# Patient Record
Sex: FEMALE | Race: WHITE | NOT HISPANIC OR LATINO | Employment: FULL TIME | URBAN - METROPOLITAN AREA
[De-identification: names, ages, dates, MRNs, and addresses within clinical notes are randomized per-mention and may not be internally consistent; named-entity substitution may affect disease eponyms.]

---

## 2017-02-13 ENCOUNTER — PATIENT MESSAGE (OUTPATIENT)
Dept: DERMATOLOGY | Facility: CLINIC | Age: 47
End: 2017-02-13

## 2017-02-14 ENCOUNTER — OFFICE VISIT (OUTPATIENT)
Dept: DERMATOLOGY | Facility: CLINIC | Age: 47
End: 2017-02-14
Payer: COMMERCIAL

## 2017-02-14 VITALS — HEIGHT: 67 IN | WEIGHT: 204 LBS | BODY MASS INDEX: 32.02 KG/M2

## 2017-02-14 DIAGNOSIS — L57.0 BENIGN KERATOSIS: ICD-10-CM

## 2017-02-14 DIAGNOSIS — L72.0 MILIA: Primary | ICD-10-CM

## 2017-02-14 PROCEDURE — 99999 PR PBB SHADOW E&M-EST. PATIENT-LVL II: CPT | Mod: PBBFAC,,, | Performed by: DERMATOLOGY

## 2017-02-14 PROCEDURE — 99212 OFFICE O/P EST SF 10 MIN: CPT | Mod: S$GLB,,, | Performed by: DERMATOLOGY

## 2017-02-14 NOTE — PROGRESS NOTES
Subjective:       Patient ID:  Concepcion De LaV ega is a 46 y.o. female who presents for   Chief Complaint   Patient presents with    Lesion     HPI Comments: High risk patient complains of lesion on right cheek for several weeks, asymptomatic, not treated  Lesion on back recently noticed not treated    Recent path  FINAL PATHOLOGIC DIAGNOSIS  1. Skin, right third digit, shave biopsy:  - VERRUCA VULGARIS.  MICROSCOPIC DESCRIPTION: Sections show a verrucous keratosis with acanthosis, papillomatosis,  hyperkeratosis, hypergranulosis and focal koilocytotic changes.  Diagnosed by: Hannah Bill M.D.  (Electronically Signed: 2016-07-18 12:57:47)     Phx BCC L anterior Thigh April 2016 - Gisela ZAZUETA @ [facility] - excised  Phx SCC R hand Sept 2015 - excised    Mother & Father had skin ca- types unknown    Maternal grandparents had skin ca - types unknown                 Review of Systems   Skin: Positive for activity-related sunscreen use. Negative for daily sunscreen use and recent sunburn.        Objective:    Physical Exam   Constitutional: She appears well-developed and well-nourished. No distress.   HENT:   Head:       Neurological: She is alert and oriented to person, place, and time. She is not disoriented.   Psychiatric: She has a normal mood and affect.   Skin:   Areas Examined (abnormalities noted in diagram):   Head / Face Inspection Performed  Neck Inspection Performed  Back Inspection Performed  RUE Inspected  LUE Inspection Performed              Diagram Legend     Erythematous scaling macule/papule c/w actinic keratosis       Vascular papule c/w angioma      Pigmented verrucoid papule/plaque c/w seborrheic keratosis      Yellow umbilicated papule c/w sebaceous hyperplasia      Irregularly shaped tan macule c/w lentigo     1-2 mm smooth white papules consistent with Milia      Movable subcutaneous cyst with punctum c/w epidermal inclusion cyst      Subcutaneous movable cyst c/w pilar cyst      Firm pink to brown  papule c/w dermatofibroma      Pedunculated fleshy papule(s) c/w skin tag(s)      Evenly pigmented macule c/w junctional nevus     Mildly variegated pigmented, slightly irregular-bordered macule c/w mildly atypical nevus      Flesh colored to evenly pigmented papule c/w intradermal nevus       Pink pearly papule/plaque c/w basal cell carcinoma      Erythematous hyperkeratotic cursted plaque c/w SCC      Surgical scar with no sign of skin cancer recurrence      Open and closed comedones      Inflammatory papules and pustules      Verrucoid papule consistent consistent with wart     Erythematous eczematous patches and plaques     Dystrophic onycholytic nail with subungual debris c/w onychomycosis     Umbilicated papule    Erythematous-base heme-crusted tan verrucoid plaque consistent with inflamed seborrheic keratosis     Erythematous Silvery Scaling Plaque c/w Psoriasis     See annotation      Assessment / Plan:        Milia, right cheek  Lesion incised with #11 blade and drained on today's date      Benign keratosis, back  Reassurance given to patient. No treatment is necessary.   Treatment of benign, asymptomatic lesions may be considered cosmetic.               Return in about 6 months (around 8/14/2017).

## 2017-04-20 ENCOUNTER — OFFICE VISIT (OUTPATIENT)
Dept: DERMATOLOGY | Facility: CLINIC | Age: 47
End: 2017-04-20
Payer: COMMERCIAL

## 2017-04-20 VITALS — WEIGHT: 204 LBS | BODY MASS INDEX: 32.02 KG/M2 | HEIGHT: 67 IN

## 2017-04-20 DIAGNOSIS — L23.7 POISON IVY: Primary | ICD-10-CM

## 2017-04-20 DIAGNOSIS — L30.4 INTERTRIGO: ICD-10-CM

## 2017-04-20 PROCEDURE — 99213 OFFICE O/P EST LOW 20 MIN: CPT | Mod: S$GLB,,, | Performed by: DERMATOLOGY

## 2017-04-20 PROCEDURE — 99999 PR PBB SHADOW E&M-EST. PATIENT-LVL II: CPT | Mod: PBBFAC,,, | Performed by: DERMATOLOGY

## 2017-04-20 RX ORDER — PREDNISONE 20 MG/1
TABLET ORAL
Qty: 11 TABLET | Refills: 0 | Status: SHIPPED | OUTPATIENT
Start: 2017-04-20 | End: 2017-07-24 | Stop reason: ALTCHOICE

## 2017-04-20 RX ORDER — MOMETASONE FUROATE 1 MG/G
CREAM TOPICAL
Qty: 50 G | Refills: 1 | Status: SHIPPED | OUTPATIENT
Start: 2017-04-20 | End: 2017-08-17

## 2017-04-20 NOTE — MR AVS SNAPSHOT
[facility]  [address] [place] [address]  Phone: [phone]  Fax: [phone]                  Concepcion De La Vega   2017 9:45 AM   Office Visit    Description:  Female : 1970   Provider:  Taylor Savage MD   Department:  [place] [facility]           Reason for Visit     Rash           Diagnoses this Visit        Comments    Poison ivy    -  Primary     Intertrigo                To Do List           Goals (5 Years of Data)     None      Follow-Up and Disposition     Return if symptoms worsen or fail to improve.    Follow-up and Disposition History       These Medications        Disp Refills Start End    mometasone 0.1% (ELOCON) 0.1 % cream 50 g 1 2017     aaa prn poison ivy, avoid chronic use.    Pharm[place]: [facility] [place] - [address] Ph #: [phone]         [facility]     [facility] Nurse Care Line -  Assistance  Unless otherwise directed by your provider, please contact [facility], our nurse care line that is available for  assistance.     Registered nurses in the [facility] provide: appointment scheduling, clinical advisement, health education, and other advisory services.  Call: [phone] (toll free)               Medications           Message regarding Medications     Verify the changes and/or additions to your medication regime listed below are the same as discussed with your clinician today.  If any of these changes or additions are incorrect, please notify your healthcare provider.        START taking these NEW medications        Refills    mometasone 0.1% (ELOCON) 0.1 % cream 1    Sig: aaa prn poison ivy, avoid chronic use.    Class: Normal           Verify that the below list of medications is an accurate representation of the medications you are currently taking.  If none reported, the list may be blank. If incorrect, please contact your healthcare provider. Carry this  "list with you in case of emergency.           Current Medications     buPROPion (WELLBUTRIN XL) 150 MG TB24 tablet Take 2 tablets (300 mg total) by mouth once daily.    dicyclomine (BENTYL) 10 MG capsule Take 1 capsule (10 mg total) by mouth 4 (four) times daily before meals and nightly.    hyoscyamine (LEVSIN/SL) 0.125 mg Subl Take 1 tablet (0.125 mg total) by mouth every 6 (six) hours as needed.    pantoprazole (PROTONIX) 20 MG tablet Take 1 tablet (20 mg total) by mouth once daily.    riboflavin (VITAMIN B-2) 100 mg Tab tablet Take 1 tablet (100 mg total) by mouth 2 (two) times daily.    mometasone 0.1% (ELOCON) 0.1 % cream aaa prn poison ivy, avoid chronic use.    triamcinolone acetonide 0.5% (KENALOG) 0.5 % Crea Apply topically 3 (three) times daily as needed.           Clinical Reference Information           Your Vitals Were     Height Weight BMI          5' 7" (1.702 m) 92.5 kg (204 lb) 31.95 kg/m2        Allergies as of 4/20/2017     Oxytetracycline      Immunizations Administered on Date of Encounter - 4/20/2017     None      Language Assistance Services     ATTENTION: Language assistance services are available, free of charge. Please call [phone].      ATENCIÓN: Si elio snell, tiene a knapp disposición servicios gratuitos de asistencia lingüística. Llame al [phone].     CHÚ Ý: N?u b?n nói Ti?ng Vi?t, có các d?ch v? h? tr? ngôn ng? mi?n phí dành cho b?n. G?i s? [phone].         [facility] complies with applicable Federal civil rights laws and does not discriminate on the basis of race, color, national origin, age, disability, or sex.        "

## 2017-04-20 NOTE — PROGRESS NOTES
Subjective:       Patient ID:  Concepcion De La Vega is a 47 y.o. female who presents for   Chief Complaint   Patient presents with    Rash     HPI Comments: Pt complains of rash on left hand for 1 days, itchy, using kenalog cream. Linear distribution. History contact with poison ivy recently this weekend. Immediately showered after she realized .     Rash under breast and abdominal fold for 1 day, using TAC cream and otc antifungal cream     Recent path 7-  FINAL PATHOLOGIC DIAGNOSIS  1. Skin, right third digit, shave biopsy:  - VERRUCA VULGARIS.  MICROSCOPIC DESCRIPTION: Sections show a verrucous keratosis with acanthosis, papillomatosis,  hyperkeratosis, hypergranulosis and focal koilocytotic changes.  Diagnosed by: Hannah Bill M.D.  (Electronically Signed: 2016-07-18 12:57:47)      Phx BCC L anterior Thigh April 2016 - Gisela ZAZUETA @ [facility] - excised  Phx SCC R hand Sept 2015 - excised    Mother & Father had skin ca- types unknown    Maternal grandparents had skin ca - types unknown                        Review of Systems   Skin: Positive for itching, rash and activity-related sunscreen use. Negative for daily sunscreen use and recent sunburn.        Objective:    Physical Exam   Constitutional: She appears well-developed and well-nourished. She is obese.  No distress.   HENT:   Mouth/Throat: Lips normal.    Eyes: Lids are normal.  No conjunctival no injection.   Neurological: She is alert and oriented to person, place, and time. She is not disoriented.   Psychiatric: She has a normal mood and affect.   Skin:   Areas Examined (abnormalities noted in diagram):   Head / Face Inspection Performed  Neck Inspection Performed  Chest / Axilla Inspection Performed  Abdomen Inspection Performed  RUE Inspected  LUE Inspection Performed  Nails and Digits Inspection Performed              Diagram Legend     Erythematous scaling macule/papule c/w actinic keratosis       Vascular papule c/w angioma      Pigmented  verrucoid papule/plaque c/w seborrheic keratosis      Yellow umbilicated papule c/w sebaceous hyperplasia      Irregularly shaped tan macule c/w lentigo     1-2 mm smooth white papules consistent with Milia      Movable subcutaneous cyst with punctum c/w epidermal inclusion cyst      Subcutaneous movable cyst c/w pilar cyst      Firm pink to brown papule c/w dermatofibroma      Pedunculated fleshy papule(s) c/w skin tag(s)      Evenly pigmented macule c/w junctional nevus     Mildly variegated pigmented, slightly irregular-bordered macule c/w mildly atypical nevus      Flesh colored to evenly pigmented papule c/w intradermal nevus       Pink pearly papule/plaque c/w basal cell carcinoma      Erythematous hyperkeratotic cursted plaque c/w SCC      Surgical scar with no sign of skin cancer recurrence      Open and closed comedones      Inflammatory papules and pustules      Verrucoid papule consistent consistent with wart     Erythematous eczematous patches and plaques     Dystrophic onycholytic nail with subungual debris c/w onychomycosis     Umbilicated papule    Erythematous-base heme-crusted tan verrucoid plaque consistent with inflamed seborrheic keratosis     Erythematous Silvery Scaling Plaque c/w Psoriasis     See annotation      Assessment / Plan:        Poison ivy  -     mometasone 0.1% (ELOCON) 0.1 % cream; aaa prn poison ivy, avoid chronic use.  Dispense: 50 g; Refill: 1  discussed avoiding chronic use and to use only on affected areas- risk atrophy, striae, ecchymoses, hypopigmentation      Intertrigo, inframammary/infraabdominal/inguinal  Recommend white vinegar: water 1:1 compresses 2x/day followed by cool blow dry and then application of prescription medication- ok to mix triamcinolone/desitin/lotrimin  Handout given  discussed avoiding chronic use and to use only on affected areas- risk atrophy, striae, ecchymoses, hypopigmentation    Cool blow dry after showering. Once clear, use baby powder, corn  starch, or Zeasorb AF powder for maintenance to affected area.             Return if symptoms worsen or fail to improve.

## 2017-05-09 ENCOUNTER — OFFICE VISIT (OUTPATIENT)
Dept: OPTOMETRY | Facility: CLINIC | Age: 47
End: 2017-05-09
Payer: COMMERCIAL

## 2017-05-09 DIAGNOSIS — H16.202 KERATOCONJUNCTIVITIS OF LEFT EYE: Primary | ICD-10-CM

## 2017-05-09 PROCEDURE — 92012 INTRM OPH EXAM EST PATIENT: CPT | Mod: S$GLB,,, | Performed by: OPTOMETRIST

## 2017-05-09 PROCEDURE — 99999 PR PBB SHADOW E&M-EST. PATIENT-LVL II: CPT | Mod: PBBFAC,,, | Performed by: OPTOMETRIST

## 2017-05-09 RX ORDER — NEOMYCIN SULFATE, POLYMYXIN B SULFATE AND DEXAMETHASONE 3.5; 10000; 1 MG/ML; [USP'U]/ML; MG/ML
1 SUSPENSION/ DROPS OPHTHALMIC 3 TIMES DAILY
Qty: 5 ML | Refills: 0 | Status: SHIPPED | OUTPATIENT
Start: 2017-05-09 | End: 2017-05-12

## 2017-05-09 NOTE — PROGRESS NOTES
HPI     Eye Pain    Additional comments: +FBS -- OS when woke up this morning, feels like   something under AYLIN -- relief when pulls lid away from eye // no redness,   tearing or discharge           Dry Eye    Additional comments: +ATS            Comments   Agree above  Transient fbs OS sup lid/ lateral canthus   VA normal  No other new issue  [place] had fbs OD and found lash in eye yesterday       Last edited by ANTONIO Gresham, OD on 5/9/2017 12:16 PM. (History)            Assessment /Plan     For exam results, see Encounter Report.    Keratoconjunctivitis of left eye  -     neomycin-polymyxin-dexamethasone (MAXITROL) 3.5mg/mL-10,000 unit/mL-0.1 % DrpS; Place 1 drop into the left eye 3 (three) times daily.  Dispense: 5 mL; Refill: 0      Allergic / dry eye/ possible fb component to s/s  No fb noted today  No stain/ abrasion    Swabbed sup lid w/ sterile forceps  Rx gtts tid x 3 then prn for comfort  Suggest gel gtts at night for dry eye issues following lasik    RTC prn if not improved or worsening      415 PM  Rechecked at slit lamp w/ fluress  Still no fb noted and no stain  Continue maxitrol as directed w/ ATs tid-qid    RTC prn

## 2017-05-09 NOTE — MR AVS SNAPSHOT
[facility]  [address] [place] [address]  Phone: [phone]  Fax: [phone]                  Concepcion De La Vega   2017 1:00 PM   Office Visit    Description:  Female : 1970   Provider:  ANTONIO Gresham, OD   Department:  [place] [facility]           Reason for Visit     Eye Pain     Dry Eye           Diagnoses this Visit        Comments    Keratoconjunctivitis of left eye    -  Primary            To Do List           Future Appointments        Provider Department Dept Phone    2017 1:00 PM ANTONIO Gresham, OD [phone]      Goals (5 Years of Data)     None      Follow-Up and Disposition     Return if symptoms worsen or fail to improve.       These Medications        Disp Refills Start End    neomycin-polymyxin-dexamethasone (MAXITROL) 3.5mg/mL-10,000 unit/mL-0.1 % DrpS 5 mL 0 2017    Place 1 drop into the left eye 3 (three) times daily. - Left Eye    Pharmacy: [facility] [place] - [address] Ph #: [phone]         [facility]     [facility] Care [facility] -  Assistance  Unless otherwise directed by your provider, please contact [facility], our nurse care line that is available for  assistance.     Registered nurses in the [facility] provide: appointment scheduling, clinical advisement, health education, and other advisory services.  Call: [phone] (toll free)               Medications           Message regarding Medications     Verify the changes and/or additions to your medication regime listed below are the same as discussed with your clinician today.  If any of these changes or additions are incorrect, please notify your healthcare provider.        START taking these NEW medications        Refills    neomycin-polymyxin-dexamethasone (MAXITROL) 3.5mg/mL-10,000 unit/mL-0.1 % DrpS 0    Sig: Place 1 drop into the left eye 3 (three) times  "daily.    Class: Normal    Route: Left Eye           Verify that the below list of medications is an accurate representation of the medications you are currently taking.  If none reported, the list may be blank. If incorrect, please contact your healthcare provider. Carry this list with you in case of emergency.           Current Medications     buPROPion (WELLBUTRIN XL) 150 MG TB24 tablet Take 2 tablets (300 mg total) by mouth once daily.    dicyclomine (BENTYL) 10 MG capsule Take 1 capsule (10 mg total) by mouth 4 (four) times daily before meals and nightly.    hyoscyamine (LEVSIN/SL) 0.125 mg Subl Take 1 tablet (0.125 mg total) by mouth every 6 (six) hours as needed.    mometasone 0.1% (ELOCON) 0.1 % cream aaa prn poison ivy, avoid chronic use.    neomycin-polymyxin-dexamethasone (MAXITROL) 3.5mg/mL-10,000 unit/mL-0.1 % DrpS Place 1 drop into the left eye 3 (three) times daily.    pantoprazole (PROTONIX) 20 MG tablet Take 1 tablet (20 mg total) by mouth once daily.    predniSONE (DELTASONE) 20 MG tablet 2 po qam x 3 days, then 1 po qam x 3 days then 1/2 po qam x 4 days    riboflavin (VITAMIN B-2) 100 mg Tab tablet Take 1 tablet (100 mg total) by mouth 2 (two) times daily.    triamcinolone acetonide 0.5% (KENALOG) 0.5 % Crea Apply topically 3 (three) times daily as needed.           Clinical Reference Information           Allergies as of 5/9/2017     Oxytetracycline      Immunizations Administered on Date of Encounter - 5/9/2017     None      Instructions    DRY EYES:  Use Over The Counter artificial tears as needed for dry eye symptoms.  Some common brands include:  Systane, Optive, and Refresh.  These drops can be used as frequently as desired, but may be most helpful use during long periods of concentrated work.  For example, reading / working at the computer.  Avoid drops that "get redness out", as these contain medication that may further irritate the eyes.    ALLERGY EYES / SYMPTOMS:    Over the counter " medications include--Zaditor and Alaway  Use as directed 1-2 drops daily for symptoms of itching / watering eyes.  These drops will not help for dry eye or exposure symptoms.           Language Assistance Services     ATTENTION: Language assistance services are available, free of charge. Please call [phone].      ATENCIÓN: Si elio snell, tiene a knapp disposición servicios gratuitos de asistencia lingüística. Llame al [phone].     CHÚ Ý: N?u b?n nói Ti?ng Vi?t, có các d?ch v? h? tr? ngôn ng? mi?n phí dành cho b?n. G?i s? [phone].         [place] [facility] complies with applicable Federal civil rights laws and does not discriminate on the basis of race, color, national origin, age, disability, or sex.

## 2017-06-19 ENCOUNTER — PATIENT MESSAGE (OUTPATIENT)
Dept: FAMILY MEDICINE | Facility: CLINIC | Age: 47
End: 2017-06-19

## 2017-06-20 RX ORDER — ALBUTEROL SULFATE 90 UG/1
2 AEROSOL, METERED RESPIRATORY (INHALATION) EVERY 6 HOURS PRN
Qty: 18 G | Refills: 3 | Status: SHIPPED | OUTPATIENT
Start: 2017-06-20 | End: 2018-10-16 | Stop reason: SDUPTHER

## 2017-06-26 ENCOUNTER — CLINICAL SUPPORT (OUTPATIENT)
Dept: UROLOGY | Facility: CLINIC | Age: 47
End: 2017-06-26
Payer: COMMERCIAL

## 2017-06-26 DIAGNOSIS — N30.00 ACUTE CYSTITIS WITHOUT HEMATURIA: Primary | ICD-10-CM

## 2017-06-26 PROCEDURE — 87086 URINE CULTURE/COLONY COUNT: CPT

## 2017-06-28 LAB — BACTERIA UR CULT: NO GROWTH

## 2017-07-06 DIAGNOSIS — K22.4 ESOPHAGEAL SPASM: ICD-10-CM

## 2017-07-06 RX ORDER — DICYCLOMINE HYDROCHLORIDE 10 MG/1
CAPSULE ORAL
Qty: 30 CAPSULE | Refills: 2 | Status: SHIPPED | OUTPATIENT
Start: 2017-07-06 | End: 2018-01-08

## 2017-07-24 ENCOUNTER — OFFICE VISIT (OUTPATIENT)
Dept: ALLERGY | Facility: CLINIC | Age: 47
End: 2017-07-24
Payer: COMMERCIAL

## 2017-07-24 VITALS
WEIGHT: 210.13 LBS | BODY MASS INDEX: 31.85 KG/M2 | HEIGHT: 68 IN | DIASTOLIC BLOOD PRESSURE: 82 MMHG | SYSTOLIC BLOOD PRESSURE: 130 MMHG

## 2017-07-24 DIAGNOSIS — J30.89 CHRONIC NONSEASONAL ALLERGIC RHINITIS DUE TO POLLEN: Primary | ICD-10-CM

## 2017-07-24 DIAGNOSIS — J45.21 MILD INTERMITTENT ASTHMA WITH ACUTE EXACERBATION: ICD-10-CM

## 2017-07-24 DIAGNOSIS — J30.1 CHRONIC SEASONAL ALLERGIC RHINITIS DUE TO POLLEN: ICD-10-CM

## 2017-07-24 DIAGNOSIS — H10.13 ALLERGIC CONJUNCTIVITIS, BILATERAL: ICD-10-CM

## 2017-07-24 PROCEDURE — 99999 PR PBB SHADOW E&M-EST. PATIENT-LVL II: CPT | Mod: PBBFAC,,, | Performed by: ALLERGY & IMMUNOLOGY

## 2017-07-24 PROCEDURE — 99204 OFFICE O/P NEW MOD 45 MIN: CPT | Mod: S$GLB,,, | Performed by: ALLERGY & IMMUNOLOGY

## 2017-07-24 RX ORDER — FLUTICASONE PROPIONATE 50 MCG
1 SPRAY, SUSPENSION (ML) NASAL DAILY
COMMUNITY
End: 2019-05-08

## 2017-07-24 RX ORDER — CETIRIZINE HYDROCHLORIDE 10 MG/1
10 TABLET ORAL DAILY
COMMUNITY
End: 2019-07-03

## 2017-07-24 RX ORDER — PREDNISONE 20 MG/1
TABLET ORAL
Qty: 7 TABLET | Refills: 0 | Status: SHIPPED | OUTPATIENT
Start: 2017-07-24 | End: 2017-08-17

## 2017-07-24 RX ORDER — MONTELUKAST SODIUM 10 MG/1
10 TABLET ORAL NIGHTLY
Qty: 30 TABLET | Refills: 11 | Status: SHIPPED | OUTPATIENT
Start: 2017-07-24 | End: 2017-08-23

## 2017-07-24 NOTE — PROGRESS NOTES
Subjective:       Patient ID: Concepcion De La Vega is a 47 y.o. female.    Chief Complaint:  Other (wheezing, cough, sinus issues)      48 yo woman presents for new patient evaluation of asthma flare. She has h/o allergic rhinitis, skin test in 2006 +to cat, dog, cockroach, dust mites, trees, weeds and grass. She did do allergy shots for awhile form 4178-3059 and did help but stopped because of inconvenience. She take Flonase and zyrtec bush and that controls her fairly well. She has albuterol but normally uses rarely. July 10 she went to [place]. On way there she started with cough, hacking and wheeze. While there got much worse with cough with white mucus, tight, wheeze and SOB. She had 2 20 mg prednisone from poison ivy flare and she took one a day for 2 days and did help a lot. She has albuterol inhaler and using daily and now QOD. She is better but just cant clear completely. She had some PND but no congestion or runny nose, no sneeze or itchy eyes now. She has no porfirio awakening from asthma but has some BEDOLLA. For few months she has been taking her dog to agility class outside on sundays and she is SOB there. She has never been on Singulair. Never on ICS. Rarely ever has flares like this. She has no insect, latex or food allergy. No eczema.         Environmental History: see history section for home environment  Review of Systems   Constitutional: Positive for fatigue. Negative for appetite change, chills and fever.   HENT: Positive for postnasal drip. Negative for congestion, ear discharge, ear pain, facial swelling, nosebleeds, rhinorrhea, sinus pressure, sneezing, sore throat, trouble swallowing and voice change.    Eyes: Negative for discharge, redness, itching and visual disturbance.   Respiratory: Positive for cough, chest tightness, shortness of breath and wheezing. Negative for choking.    Cardiovascular: Negative for chest pain, palpitations and leg swelling.   Gastrointestinal: Negative for abdominal  distention, abdominal pain, constipation, diarrhea, nausea and vomiting.   Genitourinary: Negative for difficulty urinating.   Musculoskeletal: Negative for arthralgias, gait problem, joint swelling and myalgias.   Skin: Negative for color change and rash.   Neurological: Negative for dizziness, syncope, weakness, light-headedness and headaches.   Hematological: Negative for adenopathy. Does not bruise/bleed easily.   Psychiatric/Behavioral: Negative for agitation, behavioral problems, confusion and sleep disturbance. The patient is not nervous/anxious.         Objective:    Physical Exam   Constitutional: She is oriented to person, place, and time. She appears well-developed and well-nourished. No distress.   HENT:   Head: Normocephalic and atraumatic.   Right Ear: Hearing, tympanic membrane, external ear and ear canal normal.   Left Ear: Hearing, tympanic membrane, external ear and ear canal normal.   Nose: No mucosal edema, rhinorrhea, sinus tenderness or septal deviation. No epistaxis. Right sinus exhibits no maxillary sinus tenderness and no frontal sinus tenderness. Left sinus exhibits no maxillary sinus tenderness and no frontal sinus tenderness.   Mouth/Throat: Uvula is midline, oropharynx is clear and moist and mucous membranes are normal. No uvula swelling.   Eyes: Conjunctivae are normal. Right eye exhibits no discharge. Left eye exhibits no discharge.   Neck: Normal range of motion. No thyromegaly present.   Cardiovascular: Normal rate, regular rhythm and normal heart sounds.    No murmur heard.  Pulmonary/Chest: Effort normal and breath sounds normal. No respiratory distress. She has no wheezes.   Abdominal: Soft. She exhibits no distension. There is no tenderness.   Musculoskeletal: Normal range of motion. She exhibits no edema or tenderness.   Lymphadenopathy:     She has no cervical adenopathy.   Neurological: She is alert and oriented to person, place, and time.   Skin: Skin is warm and dry. No rash  noted. No erythema.   Psychiatric: She has a normal mood and affect. Her behavior is normal. Judgment and thought content normal.   Nursing note and vitals reviewed.      Laboratory:   none performed   Assessment:       1. Chronic nonseasonal allergic rhinitis due to pollen    2. Chronic seasonal allergic rhinitis due to pollen    3. Allergic conjunctivitis, bilateral    4. Mild intermittent asthma with acute exacerbation         Plan:       1. Pt with h/o mild asthma with increased symptoms but clear on exams. Advised to use albuterol 2 puffs every 4 ours and will give oral prednisone for few days. Once over flare needs baseline PFT  2. continue cetirizine 10 mg and fluticasone 2 SEN daily and add montelukast daily  3. RTC 6 months or sooner if needed

## 2017-08-17 ENCOUNTER — OFFICE VISIT (OUTPATIENT)
Dept: UROLOGY | Facility: CLINIC | Age: 47
End: 2017-08-17
Payer: COMMERCIAL

## 2017-08-17 VITALS
BODY MASS INDEX: 31.85 KG/M2 | SYSTOLIC BLOOD PRESSURE: 130 MMHG | WEIGHT: 210.13 LBS | HEIGHT: 68 IN | DIASTOLIC BLOOD PRESSURE: 89 MMHG | HEART RATE: 83 BPM

## 2017-08-17 DIAGNOSIS — R31.29 HEMATURIA, MICROSCOPIC: Primary | ICD-10-CM

## 2017-08-17 PROCEDURE — 99202 OFFICE O/P NEW SF 15 MIN: CPT | Mod: S$GLB,,, | Performed by: UROLOGY

## 2017-08-17 PROCEDURE — 87086 URINE CULTURE/COLONY COUNT: CPT

## 2017-08-17 PROCEDURE — 99999 PR PBB SHADOW E&M-EST. PATIENT-LVL III: CPT | Mod: PBBFAC,,, | Performed by: UROLOGY

## 2017-08-17 PROCEDURE — 3008F BODY MASS INDEX DOCD: CPT | Mod: S$GLB,,, | Performed by: UROLOGY

## 2017-08-17 RX ORDER — DIAZEPAM 10 MG/1
10 TABLET ORAL ONCE
Qty: 1 TABLET | Refills: 0 | Status: SHIPPED | OUTPATIENT
Start: 2017-08-17 | End: 2017-09-29

## 2017-08-17 NOTE — PROGRESS NOTES
Subjective:       Patient ID: Concepcion De La Vega is a 47 y.o. female.    Chief Complaint: Urinary Tract Infection    HPI     47 year old with recurrent UTI's and microhematuria.  She has intermittent irritative symptoms but cultures have been negative.  She noted dysuria and frequency yesterday but today her symptoms are improved.  She also has a distant history of stone and previous lithotripsy.  CT scan 2 years ago showed a small non-obstructing lower pole stone.    Urine dipstick shows positive for 2+blood.  Micro exam: 3-5 RBC's per HPF.    Past Medical History:   Diagnosis Date    Abnormal Pap smear     ASCUS negative HPV. Repeat pap    ADD (attention deficit disorder)     Arthritis     right knee    Asthma     exercise induced    Cystocele     with stress incontinence    Depression     Dizziness     Fracture of sternum Nov 2010    from Karate class    GERD (gastroesophageal reflux disease)     HEARING LOSS     Hearing loss in right ear     IBS (irritable bowel syndrome)     Intrauterine device     Mirena    Kidney stone 2009    PONV (postoperative nausea and vomiting)     requests Scopolamine patch    Seasonal allergies     deviated septum also    SI (sacroiliac) pain     Sinus pain July 3/2012    no fever, starting on antibiotics    Sinusitis     Skin tag of female perineum 2012    TMJ (dislocation of temporomandibular joint)      Past Surgical History:   Procedure Laterality Date    ANTERIOR VAGINAL REPAIR      CHOLECYSTECTOMY      COLONOSCOPY      DILATION AND CURETTAGE OF UTERUS      EXTERNAL EAR SURGERY      Rt middle ear oval Repair    LASIK Bilateral 2015    Singer    TONSILLECTOMY      TONSILLECTOMY, ADENOIDECTOMY, BILATERAL MYRINGOTOMY AND TUBES      URETERAL STENT PLACEMENT  2009    cysto, laser lithotripsy left ureter stone       Current Outpatient Prescriptions:     albuterol 90 mcg/actuation inhaler, Inhale 2 puffs into the lungs every 6 (six) hours as needed for  Wheezing., Disp: 18 g, Rfl: 3    buPROPion (WELLBUTRIN XL) 150 MG TB24 tablet, Take 2 tablets (300 mg total) by mouth once daily., Disp: 180 tablet, Rfl: 3    fluticasone (FLONASE) 50 mcg/actuation nasal spray, 1 spray by Each Nare route once daily., Disp: , Rfl:     montelukast (SINGULAIR) 10 mg tablet, Take 1 tablet (10 mg total) by mouth every evening., Disp: 30 tablet, Rfl: 11    pantoprazole (PROTONIX) 20 MG tablet, Take 1 tablet (20 mg total) by mouth once daily., Disp: 90 tablet, Rfl: 3    triamcinolone acetonide 0.5% (KENALOG) 0.5 % Crea, Apply topically 3 (three) times daily as needed., Disp: 30 g, Rfl: 2    cetirizine (ZYRTEC) 10 MG tablet, Take 10 mg by mouth once daily., Disp: , Rfl:     diazePAM (VALIUM) 10 MG Tab, Take 1 tablet (10 mg total) by mouth once., Disp: 1 tablet, Rfl: 0    dicyclomine (BENTYL) 10 MG capsule, TAKE ONE CAPSULE BY MOUTH FOUR TIMES A DAY BEFORE MEALS AND NIGHTLY, Disp: 30 capsule, Rfl: 2    hyoscyamine (LEVSIN/SL) 0.125 mg Subl, Take 1 tablet (0.125 mg total) by mouth every 6 (six) hours as needed., Disp: 30 tablet, Rfl: 1    Review of Systems   Constitutional: Negative for fever.   Genitourinary: Negative for dysuria and hematuria.       Objective:      Physical Exam   Constitutional: She is oriented to person, place, and time. She appears well-developed and well-nourished.   Pulmonary/Chest: Effort normal. No respiratory distress.   Neurological: She is alert and oriented to person, place, and time.   Skin: Skin is warm.   Psychiatric: She has a normal mood and affect. Her behavior is normal.   Vitals reviewed.      Assessment:       1. Hematuria, microscopic        Plan:       Hematuria, microscopic  -     Urine culture  -     US Retroperitoneal Complete (Kidney and; Future; Expected date: 08/17/2017  -     Cystoscopy; Future    Other orders  -     diazePAM (VALIUM) 10 MG Tab; Take 1 tablet (10 mg total) by mouth once.  Dispense: 1 tablet; Refill: 0      Plan BELL and  cystoscopy.  F/u urine culture

## 2017-08-18 LAB — BACTERIA UR CULT: NORMAL

## 2017-08-21 ENCOUNTER — HOSPITAL ENCOUNTER (OUTPATIENT)
Dept: RADIOLOGY | Facility: HOSPITAL | Age: 47
Discharge: HOME | End: 2017-08-21
Attending: UROLOGY
Payer: COMMERCIAL

## 2017-08-21 ENCOUNTER — TELEPHONE (OUTPATIENT)
Dept: UROLOGY | Facility: CLINIC | Age: 47
End: 2017-08-21

## 2017-08-21 DIAGNOSIS — R31.29 HEMATURIA, MICROSCOPIC: ICD-10-CM

## 2017-08-21 PROCEDURE — 76770 US EXAM ABDO BACK WALL COMP: CPT | Mod: 26,,, | Performed by: RADIOLOGY

## 2017-08-21 PROCEDURE — 76770 US EXAM ABDO BACK WALL COMP: CPT | Mod: TC,PO

## 2017-08-21 NOTE — TELEPHONE ENCOUNTER
----- Message from ROSY Wallace MD sent at 8/21/2017  1:18 PM CDT -----  Call patient with culture results.  Negative

## 2017-09-29 ENCOUNTER — PROCEDURE VISIT (OUTPATIENT)
Dept: UROLOGY | Facility: CLINIC | Age: 47
End: 2017-09-29
Payer: COMMERCIAL

## 2017-09-29 VITALS
BODY MASS INDEX: 30.94 KG/M2 | SYSTOLIC BLOOD PRESSURE: 151 MMHG | HEART RATE: 73 BPM | DIASTOLIC BLOOD PRESSURE: 92 MMHG | WEIGHT: 204.13 LBS | HEIGHT: 68 IN

## 2017-09-29 DIAGNOSIS — R31.29 HEMATURIA, MICROSCOPIC: ICD-10-CM

## 2017-09-29 LAB
BILIRUB SERPL-MCNC: NORMAL MG/DL
BLOOD URINE, POC: NORMAL
COLOR, POC UA: YELLOW
GLUCOSE UR QL STRIP: NORMAL
KETONES UR QL STRIP: NORMAL
LEUKOCYTE ESTERASE URINE, POC: NORMAL
NITRITE, POC UA: NORMAL
PH, POC UA: 6
PROTEIN, POC: NORMAL
SPECIFIC GRAVITY, POC UA: 1
UROBILINOGEN, POC UA: NORMAL

## 2017-09-29 PROCEDURE — 88112 CYTOPATH CELL ENHANCE TECH: CPT | Performed by: PATHOLOGY

## 2017-09-29 PROCEDURE — 81002 URINALYSIS NONAUTO W/O SCOPE: CPT | Mod: S$GLB,,, | Performed by: UROLOGY

## 2017-09-29 PROCEDURE — 52000 CYSTOURETHROSCOPY: CPT | Mod: S$GLB,,, | Performed by: UROLOGY

## 2017-09-29 NOTE — PROCEDURES
"Cystoscopy  Date/Time: 9/29/2017 3:35 PM  Performed by: ROSY MILLER  Authorized by: ROSY MILLER     Consent Done?:  Yes (Written)  Time out: Immediately prior to procedure a "time out" was called to verify the correct patient, procedure, equipment, support staff and site/side marked as required.    Indications: hematuria    Position:  Other  Anesthesia:  Lidocaine jelly  Patient sedated?: No    Preparation: Patient was prepped and draped in usual sterile fashion      Scope type:  Flexible cystoscope    Patient tolerance:  Patient tolerated the procedure well with no immediate complications      The patients clinic history and physical were reviewed and there are no changes.    The patient was placed in the frog-leg position.  The genitals were prepped and draped in a sterile fashion.  Viscous lidocaine jelly was intsilled into the urethra.  The uretrha was dilated with sounds to 22 Samoan.  Using a flexible scope, a careful cystoscopic exam was then performed.  The entire bladder mucosa was systematically visualized.  The mucosa appeared normal.  There were no lesions, masses foreign bodies or stones.   Each ureteral orifices were visualized and both had clear efflux of urine.  Barbotage was performed and washings were collected for cytological evaluation.  The cystoscope was then removed and I examined the entire length of the urethra.  The urethra appeared normal.  She tolerated the procedure well.  There were no complications.    Impression:  Normal cystoscopy.      "

## 2017-10-04 ENCOUNTER — TELEPHONE (OUTPATIENT)
Dept: UROLOGY | Facility: CLINIC | Age: 47
End: 2017-10-04

## 2017-10-04 NOTE — TELEPHONE ENCOUNTER
----- Message from ROSY Wallace MD sent at 10/4/2017  8:54 AM CDT -----  Call with urine cytology.  Negative.

## 2017-10-17 DIAGNOSIS — F33.0 MILD EPISODE OF RECURRENT MAJOR DEPRESSIVE DISORDER: Chronic | ICD-10-CM

## 2017-10-18 RX ORDER — BUPROPION HYDROCHLORIDE 150 MG/1
TABLET ORAL
Qty: 180 TABLET | Refills: 3 | Status: SHIPPED | OUTPATIENT
Start: 2017-10-18 | End: 2018-12-20 | Stop reason: SDUPTHER

## 2017-10-26 ENCOUNTER — PATIENT MESSAGE (OUTPATIENT)
Dept: FAMILY MEDICINE | Facility: CLINIC | Age: 47
End: 2017-10-26

## 2017-10-26 DIAGNOSIS — R07.89 MID STERNAL CHEST PAIN: ICD-10-CM

## 2017-10-26 DIAGNOSIS — Z00.00 PREVENTATIVE HEALTH CARE: Primary | ICD-10-CM

## 2017-10-26 DIAGNOSIS — N95.1 PERIMENOPAUSAL: ICD-10-CM

## 2017-10-26 RX ORDER — CYCLOBENZAPRINE HCL 5 MG
TABLET ORAL
Qty: 30 TABLET | Refills: 0 | Status: SHIPPED | OUTPATIENT
Start: 2017-10-26 | End: 2018-01-08

## 2017-10-27 NOTE — TELEPHONE ENCOUNTER
Patient already schedule for visit on 1/16.Patient requesting labs to screen for menopause and a question about Wellbutrin.

## 2017-11-16 ENCOUNTER — LAB VISIT (OUTPATIENT)
Dept: LAB | Facility: HOSPITAL | Age: 47
End: 2017-11-16
Attending: EMERGENCY MEDICINE
Payer: COMMERCIAL

## 2017-11-16 ENCOUNTER — OFFICE VISIT (OUTPATIENT)
Dept: PSYCHIATRY | Facility: CLINIC | Age: 47
End: 2017-11-16
Payer: COMMERCIAL

## 2017-11-16 DIAGNOSIS — E78.3 HYPERLIPIDEMIA, GROUP D: ICD-10-CM

## 2017-11-16 DIAGNOSIS — Z00.00 PREVENTATIVE HEALTH CARE: ICD-10-CM

## 2017-11-16 DIAGNOSIS — N95.1 PERIMENOPAUSAL: ICD-10-CM

## 2017-11-16 LAB
ALBUMIN SERPL BCP-MCNC: 3.8 G/DL
ALP SERPL-CCNC: 85 U/L
ALT SERPL W/O P-5'-P-CCNC: 23 U/L
ANION GAP SERPL CALC-SCNC: 7 MMOL/L
AST SERPL-CCNC: 17 U/L
BASOPHILS # BLD AUTO: 0.05 K/UL
BASOPHILS NFR BLD: 0.6 %
BILIRUB SERPL-MCNC: 0.6 MG/DL
BUN SERPL-MCNC: 8 MG/DL
CALCIUM SERPL-MCNC: 9.3 MG/DL
CHLORIDE SERPL-SCNC: 103 MMOL/L
CHOLEST SERPL-MCNC: 184 MG/DL
CHOLEST SERPL-MCNC: 184 MG/DL
CHOLEST/HDLC SERPL: 4.6 {RATIO}
CHOLEST/HDLC SERPL: 4.6 {RATIO}
CO2 SERPL-SCNC: 26 MMOL/L
CREAT SERPL-MCNC: 1 MG/DL
DIFFERENTIAL METHOD: ABNORMAL
EOSINOPHIL # BLD AUTO: 0.2 K/UL
EOSINOPHIL NFR BLD: 1.8 %
ERYTHROCYTE [DISTWIDTH] IN BLOOD BY AUTOMATED COUNT: 15.7 %
EST. GFR  (AFRICAN AMERICAN): >60 ML/MIN/1.73 M^2
EST. GFR  (NON AFRICAN AMERICAN): >60 ML/MIN/1.73 M^2
FSH SERPL-ACNC: 57.2 MIU/ML
GLUCOSE SERPL-MCNC: 134 MG/DL
HCT VFR BLD AUTO: 38.6 %
HDLC SERPL-MCNC: 40 MG/DL
HDLC SERPL-MCNC: 40 MG/DL
HDLC SERPL: 21.7 %
HDLC SERPL: 21.7 %
HGB BLD-MCNC: 11.9 G/DL
IMM GRANULOCYTES # BLD AUTO: 0.03 K/UL
IMM GRANULOCYTES NFR BLD AUTO: 0.4 %
LDLC SERPL CALC-MCNC: 107.6 MG/DL
LDLC SERPL CALC-MCNC: 107.6 MG/DL
LYMPHOCYTES # BLD AUTO: 2.3 K/UL
LYMPHOCYTES NFR BLD: 28.1 %
MCH RBC QN AUTO: 25.8 PG
MCHC RBC AUTO-ENTMCNC: 30.8 G/DL
MCV RBC AUTO: 84 FL
MONOCYTES # BLD AUTO: 0.6 K/UL
MONOCYTES NFR BLD: 7 %
NEUTROPHILS # BLD AUTO: 5.1 K/UL
NEUTROPHILS NFR BLD: 62.1 %
NONHDLC SERPL-MCNC: 144 MG/DL
NONHDLC SERPL-MCNC: 144 MG/DL
NRBC BLD-RTO: 0 /100 WBC
PLATELET # BLD AUTO: 368 K/UL
PMV BLD AUTO: 10.8 FL
POTASSIUM SERPL-SCNC: 4 MMOL/L
PROT SERPL-MCNC: 7.7 G/DL
RBC # BLD AUTO: 4.61 M/UL
SODIUM SERPL-SCNC: 136 MMOL/L
TRIGL SERPL-MCNC: 182 MG/DL
TRIGL SERPL-MCNC: 182 MG/DL
WBC # BLD AUTO: 8.27 K/UL

## 2017-11-16 PROCEDURE — 85025 COMPLETE CBC W/AUTO DIFF WBC: CPT

## 2017-11-16 PROCEDURE — 90834 PSYTX W PT 45 MINUTES: CPT | Mod: S$GLB,,, | Performed by: SOCIAL WORKER

## 2017-11-16 PROCEDURE — 80061 LIPID PANEL: CPT

## 2017-11-16 PROCEDURE — 36415 COLL VENOUS BLD VENIPUNCTURE: CPT | Mod: PO

## 2017-11-16 PROCEDURE — 83001 ASSAY OF GONADOTROPIN (FSH): CPT

## 2017-11-16 PROCEDURE — 80053 COMPREHEN METABOLIC PANEL: CPT

## 2017-11-19 ENCOUNTER — PATIENT MESSAGE (OUTPATIENT)
Dept: FAMILY MEDICINE | Facility: CLINIC | Age: 47
End: 2017-11-19

## 2017-11-19 DIAGNOSIS — D64.9 ANEMIA, UNSPECIFIED TYPE: ICD-10-CM

## 2017-11-19 DIAGNOSIS — R73.09 ELEVATED GLUCOSE: Primary | ICD-10-CM

## 2017-11-21 ENCOUNTER — LAB VISIT (OUTPATIENT)
Dept: LAB | Facility: HOSPITAL | Age: 47
End: 2017-11-21
Attending: EMERGENCY MEDICINE
Payer: COMMERCIAL

## 2017-11-21 ENCOUNTER — PATIENT MESSAGE (OUTPATIENT)
Dept: FAMILY MEDICINE | Facility: CLINIC | Age: 47
End: 2017-11-21

## 2017-11-21 DIAGNOSIS — D64.9 ANEMIA, UNSPECIFIED TYPE: ICD-10-CM

## 2017-11-21 DIAGNOSIS — R73.09 ELEVATED GLUCOSE: ICD-10-CM

## 2017-11-21 LAB
ESTIMATED AVG GLUCOSE: 120 MG/DL
HBA1C MFR BLD HPLC: 5.8 %
IRON SERPL-MCNC: 51 UG/DL
SATURATED IRON: 11 %
TOTAL IRON BINDING CAPACITY: 445 UG/DL
TRANSFERRIN SERPL-MCNC: 301 MG/DL

## 2017-11-21 PROCEDURE — 83540 ASSAY OF IRON: CPT

## 2017-11-21 PROCEDURE — 36415 COLL VENOUS BLD VENIPUNCTURE: CPT | Mod: PO

## 2017-11-21 PROCEDURE — 83036 HEMOGLOBIN GLYCOSYLATED A1C: CPT

## 2017-12-07 ENCOUNTER — OFFICE VISIT (OUTPATIENT)
Dept: PSYCHIATRY | Facility: CLINIC | Age: 47
End: 2017-12-07
Payer: COMMERCIAL

## 2017-12-07 PROCEDURE — 90834 PSYTX W PT 45 MINUTES: CPT | Mod: S$GLB,,, | Performed by: SOCIAL WORKER

## 2017-12-14 ENCOUNTER — OFFICE VISIT (OUTPATIENT)
Dept: PSYCHIATRY | Facility: CLINIC | Age: 47
End: 2017-12-14
Payer: COMMERCIAL

## 2017-12-14 PROCEDURE — 90834 PSYTX W PT 45 MINUTES: CPT | Mod: S$GLB,,, | Performed by: SOCIAL WORKER

## 2017-12-28 ENCOUNTER — PATIENT MESSAGE (OUTPATIENT)
Dept: FAMILY MEDICINE | Facility: CLINIC | Age: 47
End: 2017-12-28

## 2017-12-28 DIAGNOSIS — J34.89 SINUS PAIN: Primary | ICD-10-CM

## 2017-12-29 ENCOUNTER — PATIENT MESSAGE (OUTPATIENT)
Dept: FAMILY MEDICINE | Facility: CLINIC | Age: 47
End: 2017-12-29

## 2017-12-29 ENCOUNTER — HOSPITAL ENCOUNTER (OUTPATIENT)
Dept: RADIOLOGY | Facility: HOSPITAL | Age: 47
Discharge: HOME | End: 2017-12-29
Attending: EMERGENCY MEDICINE
Payer: COMMERCIAL

## 2017-12-29 ENCOUNTER — TELEPHONE (OUTPATIENT)
Dept: FAMILY MEDICINE | Facility: CLINIC | Age: 47
End: 2017-12-29

## 2017-12-29 DIAGNOSIS — J34.89 SINUS PAIN: ICD-10-CM

## 2017-12-29 PROCEDURE — 70486 CT MAXILLOFACIAL W/O DYE: CPT | Mod: TC,PO

## 2017-12-29 PROCEDURE — 70486 CT MAXILLOFACIAL W/O DYE: CPT | Mod: 26,,, | Performed by: RADIOLOGY

## 2017-12-29 NOTE — TELEPHONE ENCOUNTER
She has seen her dentist, and has right-sided maxillary discomfort.  Her dentist has recommended a sinus CT, which has been ordered.    Please arrange.     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.

## 2018-01-08 ENCOUNTER — OFFICE VISIT (OUTPATIENT)
Dept: FAMILY MEDICINE | Facility: CLINIC | Age: 48
End: 2018-01-08
Payer: COMMERCIAL

## 2018-01-08 VITALS
OXYGEN SATURATION: 96 % | BODY MASS INDEX: 30.41 KG/M2 | DIASTOLIC BLOOD PRESSURE: 78 MMHG | HEIGHT: 68 IN | HEART RATE: 100 BPM | SYSTOLIC BLOOD PRESSURE: 116 MMHG | TEMPERATURE: 99 F | RESPIRATION RATE: 18 BRPM | WEIGHT: 200.63 LBS

## 2018-01-08 DIAGNOSIS — R09.89 RUNNY NOSE: ICD-10-CM

## 2018-01-08 DIAGNOSIS — R68.89 FLU-LIKE SYMPTOMS: Primary | ICD-10-CM

## 2018-01-08 DIAGNOSIS — R05.9 COUGH: ICD-10-CM

## 2018-01-08 LAB
FLUAV AG SPEC QL IA: NEGATIVE
FLUBV AG SPEC QL IA: NEGATIVE
SPECIMEN SOURCE: NORMAL

## 2018-01-08 PROCEDURE — 99999 PR PBB SHADOW E&M-EST. PATIENT-LVL IV: CPT | Mod: PBBFAC,,, | Performed by: NURSE PRACTITIONER

## 2018-01-08 PROCEDURE — 87400 INFLUENZA A/B EACH AG IA: CPT | Mod: 59,PO

## 2018-01-08 PROCEDURE — 99213 OFFICE O/P EST LOW 20 MIN: CPT | Mod: S$GLB,,, | Performed by: NURSE PRACTITIONER

## 2018-01-08 RX ORDER — AZELASTINE 1 MG/ML
1 SPRAY, METERED NASAL 2 TIMES DAILY
Qty: 30 ML | Refills: 0 | Status: SHIPPED | OUTPATIENT
Start: 2018-01-08 | End: 2018-07-13 | Stop reason: SDUPTHER

## 2018-01-08 RX ORDER — BENZONATATE 200 MG/1
200 CAPSULE ORAL 3 TIMES DAILY PRN
Qty: 30 CAPSULE | Refills: 0 | Status: SHIPPED | OUTPATIENT
Start: 2018-01-08 | End: 2018-01-18

## 2018-01-08 RX ORDER — OSELTAMIVIR PHOSPHATE 75 MG/1
75 CAPSULE ORAL DAILY
Qty: 10 CAPSULE | Refills: 0 | Status: SHIPPED | OUTPATIENT
Start: 2018-01-08 | End: 2018-01-18

## 2018-01-08 NOTE — PROGRESS NOTES
Subjective:       Patient ID: Concepcion De La Vega is a 47 y.o. female.    Chief Complaint: Cough (Patient reports non-productive cough. Mucous is without color. Reports running fever beginning last night. ) and Headache    100.9 F  yesterday fever.   No meds so far this am      Cough   This is a new problem. The current episode started yesterday. The cough is productive of sputum. Associated symptoms include chills, a fever, headaches, myalgias and postnasal drip. Pertinent negatives include no chest pain, ear congestion, ear pain, nasal congestion, rhinorrhea, sore throat or shortness of breath. She has tried a beta-agonist inhaler (Daja [place] Cold Plus) for the symptoms. The treatment provided mild relief.   Headache    This is a new problem. The current episode started today. The pain is located in the frontal (sinus) region. Associated symptoms include coughing and a fever. Pertinent negatives include no ear pain, rhinorrhea or sore throat. Treatments tried: Daja [place] Cold Plus. The treatment provided mild relief.     Vitals:    01/08/18 0814   BP: 116/78   Pulse: 100   Resp: 18   Temp: 99.1 °F (37.3 °C)     Review of Systems   Constitutional: Positive for chills and fever. Negative for diaphoresis.   HENT: Positive for postnasal drip. Negative for congestion, ear discharge, ear pain, rhinorrhea, sneezing, sore throat and voice change.    Eyes: Negative for visual disturbance.   Respiratory: Positive for cough. Negative for chest tightness and shortness of breath.    Cardiovascular: Negative for chest pain.   Musculoskeletal: Positive for myalgias.   Neurological: Positive for headaches.       Objective:      Physical Exam   Constitutional: She is oriented to person, place, and time. Vital signs are normal. She appears well-developed and well-nourished. She is cooperative.   HENT:   Head: Normocephalic and atraumatic.   Right Ear: Hearing, tympanic membrane, external ear and ear canal normal.   Left Ear: Hearing,  tympanic membrane, external ear and ear canal normal.   Nose: Nose normal.   Mouth/Throat: Uvula is midline and mucous membranes are normal. Posterior oropharyngeal erythema present.   Eyes: Conjunctivae, EOM and lids are normal.   Neck: Normal range of motion. Neck supple.   Cardiovascular: Normal rate, regular rhythm, S1 normal, S2 normal and normal heart sounds.    Pulmonary/Chest: Effort normal and breath sounds normal. No respiratory distress.   Musculoskeletal: Normal range of motion.   Lymphadenopathy:        Head (right side): No submental, no submandibular, no tonsillar, no preauricular and no posterior auricular adenopathy present.        Head (left side): No submental, no submandibular, no tonsillar, no preauricular and no posterior auricular adenopathy present.     She has no cervical adenopathy.   Neurological: She is alert and oriented to person, place, and time.   Skin: Skin is warm and dry.   Psychiatric: She has a normal mood and affect. Her speech is normal and behavior is normal. Judgment and thought content normal.   Nursing note and vitals reviewed.      Assessment & Plan:       Flu-like symptoms  -     Influenza antigen Nasopharyngeal Swab, negative.   -     oseltamivir (TAMIFLU) 75 MG capsule; Take 1 capsule (75 mg total) by mouth once daily.  Dispense: 10 capsule; Refill: 0    Cough  -     benzonatate (TESSALON) 200 MG capsule; Take 1 capsule (200 mg total) by mouth 3 (three) times daily as needed.  Dispense: 30 capsule; Refill: 0    Runny nose  -     azelastine (ASTELIN) 137 mcg (0.1 %) nasal spray; 1 spray (137 mcg total) by Nasal route 2 (two) times daily.  Dispense: 30 mL; Refill: 0          Return if symptoms worsen or fail to improve.

## 2018-01-22 ENCOUNTER — HOSPITAL ENCOUNTER (OUTPATIENT)
Dept: RADIOLOGY | Facility: HOSPITAL | Age: 48
Discharge: HOME | End: 2018-01-22
Attending: EMERGENCY MEDICINE
Payer: COMMERCIAL

## 2018-01-22 ENCOUNTER — OFFICE VISIT (OUTPATIENT)
Dept: OBSTETRICS AND GYNECOLOGY | Facility: CLINIC | Age: 48
End: 2018-01-22
Payer: COMMERCIAL

## 2018-01-22 VITALS
HEIGHT: 68 IN | WEIGHT: 200 LBS | HEIGHT: 68 IN | BODY MASS INDEX: 30.21 KG/M2 | WEIGHT: 199.31 LBS | BODY MASS INDEX: 30.31 KG/M2

## 2018-01-22 DIAGNOSIS — Z97.5 IUD (INTRAUTERINE DEVICE) IN PLACE: Chronic | ICD-10-CM

## 2018-01-22 DIAGNOSIS — Z30.432 ENCOUNTER FOR IUD REMOVAL: ICD-10-CM

## 2018-01-22 DIAGNOSIS — Z01.419 GYNECOLOGIC EXAM NORMAL: Primary | ICD-10-CM

## 2018-01-22 DIAGNOSIS — Z12.31 VISIT FOR SCREENING MAMMOGRAM: ICD-10-CM

## 2018-01-22 PROCEDURE — 99999 PR PBB SHADOW E&M-EST. PATIENT-LVL III: CPT | Mod: PBBFAC,,, | Performed by: SPECIALIST

## 2018-01-22 PROCEDURE — 58301 REMOVE INTRAUTERINE DEVICE: CPT | Mod: S$GLB,,, | Performed by: SPECIALIST

## 2018-01-22 PROCEDURE — 87624 HPV HI-RISK TYP POOLED RSLT: CPT

## 2018-01-22 PROCEDURE — 88175 CYTOPATH C/V AUTO FLUID REDO: CPT | Performed by: PATHOLOGY

## 2018-01-22 PROCEDURE — 99386 PREV VISIT NEW AGE 40-64: CPT | Mod: 25,S$GLB,, | Performed by: SPECIALIST

## 2018-01-22 PROCEDURE — 77067 SCR MAMMO BI INCL CAD: CPT | Mod: 26,,, | Performed by: RADIOLOGY

## 2018-01-22 PROCEDURE — 77067 SCR MAMMO BI INCL CAD: CPT | Mod: TC,PN

## 2018-01-22 PROCEDURE — 88141 CYTOPATH C/V INTERPRET: CPT | Mod: ,,, | Performed by: PATHOLOGY

## 2018-01-22 PROCEDURE — 77063 BREAST TOMOSYNTHESIS BI: CPT | Mod: 26,,, | Performed by: RADIOLOGY

## 2018-01-22 RX ORDER — METRONIDAZOLE 7.5 MG/G
1 GEL VAGINAL 2 TIMES DAILY
Qty: 70 G | Refills: 1 | Status: SHIPPED | OUTPATIENT
Start: 2018-01-22 | End: 2018-03-01

## 2018-01-22 NOTE — PROGRESS NOTES
46 yo WF presents for annual gyn evaluation and desires IUD removal. FSH level 56 and pt amenorrheic.  Past Medical History:   Diagnosis Date    Abnormal Pap smear     ASCUS negative HPV. Repeat pap    ADD (attention deficit disorder)     Arthritis     right knee    Asthma     exercise induced    Cystocele     with stress incontinence    Depression     Dizziness     Fracture of sternum Nov 2010    from Karate class    GERD (gastroesophageal reflux disease)     HEARING LOSS     Hearing loss in right ear     IBS (irritable bowel syndrome)     Intrauterine device     Mirena    Kidney stone 2009    PONV (postoperative nausea and vomiting)     requests Scopolamine patch    Seasonal allergies     deviated septum also    SI (sacroiliac) pain     Sinus pain July 3/2012    no fever, starting on antibiotics    Sinusitis     Skin tag of female perineum 2012    TMJ (dislocation of temporomandibular joint)        Past Surgical History:   Procedure Laterality Date    ANTERIOR VAGINAL REPAIR      CHOLECYSTECTOMY      COLONOSCOPY      DILATION AND CURETTAGE OF UTERUS      EXTERNAL EAR SURGERY      Rt middle ear oval Repair    LASIK Bilateral 2015    [place]    TONSILLECTOMY      TONSILLECTOMY, ADENOIDECTOMY, BILATERAL MYRINGOTOMY AND TUBES      URETERAL STENT PLACEMENT  2009    cysto, laser lithotripsy left ureter stone       Family History   Problem Relation Age of Onset    Cancer Maternal Grandmother      uncertain of which GYN cancer    Anesthesia problems Mother      Mom hard to awaken post-op    Multiple sclerosis Mother     Diabetes Mother     Kidney disease Father     Diabetes Brother     Clotting disorder Neg Hx     Breast cancer Neg Hx     Allergic rhinitis Neg Hx     Allergies Neg Hx     Angioedema Neg Hx     Asthma Neg Hx     Atopy Neg Hx     Eczema Neg Hx     Immunodeficiency Neg Hx     Rhinitis Neg Hx     Urticaria Neg Hx        Social History     Social History    Marital  status:      Spouse name: N/A    Number of children: N/A    Years of education: N/A     Social History Main Topics    Smoking status: Former Smoker     Packs/day: 0.50     Years: 5.00     Quit date: 7/9/2002    Smokeless tobacco: Never Used    Alcohol use Yes      Comment: glass of wine of day    Drug use: No    Sexual activity: Yes     Partners: Male     Birth control/ protection: IUD     Other Topics Concern    None     Social History Narrative    None       Current Outpatient Prescriptions   Medication Sig Dispense Refill    albuterol 90 mcg/actuation inhaler Inhale 2 puffs into the lungs every 6 (six) hours as needed for Wheezing. 18 g 3    azelastine (ASTELIN) 137 mcg (0.1 %) nasal spray 1 spray (137 mcg total) by Nasal route 2 (two) times daily. 30 mL 0    buPROPion (WELLBUTRIN XL) 150 MG TB24 tablet TAKE TWO TABLETS BY MOUTH EVERY  tablet 3    cetirizine (ZYRTEC) 10 MG tablet Take 10 mg by mouth once daily.      FERROUS SULFATE (FEOSOL ORAL) Take 1 tablet by mouth every other day.       fluticasone (FLONASE) 50 mcg/actuation nasal spray 1 spray by Each Nare route once daily.      MONTELUKAST SODIUM (SINGULAIR ORAL) Take 1 tablet by mouth once daily.      MULTIVITAMIN,STRESS FORMULA (STRESS TAB NF ORAL) Take by mouth.      pantoprazole (PROTONIX) 20 MG tablet Take 1 tablet (20 mg total) by mouth once daily. 90 tablet 3     No current facility-administered medications for this visit.        Review of patient's allergies indicates:   Allergen Reactions    Oxytetracycline Swelling     Terramycin    Triple antibiotic [[id number]] Blisters       Review of System:   General: no chills, fever, night sweats, weight gain or weight loss  Psychological: no depression or suicidal ideation  Breasts: no new or changing breast lumps, nipple discharge or masses.  Respiratory: no cough, shortness of breath, or wheezing  Cardiovascular: no chest pain or dyspnea on  exertion  Gastrointestinal: no abdominal pain, change in bowel habits, or black or bloody stools  Genito-Urinary: no incontinence, urinary frequency/urgency or vulvar/vaginal symptoms, pelvic pain or abnormal vaginal bleeding.  Musculoskeletal: no gait disturbance or muscular weakness    General Appearance:  Alert, cooperative, no distress, appears stated age   Head:  Normocephalic, without obvious abnormality, atraumatic   Eyes:  PERRL, conjunctiva/corneas clear, EOM's intact, fundi benign, both eyes   Ears:  Normal TM's and external ear canals, both ears   Nose: Nares normal, septum midline,mucosa normal, no drainage or sinus tenderness   Throat: Lips, mucosa, and tongue normal; teeth and gums normal   Neck: Supple, symmetrical, trachea midline, no adenopathy;  thyroid: not enlarged, symmetric, no tenderness/mass/nodules; no carotid bruit or JVD   Back:   Symmetric, no curvature, ROM normal, no CVA tenderness   Lungs:   Clear to auscultation bilaterally, respirations unlabored   Breasts:  No masses or tenderness   Heart:  Regular rate and rhythm, S1 and S2 normal, no murmur, rub, or gallop   Abdomen:   Soft, non-tender, bowel sounds active all four quadrants,  no masses, no organomegaly    Genitourinary:   External rectal exam shows no thrombosed external hemorrhoids.   Pelvic exam was performed with patient supine.  No labial fusion.  There is no rash, lesion or injury on the right labia.  There is no rash, lesion or injury on the left labia.  No bleeding and no signs of injury around the vaginal introitus, urethra is without lesions and well supported. The cervix is visualized with no discharge, lesions or friability.  No vaginal discharge found.  No significant Cystocele, Enterocele or rectocele, and uterus well supported.  Bimanual exam:  The urethra is normal to palpation and there are no palpable vaginal wall masses.  Uterus is not deviated, not enlarged, not fixed, normal shape and not tender.  Cervix  exhibits no motion tenderness.   Right adnexum displays no mass and no tenderness.  Left adnexum displays no mass and no tenderness.   Extremities: Extremities normal, atraumatic, no cyanosis or edema   Pulses: 2+ and symmetric   Skin: Skin color, texture, turgor normal, no rashes or lesions   Lymph nodes: Cervical, supraclavicular, and axillary nodes normal   Neurologic: Normal       PAP submittedd  .    Procedure IUD Removal    IUD suture vizualized and grasped with ring clamp. IUD removed w/o difficulty     COUNSELING:  The patient was counseled today on osteoporosis prevention, calcium supplementation, and regular weight bearing exercise. The patient was also counseled today on ACS PAP guidelines, with recommendations for yearly pelvic exams unless their uterus, cervix, and ovaries were removed for benign reasons; in that case, examinations every 3-5 years are recommended. The patient was also counseled regarding monthly breast self-examination, routine STD screening for at-risk populations, prophylactic immunizations for transmitted infections such as HPV, Pertussis, or Influenza as appropriate, and yearly mammograms when indicated by ACS guidelines. She was advised to see her primary care physician for all other health maintenance.   FOLLOW-UP with me for next routine visit.

## 2018-01-26 LAB
HPV16 AG SPEC QL: NEGATIVE
HPV16+18+H RISK 12 DNA CVX-IMP: NEGATIVE
HPV18 DNA SPEC QL NAA+PROBE: NEGATIVE

## 2018-01-28 ENCOUNTER — PATIENT MESSAGE (OUTPATIENT)
Dept: PSYCHIATRY | Facility: CLINIC | Age: 48
End: 2018-01-28

## 2018-02-12 ENCOUNTER — TELEPHONE (OUTPATIENT)
Dept: OBSTETRICS AND GYNECOLOGY | Facility: CLINIC | Age: 48
End: 2018-02-12

## 2018-02-12 RX ORDER — FLUCONAZOLE 200 MG/1
200 TABLET ORAL ONCE
Qty: 1 TABLET | Refills: 0 | Status: SHIPPED | OUTPATIENT
Start: 2018-02-12 | End: 2018-02-12

## 2018-03-01 ENCOUNTER — OFFICE VISIT (OUTPATIENT)
Dept: NEUROLOGY | Facility: CLINIC | Age: 48
End: 2018-03-01
Payer: COMMERCIAL

## 2018-03-01 VITALS
HEART RATE: 74 BPM | DIASTOLIC BLOOD PRESSURE: 83 MMHG | BODY MASS INDEX: 29.76 KG/M2 | SYSTOLIC BLOOD PRESSURE: 123 MMHG | RESPIRATION RATE: 16 BRPM | WEIGHT: 189.63 LBS | HEIGHT: 67 IN

## 2018-03-01 DIAGNOSIS — Z82.0 FAMILY HISTORY OF MULTIPLE SCLEROSIS: ICD-10-CM

## 2018-03-01 DIAGNOSIS — N20.0 NEPHROLITHIASIS: Chronic | ICD-10-CM

## 2018-03-01 DIAGNOSIS — G43.719 INTRACTABLE CHRONIC MIGRAINE WITHOUT AURA AND WITHOUT STATUS MIGRAINOSUS: ICD-10-CM

## 2018-03-01 PROCEDURE — 99204 OFFICE O/P NEW MOD 45 MIN: CPT | Mod: S$GLB,,, | Performed by: PSYCHIATRY & NEUROLOGY

## 2018-03-01 PROCEDURE — 99999 PR PBB SHADOW E&M-EST. PATIENT-LVL III: CPT | Mod: PBBFAC,,, | Performed by: PSYCHIATRY & NEUROLOGY

## 2018-03-01 RX ORDER — SUMATRIPTAN 50 MG/1
TABLET, FILM COATED ORAL
Qty: 9 TABLET | Refills: 3 | Status: SHIPPED | OUTPATIENT
Start: 2018-03-01 | End: 2018-09-13

## 2018-03-01 RX ORDER — MONTELUKAST SODIUM 10 MG/1
TABLET ORAL
COMMUNITY
Start: 2018-02-20 | End: 2018-03-01 | Stop reason: SDUPTHER

## 2018-03-01 NOTE — PROGRESS NOTES
Subjective:       Patient ID: Concepcion De La Vega is a 47 y.o. female.    Chief Complaint: Headache    HPI   The patient is a pleasant 48 y/o female presenting with chief complaint of headache starting about 5 to 7 years ago. She states that she was under the impression that once in menopause her headaches would disappear. Nonetheless, she continues to suffer. About a month ago she had a Mirena IUD removed but states that it was way overdue to come out. She was diagnosed with migraine headache and placed on Elavil that made her very sleepy. Topamax because of history of kidney stones. Review of her diaries reveals 15 of more days of headache per month and 6 to 8 escalations to migraine. She also suffers with TMD which is a contributor  Please see details of headache characteristics below.    Headache questionnaire     1. When did your Headaches start?               5 to 7 years        2. Where are your headaches located?              Usually left        3. Your headache's characteristics:               Pressure, Throbbing, Pounding, Sharp        4. How long does the headache last?              hours        5. How often does the headache occur?              weekly        6. Are your headaches preceded or accompanied by other symptoms? yes              If yes, please describe.  Fatigue rarely dizzy/nausea        7. Does the headache awaken you at night? yes              If so, how often? monthly           8. Please margot the word that best describes your headache's intensity:               moderate        9. Using a scale of 1 through 10, with 0 = no pain and 10 = the worst pain:              What score is your headache now? 2              What score is your headache at its worst? 0              What score is your headache at its best? 0           10. Possible associated headache symptoms:  [x]  Sensitivity to light                  [x] Dizziness                    [] Nasal or sinus pressure/ pain              [] Sensitivity  to noise                 [] Vertigo                        [x] Problems with concentration  [x] Sensitivity to smells   [x] Ringing in ears           [x] Problems with memory                        [] Blurred vision             [x] Irritability                     [x] Problems with task completion   [] Double vision             [x] Anger              [x]  Problems with relaxation  [] Loss of appetite                     [x] Anxiety                       [x] Neck tightness, Neck pain  [x] Nausea                                   [] Nasal congestion  [] Vomiting                                         11. Headache improving factors:  [x] Sleep              [] Heat  [x] Darkness                    [] Ice  [x] Local pressure           [] Menses (period)  [x] Massage                    [x] Medications:          12. Headache worsening factors:   [] Fatigue           [] Sneezing                    [x] Changes in Weather  [x] Light   [x] Bending Over [] Stress  [] Noise  [] Ovulation                    [] Multiple Sclerosis Flare-Up  [] Smells            [] Menses                      [x] Food   [] Coughing        [] Alcohol        13. Number of caffeinated drinks per day: 1not usually        14. Number of diet drinks per day:  1 not usually             Review of Systems   Constitutional: Positive for fatigue. Negative for activity change, appetite change and fever.   HENT: Positive for tinnitus. Negative for congestion, dental problem, hearing loss, sinus pressure, trouble swallowing and voice change.    Eyes: Negative for photophobia, pain, redness and visual disturbance.   Respiratory: Negative for cough, chest tightness and shortness of breath.    Cardiovascular: Negative for chest pain, palpitations and leg swelling.   Gastrointestinal: Negative for abdominal pain, blood in stool, nausea and vomiting.   Endocrine: Positive for cold intolerance and heat intolerance.   Genitourinary: Negative for difficulty urinating,  frequency, menstrual problem and urgency.   Musculoskeletal: Negative for arthralgias, back pain, gait problem, joint swelling, myalgias, neck pain and neck stiffness.   Skin: Negative.    Neurological: Positive for headaches. Negative for dizziness, tremors, seizures, syncope, facial asymmetry, speech difficulty, weakness, light-headedness and numbness.   Hematological: Negative for adenopathy. Does not bruise/bleed easily.   Psychiatric/Behavioral: Negative for agitation, behavioral problems, confusion, decreased concentration, self-injury, sleep disturbance and suicidal ideas. The patient is not nervous/anxious and is not hyperactive.                Past Medical History:   Diagnosis Date    Abnormal Pap smear     ASCUS negative HPV. Repeat pap    ADD (attention deficit disorder)     Arthritis     right knee    Asthma     exercise induced    Cystocele     with stress incontinence    Depression     Dizziness     Fracture of sternum Nov 2010    from Karate class    GERD (gastroesophageal reflux disease)     HEARING LOSS     Hearing loss in right ear     IBS (irritable bowel syndrome)     Intrauterine device     Mirena    Kidney stone 2009    PONV (postoperative nausea and vomiting)     requests Scopolamine patch    Seasonal allergies     deviated septum also    SI (sacroiliac) pain     Sinus pain July 3/2012    no fever, starting on antibiotics    Sinusitis     Skin tag of female perineum 2012    TMJ (dislocation of temporomandibular joint)      Past Surgical History:   Procedure Laterality Date    ANTERIOR VAGINAL REPAIR      CHOLECYSTECTOMY      COLONOSCOPY      DILATION AND CURETTAGE OF UTERUS      EXTERNAL EAR SURGERY      Rt middle ear oval Repair    LASIK Bilateral 2015    [place]    TONSILLECTOMY      TONSILLECTOMY, ADENOIDECTOMY, BILATERAL MYRINGOTOMY AND TUBES      URETERAL STENT PLACEMENT  2009    cysto, laser lithotripsy left ureter stone     Family History   Problem Relation  Age of Onset    Cancer Maternal Grandmother      uncertain of which GYN cancer    Anesthesia problems Mother      Mom hard to awaken post-op    Multiple sclerosis Mother     Diabetes Mother     Kidney disease Father     Diabetes Brother     Clotting disorder Neg Hx     Breast cancer Neg Hx     Allergic rhinitis Neg Hx     Allergies Neg Hx     Angioedema Neg Hx     Asthma Neg Hx     Atopy Neg Hx     Eczema Neg Hx     Immunodeficiency Neg Hx     Rhinitis Neg Hx     Urticaria Neg Hx      Social History     Social History    Marital status:      Spouse name: N/A    Number of children: N/A    Years of education: N/A     Occupational History    Not on file.     Social History Main Topics    Smoking status: Former Smoker     Packs/day: 0.50     Years: 5.00     Quit date: 7/9/2002    Smokeless tobacco: Never Used    Alcohol use Yes      Comment: glass of wine of day    Drug use: No    Sexual activity: Yes     Partners: Male     Birth control/ protection: IUD     Other Topics Concern    Not on file     Social History Narrative    No narrative on file     Review of patient's allergies indicates:   Allergen Reactions    Oxytetracycline Swelling     Terramycin    Triple antibiotic [[id number]-[id number]-dgwjjod-pramoxine] Blisters       Current Outpatient Prescriptions:     azelastine (ASTELIN) 137 mcg (0.1 %) nasal spray, 1 spray (137 mcg total) by Nasal route 2 (two) times daily., Disp: 30 mL, Rfl: 0    buPROPion (WELLBUTRIN XL) 150 MG TB24 tablet, TAKE TWO TABLETS BY MOUTH EVERY DAY, Disp: 180 tablet, Rfl: 3    FERROUS SULFATE (FEOSOL ORAL), Take 1 tablet by mouth every other day. , Disp: , Rfl:     fluticasone (FLONASE) 50 mcg/actuation nasal spray, 1 spray by Each Nare route once daily., Disp: , Rfl:     MONTELUKAST SODIUM (SINGULAIR ORAL), Take 1 tablet by mouth once daily., Disp: , Rfl:     MULTIVITAMIN,STRESS FORMULA (STRESS TAB NF ORAL), Take by mouth., Disp: , Rfl:     albuterol 90  mcg/actuation inhaler, Inhale 2 puffs into the lungs every 6 (six) hours as needed for Wheezing., Disp: 18 g, Rfl: 3    cetirizine (ZYRTEC) 10 MG tablet, Take 10 mg by mouth once daily., Disp: , Rfl:       Objective:      Vitals:    03/01/18 0815   BP: 123/83   Pulse: 74   Resp: 16     Body mass index is 29.69 kg/m².    Physical Exam    Constitutional:   She appears well-developed and well-nourished. She is well groomed    HENT:    Head: Atraumatic, oral and nasal mucosa intact  Eyes: Conjunctivae and EOM are normal. Pupils are equal, round, and reactive to light OU  Neck: Neck supple. No thyromegaly present  Cardiovascular: Normal rate and normal heart sounds  No murmur heard  Pulmonary/Chest: Effort normal and breath sounds normal  Musculoskeletal: Normal range of motion. No joint stiffness. No vertebral point tenderness  Skin: Skin is warm and dry  Psychiatric: Normal mood and affect     Neuro exam:    Mental status:  Awake, attentive, Alert, oriented to self, place, year and month  Language function is intact    Cranial Nerves:  Smell was not formally evaluated  Cranial Nerves II - XII: intact  Pursuits were smooth, normal saccades, no nystagmus OU  Funduscopic exam - disc were flat and pink, no exudates or hemorrhages OU  Motor - facial movement was symmetrical and normal     Palate moved well and was symmetrical with normal palatal and oral sensation  Tongue movements were full    Coordination:     Rapid alternating movements and rapid finger tapping - normal bilaterally  Finger to nose - normal and symmetric bilaterally   Heel to shin test - normal and symmetric bilaterally   Arm roll - smooth and symmetric   No intentional or positional tremor.     Motor:  Normal muscle bulk and symmetry. No fasciculations were noted    No pronator drift  Strength  5/5 bilaterally     Reflexes:  Tendon reflexes were 2 + at biceps, triceps, brachioradialis, patellar, and Achilles bilaterally  On plantar stimulation toes were  down going bilaterally  No clonus was noted     Sensory: Intact to light touch, pin prick in all extremities.     Gait: Romberg absent. Normal gait. Normal arm swing and turns. Good tandem    Review of Data:  Lab Results   Component Value Date     11/16/2017    K 4.0 11/16/2017     11/16/2017    CO2 26 11/16/2017    BUN 8 11/16/2017    CREATININE 1.0 11/16/2017     (H) 11/16/2017    HGBA1C 5.8 (H) 11/21/2017    AST 17 11/16/2017    ALT 23 11/16/2017    ALBUMIN 3.8 11/16/2017    PROT 7.7 11/16/2017    BILITOT 0.6 11/16/2017    CHOL 184 11/16/2017    CHOL 184 11/16/2017    HDL 40 11/16/2017    HDL 40 11/16/2017    LDLCALC 107.6 11/16/2017    LDLCALC 107.6 11/16/2017    TRIG 182 (H) 11/16/2017    TRIG 182 (H) 11/16/2017       Lab Results   Component Value Date    WBC 8.27 11/16/2017    HGB 11.9 (L) 11/16/2017    HCT 38.6 11/16/2017    MCV 84 11/16/2017     (H) 11/16/2017       Lab Results   Component Value Date    TSH 1.567 11/10/2015     Results for orders placed or performed during the hospital encounter of 06/30/14   CT Head Without Contrast    Narrative    Axial images were obtained at 5-mm intervals from the level of the skull apex to the level of the skull base.    The ventricles and sulci appear age appropriate.  No abnormal intracranial densities are noted to suggest the presence of acute blood products.  No large hypodensities are identified to suggest the presence of major vascular infarction.  Some   subcutaneous swelling appears to be present overlying the left orbital rim laterally that may relate to a region of direct contusion.    Impression     No acute intracranial process is convincingly noted.  Some soft tissue swelling appears to be present adjacent to the left orbit suggestive of a region of contusion      Electronically signed by: Fede Callahan MD  Date:     06/30/14  Time:    14:47              Assessment and Plan     The patient has chronic migraines ( G43.719) and  suffers from headaches more than 15 days a month lasting more than 4 hours a day with no relief of symptoms despite trying multiple medications including but not limited to Amitriptyline, unable to use beta blockers due to low blood pressure, Topamax contraindicated because of previous history of kidney stones. The patient will be an ideal candidate for Botox. We are planning for 3 treatments 3 months apart and aiming for at least 50% improvement in the symptoms. If we see no improvement after 3 treatments, we will discontinue the injections.  In view of the family history of Multiple Sclerosis, and her report of intermittent numbness, will obtain an MRi of the brain.   For acute treatment will try Sumatriptan. Take 1 at onset of headache, may repeat in 2 hours to a max of 3 per day, 2 days per week. She should combine with rapid release tylenol since she has a sensitive GI tract.        I have discussed the side effects of the medications prescribed and the patient acknowledges understanding    Joy Martin M.D  Medical Director, Headache and Facial Pain  [facility]

## 2018-03-01 NOTE — PROGRESS NOTES
Headache questionnaire    1. When did your Headaches start?    Pt did not answer      2. Where are your headaches located?   Usually left      3. Your headache's characteristics:    Pressure, Throbbing, Pounding, Sharp      4. How long does the headache last?   hours      5. How often does the headache occur?   weekly      6. Are your headaches preceded or accompanied by other symptoms? yes   If yes, please describe.  Fatigue rarely dizzy/nausea      7. Does the headache awaken you at night? yes   If so, how often? monthly        8. Please margot the word that best describes your headache's intensity:    moderate      9. Using a scale of 1 through 10, with 0 = no pain and 10 = the worst pain:   What score is your headache now? 2   What score is your headache at its worst? 0   What score is your headache at its best? 0        10. Possible associated headache symptoms:  [x]  Sensitivity to light  [x] Dizziness  [] Nasal or sinus pressure/ pain   [] Sensitivity to noise  [] Vertigo  [x] Problems with concentration  [x] Sensitivity to smells  [x] Ringing in ears  [x] Problems with memory    [] Blurred vision  [x] Irritability  [x] Problems with task completion   [] Double vision  [x] Anger  [x]  Problems with relaxation  [] Loss of appetite  [x] Anxiety  [x] Neck tightness, Neck pain  [x] Nausea   [] Nasal congestion  [] Vomiting         11. Headache improving factors:  [x] Sleep  [] Heat  [x] Darkness  [] Ice  [x] Local pressure [] Menses (period)  [x] Massage   [x] Medications:        12. Headache worsening factors:   [] Fatigue [] Sneezing  [x] Changes in Weather  [x] Light [x] Bending Over [] Stress  [] Noise [] Ovulation  [] Multiple Sclerosis Flare-Up  [] Smells  [] Menses  [x] Food   [] Coughing [] Alcohol      13. Number of caffeinated drinks per day: 1not usually      14. Number of diet drinks per day:  1 not usually      15. Have you seen any other [facility]ists within the last 3 years?  Yes    Please  Check any Medications Tried for Headache    AED Neuromodulators  MAOIs  Ergot Alkaloids    Acetazolamide (Diamox) [] Phenelzine (Nardil) [] Dihydroergotamine (Migranal) []   Carbamazepine (Tegretol) [] Tranylcypromine (Parnate) [] Ergotamine (Ergomar) []   Gabapentin (Neurontin) [] Antihistamine/Serotonergic  Triptans    Lacosamide (Vimpat) [] Cyproheptadine (Periactin) [] Almotriptan (Axert) []   Lamotrigine (Lamictal) [] Antihypertensives  Eletriptan (Relpax) []   Levatiracetam (Keppra) [] Atenolol (Tenormin) [] Frovatriptan (Frova) []   Oxcarbazepine (Trileptal) [] Bisoprostol (Zebeta) [] Naratriptan (Amerge) []   Phenobarbital [] Candesartan (Atacand) [] Rizatriptan (Maxalt) []   Phenytoin (Dilantin) [] Diltiazem (Cardizem) [] Sumatriptan (Imitrex) []   Pregabalin (Lyrica) [] Lisinopril (Prinivil, Zestril) [] Zolmitriptan (Zomig) []   Primidone (Mysoline) [] Metoprolol (Toprol) [] Combo Abortives    Tiagabine (Gabatril) [] Nadolol (Corgard) [] Butalbital and Acetaminophen (Bupap) []   Topiramate (Topamax)  (Trokendi) [] Nicardipine (Cardene) []     Vigabatrin (Sabril) [] Nimodipine (Nimotop) [] Butalbital, Acetaminophen, and caffeine (Fioricet) []   Valproic Acid (Depakote) (Divalproex Sodium) [] Propranolol (Inderal) []     Zonisamide (Zonegran) [] Telmisartan (Micardis) [] Butalbital, Aspirin, and caffeine (Fiorinal) []   Benzodiazepines  Timolol (Blocadren) []     Alprazolam (Xanax) [] Verapamil (Calan, Verelan) [] Butalbital, Caffeine, Acetaminophen, and Codeine (Fioricet with Codeine) []   Diazepam (Valium) [] NSAIDs      Lorazepam (Ativan) [] Acetaminophen (Tylenol) [x]     Clonazepam (Klonopin) [] Acetylsalicylic Acid (Aspirin) [x] Butalbital, Caffeine, Aspirin, and Codeine  (Fiorinal with Codeine) []   Antidepressants  Diclofenac (Cambia) [x]     Amitriptyline (Elavil) [x] Ibuprofen (Motrin) [x]     Desipramine (Norpramin) [] Indomethacin (Indocin) [] Aspirin, Caffeine, and Acetaminophen (Excedrin)  (Goodys) [x]   Doxepin (Sinequan) [] Ketoprofen (Orudis) []     Fluoxetine (Prozac) [] Ketorolac (Toradol) [] Acetaminophen, Dichloralphenazone, and Isometheptene (Midrin) []   Imipramine (Tofranil) [] Naproxen (Anaprox) (Aleve) [x]     Nortriptyline (Pamelor) [] Meclofenamic Acid (Meclomen) []     Venlafaxine (Effexor) [] Meloxicam (Mobic) [] Aspirin, Salicylamide, and Caffeine (BC Powder) []     Please Check If You Have Had Any Of These Symptoms In The Last Week    General/Constitutional: Unintentional weight loss [] Change in Appetite  []   Eyes/Vision: Change in Vision [] Double Vision []   ENT: Frequent nose bleeds [] Ringing in the Ears [x]   Respiratory: Cough [] Wheezing []   Cardovascular: Chest Pain [] Palpitations []   Gastrointestinal: Jaundice [] Nausea/Vomiting []   Genitourinary: Incontinence [] Burning with urination []   Hemotologic/Lymphatic: Easy Brusing/Bleeding [] Night Sweats []   Neurological: Numbness [x] Weakness []   Endocrine: Fatigue [x] Heat/Cold Intolerance  [x]   Allergy/Immunologic: Fevers [] Chills []   Musculoskeletal: Muscle Pain [x] Joint Pain []   Psychiatric: Thoughts of harming self/others [] Depression []   Integumentary: Rashes [] Sores that do not heal []

## 2018-03-15 ENCOUNTER — PROCEDURE VISIT (OUTPATIENT)
Dept: NEUROLOGY | Facility: CLINIC | Age: 48
End: 2018-03-15
Payer: COMMERCIAL

## 2018-03-15 VITALS
DIASTOLIC BLOOD PRESSURE: 82 MMHG | HEART RATE: 80 BPM | HEIGHT: 67 IN | SYSTOLIC BLOOD PRESSURE: 128 MMHG | BODY MASS INDEX: 29.76 KG/M2 | WEIGHT: 189.63 LBS

## 2018-03-15 DIAGNOSIS — G43.719 INTRACTABLE CHRONIC MIGRAINE WITHOUT AURA AND WITHOUT STATUS MIGRAINOSUS: Primary | ICD-10-CM

## 2018-03-15 PROCEDURE — 64615 CHEMODENERV MUSC MIGRAINE: CPT | Mod: S$GLB,,, | Performed by: PSYCHIATRY & NEUROLOGY

## 2018-03-15 NOTE — PROCEDURES
Procedures     BOTOX PROCEDURE    PROCEDURE PERFORMED: Botulinum toxin injection (23344)    CLINICAL INDICATION: G43.719 NDC: [id number]    A time out was conducted just before the start of the procedure to verify the correct patient and procedure, procedure location, and all relevant critical information.     This is the first Botox injections and I am aiming for at least 50%  improvement in the patient's symptoms. Frequency of treatment is every 3 months unless no response to the treatments, at which time we will discontinue the injections.     DESCRIPTION OF PROCEDURE: After obtaining informed consent and under   aseptic technique, a total of 155 units of botulinum toxin type A were   injected in the following muscles: Procerus 5 units,  5 units   bilaterally, frontalis 20 units, temporalis 20 units bilaterally,   occipitalis 15 units, upper cervical paraspinals 10 units bilaterally and trapezius 15 units bilaterally. The patient was given a total of 155 units in 31 sites.The patient tolerated the procedure well. There were no complications. The patient was given a prescription for repeat treatment in 3 months.     Unavoidable waste 45 units          Joy Martin M.D  Medical Director, Headache and Facial Pain  [facility]

## 2018-03-20 ENCOUNTER — HOSPITAL ENCOUNTER (OUTPATIENT)
Dept: RADIOLOGY | Facility: HOSPITAL | Age: 48
Discharge: HOME | End: 2018-03-20
Attending: PSYCHIATRY & NEUROLOGY
Payer: COMMERCIAL

## 2018-03-20 DIAGNOSIS — Z82.0 FAMILY HISTORY OF MULTIPLE SCLEROSIS: ICD-10-CM

## 2018-03-20 PROCEDURE — 25500020 PHARM REV CODE 255: Mod: PO | Performed by: PSYCHIATRY & NEUROLOGY

## 2018-03-20 PROCEDURE — 70553 MRI BRAIN STEM W/O & W/DYE: CPT | Mod: 26,,, | Performed by: RADIOLOGY

## 2018-03-20 PROCEDURE — 70553 MRI BRAIN STEM W/O & W/DYE: CPT | Mod: TC,PO

## 2018-03-20 PROCEDURE — A9585 GADOBUTROL INJECTION: HCPCS | Mod: PO | Performed by: PSYCHIATRY & NEUROLOGY

## 2018-03-20 RX ORDER — GADOBUTROL 604.72 MG/ML
10 INJECTION INTRAVENOUS
Status: DISCONTINUED | OUTPATIENT
Start: 2018-03-20 | End: 2018-03-21 | Stop reason: HOSPADM

## 2018-03-20 RX ORDER — GADOBUTROL 604.72 MG/ML
8 INJECTION INTRAVENOUS
Status: COMPLETED | OUTPATIENT
Start: 2018-03-20 | End: 2018-03-20

## 2018-03-20 RX ADMIN — GADOBUTROL 8 ML: 604.72 INJECTION INTRAVENOUS at 08:03

## 2018-04-02 ENCOUNTER — HOSPITAL ENCOUNTER (OUTPATIENT)
Dept: RADIOLOGY | Facility: HOSPITAL | Age: 48
Discharge: HOME | End: 2018-04-02
Attending: NURSE PRACTITIONER
Payer: COMMERCIAL

## 2018-04-02 ENCOUNTER — TELEPHONE (OUTPATIENT)
Dept: PRIMARY CARE CLINIC | Facility: CLINIC | Age: 48
End: 2018-04-02

## 2018-04-02 DIAGNOSIS — M79.89 CALF SWELLING: ICD-10-CM

## 2018-04-02 DIAGNOSIS — M79.661 PAIN OF RIGHT CALF: Primary | ICD-10-CM

## 2018-04-02 DIAGNOSIS — M79.661 PAIN OF RIGHT CALF: ICD-10-CM

## 2018-04-02 PROCEDURE — 93971 EXTREMITY STUDY: CPT | Mod: TC,PO

## 2018-04-02 PROCEDURE — 93971 EXTREMITY STUDY: CPT | Mod: 26,,, | Performed by: RADIOLOGY

## 2018-04-05 ENCOUNTER — OFFICE VISIT (OUTPATIENT)
Dept: FAMILY MEDICINE | Facility: CLINIC | Age: 48
End: 2018-04-05
Payer: COMMERCIAL

## 2018-04-05 VITALS
DIASTOLIC BLOOD PRESSURE: 80 MMHG | WEIGHT: 189.81 LBS | OXYGEN SATURATION: 98 % | HEIGHT: 67 IN | HEART RATE: 78 BPM | BODY MASS INDEX: 29.79 KG/M2 | SYSTOLIC BLOOD PRESSURE: 116 MMHG

## 2018-04-05 DIAGNOSIS — G89.29 CHRONIC PAIN OF RIGHT KNEE: ICD-10-CM

## 2018-04-05 DIAGNOSIS — G47.9 SLEEP DISTURBANCE: Primary | ICD-10-CM

## 2018-04-05 DIAGNOSIS — M25.561 CHRONIC PAIN OF RIGHT KNEE: ICD-10-CM

## 2018-04-05 DIAGNOSIS — F33.0 MILD EPISODE OF RECURRENT MAJOR DEPRESSIVE DISORDER: Chronic | ICD-10-CM

## 2018-04-05 PROCEDURE — 99214 OFFICE O/P EST MOD 30 MIN: CPT | Mod: S$GLB,,, | Performed by: EMERGENCY MEDICINE

## 2018-04-05 PROCEDURE — 99999 PR PBB SHADOW E&M-EST. PATIENT-LVL III: CPT | Mod: PBBFAC,,, | Performed by: EMERGENCY MEDICINE

## 2018-04-05 NOTE — PROGRESS NOTES
Subjective:   THIS NOTE IS DONE WITH VOICE RECOGNITION        Patient ID: Concepcion De La Vega is a 48 y.o. female.    Chief Complaint: Insomnia      HPI    sh recently hadan ultasoundseconday to concerns of thrombs.  Ultrasound is free of thrombus.    Botox injections for migraine are in play.  Botox has helped her migraines.      Has been told she is having issues with     Homer Sleepiness Scale Questionnaire:    0 = would never doze or sleep.  1 = slight chance of dozing or sleeping  2 = moderate chance of dozing or sleeping  3 = high chance of dozing or sleeping     Sitting and reading 3  Watching TV 1  Sitting inactive in a public place 0  Being a passenger in a motor vehicle for an hour or more 3  Lying down in the afternoon 2  Sitting and talking to someone 0  Sitting quietly after lunch (no alcohol) 0  Stopped for a few minutes in traffic while driving 0    Total score:  9/24    She is wondering if there is alternative cyclobenzaprine.  She uses for muscle spasms.  She does feel that may have a negative impact on her mood.  Alternatives were discussed, and we elected to stay with cyclobenzaprine.    Right knee is problematic. History of previous knee pain.  2013 xrays were stable.  She does not describe locking of the knee or instability of the knee.  She does get some relief with the patellar stabilization brace, and she is encouraged to do this.    She has been under increased stress with several members of her family meeting assistance.  She has to sisters-in-law the with cancer.  Her father has also been ill.    Immunization History   Administered Date(s) Administered    DT (Pediatric) 01/09/1971, 08/01/1987    DTP 08/20/1974    Influenza A (H1N1) 2009 Monovalent - IM 10/27/2009    Influenza Split 09/26/2007, 10/14/2008, 09/18/2009    OPV 05/15/1973, 08/20/1974    PPD Test 06/04/2008, 06/05/2009, 05/18/2010    Rubella 09/17/1969, 05/03/1972    Td (ADULT) 11/06/1992    Tdap 05/21/2007, 11/19/2013      Colonoscopy done in 2008 revealed no obvious problems.  Repeat screening with fecal immune testing would be an option for her.      Current Outpatient Prescriptions   Medication Sig Dispense Refill    albuterol 90 mcg/actuation inhaler Inhale 2 puffs into the lungs every 6 (six) hours as needed for Wheezing. 18 g 3    azelastine (ASTELIN) 137 mcg (0.1 %) nasal spray 1 spray (137 mcg total) by Nasal route 2 (two) times daily. 30 mL 0    buPROPion (WELLBUTRIN XL) 150 MG TB24 tablet TAKE TWO TABLETS BY MOUTH EVERY  tablet 3    cetirizine (ZYRTEC) 10 MG tablet Take 10 mg by mouth once daily.      FERROUS SULFATE (FEOSOL ORAL) Take 1 tablet by mouth every other day.       fluticasone (FLONASE) 50 mcg/actuation nasal spray 1 spray by Each Nare route once daily.      MONTELUKAST SODIUM (SINGULAIR ORAL) Take 1 tablet by mouth once daily.      MULTIVITAMIN,STRESS FORMULA (STRESS TAB NF ORAL) Take by mouth.      sumatriptan (IMITREX) 50 MG tablet Take 1 at onset of headache, may repeat in 2 hours to a max of 3 per day, 2 days per week 9 tablet 3     Current Facility-Administered Medications   Medication Dose Route Frequency Provider Last Rate Last Dose    onabotulinumtoxina injection 200 Units  200 Units Intramuscular Q90 Days Lydia Martin MD        onabotulinumtoxina injection 200 Units  200 Units Intramuscular Q90 Days Lydia Martin MD   200 Units at 03/15/18 1100       Patient entered via patient portal and augmented by myself.    Review of Systems   Constitutional: Positive for activity change. Negative for unexpected weight change.   HENT: Negative for hearing loss, rhinorrhea and trouble swallowing.    Eyes: Negative for discharge and visual disturbance.   Respiratory: Negative for chest tightness and wheezing.    Cardiovascular: Negative for chest pain and palpitations.   Gastrointestinal: Negative for blood in stool, constipation, diarrhea and vomiting.   Endocrine: Negative for  polydipsia and polyuria.   Genitourinary: Negative for difficulty urinating, dysuria, hematuria and menstrual problem.   Musculoskeletal: Positive for arthralgias, joint swelling and neck pain.   Skin: Negative.    Neurological: Positive for headaches. Negative for weakness.   Psychiatric/Behavioral: Negative for confusion and dysphoric mood.       Objective:      Physical Exam   Constitutional: She is oriented to person, place, and time. She appears well-developed and well-nourished. No distress.   HENT:   Head: Normocephalic and atraumatic.   Right Ear: External ear normal.   Left Ear: External ear normal.   Nose: Nose normal.   Mouth/Throat: Oropharynx is clear and moist.   Eyes: Conjunctivae and EOM are normal. Pupils are equal, round, and reactive to light. No scleral icterus.   Neck: Normal range of motion. Neck supple. No thyromegaly present.   Cardiovascular: Normal rate, regular rhythm and normal heart sounds.   No extrasystoles are present.   Pulmonary/Chest: Effort normal and breath sounds normal. No tachypnea. She has no wheezes. She has no rales.   Abdominal: Soft. Bowel sounds are normal. She exhibits no distension. There is no tenderness.   Musculoskeletal: Normal range of motion. She exhibits no edema.        Right shoulder: She exhibits normal range of motion, no tenderness, no bony tenderness, no effusion and no deformity.        Right knee: She exhibits MCL laxity. Tenderness found. Medial joint line tenderness noted.   Patellar crepitance    Lymphadenopathy:     She has no cervical adenopathy.     She has no axillary adenopathy.        Right: No inguinal and no supraclavicular adenopathy present.   Neurological: She is alert and oriented to person, place, and time. She has normal reflexes. No cranial nerve deficit. Coordination normal.   Skin: Skin is warm and dry. No ecchymosis, no lesion and no rash noted. No erythema.   Psychiatric: She has a normal mood and affect. Her mood appears not  anxious. She does not exhibit a depressed mood.   Vitals reviewed.      Assessment:       1. Sleep disturbance    2. Chronic pain of right knee    3. Mild episode of recurrent major depressive disorder        Plan:       1. Sleep disturbance  Suggestive, enough to proceed with home study    - Home Sleep Studies; Future    2. Chronic pain of right knee  Anti-inflammatory of choice  Continued exercise  Update imaging if worse.    3. Mild episode of recurrent major depressive disorder  Stable  Continue current medications    She does report substantial benefit from the Botox injection symptoms in regards to her migraine syndrome.  She is planning to repeat these.

## 2018-04-08 NOTE — PATIENT INSTRUCTIONS
Concepcion,    I do think it's worthwhile doing a home study to see if indeed to have any suggestion of sleep apnea or obstructive sleep apnea.  We should be able to do this relatively easily.    Your last colonoscopy and was in 2008.  It was unremarkable.  I'm not aware of any family history of colon cancer.  No oral my aware that she will never had polyps.  If these are both valid statements, you are a candidate for fecal immune testing which we can do at your convenience.    I'm glad to hear your migraines are better.    I'm thrilled that your ultrasound was unremarkable.    We'll continue to treat for mild intermittent depression with current medications.    Please remember there is no substitute for taking good care of yourself with adequate sleep, regular exercise, great diet, and trying to reduce her stress is much as possible.    FABIO

## 2018-04-23 ENCOUNTER — OFFICE VISIT (OUTPATIENT)
Dept: DERMATOLOGY | Facility: CLINIC | Age: 48
End: 2018-04-23
Payer: COMMERCIAL

## 2018-04-23 VITALS — BODY MASS INDEX: 29.66 KG/M2 | WEIGHT: 189 LBS | RESPIRATION RATE: 16 BRPM | HEIGHT: 67 IN

## 2018-04-23 DIAGNOSIS — D48.5 NEOPLASM OF UNCERTAIN BEHAVIOR OF SKIN: ICD-10-CM

## 2018-04-23 DIAGNOSIS — Z85.828 PERSONAL HISTORY OF MALIGNANT NEOPLASM OF SKIN: ICD-10-CM

## 2018-04-23 DIAGNOSIS — L82.1 SEBORRHEIC KERATOSES: Primary | ICD-10-CM

## 2018-04-23 DIAGNOSIS — B07.8 COMMON WART: ICD-10-CM

## 2018-04-23 PROCEDURE — 11100 PR BIOPSY OF SKIN LESION: CPT | Mod: S$GLB,,, | Performed by: DERMATOLOGY

## 2018-04-23 PROCEDURE — 88305 TISSUE EXAM BY PATHOLOGIST: CPT | Performed by: PATHOLOGY

## 2018-04-23 PROCEDURE — 99213 OFFICE O/P EST LOW 20 MIN: CPT | Mod: 25,S$GLB,, | Performed by: DERMATOLOGY

## 2018-04-23 PROCEDURE — 99999 PR PBB SHADOW E&M-EST. PATIENT-LVL III: CPT | Mod: PBBFAC,,, | Performed by: DERMATOLOGY

## 2018-04-23 NOTE — PROGRESS NOTES
Subjective:       Patient ID:  Concepcion De La Vega is a 48 y.o. female who presents for   Chief Complaint   Patient presents with    Lesion     L hand , R ankle      47 yo F presents for evaluation of a lesion on L dorsal hand, described as dry & scaly, no bleeding, no prior treatments.  There is also a lesion on her R ankle that started as a bug bite but is persistent, no bleeding, non-healing    Last seen by Dr Savage 4-20-17     Derm Hx: BCC of L anterior thigh excised April 2016 by Gisela ZAZUETA @ [facility]; SCC of R hand excised Sept 2015  Mother & Father, Maternal grandparents had skin ca- types unknown            Past Medical History:   Diagnosis Date    Abnormal Pap smear     ASCUS negative HPV. Repeat pap    ADD (attention deficit disorder)     Arthritis     right knee    Asthma     exercise induced    Cystocele     with stress incontinence    Depression     Dizziness     Fracture of sternum Nov 2010    from Karate class    GERD (gastroesophageal reflux disease)     HEARING LOSS     Hearing loss in right ear     IBS (irritable bowel syndrome)     Intrauterine device     Mirena    Kidney stone 2009    PONV (postoperative nausea and vomiting)     requests Scopolamine patch    Seasonal allergies     deviated septum also    SI (sacroiliac) pain     Sinus pain July 3/2012    no fever, starting on antibiotics    Sinusitis     Skin tag of female perineum 2012    TMJ (dislocation of temporomandibular joint)      Review of Systems   Skin: Positive for dry skin, activity-related sunscreen use, sensitivity to antibiotic ointment and sensitivity to bandage adhesive. Negative for itching and daily sunscreen use.   Hematologic/Lymphatic: Bruises/bleeds easily.        Objective:    Physical Exam   Constitutional: She appears well-developed and well-nourished.   Neurological: She is alert and oriented to person, place, and time.   Psychiatric: She has a normal mood and affect.   Skin:   Areas Examined  (abnormalities noted in diagram):   Head / Face Inspection Performed  Neck Inspection Performed  RUE Inspected  RLE Inspected                          Diagram Legend     Erythematous scaling macule/papule c/w actinic keratosis       Vascular papule c/w angioma      Pigmented verrucoid papule/plaque c/w seborrheic keratosis      Yellow umbilicated papule c/w sebaceous hyperplasia      Irregularly shaped tan macule c/w lentigo     1-2 mm smooth white papules consistent with Milia      Movable subcutaneous cyst with punctum c/w epidermal inclusion cyst      Subcutaneous movable cyst c/w pilar cyst      Firm pink to brown papule c/w dermatofibroma      Pedunculated fleshy papule(s) c/w skin tag(s)      Evenly pigmented macule c/w junctional nevus     Mildly variegated pigmented, slightly irregular-bordered macule c/w mildly atypical nevus      Flesh colored to evenly pigmented papule c/w intradermal nevus       Pink pearly papule/plaque c/w basal cell carcinoma      Erythematous hyperkeratotic cursted plaque c/w SCC      Surgical scar with no sign of skin cancer recurrence      Open and closed comedones      Inflammatory papules and pustules      Verrucoid papule consistent consistent with wart     Erythematous eczematous patches and plaques     Dystrophic onycholytic nail with subungual debris c/w onychomycosis     Umbilicated papule    Erythematous-base heme-crusted tan verrucoid plaque consistent with inflamed seborrheic keratosis     Erythematous Silvery Scaling Plaque c/w Psoriasis     See annotation      Assessment / Plan:      Pathology Orders:     Normal Orders This Visit    Tissue Specimen To Pathology, Dermatology     Questions:    Directional Terms:  Other(comment)    Clinical information:  traumatized nevus vs BLK r/o NMSC    Specific Site:  R medial ankle        Neoplasm of uncertain behavior of skin  -     Tissue Specimen To Pathology, Dermatology  Shave biopsy procedure note:    Shave biopsy performed  after verbal consent including risk of infection, scar, recurrence, need for additional treatment of site. Area prepped with alcohol, anesthetized with approximately 1.0cc of 1% lidocaine with epinephrine. Lesional tissue shaved with razor blade. Hemostasis achieved with application of aluminum chloride followed by hyfrecation. No complications. Dressing applied. Wound care explained.    Common wart  Pt declined treatment today    Seborrheic keratoses  These are benign inherited growths without a malignant potential. Reassurance given to patient. No treatment is necessary.     H/o NMSC  Continue annual skin checks         Follow-up for pending Pathology.

## 2018-05-08 ENCOUNTER — PATIENT MESSAGE (OUTPATIENT)
Dept: DERMATOLOGY | Facility: CLINIC | Age: 48
End: 2018-05-08

## 2018-05-10 ENCOUNTER — OFFICE VISIT (OUTPATIENT)
Dept: ORTHOPEDICS | Facility: CLINIC | Age: 48
End: 2018-05-10
Payer: COMMERCIAL

## 2018-05-10 ENCOUNTER — HOSPITAL ENCOUNTER (OUTPATIENT)
Dept: RADIOLOGY | Facility: HOSPITAL | Age: 48
Discharge: HOME | End: 2018-05-10
Attending: ORTHOPAEDIC SURGERY
Payer: COMMERCIAL

## 2018-05-10 VITALS — BODY MASS INDEX: 29.83 KG/M2 | HEIGHT: 67 IN | WEIGHT: 190.06 LBS

## 2018-05-10 DIAGNOSIS — S83.241A ACUTE MEDIAL MENISCUS TEAR OF RIGHT KNEE, INITIAL ENCOUNTER: ICD-10-CM

## 2018-05-10 DIAGNOSIS — M25.561 ACUTE PAIN OF RIGHT KNEE: Primary | ICD-10-CM

## 2018-05-10 DIAGNOSIS — M25.561 ACUTE PAIN OF RIGHT KNEE: ICD-10-CM

## 2018-05-10 DIAGNOSIS — M94.261 CHONDROMALACIA OF KNEE, RIGHT: ICD-10-CM

## 2018-05-10 PROCEDURE — 73562 X-RAY EXAM OF KNEE 3: CPT | Mod: TC,PO,LT

## 2018-05-10 PROCEDURE — 99999 PR PBB SHADOW E&M-EST. PATIENT-LVL III: CPT | Mod: PBBFAC,,, | Performed by: ORTHOPAEDIC SURGERY

## 2018-05-10 PROCEDURE — 3008F BODY MASS INDEX DOCD: CPT | Mod: CPTII,S$GLB,, | Performed by: ORTHOPAEDIC SURGERY

## 2018-05-10 PROCEDURE — 73564 X-RAY EXAM KNEE 4 OR MORE: CPT | Mod: 26,RT,, | Performed by: RADIOLOGY

## 2018-05-10 PROCEDURE — 99203 OFFICE O/P NEW LOW 30 MIN: CPT | Mod: S$GLB,,, | Performed by: ORTHOPAEDIC SURGERY

## 2018-05-10 PROCEDURE — 73562 X-RAY EXAM OF KNEE 3: CPT | Mod: 26,XS,LT, | Performed by: RADIOLOGY

## 2018-05-10 NOTE — PROGRESS NOTES
"DATE: 5/10/2018  PATIENT: Concepcion De La Vega  REFERRING MD:  CHIEF COMPLAINT:   Chief Complaint   Patient presents with    Right Knee - Pain       HISTORY:  Concepcion De La Vega is a 48 y.o. female  who presents for initial evaluation of her right knee.  She notes long-standing knee symptoms which have been tolerable.  She saw Dr. Medina 2013 told her she had "bone on bone arthritis" she states she been tolerating it until yesterday when it became significantly worse.  Denies any traumatic event but was doing agility exercises with her dog.  She states her pain is 4/10.  She notes swelling.  Take an aspirin with some relief.  She now presents for evaluation.  She is employed as an LPN at [employer].      PAST MEDICAL/SURGICAL HISTORY:  Past Medical History:   Diagnosis Date    Abnormal Pap smear     ASCUS negative HPV. Repeat pap    ADD (attention deficit disorder)     Arthritis     right knee    Asthma     exercise induced    Cystocele     with stress incontinence    Depression     Dizziness     Fracture of sternum Nov 2010    from Karate class    GERD (gastroesophageal reflux disease)     HEARING LOSS     Hearing loss in right ear     IBS (irritable bowel syndrome)     Intrauterine device     Mirena    Kidney stone 2009    PONV (postoperative nausea and vomiting)     requests Scopolamine patch    Seasonal allergies     deviated septum also    SI (sacroiliac) pain     Sinus pain July 3/2012    no fever, starting on antibiotics    Sinusitis     Skin tag of female perineum 2012    TMJ (dislocation of temporomandibular joint)      Past Surgical History:   Procedure Laterality Date    ANTERIOR VAGINAL REPAIR      CHOLECYSTECTOMY      COLONOSCOPY      DILATION AND CURETTAGE OF UTERUS      EXTERNAL EAR SURGERY      Rt middle ear oval Repair    LASIK Bilateral 2015    [place]    TONSILLECTOMY      TONSILLECTOMY, ADENOIDECTOMY, BILATERAL MYRINGOTOMY AND TUBES      URETERAL STENT PLACEMENT  2009    " cysto, laser lithotripsy left ureter stone       Current Medications:   Current Outpatient Prescriptions:     buPROPion (WELLBUTRIN XL) 150 MG TB24 tablet, TAKE TWO TABLETS BY MOUTH EVERY DAY, Disp: 180 tablet, Rfl: 3    fluticasone (FLONASE) 50 mcg/actuation nasal spray, 1 spray by Each Nare route once daily., Disp: , Rfl:     montelukast (SINGULAIR) 10 mg tablet, TAKE ONE TABLET BY MOUTH EVERY EVENING, Disp: 30 tablet, Rfl: 11    MONTELUKAST SODIUM (SINGULAIR ORAL), Take 1 tablet by mouth once daily., Disp: , Rfl:     albuterol 90 mcg/actuation inhaler, Inhale 2 puffs into the lungs every 6 (six) hours as needed for Wheezing., Disp: 18 g, Rfl: 3    azelastine (ASTELIN) 137 mcg (0.1 %) nasal spray, 1 spray (137 mcg total) by Nasal route 2 (two) times daily., Disp: 30 mL, Rfl: 0    cetirizine (ZYRTEC) 10 MG tablet, Take 10 mg by mouth once daily., Disp: , Rfl:     FERROUS SULFATE (FEOSOL ORAL), Take 1 tablet by mouth every other day. , Disp: , Rfl:     MULTIVITAMIN,STRESS FORMULA (STRESS TAB NF ORAL), Take by mouth., Disp: , Rfl:     sumatriptan (IMITREX) 50 MG tablet, Take 1 at onset of headache, may repeat in 2 hours to a max of 3 per day, 2 days per week, Disp: 9 tablet, Rfl: 3    Current Facility-Administered Medications:     onabotulinumtoxina injection 200 Units, 200 Units, Intramuscular, Q90 Days, Lydia Martin MD    onabotulinumtoxina injection 200 Units, 200 Units, Intramuscular, Q90 Days, Lydia Martin MD, 200 Units at 03/15/18 1100    Family History: family history was reviewed and is noncontributory  Social History:   Social History     Social History    Marital status:      Spouse name: N/A    Number of children: N/A    Years of education: N/A     Occupational History    Not on file.     Social History Main Topics    Smoking status: Former Smoker     Packs/day: 0.50     Years: 5.00     Quit date: 7/9/2002    Smokeless tobacco: Never Used    Alcohol use Yes       "Comment: glass of wine of day    Drug use: No    Sexual activity: Yes     Partners: Male     Birth control/ protection: IUD     Other Topics Concern    Not on file     Social History Narrative    No narrative on file       ROS:  Constitution: Negative for chills, fever, and sweats. Negative for unexplained weight loss.  HENT: Negative for headaches and blurry vision.   Cardiovascular: Negative for chest pain, irregular heartbeat, leg swelling and palpitations.   Respiratory: Negative for cough and shortness of breath.   Gastrointestinal: Negative for abdominal pain, heartburn, nausea and vomiting.   Genitourinary: Negative for bladder incontinence and dysuria.   Musculoskeletal: Negative for systemic arthritis, muscle weakness and myalgias.   Neurological: Negative for numbness.   Psychiatric/Behavioral: Negative for depression.  Endocrine: Negative for polyuria.   Hematologic/Lymphatic: Negative for bleeding disorders.   Skin: Negative for poor wound healing.        PHYSICAL EXAM:  Ht 5' 7" (1.702 m)   Wt 86.2 kg (190 lb 0.6 oz)   BMI 29.76 kg/m²   Concepcion De La Vega is a well developed, well nourished female in no acute distress. Physical examination of the right knee evaluated the following:    Gait and Alignment  Inspection for ecchymosis, swelling, atrophy, or deformity  Inspection for intra-articular and/or bursal effusions  Tenderness to palpation over the bony and soft tissue structures around the knee  Range of Motion and presence of extensor lag/contractures  Sensation and motor strength  Varus/valgus or anterior/posterior/rotatory instability  Flexion pinch and Elieser's Tests  Patellar alignment/tracking/pain to palpation  Vascular exam to include skin temperature/color/capillary refill    Remarkable findings included:  Mild effusion.  Medial joint line tenderness present.  Patellofemoral crepitus noted.  Pain with patellofemoral compression.  No instability to varus valgus stress.  Lachman and anterior " drawer negative.  Flexion pinch and Elieser's test equivocal and referrable to the medial compartment        IMAGING:   X-rays of the right knee are performed and personally reviewed.  No acute fractures or dislocations are seen.  Very mild degenerative changes without significant joint space narrowing noted.  There does appear to be a degenerative cyst seen in the patella.     ASSESSMENT:   Chondromalacia versus medial meniscus tear right knee    PLAN:  The nature of the diagnosis, using models and diagrams when appropriate, was explained to the patient in detail.Treatment option discussed included observation, physical therapy, cortisone injection, and MRI.. All questions answered and the patient wishes to proceed with MRI as she's concerned about intra-articular pathology.  Follow-up after MRI has been performed discussed results and further treatment recommendations..      This note was dictated using voice recognition software. Please excuse any grammatical or typographical errors.

## 2018-05-11 ENCOUNTER — HOSPITAL ENCOUNTER (OUTPATIENT)
Dept: RADIOLOGY | Facility: HOSPITAL | Age: 48
Discharge: HOME | End: 2018-05-11
Attending: ORTHOPAEDIC SURGERY
Payer: COMMERCIAL

## 2018-05-11 DIAGNOSIS — M25.561 ACUTE PAIN OF RIGHT KNEE: ICD-10-CM

## 2018-05-11 PROCEDURE — 73721 MRI JNT OF LWR EXTRE W/O DYE: CPT | Mod: TC,PO,RT

## 2018-05-11 PROCEDURE — 73721 MRI JNT OF LWR EXTRE W/O DYE: CPT | Mod: 26,RT,, | Performed by: RADIOLOGY

## 2018-05-14 ENCOUNTER — OFFICE VISIT (OUTPATIENT)
Dept: DERMATOLOGY | Facility: CLINIC | Age: 48
End: 2018-05-14
Payer: COMMERCIAL

## 2018-05-14 ENCOUNTER — PROCEDURE VISIT (OUTPATIENT)
Dept: DERMATOLOGY | Facility: CLINIC | Age: 48
End: 2018-05-14
Payer: COMMERCIAL

## 2018-05-14 VITALS — BODY MASS INDEX: 29.82 KG/M2 | RESPIRATION RATE: 18 BRPM | HEIGHT: 67 IN | WEIGHT: 190 LBS

## 2018-05-14 DIAGNOSIS — D22.9 MULTIPLE BENIGN NEVI: ICD-10-CM

## 2018-05-14 DIAGNOSIS — D23.9 DERMATOFIBROMA: ICD-10-CM

## 2018-05-14 DIAGNOSIS — C44.91 SUPERFICIAL BASAL CELL CARCINOMA: Primary | ICD-10-CM

## 2018-05-14 DIAGNOSIS — L81.4 LENTIGINES: ICD-10-CM

## 2018-05-14 DIAGNOSIS — Z85.828 PERSONAL HISTORY OF MALIGNANT NEOPLASM OF SKIN: ICD-10-CM

## 2018-05-14 DIAGNOSIS — L70.0 ACNE VULGARIS: ICD-10-CM

## 2018-05-14 DIAGNOSIS — L82.1 SEBORRHEIC KERATOSES: ICD-10-CM

## 2018-05-14 DIAGNOSIS — L57.0 ACTINIC KERATOSIS: ICD-10-CM

## 2018-05-14 PROCEDURE — 17261 DSTRJ MAL LES T/A/L .6-1.0CM: CPT | Mod: 59,S$GLB,, | Performed by: DERMATOLOGY

## 2018-05-14 PROCEDURE — 99999 PR PBB SHADOW E&M-EST. PATIENT-LVL II: CPT | Mod: PBBFAC,,, | Performed by: DERMATOLOGY

## 2018-05-14 PROCEDURE — 17000 DESTRUCT PREMALG LESION: CPT | Mod: S$GLB,,, | Performed by: DERMATOLOGY

## 2018-05-14 PROCEDURE — 99214 OFFICE O/P EST MOD 30 MIN: CPT | Mod: 25,S$GLB,, | Performed by: DERMATOLOGY

## 2018-05-14 PROCEDURE — 99499 UNLISTED E&M SERVICE: CPT | Mod: S$GLB,,, | Performed by: DERMATOLOGY

## 2018-05-14 NOTE — PROGRESS NOTES
Subjective:       Patient ID:  Concepcion De La Vega is a 48 y.o. female who presents for   Chief Complaint   Patient presents with    Skin Check     Last seen 4-23- 18 at which time a biopsy was performed.  Today she presents for a skin check.  There is a pink spot on her shin that she thinks she treated/burned with silver nitrate years ago bc it wouldn't go away.  There is also a rough spot on her hand that we talked about at last OV but we did not treat.  She would like to have it treated today if possible      FINAL PATHOLOGIC DIAGNOSIS Dated 4-23-18   1. Skin, right medial ankle, shave biopsy:  - BASAL CELL CARCINOMA, SUPERFICIAL TYPE.    Derm Hx: superficial BCC on R medial ankle to be treated today; BCC of L anterior thigh excised April 2016 by Gisela ZAZUETA @ [facility]; SCC of R hand excised Sept 2015  Mother & Father, Maternal grandparents had skin ca- types unknown          Review of Systems   Skin: Positive for activity-related sunscreen use and sensitivity to antibiotic ointment. Negative for itching, dry skin, daily sunscreen use, sensitivity to bandage adhesive and tendency to form keloidal scars.   Hematologic/Lymphatic: Bruises/bleeds easily.        Objective:    Physical Exam   Constitutional: She appears well-developed and well-nourished.   Neurological: She is alert and oriented to person, place, and time.   Psychiatric: She has a normal mood and affect.   Skin:   Areas Examined (abnormalities noted in diagram):   Scalp / Hair Palpated and Inspected  Head / Face Inspection Performed  Neck Inspection Performed  Chest / Axilla Inspection Performed  Abdomen Inspection Performed  Genitals / Buttocks / Groin Inspection Performed  Back Inspection Performed  RUE Inspected  LUE Inspection Performed  RLE Inspected  LLE Inspection Performed                       Diagram Legend     Erythematous scaling macule/papule c/w actinic keratosis       Vascular papule c/w angioma      Pigmented verrucoid papule/plaque c/w  seborrheic keratosis      Yellow umbilicated papule c/w sebaceous hyperplasia      Irregularly shaped tan macule c/w lentigo     1-2 mm smooth white papules consistent with Milia      Movable subcutaneous cyst with punctum c/w epidermal inclusion cyst      Subcutaneous movable cyst c/w pilar cyst      Firm pink to brown papule c/w dermatofibroma      Pedunculated fleshy papule(s) c/w skin tag(s)      Evenly pigmented macule c/w junctional nevus     Mildly variegated pigmented, slightly irregular-bordered macule c/w mildly atypical nevus      Flesh colored to evenly pigmented papule c/w intradermal nevus       Pink pearly papule/plaque c/w basal cell carcinoma      Erythematous hyperkeratotic cursted plaque c/w SCC      Surgical scar with no sign of skin cancer recurrence      Open and closed comedones      Inflammatory papules and pustules      Verrucoid papule consistent consistent with wart     Erythematous eczematous patches and plaques     Dystrophic onycholytic nail with subungual debris c/w onychomycosis     Umbilicated papule    Erythematous-base heme-crusted tan verrucoid plaque consistent with inflamed seborrheic keratosis     Erythematous Silvery Scaling Plaque c/w Psoriasis     See annotation      Assessment / Plan:        Superficial basal cell carcinoma  Margins were negative on biopsy    Seborrheic keratoses  These are benign inherited growths without a malignant potential. Reassurance given to patient. No treatment is necessary.     Personal history of malignant neoplasm of skin  Area of previous skin cancers examined. Sites well healed with no signs of recurrence.  Total body skin examination performed today including at least 12 points as noted in physical examination. No lesions suspicious for malignancy noted.    Multiple benign nevi  Reassurance that her nevi appear benign with regular and consistent pigment pattern on dermoscopy    Lentigines  These are benign hyperpigmented sun induced lesions.  Daily sun protection will reduce the number of new lesions    Dermatofibroma  Central hyperpigmentation and + dimple sign  Reassurance that this is a benign collection of scar tissue and it is commonly located on the legs    Actinic keratosis  Cryosurgery Procedure Note    Verbal consent from the patient is obtained and the patient is aware of the precancerous quality and need for treatment of these lesions. Liquid nitrogen cryosurgery is applied to the 1 actinic keratosis, as detailed in the physical exam, to produce a freeze injury. The patient is aware that blisters may form and is instructed on wound care with gentle cleansing and use of vaseline ointment to keep moist until healed. The patient is supplied a handout on cryosurgery and is instructed to call if lesions do not completely resolve.    Acne vulgaris  Per patient, some sunscreens cause breakouts.  She used a different sunscreen recently           Follow-up for 3-4 months skin check.

## 2018-05-14 NOTE — PROGRESS NOTES
Subjective:       Patient ID:  Concepcion De La Vega is a 48 y.o. female who presents for   Chief Complaint   Patient presents with    Follow-up     leg      Last seen 4-23-18   ED&C  Right medial ankle           FINAL PATHOLOGIC DIAGNOSIS DATED 4-23-18   1. Skin, right medial ankle, shave biopsy:  - BASAL CELL CARCINOMA, SUPERFICIAL TYPE.  - MARGINS ARE NEGATIVE IN THE PLANES OF SECTION.  MICROSCOPIC DESCRIPTION: Sections show multiple foci of basaloid cells with peripheral palisading and focal  retraction artifact, some of them in apoptosis, arising along the dermoepidermal junction and extending into the  papillary dermis.  Diagnosed by: Hannah Bill M.D.  (Electronically Signed: 2018-04-30 13:29:17)            Review of Systems   Skin: Positive for activity-related sunscreen use and sensitivity to antibiotic ointment. Negative for dry skin, sensitivity to bandage adhesive, tendency to form keloidal scars and dry lips.   Hematologic/Lymphatic: Bruises/bleeds easily.        Objective:    Physical Exam   Skin:   Areas Examined (abnormalities noted in diagram):   Head / Face Inspection Performed  RLE Inspected              Diagram Legend     Erythematous scaling macule/papule c/w actinic keratosis       Vascular papule c/w angioma      Pigmented verrucoid papule/plaque c/w seborrheic keratosis      Yellow umbilicated papule c/w sebaceous hyperplasia      Irregularly shaped tan macule c/w lentigo     1-2 mm smooth white papules consistent with Milia      Movable subcutaneous cyst with punctum c/w epidermal inclusion cyst      Subcutaneous movable cyst c/w pilar cyst      Firm pink to brown papule c/w dermatofibroma      Pedunculated fleshy papule(s) c/w skin tag(s)      Evenly pigmented macule c/w junctional nevus     Mildly variegated pigmented, slightly irregular-bordered macule c/w mildly atypical nevus      Flesh colored to evenly pigmented papule c/w intradermal nevus       Pink pearly papule/plaque c/w basal cell  carcinoma      Erythematous hyperkeratotic cursted plaque c/w SCC      Surgical scar with no sign of skin cancer recurrence      Open and closed comedones      Inflammatory papules and pustules      Verrucoid papule consistent consistent with wart     Erythematous eczematous patches and plaques     Dystrophic onycholytic nail with subungual debris c/w onychomycosis     Umbilicated papule    Erythematous-base heme-crusted tan verrucoid plaque consistent with inflamed seborrheic keratosis     Erythematous Silvery Scaling Plaque c/w Psoriasis     See annotation      Assessment / Plan:        Superficial basal cell carcinoma    Here for electrodesiccation and curettage of Superficial BCC on the R medial ankle. bx done on 4/23/18:  Electrodessication and Curettage Procedure note:  Verbal consent obtained. Lesional tissue marked and prepped with alcohol. Lesion anesthetized with 1% lidocaine with epinephrine. Curettage and Desiccation x 3 cycles to base. Aluminum chloride for hemostasis. Lesion size after primary curettage: 7 mm    Area bandaged and wound care explained.           Follow-up in about 3 months (around 8/14/2018) for skin check.

## 2018-05-15 ENCOUNTER — TELEPHONE (OUTPATIENT)
Dept: ORTHOPEDICS | Facility: CLINIC | Age: 48
End: 2018-05-15

## 2018-05-15 NOTE — TELEPHONE ENCOUNTER
Discussed MRI results with pt and recommendations per Dr. Kohli. Pt will come for appt Thursday for injection and brace for referral to PT.

## 2018-05-16 ENCOUNTER — PATIENT MESSAGE (OUTPATIENT)
Dept: DERMATOLOGY | Facility: CLINIC | Age: 48
End: 2018-05-16

## 2018-05-17 ENCOUNTER — OFFICE VISIT (OUTPATIENT)
Dept: ORTHOPEDICS | Facility: CLINIC | Age: 48
End: 2018-05-17
Payer: COMMERCIAL

## 2018-05-17 ENCOUNTER — LAB VISIT (OUTPATIENT)
Dept: LAB | Facility: HOSPITAL | Age: 48
End: 2018-05-17
Attending: EMERGENCY MEDICINE
Payer: COMMERCIAL

## 2018-05-17 VITALS — BODY MASS INDEX: 29.93 KG/M2 | HEIGHT: 67 IN | WEIGHT: 190.69 LBS

## 2018-05-17 DIAGNOSIS — B99.9 INFECTION: ICD-10-CM

## 2018-05-17 DIAGNOSIS — B99.9 INFECTION: Primary | ICD-10-CM

## 2018-05-17 DIAGNOSIS — M25.561 ACUTE PAIN OF RIGHT KNEE: Primary | ICD-10-CM

## 2018-05-17 PROCEDURE — 99999 PR PBB SHADOW E&M-EST. PATIENT-LVL II: CPT | Mod: PBBFAC,,, | Performed by: NURSE PRACTITIONER

## 2018-05-17 PROCEDURE — 99213 OFFICE O/P EST LOW 20 MIN: CPT | Mod: 25,S$GLB,, | Performed by: NURSE PRACTITIONER

## 2018-05-17 PROCEDURE — 3008F BODY MASS INDEX DOCD: CPT | Mod: CPTII,S$GLB,, | Performed by: NURSE PRACTITIONER

## 2018-05-17 PROCEDURE — 20610 DRAIN/INJ JOINT/BURSA W/O US: CPT | Mod: RT,S$GLB,, | Performed by: NURSE PRACTITIONER

## 2018-05-17 PROCEDURE — 87070 CULTURE OTHR SPECIMN AEROBIC: CPT

## 2018-05-17 RX ORDER — TRIAMCINOLONE ACETONIDE 40 MG/ML
40 INJECTION, SUSPENSION INTRA-ARTICULAR; INTRAMUSCULAR
Status: DISCONTINUED | OUTPATIENT
Start: 2018-05-17 | End: 2018-05-17 | Stop reason: HOSPADM

## 2018-05-17 RX ORDER — CIPROFLOXACIN 500 MG/1
500 TABLET ORAL EVERY 12 HOURS
Qty: 14 TABLET | Refills: 0 | Status: SHIPPED | OUTPATIENT
Start: 2018-05-17 | End: 2018-05-24

## 2018-05-17 RX ADMIN — TRIAMCINOLONE ACETONIDE 40 MG: 40 INJECTION, SUSPENSION INTRA-ARTICULAR; INTRAMUSCULAR at 11:05

## 2018-05-17 NOTE — PROCEDURES
Large Joint Aspiration/Injection  Date/Time: 5/17/2018 11:30 AM  Performed by: DANA FUNEZ  Authorized by: DANA FUNEZ     Consent Done?:  Yes (Verbal)  Indications:  Pain and joint swelling  Timeout: Prior to procedure the correct patient, procedure, and site was verified      Location:  Knee  Site:  R knee  Prep: Patient was prepped and draped in usual sterile fashion    Ultrasonic Guidance for needle placement: No  Needle size:  22 G  Approach:  Anterolateral  Medications:  40 mg triamcinolone acetonide 40 mg/mL  Patient tolerance:  Patient tolerated the procedure well with no immediate complications

## 2018-05-17 NOTE — PROGRESS NOTES
DATE: 5/17/2018  PATIENT: Concepcion De La Vega  REFERRING MD:   CHIEF COMPLAINT:   Chief Complaint   Patient presents with    Right Knee - Pain       HISTORY:  Concepcion De La Vega is a 48 y.o. female  who presents for follow up evaluation of her right knee pain.  She has recently undergone MRI to rule out a meniscus tear.  She reports her pain today is 2/10.  She presents today requesting a cortisone injection.    PAST MEDICAL/SURGICAL HISTORY:  Past Medical History:   Diagnosis Date    Abnormal Pap smear     ASCUS negative HPV. Repeat pap    ADD (attention deficit disorder)     Arthritis     right knee    Asthma     exercise induced    Cystocele     with stress incontinence    Depression     Dizziness     Fracture of sternum Nov 2010    from Karate class    GERD (gastroesophageal reflux disease)     HEARING LOSS     Hearing loss in right ear     IBS (irritable bowel syndrome)     Intrauterine device     Mirena    Kidney stone 2009    PONV (postoperative nausea and vomiting)     requests Scopolamine patch    Seasonal allergies     deviated septum also    SI (sacroiliac) pain     Sinus pain July 3/2012    no fever, starting on antibiotics    Sinusitis     Skin tag of female perineum 2012    TMJ (dislocation of temporomandibular joint)      Past Surgical History:   Procedure Laterality Date    ANTERIOR VAGINAL REPAIR      CHOLECYSTECTOMY      COLONOSCOPY      DILATION AND CURETTAGE OF UTERUS      EXTERNAL EAR SURGERY      Rt middle ear oval Repair    LASIK Bilateral 2015    Singer    TONSILLECTOMY      TONSILLECTOMY, ADENOIDECTOMY, BILATERAL MYRINGOTOMY AND TUBES      URETERAL STENT PLACEMENT  2009    cysto, laser lithotripsy left ureter stone       Current Medications:   Current Outpatient Prescriptions:     albuterol 90 mcg/actuation inhaler, Inhale 2 puffs into the lungs every 6 (six) hours as needed for Wheezing., Disp: 18 g, Rfl: 3    azelastine (ASTELIN) 137 mcg (0.1 %) nasal spray, 1 spray  (137 mcg total) by Nasal route 2 (two) times daily., Disp: 30 mL, Rfl: 0    buPROPion (WELLBUTRIN XL) 150 MG TB24 tablet, TAKE TWO TABLETS BY MOUTH EVERY DAY, Disp: 180 tablet, Rfl: 3    cetirizine (ZYRTEC) 10 MG tablet, Take 10 mg by mouth once daily., Disp: , Rfl:     FERROUS SULFATE (FEOSOL ORAL), Take 1 tablet by mouth every other day. , Disp: , Rfl:     fluticasone (FLONASE) 50 mcg/actuation nasal spray, 1 spray by Each Nare route once daily., Disp: , Rfl:     montelukast (SINGULAIR) 10 mg tablet, TAKE ONE TABLET BY MOUTH EVERY EVENING, Disp: 30 tablet, Rfl: 11    MONTELUKAST SODIUM (SINGULAIR ORAL), Take 1 tablet by mouth once daily., Disp: , Rfl:     MULTIVITAMIN,STRESS FORMULA (STRESS TAB NF ORAL), Take by mouth., Disp: , Rfl:     sumatriptan (IMITREX) 50 MG tablet, Take 1 at onset of headache, may repeat in 2 hours to a max of 3 per day, 2 days per week, Disp: 9 tablet, Rfl: 3    Current Facility-Administered Medications:     onabotulinumtoxina injection 200 Units, 200 Units, Intramuscular, Q90 Days, Lydia Martin MD    onabotulinumtoxina injection 200 Units, 200 Units, Intramuscular, Q90 Days, Lydia Martin MD, 200 Units at 03/15/18 1100    Family History: family history was reviewed and is noncontributory  Social History:   Social History     Social History    Marital status:      Spouse name: N/A    Number of children: N/A    Years of education: N/A     Occupational History    Not on file.     Social History Main Topics    Smoking status: Former Smoker     Packs/day: 0.50     Years: 5.00     Quit date: 7/9/2002    Smokeless tobacco: Never Used    Alcohol use Yes      Comment: glass of wine of day    Drug use: No    Sexual activity: Yes     Partners: Male     Birth control/ protection: IUD     Other Topics Concern    Not on file     Social History Narrative    No narrative on file       ROS:  Constitution: Negative for chills, fever, and sweats. Negative for  "unexplained weight loss.  HENT: Negative for headaches and blurry vision.   Cardiovascular: Negative for chest pain, irregular heartbeat, leg swelling and palpitations.   Respiratory: Negative for cough and shortness of breath.   Gastrointestinal: Negative for abdominal pain, heartburn, nausea and vomiting.   Genitourinary: Negative for bladder incontinence and dysuria.   Musculoskeletal: Negative for systemic arthritis, joint swelling, muscle weakness and myalgias.   Neurological: Negative for numbness.   Psychiatric/Behavioral: Negative for depression.   Endocrine: Negative for polyuria.   Hematologic/Lymphatic: Negative for bleeding disorders.  Skin: Negative for poor wound healing.       PHYSICAL EXAM:  Ht 5' 7" (1.702 m)   Wt 86.5 kg (190 lb 11.2 oz)   BMI 29.87 kg/m²   Concepcion De La Vega is a well developed, well nourished female in no acute distress. Physical examination of the right knee evaluated the following:     Gait and Alignment  Inspection for ecchymosis, swelling, atrophy, or deformity  Inspection for intra-articular and/or bursal effusions  Tenderness to palpation over the bony and soft tissue structures around the knee  Range of Motion and presence of extensor lag/contractures  Sensation and motor strength  Varus/valgus or anterior/posterior/rotatory instability  Flexion pinch and Elieser's Tests  Patellar alignment/tracking/pain to palpation  Vascular exam to include skin temperature/color/capillary refill     Remarkable findings included:  Mild effusion.  Medial joint line tenderness present.  Patellofemoral crepitus noted.  Pain with patellofemoral compression.  No instability to varus valgus stress.  Lachman and anterior drawer negative.  Flexion pinch and Elieser's test equivocal and referrable to the medial compartment     IMAGING:   Xray not performed today     ASSESSMENT:   Right knee pain    PLAN:  The nature of the diagnosis, using models and diagrams when appropriate, was explained to the " patient in detail. Cortisone injection performed today (see procedure documentation).  She will return for follow up as needed.    Answers for HPI/ROS submitted by the patient on 5/16/2018   Leg pain  activity change: No  unexpected weight change: No  neck pain: No  hearing loss: No  rhinorrhea: No  trouble swallowing: No  eye discharge: No  visual disturbance: No  chest tightness: No  wheezing: No  chest pain: No  palpitations: No  blood in stool: No  constipation: No  vomiting: No  diarrhea: No  polydipsia: No  difficulty urinating: No  hematuria: No  menstrual problem: No  dysuria: No  joint swelling: Yes  arthralgias: Yes  headaches: No  weakness: No  confusion: No  dysphoric mood: No  appetite change : No  sleep disturbance: Yes  IMMUNOCOMPROMISED: No  nervous/ anxious: No  rash: No  eye redness: No  eye pain: No  ear pain: No  tinnitus: No  sinus pressure : No  nosebleeds: No  enviro allergies: No  food allergies: No  cough: No  shortness of breath: No  sweating: No  frequency: No  painful intercourse: No  nausea: No  dizziness: No  numbness: No  seizures: No  myalgia: No  back pain: No  Pain Chronicity: recurrent  History of trauma: No  Onset: 1 to 4 weeks ago  Frequency: daily  Progression since onset: gradually improving  injury location: at home  pain- numeric: 4/10  pain location: right knee  pain quality: sharp  Radiating Pain: No  Aggravating factors: activity  fever: No  inability to bear weight: No  itching: No  joint locking: No  limited range of motion: Yes  stiffness: Yes  tingling: No  Treatments tried: brace/corset  physical therapy: not tried  Improvement on treatment: mild

## 2018-05-21 LAB — BACTERIA SPEC AEROBE CULT: NORMAL

## 2018-05-31 ENCOUNTER — HOSPITAL ENCOUNTER (OUTPATIENT)
Dept: RADIOLOGY | Facility: HOSPITAL | Age: 48
Discharge: HOME | End: 2018-05-31
Attending: SPECIALIST
Payer: COMMERCIAL

## 2018-05-31 ENCOUNTER — PATIENT MESSAGE (OUTPATIENT)
Dept: OBSTETRICS AND GYNECOLOGY | Facility: CLINIC | Age: 48
End: 2018-05-31

## 2018-05-31 DIAGNOSIS — N95.0 PMB (POSTMENOPAUSAL BLEEDING): ICD-10-CM

## 2018-05-31 DIAGNOSIS — N95.0 PMB (POSTMENOPAUSAL BLEEDING): Primary | ICD-10-CM

## 2018-05-31 PROCEDURE — 76830 TRANSVAGINAL US NON-OB: CPT | Mod: 26,,, | Performed by: RADIOLOGY

## 2018-05-31 PROCEDURE — 76830 TRANSVAGINAL US NON-OB: CPT | Mod: TC,PO

## 2018-05-31 PROCEDURE — 76856 US EXAM PELVIC COMPLETE: CPT | Mod: 26,,, | Performed by: RADIOLOGY

## 2018-06-05 ENCOUNTER — PATIENT MESSAGE (OUTPATIENT)
Dept: ORTHOPEDICS | Facility: CLINIC | Age: 48
End: 2018-06-05

## 2018-06-05 RX ORDER — LIDOCAINE 50 MG/G
1 PATCH TOPICAL DAILY
Qty: 30 PATCH | Refills: 1 | Status: SHIPPED | OUTPATIENT
Start: 2018-06-05 | End: 2018-09-13

## 2018-06-06 ENCOUNTER — PROCEDURE VISIT (OUTPATIENT)
Dept: NEUROLOGY | Facility: CLINIC | Age: 48
End: 2018-06-06
Payer: COMMERCIAL

## 2018-06-06 VITALS
BODY MASS INDEX: 29.93 KG/M2 | DIASTOLIC BLOOD PRESSURE: 80 MMHG | SYSTOLIC BLOOD PRESSURE: 120 MMHG | WEIGHT: 190.69 LBS | HEIGHT: 67 IN | RESPIRATION RATE: 17 BRPM | HEART RATE: 87 BPM

## 2018-06-06 DIAGNOSIS — G43.719 INTRACTABLE CHRONIC MIGRAINE WITHOUT AURA AND WITHOUT STATUS MIGRAINOSUS: Primary | ICD-10-CM

## 2018-06-06 PROCEDURE — 64615 CHEMODENERV MUSC MIGRAINE: CPT | Mod: S$GLB,,, | Performed by: PSYCHIATRY & NEUROLOGY

## 2018-06-06 NOTE — PROCEDURES
Procedures       BOTOX PROCEDURE    PROCEDURE PERFORMED: Botulinum toxin injection (56228)    CLINICAL INDICATION: G43.719 NDC: 5153-0144-55    A time out was conducted just before the start of the procedure to verify the correct patient and procedure, procedure location, and all relevant critical information.       DESCRIPTION OF PROCEDURE: After obtaining informed consent and under aseptic technique, a total of 155 units of botulinum toxin type A were   injected in the following muscles: Procerus 5 units,  5 units bilaterally, frontalis 20 units, temporalis 20 units bilaterally, occipitalis 15 units, upper cervical paraspinals 10 units bilaterally and trapezius 15 units bilaterally. The patient was given a total of 155 units in 31 sites.The patient tolerated the procedure well. There were no complications. The patient was given a prescription for repeat treatment in 3 months.     Unavoidable waste 45 units          Joy Martin M.D  Medical Director, Headache and Facial Pain  [facility]

## 2018-06-19 ENCOUNTER — PATIENT MESSAGE (OUTPATIENT)
Dept: NEUROLOGY | Facility: CLINIC | Age: 48
End: 2018-06-19

## 2018-06-19 RX ORDER — APRACLONIDINE HYDROCHLORIDE 5 MG/ML
SOLUTION/ DROPS OPHTHALMIC
Qty: 5 ML | Refills: 1 | Status: SHIPPED | OUTPATIENT
Start: 2018-06-19 | End: 2018-09-18

## 2018-06-29 ENCOUNTER — CLINICAL SUPPORT (OUTPATIENT)
Dept: UROLOGY | Facility: CLINIC | Age: 48
End: 2018-06-29
Payer: COMMERCIAL

## 2018-06-29 ENCOUNTER — TELEPHONE (OUTPATIENT)
Dept: UROLOGY | Facility: CLINIC | Age: 48
End: 2018-06-29

## 2018-06-29 DIAGNOSIS — R30.0 DYSURIA: Primary | ICD-10-CM

## 2018-06-29 DIAGNOSIS — N30.00 ACUTE CYSTITIS WITHOUT HEMATURIA: ICD-10-CM

## 2018-06-29 LAB
BILIRUB SERPL-MCNC: ABNORMAL MG/DL
BLOOD URINE, POC: ABNORMAL
COLOR, POC UA: ABNORMAL
GLUCOSE UR QL STRIP: 250
KETONES UR QL STRIP: ABNORMAL
LEUKOCYTE ESTERASE URINE, POC: ABNORMAL
NITRITE, POC UA: ABNORMAL
PH, POC UA: 5.5
PROTEIN, POC: 100
SPECIFIC GRAVITY, POC UA: 1.02
UROBILINOGEN, POC UA: 4

## 2018-06-29 PROCEDURE — 81002 URINALYSIS NONAUTO W/O SCOPE: CPT | Mod: S$GLB,,, | Performed by: UROLOGY

## 2018-06-29 PROCEDURE — 87086 URINE CULTURE/COLONY COUNT: CPT

## 2018-06-29 NOTE — PROGRESS NOTES
Dr. Box reviewed the urine specimen under the microscope and we are sending it for culture. He did not see any bacteria or WBC that would indicate an infection.

## 2018-06-29 NOTE — TELEPHONE ENCOUNTER
The patient called complaining of urgency, frequency, incontinence that she needs to wear a pad, bladder pressure, left flank pain that is a 3 and urine that smells.  She would like to get a urine culture but she is taking AZO and dropped a urine specimen off at the office for Dr Wallace to look at first.

## 2018-06-30 LAB — BACTERIA UR CULT: NORMAL

## 2018-07-06 ENCOUNTER — TELEPHONE (OUTPATIENT)
Dept: UROLOGY | Facility: CLINIC | Age: 48
End: 2018-07-06

## 2018-07-06 NOTE — TELEPHONE ENCOUNTER
Concepcion called and was complaining of urinating every hour and sometimes 2 times in 15 minutes.  She had finished her antibiotic and her urine does not smell bad anymore but is wondering if she can take medication for the problem.    After speaking to Dr Wallace about Concepcion's symptoms, he said that she can try Myrbetriq 25 mg, 1 daily and she will contact the office on her progress.

## 2018-07-13 DIAGNOSIS — R09.89 RUNNY NOSE: ICD-10-CM

## 2018-07-13 RX ORDER — AZELASTINE 1 MG/ML
1 SPRAY, METERED NASAL 2 TIMES DAILY
Qty: 30 ML | Refills: 0 | Status: SHIPPED | OUTPATIENT
Start: 2018-07-13 | End: 2018-10-16 | Stop reason: SDUPTHER

## 2018-08-23 ENCOUNTER — TELEPHONE (OUTPATIENT)
Dept: NEUROLOGY | Facility: CLINIC | Age: 48
End: 2018-08-23

## 2018-08-23 NOTE — TELEPHONE ENCOUNTER
Called patient to reschedule her appointment due to Dr Martin having a meeting to attend.  Patient said she will either reschedule her appointment online or call us back to reschedule it when she is ready to be seen by Dr Martin.

## 2018-09-11 ENCOUNTER — PATIENT MESSAGE (OUTPATIENT)
Dept: NEUROLOGY | Facility: CLINIC | Age: 48
End: 2018-09-11

## 2018-09-13 ENCOUNTER — OFFICE VISIT (OUTPATIENT)
Dept: DERMATOLOGY | Facility: CLINIC | Age: 48
End: 2018-09-13
Payer: COMMERCIAL

## 2018-09-13 DIAGNOSIS — L82.1 SEBORRHEIC KERATOSES: Primary | ICD-10-CM

## 2018-09-13 DIAGNOSIS — L73.8 SEBACEOUS HYPERPLASIA: ICD-10-CM

## 2018-09-13 DIAGNOSIS — D22.9 MULTIPLE BENIGN NEVI: ICD-10-CM

## 2018-09-13 DIAGNOSIS — L81.4 LENTIGINES: ICD-10-CM

## 2018-09-13 DIAGNOSIS — D48.5 NEOPLASM OF UNCERTAIN BEHAVIOR OF SKIN: ICD-10-CM

## 2018-09-13 DIAGNOSIS — Z85.828 PERSONAL HISTORY OF MALIGNANT NEOPLASM OF SKIN: ICD-10-CM

## 2018-09-13 PROCEDURE — 99214 OFFICE O/P EST MOD 30 MIN: CPT | Mod: 25,S$GLB,, | Performed by: DERMATOLOGY

## 2018-09-13 PROCEDURE — 99999 PR PBB SHADOW E&M-EST. PATIENT-LVL III: CPT | Mod: PBBFAC,,, | Performed by: DERMATOLOGY

## 2018-09-13 PROCEDURE — 11101 PR BIOPSY, EACH ADDED LESION: CPT | Mod: 59,S$GLB,, | Performed by: DERMATOLOGY

## 2018-09-13 PROCEDURE — 11100 PR BIOPSY OF SKIN LESION: CPT | Mod: S$GLB,,, | Performed by: DERMATOLOGY

## 2018-09-13 PROCEDURE — 88305 TISSUE EXAM BY PATHOLOGIST: CPT | Performed by: PATHOLOGY

## 2018-09-13 NOTE — PROGRESS NOTES
Subjective:       Patient ID:  Concepcion De La Vega is a 48 y.o. female who presents for   Chief Complaint   Patient presents with    Skin Check    Lesion     Last skin check with me 5-  Today she presents for a skin check. There is a lesion on her left lower leg present for 3 weeks, asymptomatic,used steroid cream a couple of weeks ago with no improvement       Derm Hx: superficial BCC on R medial ankle to be treated today; BCC of L anterior thigh excised April 2016 by Gisela ZAZUETA @ [facility]; SCC of R hand treated by ED&C Sept 2015  Family Hx: Mother & Father, Maternal grandparents had skin ca- types unknown                             Past Medical History:   Diagnosis Date    Abnormal Pap smear     ASCUS negative HPV. Repeat pap    ADD (attention deficit disorder)     Arthritis     right knee    Asthma     exercise induced    Cystocele     with stress incontinence    Depression     Dizziness     Fracture of sternum Nov 2010    from Karate class    GERD (gastroesophageal reflux disease)     HEARING LOSS     Hearing loss in right ear     IBS (irritable bowel syndrome)     Intrauterine device     Mirena    Kidney stone 2009    PONV (postoperative nausea and vomiting)     requests Scopolamine patch    Seasonal allergies     deviated septum also    SI (sacroiliac) pain     Sinus pain July 3/2012    no fever, starting on antibiotics    Sinusitis     Skin tag of female perineum 2012    TMJ (dislocation of temporomandibular joint)      Review of Systems   Skin: Positive for activity-related sunscreen use and sensitivity to antibiotic ointment. Negative for dry skin, sensitivity to bandage adhesive, tendency to form keloidal scars and dry lips.   Hematologic/Lymphatic: Bruises/bleeds easily.        Objective:    Physical Exam   Constitutional: She appears well-developed and well-nourished.   Neurological: She is alert and oriented to person, place, and time.   Psychiatric: She has a normal mood and  affect.   Skin:   Areas Examined (abnormalities noted in diagram):   Scalp / Hair Palpated and Inspected  Head / Face Inspection Performed  Neck Inspection Performed  Chest / Axilla Inspection Performed  Abdomen Inspection Performed  Genitals / Buttocks / Groin Inspection Performed  Back Inspection Performed  RUE Inspected  LUE Inspection Performed  RLE Inspected  LLE Inspection Performed                           Diagram Legend     Erythematous scaling macule/papule c/w actinic keratosis       Vascular papule c/w angioma      Pigmented verrucoid papule/plaque c/w seborrheic keratosis      Yellow umbilicated papule c/w sebaceous hyperplasia      Irregularly shaped tan macule c/w lentigo     1-2 mm smooth white papules consistent with Milia      Movable subcutaneous cyst with punctum c/w epidermal inclusion cyst      Subcutaneous movable cyst c/w pilar cyst      Firm pink to brown papule c/w dermatofibroma      Pedunculated fleshy papule(s) c/w skin tag(s)      Evenly pigmented macule c/w junctional nevus     Mildly variegated pigmented, slightly irregular-bordered macule c/w mildly atypical nevus      Flesh colored to evenly pigmented papule c/w intradermal nevus       Pink pearly papule/plaque c/w basal cell carcinoma      Erythematous hyperkeratotic cursted plaque c/w SCC      Surgical scar with no sign of skin cancer recurrence      Open and closed comedones      Inflammatory papules and pustules      Verrucoid papule consistent consistent with wart     Erythematous eczematous patches and plaques     Dystrophic onycholytic nail with subungual debris c/w onychomycosis     Umbilicated papule    Erythematous-base heme-crusted tan verrucoid plaque consistent with inflamed seborrheic keratosis     Erythematous Silvery Scaling Plaque c/w Psoriasis     See annotation      Assessment / Plan:      Pathology Orders:     Normal Orders This Visit    Tissue Specimen To Pathology, Dermatology     Questions:    Directional  Terms:  Other(comment)    Clinical information:  r/o superficiall BCC vs other    Specific Site:  L upper back    Tissue Specimen To Pathology, Dermatology     Questions:    Directional Terms:  Other(comment)    Clinical information:  r/o traumatized nevus vs VV vs NMSC vs other    Specific Site:  L shin        Neoplasm of uncertain behavior of skin  -     Tissue Specimen To Pathology, Dermatology  -     Tissue Specimen To Pathology, Dermatology  Shave biopsy procedure note:    Shave biopsy performed after verbal consent including risk of infection, scar, recurrence, need for additional treatment of site. Area prepped with alcohol, anesthetized with approximately 1.0cc of 1% lidocaine with epinephrine. Lesional tissue shaved with razor blade. Hemostasis achieved with application of aluminum chloride followed by hyfrecation. No complications. Dressing applied. Wound care explained.    Lentigines  These are benign hyperpigmented sun induced lesions. Daily sun protection will reduce the number of new lesions. Treatment of these benign lesions (such as IPL or topicals such as HQ) are considered cosmetic.    Seborrheic keratoses  These are benign inherited growths without a malignant potential. Reassurance given to patient. No treatment is necessary.     Multiple benign nevi  Reassurance that her nevi appear benign with regular and consistent pigment pattern on dermoscopy    Personal history of malignant neoplasm of skin  Area of previous skin cancers examined. Site well healed with no signs of recurrence.    Total body skin examination performed today including at least 12 points as noted in physical examination. 2 lesions suspicious for malignancy noted.    Sebaceous hyperplasia  This is a common condition representing benign enlargement of the sebaceous lobule. It typically occurs in adulthood. Reassurance given to patient.              Follow-up for pending Pathology.

## 2018-09-18 ENCOUNTER — PROCEDURE VISIT (OUTPATIENT)
Dept: NEUROLOGY | Facility: CLINIC | Age: 48
End: 2018-09-18
Payer: COMMERCIAL

## 2018-09-18 VITALS
HEIGHT: 67 IN | WEIGHT: 190 LBS | DIASTOLIC BLOOD PRESSURE: 82 MMHG | RESPIRATION RATE: 16 BRPM | SYSTOLIC BLOOD PRESSURE: 134 MMHG | HEART RATE: 81 BPM | BODY MASS INDEX: 29.82 KG/M2

## 2018-09-18 DIAGNOSIS — G43.719 INTRACTABLE CHRONIC MIGRAINE WITHOUT AURA AND WITHOUT STATUS MIGRAINOSUS: Primary | ICD-10-CM

## 2018-09-18 PROCEDURE — 64615 CHEMODENERV MUSC MIGRAINE: CPT | Mod: S$GLB,,, | Performed by: PSYCHIATRY & NEUROLOGY

## 2018-09-18 NOTE — PROCEDURES
Procedures     BOTOX PROCEDURE    PROCEDURE PERFORMED: Botulinum toxin injection (67686)    CLINICAL INDICATION: G43.719 NDC: [id number]    A time out was conducted just before the start of the procedure to verify the correct patient and procedure, procedure location, and all relevant critical information.   The Botox injections have achieved well over 50%  improvement in the patient's symptoms. Migraines have been reduced at least 7 days per month and the number of cumulative hours suffering with headaches has been reduced at least 100 total hours per month. Today she does have a headache indicating that the Botox has worn off. Frequency of treatment is every 3 months unless no response to the treatments, at which time we will discontinue the injections.      DESCRIPTION OF PROCEDURE: After obtaining informed consent and under aseptic technique, a total of 155 units of botulinum toxin type A were   injected in the following muscles: Procerus 5 units,  5 units bilaterally, frontalis 20 units, temporalis 20 units bilaterally, occipitalis 15 units, upper cervical paraspinals 10 units bilaterally and trapezius 15 units bilaterally. The patient was given a total of 155 units in 31 sites.The patient tolerated the procedure well. There were no complications. The patient was given a prescription for repeat treatment in 3 months.     Unavoidable waste 45 units          Joy Martin M.D  Medical Director, Headache and Facial Pain  [facility]

## 2018-09-28 ENCOUNTER — PATIENT MESSAGE (OUTPATIENT)
Dept: DERMATOLOGY | Facility: CLINIC | Age: 48
End: 2018-09-28

## 2018-10-09 ENCOUNTER — PATIENT MESSAGE (OUTPATIENT)
Dept: DERMATOLOGY | Facility: CLINIC | Age: 48
End: 2018-10-09

## 2018-10-16 DIAGNOSIS — R09.89 RUNNY NOSE: ICD-10-CM

## 2018-10-16 RX ORDER — ALBUTEROL SULFATE 90 UG/1
2 AEROSOL, METERED RESPIRATORY (INHALATION) EVERY 6 HOURS PRN
Qty: 18 G | Refills: 3 | Status: SHIPPED | OUTPATIENT
Start: 2018-10-16 | End: 2019-05-08 | Stop reason: SDUPTHER

## 2018-10-17 RX ORDER — AZELASTINE 1 MG/ML
1 SPRAY, METERED NASAL 2 TIMES DAILY
Qty: 30 ML | Refills: 11 | Status: SHIPPED | OUTPATIENT
Start: 2018-10-17 | End: 2019-05-08 | Stop reason: SDUPTHER

## 2018-10-30 ENCOUNTER — OFFICE VISIT (OUTPATIENT)
Dept: PRIMARY CARE CLINIC | Facility: CLINIC | Age: 48
End: 2018-10-30
Payer: COMMERCIAL

## 2018-10-30 VITALS
HEART RATE: 88 BPM | WEIGHT: 190 LBS | SYSTOLIC BLOOD PRESSURE: 120 MMHG | DIASTOLIC BLOOD PRESSURE: 80 MMHG | HEIGHT: 67 IN | BODY MASS INDEX: 29.82 KG/M2

## 2018-10-30 DIAGNOSIS — T63.461A WASP STING, ACCIDENTAL OR UNINTENTIONAL, INITIAL ENCOUNTER: Primary | ICD-10-CM

## 2018-10-30 PROCEDURE — 99999 PR PBB SHADOW E&M-EST. PATIENT-LVL IV: CPT | Mod: PBBFAC,,, | Performed by: NURSE PRACTITIONER

## 2018-10-30 PROCEDURE — 96372 THER/PROPH/DIAG INJ SC/IM: CPT | Mod: S$GLB,,, | Performed by: NURSE PRACTITIONER

## 2018-10-30 PROCEDURE — 99214 OFFICE O/P EST MOD 30 MIN: CPT | Mod: 25,S$GLB,, | Performed by: NURSE PRACTITIONER

## 2018-10-30 PROCEDURE — 3008F BODY MASS INDEX DOCD: CPT | Mod: CPTII,S$GLB,, | Performed by: NURSE PRACTITIONER

## 2018-10-30 RX ORDER — METHYLPREDNISOLONE SODIUM SUCCINATE 125 MG/2ML
125 INJECTION INTRAMUSCULAR; INTRAVENOUS EVERY 6 HOURS
Status: DISCONTINUED | OUTPATIENT
Start: 2018-10-30 | End: 2023-03-02

## 2018-10-30 RX ADMIN — METHYLPREDNISOLONE SODIUM SUCCINATE 125 MG: 125 INJECTION INTRAMUSCULAR; INTRAVENOUS at 03:10

## 2018-10-30 NOTE — PROGRESS NOTES
Concepcion De La Vega is a 48 y.o. female patient of Umair Kuhn Jr, MD who presents to the clinic today for   Chief Complaint   Patient presents with    Insect Bite     wasp sting on L ankle   .    HPI    Pt, who is known to me, reports a new problem to me:  Swelling ansd redness after being stung by a wasp that was in her shoe 2 days ago.  The swelling continues to spread.  The area is very itchy.    These symptoms began 2 days  ago and status is worsening.     Pt denies the following symptoms:  Breathing problems    Aggravating factors include wasp sting .    Relieving factors include nothing .    OTC Medications tried are benadryl.    Prescription medications taken for symptoms are none.    Pertinent medical history:  H/o reactions to wasp stings in the past.  Steroid injection usually resolves the problems.    ROS    Constitutional:  No fever.    Skin:  See chief complaint/HPI.    All other ROS neg.      PAST MEDICAL HISTORY:  Past Medical History:   Diagnosis Date    Abnormal Pap smear     ASCUS negative HPV. Repeat pap    ADD (attention deficit disorder)     Arthritis     right knee    Asthma     exercise induced    Cystocele     with stress incontinence    Depression     Dizziness     Fracture of sternum Nov 2010    from Karate class    GERD (gastroesophageal reflux disease)     HEARING LOSS     Hearing loss in right ear     IBS (irritable bowel syndrome)     Intrauterine device     Mirena    Kidney stone 2009    PONV (postoperative nausea and vomiting)     requests Scopolamine patch    Seasonal allergies     deviated septum also    SI (sacroiliac) pain     Sinus pain July 3/2012    no fever, starting on antibiotics    Sinusitis     Skin tag of female perineum 2012    TMJ (dislocation of temporomandibular joint)        PAST SURGICAL HISTORY:  Past Surgical History:   Procedure Laterality Date    ANTERIOR VAGINAL REPAIR      CHOLECYSTECTOMY      COLONOSCOPY      COLPORRHAPHY, ANTERIOR N/A  2012    [facility]    CYSTOPEXY N/A 2012    [facility]    DILATION AND CURETTAGE OF UTERUS      EXTERNAL EAR SURGERY      Rt middle ear oval Repair    LASIK Bilateral 2015    [place]    REMOVAL, SKIN TAG Right 2012    [facility]    TONSILLECTOMY      TONSILLECTOMY, ADENOIDECTOMY, BILATERAL MYRINGOTOMY AND TUBES      URETERAL STENT PLACEMENT      cysto, laser lithotripsy left ureter stone       SOCIAL HISTORY:  Social History     Socioeconomic History    Marital status:      Spouse name: Not on file    Number of children: Not on file    Years of education: Not on file    Highest education level: Not on file   Social Needs    Financial resource strain: Not on file    Food insecurity - worry: Not on file    Food insecurity - inability: Not on file    Transportation needs - medical: Not on file    Transportation needs - non-medical: Not on file   Occupational History    Not on file   Tobacco Use    Smoking status: Former Smoker     Packs/day: 0.50     Years: 5.00     Pack years: 2.50     Last attempt to quit: 2002     Years since quittin.3    Smokeless tobacco: Never Used   Substance and Sexual Activity    Alcohol use: Yes     Comment: glass of wine of day    Drug use: No    Sexual activity: Yes     Partners: Male     Birth control/protection: IUD   Other Topics Concern    Patient feels they ought to cut down on drinking/drug use Not Asked    Patient annoyed by others criticizing their drinking/drug use Not Asked    Patient has felt bad or guilty about drinking/drug use Not Asked    Patient has had a drink/used drugs as an eye opener in the AM Not Asked    Are you pregnant or think you may be? Not Asked    Breast-feeding Not Asked   Social History Narrative    Not on file       FAMILY HISTORY:  Family History   Problem Relation Age of Onset    Cancer Maternal  Grandmother         uncertain of which GYN cancer    Anesthesia problems Mother         Mom hard to awaken post-op    Multiple sclerosis Mother     Diabetes Mother     Kidney disease Father     Diabetes Brother     Clotting disorder Neg Hx     Breast cancer Neg Hx     Allergic rhinitis Neg Hx     Allergies Neg Hx     Angioedema Neg Hx     Asthma Neg Hx     Atopy Neg Hx     Eczema Neg Hx     Immunodeficiency Neg Hx     Rhinitis Neg Hx     Urticaria Neg Hx        ALLERGIES AND MEDICATIONS: updated and reviewed.  Review of patient's allergies indicates:   Allergen Reactions    Oxytetracycline Swelling     Terramycin    Triple antibiotic [dlcoz-sglcg-utbgwgd-pramoxine] Blisters    Adhesive Rash     Current Outpatient Medications   Medication Sig Dispense Refill    buPROPion (WELLBUTRIN XL) 150 MG TB24 tablet TAKE TWO TABLETS BY MOUTH EVERY  tablet 3    cetirizine (ZYRTEC) 10 MG tablet Take 10 mg by mouth once daily.      mirabegron (MYRBETRIQ) 25 mg Tb24 ER tablet Take 25 mg by mouth once daily.      MONTELUKAST SODIUM (SINGULAIR ORAL) Take 1 tablet by mouth once daily.      MULTIVITAMIN,STRESS FORMULA (STRESS TAB NF ORAL) Take by mouth.      albuterol (PROVENTIL/VENTOLIN HFA) 90 mcg/actuation inhaler Inhale 2 puffs into the lungs every 6 (six) hours as needed for Wheezing. 18 g 3    azelastine (ASTELIN) 137 mcg (0.1 %) nasal spray Use 1 spray (137 mcg total) by Nasal route 2 (two) times daily. for 10 days 30 mL 11    FERROUS SULFATE (FEOSOL ORAL) Take 1 tablet by mouth every other day.       fluticasone (FLONASE) 50 mcg/actuation nasal spray 1 spray by Each Nare route once daily.       Current Facility-Administered Medications   Medication Dose Route Frequency Provider Last Rate Last Dose    onabotulinumtoxina injection 200 Units  200 Units Intramuscular Q90 Days Lydia Martin MD        onabotulinumtoxina injection 200 Units  200 Units Intramuscular Q90 Days Lydia Garcia  MD Mario   200 Units at 03/15/18 1100    onabotulinumtoxina injection 200 Units  200 Units Intramuscular Q90 Days Lydia Martin MD   200 Units at 06/06/18 1406    onabotulinumtoxina injection 200 Units  200 Units Intramuscular Q90 Days Lydia Martin MD   200 Units at 09/18/18 1518         PHYSICAL EXAM    Alert, coop 48 y.o. female patient in no acute distress, is not ill-appearing.    Vitals:    10/30/18 1441   BP: 120/80   Pulse: 88     VS reviewed.  VS stable.  CC, nursing note, medications & PMH all reviewed today.    Head:  Normocephalic, atraumatic.    EENT:  Ext nose/ears normal.               Eyes with normal lids, not injected.     Resp:  Respirations even, unlabored    Heart:  Regular rate.  CV:  Cap RF brisk    MS:  Ambulates normally.     NEURO:  Alert and oriented x 4.  Responds appropriately during interaction.                    Skin:  Warm, dry, color good.            A/an erythematous, raised, pruritic area is noted on the lat malleolar area of the foot, measuring 10 x 10 cm.            No pustules or vesicles noted.    Psych:  Responds appropriately throughout the visit.               Relaxed.  Well-groomed    Wasp sting, accidental or unintentional, initial encounter  -     methylPREDNISolone sodium succinate injection 125 mg      Pt today presents with report of continuing swelling, itching and redness after being stung by a wasp that was hiding in her shoe 2 days ago.  There is a reddened, raised and pruritic area noted in the left lat malleolar area consistent with a reaction to an insect sting.    This is a new problem to me.  No work up is planned.        Pt advised to perform comfort measures recommended on patient instruction sheet .    If not better in 2 days, the patient is advised to call us.  If worse or concerns, the patient is advised to call us.  Explained exam findings, diagnosis and treatment plan to patient.  Questions answered and patient states  understanding.

## 2018-10-30 NOTE — PATIENT INSTRUCTIONS
Insect Sting Allergy, Generalized  You are having an allergic reaction to an insect sting. This may occur after a sting by a wasp, honeybee, yellow jacket, or other insect. This may cause an itchy rash and swelling in the face or other parts of the body. A more severe reaction may cause you to feel dizzy, faint, or have trouble breathing or swallowing. Other warning signs are listed below.  Symptoms can include:  · Rash, hives, redness, welts, or blisters in areas other than the sting site  · Itching, burning, stinging, pain in areas other than the sting site  · Dry, flaky, cracking, scaly skin  · Swelling in areas other than the sting site   · Stomach pain or cramps  More severe symptoms include:  · Swelling of the face or lips or drooling  · Trouble swallowing, feeling like your throat is closing  · Trouble breathing, wheezing  · Dizziness or a sudden decrease in blood pressure  · Hoarse voice or trouble speaking  · Severe nausea, vomiting, or diarrhea  · Feeling faint or lightheaded  · Rapid heart rate  Home care  Medicine  The healthcare provider may prescribe medicines to relieve swelling, itching, and pain. Follow the providers instructions when taking these medicines.  · If you had a severe reaction, the provider may prescribe an injectable epinephrine kit. Epinephrine will stop the progression of an allergic reaction. Before you leave the hospital, be sure that you understand when and how to use this medicine.  · Oral diphenhydramine is an over-the-counter antihistamine available at pharmacies and grocery stores. Unless a prescription antihistamine was given, diphenhydramine may be used to reduce itching if large areas of the skin are involved. It may make you sleepy, so be careful using it in the daytime or when going to school, working, or driving. Note: Dont use diphenhydramine if you have glaucoma or if you are a man with trouble urinating due to an enlarged prostate. There are other antihistamines  that cause less drowsiness and are good choices for daytime use. Ask your pharmacist for suggestions.  · Dont use diphenhydramine cream on your skin. It can cause a further reaction in some people.  · Calamine lotion or oatmeal baths sometimes help with itching.  · You may use acetaminophen or ibuprofen to control pain, unless another pain medicine was prescribed. Note: If you have chronic liver or kidney disease or ever had a stomach ulcer or gastrointestinal bleeding, talk with your provider before using these medicines.    General care  Avoid tight clothing and things that heat up your skin (such as hot showers or baths, or direct sunlight). Heat makes the itching worse.  An ice pack will relieve local areas of intense itching and redness. Apply 5 to 10 minutes. To make an ice pack, put ice cubes in a plastic bag that seals at the top. Wrap the bag in a clean, thin towel or cloth. Dont put ice directly on the skin.  Ticks  If you try to remove a tick, do the following:  · Use a set of fine tweezers and  the tick as close to the skin as is possible.  · Pull upwards, using even, steady pressure. Dont jerk or twist the tick. The ticks bodily fluids may contain infection-causing organisms. So dont squeeze, crush, or puncture the body of the tick. Dont use a smoldering match or cigarette, nail polish, petroleum jelly, liquid soap, or kerosene. They may irritate the tick.  · If any mouthparts of the tick remain in the skin, these can be removed with tweezers. If you cant remove the mouth (of a tick) easily with clean tweezers, leave it alone and let the skin heal.  · After the tick is removed, wash the bite area with rubbing alcohol, iodine, or soap and water.  · Put the tick in a sealed container and completely cover it with alcohol. Never try to kill or crush a tick with your hand or fingers.  Stings  Wasps, yellow jackets, and hornets dont leave a stinger behind. But if a honeybee stings you, a stinger  may stay in your skin. The stinger of a honeybee releases a substance that will attract other bees to you. So try to move away from the nest immediately. Once you are away from the nest, then remove the stinger as quickly as possible by:  · Scraping the stinger out with the edge of a dull knife or plastic card (credit card).  · Don't use a tweezer or your fingers to remove the stinger since that may squeeze more toxin from the stinger.  · Wash the affected area with soap and warm water 2 to 3 times a day. Don't break a blister, if present.  · Next apply an ice pack for 5 to 10 minutes. To make an ice pack, put ice cubes in a plastic bag that seals at the top. Wrap the bag in a clean, thin towel or cloth. Dont put ice directly on the skin.  · Contact your healthcare provider and ask what can be used to help decrease the swelling and itching to the affected area.   · To prevent an infection, don't scratch the affected areas. Always check the sting area for signs of an infection: increased redness, swelling, or pain to the affected area.  Preventing future reactions  Future reactions could be worse than this one. So try to avoid situations where you might be stung:  · Don't walk in grass without shoes. Avoid wearing sandals.  · Don't leave food uncovered when eating outside. Sweet treats, watermelon, and ice cream attract insects.  · Don't drink from uncovered sweetened drinks in cans when outside. Insects are attracted to soda drink cans and sometimes crawl inside of them.  · Don't wear bright colored clothes with flowery prints and patterns when outside.  · Dont wear perfume when outside. Smell attracts insects.  · Wear long pants, long-sleeved shirts, socks, and work gloves when working outside.  · Be aware that honeybees nest in trees. Wasps and yellow jackets nest in the ground, trees or roof eaves. Avoid garbage cans when outside.  Auto-injectable epinephrine  · If you are at high risk for another sting due to  where you work or play, or if your reaction included dizziness, fainting or trouble breathing or swallowing, an auto-injectable epinephrine may be prescribed. If not, ask your healthcare provider for one and always carry it with you. Learn how to use the device. If you begin to feel the symptoms of another reaction in the future, use the auto-injectable epinephrine to inject yourself, and then call 911. Don't wait until symptoms become severe.   · Remember that the auto-injectable epinephrine is a rescue medicine only. You still need someone to take you to the hospital or call 911 after you have received the medicine.  Follow-up care  Follow up with your healthcare provider, or as advised if your symptoms do not continue to improve.  Call 911  Call 911 if any of these occur:  · Trouble breathing or swallowing, wheezing   · Cool, moist, pale skin  · Hoarse voice or trouble speaking  · Confused  · Very drowsy or trouble waking up  · Fainting or loss of consciousness  · Rapid heart rate  · Low blood pressure or feeling dizzy or weak  · Feeling of doom  · Severe nausea, vomiting, or diarrhea  · Seizure  · Swelling in the face, eyelids, lips, mouth, throat or tongue  · Drooling  When to seek medical advice  Call your healthcare provider right away if any of the following occur:  · Spreading areas of itching, redness or swelling  · Headache, fever, chills, muscle or joint aching  · Increased pain or swelling  · Signs of infection of the affected area:  ¨ Spreading redness  ¨ Increase in pain or swelling  ¨ Fluid or colored drainage from the affected site  Date Last Reviewed: 3/1/2017  © 5128-7873 [employer]. [address] [place] [address]. All rights reserved. This information is not intended as a substitute for professional medical care. Always follow your healthcare professional's instructions.    Take benadryl as needed.  Caution for drowsiness while taking it.    Solumedrol injection given  today.    If you are not better in 2 days, if worse or you have concerns or questions, please do not hesitate to call.  You can reach us at [phone] Monday through Thursday (except holidays) 10 a.m. to 6 p.m. or call Dr. Kuhn/CARLITA Crenshaw    Thank you for using the [facility]!    DAIN Lemos, CNP, FNP-BC [facility]      To rate your experience with KANU Lemos, click on the link below:      [email or web address]

## 2018-11-12 ENCOUNTER — PATIENT MESSAGE (OUTPATIENT)
Dept: UROLOGY | Facility: CLINIC | Age: 48
End: 2018-11-12

## 2018-11-12 ENCOUNTER — OFFICE VISIT (OUTPATIENT)
Dept: DERMATOLOGY | Facility: CLINIC | Age: 48
End: 2018-11-12
Payer: COMMERCIAL

## 2018-11-12 DIAGNOSIS — L82.1 SEBORRHEIC KERATOSES: ICD-10-CM

## 2018-11-12 DIAGNOSIS — L72.0 MILIUM: ICD-10-CM

## 2018-11-12 DIAGNOSIS — C44.91 SUPERFICIAL BASAL CELL CARCINOMA: ICD-10-CM

## 2018-11-12 DIAGNOSIS — C44.729 SQUAMOUS CELL CARCINOMA OF LOWER LEG, LEFT: Primary | ICD-10-CM

## 2018-11-12 PROCEDURE — 99213 OFFICE O/P EST LOW 20 MIN: CPT | Mod: S$GLB,,, | Performed by: DERMATOLOGY

## 2018-11-12 PROCEDURE — 99999 PR PBB SHADOW E&M-EST. PATIENT-LVL II: CPT | Mod: PBBFAC,,, | Performed by: DERMATOLOGY

## 2018-11-12 RX ORDER — FLUOROURACIL 50 MG/G
CREAM TOPICAL 2 TIMES DAILY
Qty: 40 G | Refills: 1 | Status: SHIPPED | OUTPATIENT
Start: 2018-11-12 | End: 2019-05-08

## 2018-11-12 NOTE — PROGRESS NOTES
Subjective:       Patient ID:  Concepcion De La Vega is a 48 y.o. female who presents for   Chief Complaint   Patient presents with    Mole     Patient here for follow up. Last skin check  9-  New lesion on her upper chest present for 1 month, rough & firm, not treated       Derm Hx: superficial BCC on L back; SCC on L shin; superficial BCC on R medial ankle treated by ED&C; BCC of L anterior thigh excised April 2016 by Gisela ZAZUETA @ [facility]; SCC of R hand treated by ED&C Sept 2015    Family Hx: Mother & Father, Maternal grandparents had skin ca- types unknown       Past Medical History:  Diagnosis Date   Abnormal Pap smear      ASCUS negative HPV. Repeat pap   ADD (attention deficit disorder)     Arthritis      right knee   Asthma      exercise induced   Cystocele      with stress incontinence   Depression     Dizziness     Fracture of sternum Nov 2010    from Karate class   GERD (gastroesophageal reflux disease)     HEARING LOSS     Hearing loss in right ear     IBS (irritable bowel syndrome)     Intrauterine device      Mirena   Kidney stone 2009   PONV (postoperative nausea and vomiting)      requests Scopolamine patch   Seasonal allergies      deviated septum also   SI (sacroiliac) pain     Sinus pain July 3/2012    no fever, starting on antibiotics   Sinusitis     Skin tag of female perineum 2012   TMJ (dislocation of temporomandibular joint)               Review of Systems   Skin: Positive for activity-related sunscreen use and sensitivity to antibiotic ointment. Negative for dry skin, sensitivity to bandage adhesive, tendency to form keloidal scars and dry lips.   Hematologic/Lymphatic: Bruises/bleeds easily.        Objective:    Physical Exam   Constitutional: She appears well-developed and well-nourished.   Neurological: She is alert and oriented to person, place, and time.   Psychiatric: She has a normal mood and affect.   Skin:   Areas Examined (abnormalities noted in diagram):    Head / Face Inspection Performed  Chest / Axilla Inspection Performed  Back Inspection Performed  LLE Inspection Performed                          Diagram Legend     Erythematous scaling macule/papule c/w actinic keratosis       Vascular papule c/w angioma      Pigmented verrucoid papule/plaque c/w seborrheic keratosis      Yellow umbilicated papule c/w sebaceous hyperplasia      Irregularly shaped tan macule c/w lentigo     1-2 mm smooth white papules consistent with Milia      Movable subcutaneous cyst with punctum c/w epidermal inclusion cyst      Subcutaneous movable cyst c/w pilar cyst      Firm pink to brown papule c/w dermatofibroma      Pedunculated fleshy papule(s) c/w skin tag(s)      Evenly pigmented macule c/w junctional nevus     Mildly variegated pigmented, slightly irregular-bordered macule c/w mildly atypical nevus      Flesh colored to evenly pigmented papule c/w intradermal nevus       Pink pearly papule/plaque c/w basal cell carcinoma      Erythematous hyperkeratotic cursted plaque c/w SCC      Surgical scar with no sign of skin cancer recurrence      Open and closed comedones      Inflammatory papules and pustules      Verrucoid papule consistent consistent with wart     Erythematous eczematous patches and plaques     Dystrophic onycholytic nail with subungual debris c/w onychomycosis     Umbilicated papule    Erythematous-base heme-crusted tan verrucoid plaque consistent with inflamed seborrheic keratosis     Erythematous Silvery Scaling Plaque c/w Psoriasis     See annotation    PATHOLOGY 9/13/18    Skin, left upper back, shave biopsy:  - BASAL CELL CARCINOMA, SUPERFICIAL TYPE.  - MARGINS ARE NEGATIVE IN THE PLANES OF SECTION.    Skin, left shin, shave biopsy:  - INVASIVE SQUAMOUS CELL CARCINOMA, WELL-DIFFERENTIATED.  - THE TUMOR EXTENDS TO THE DEEP BIOPSY MARGIN.      Assessment / Plan:        Squamous cell carcinoma of lower leg, left  Biopsy site is well-healed with no evidence of  clinical recurrence  Pt would like to treat this site with Efudex as well    Superficial basal cell carcinoma  Discussed the likely erythema, crusting, swelling, inflammation and showed photos of what to expect    -     fluorouracil (EFUDEX) 5 % cream; Apply topically 2 (two) times daily. Biopsy site on back and leg x 4 weeks  Dispense: 40 g; Refill: 1  Pt will send photo or stop by clinic to show me the biopsy site in 4 weeks to determine if we need to treat for an additional 4-8 weeks    Seborrheic Keratosis  These are benign inherited growths without a malignant potential. Reassurance given to patient. No treatment is necessary.     Milium  Reassurance           Follow-up in about 4 weeks (around 12/10/2018).

## 2018-12-19 DIAGNOSIS — F33.0 MILD EPISODE OF RECURRENT MAJOR DEPRESSIVE DISORDER: Chronic | ICD-10-CM

## 2018-12-20 RX ORDER — BUPROPION HYDROCHLORIDE 150 MG/1
TABLET ORAL
Qty: 180 TABLET | Refills: 3 | Status: SHIPPED | OUTPATIENT
Start: 2018-12-20 | End: 2020-02-26 | Stop reason: SDUPTHER

## 2019-01-15 ENCOUNTER — PATIENT MESSAGE (OUTPATIENT)
Dept: NEUROLOGY | Facility: CLINIC | Age: 49
End: 2019-01-15

## 2019-01-15 ENCOUNTER — PATIENT MESSAGE (OUTPATIENT)
Dept: UROLOGY | Facility: CLINIC | Age: 49
End: 2019-01-15

## 2019-01-15 DIAGNOSIS — R35.0 URINARY FREQUENCY: ICD-10-CM

## 2019-01-15 DIAGNOSIS — R30.0 DYSURIA: Primary | ICD-10-CM

## 2019-01-22 ENCOUNTER — HOSPITAL ENCOUNTER (OUTPATIENT)
Dept: RADIOLOGY | Facility: HOSPITAL | Age: 49
Discharge: HOME | End: 2019-01-22
Attending: NURSE PRACTITIONER
Payer: COMMERCIAL

## 2019-01-22 ENCOUNTER — OFFICE VISIT (OUTPATIENT)
Dept: ORTHOPEDICS | Facility: CLINIC | Age: 49
End: 2019-01-22
Payer: COMMERCIAL

## 2019-01-22 VITALS — WEIGHT: 190 LBS | BODY MASS INDEX: 29.82 KG/M2 | HEIGHT: 67 IN

## 2019-01-22 DIAGNOSIS — M25.512 LEFT SHOULDER PAIN, UNSPECIFIED CHRONICITY: Primary | ICD-10-CM

## 2019-01-22 DIAGNOSIS — M25.512 LEFT SHOULDER PAIN, UNSPECIFIED CHRONICITY: ICD-10-CM

## 2019-01-22 PROCEDURE — 73030 X-RAY EXAM OF SHOULDER: CPT | Mod: 26,LT,, | Performed by: RADIOLOGY

## 2019-01-22 PROCEDURE — 99214 PR OFFICE/OUTPT VISIT, EST, LEVL IV, 30-39 MIN: ICD-10-PCS | Mod: S$GLB,,, | Performed by: NURSE PRACTITIONER

## 2019-01-22 PROCEDURE — 99214 OFFICE O/P EST MOD 30 MIN: CPT | Mod: S$GLB,,, | Performed by: NURSE PRACTITIONER

## 2019-01-22 PROCEDURE — 99999 PR PBB SHADOW E&M-EST. PATIENT-LVL III: CPT | Mod: PBBFAC,,, | Performed by: NURSE PRACTITIONER

## 2019-01-22 PROCEDURE — 73030 XR SHOULDER TRAUMA 3 VIEW LEFT: ICD-10-PCS | Mod: 26,LT,, | Performed by: RADIOLOGY

## 2019-01-22 PROCEDURE — 99999 PR PBB SHADOW E&M-EST. PATIENT-LVL III: ICD-10-PCS | Mod: PBBFAC,,, | Performed by: NURSE PRACTITIONER

## 2019-01-22 PROCEDURE — 73030 X-RAY EXAM OF SHOULDER: CPT | Mod: TC,PO,LT

## 2019-01-22 RX ORDER — DICLOFENAC EPOLAMINE 0.01 G/1
1 SYSTEM TOPICAL DAILY
Qty: 30 PATCH | Refills: 6 | Status: SHIPPED | OUTPATIENT
Start: 2019-01-22 | End: 2019-12-29 | Stop reason: SDUPTHER

## 2019-01-22 NOTE — PROGRESS NOTES
DATE: 1/22/2019  PATIENT: Concepcion De La Vega  REFERRING MD:   CHIEF COMPLAINT:   Chief Complaint   Patient presents with    Left Shoulder - Pain       HISTORY:  Concepcion De La Vega is a 48 y.o. female  who presents for initial evaluation of her left shoulder pain, she is right hand dominant.  She reports her pain is 6/10.  The pain began today and increased as the day has gone on.  She has been wearing a hoodie today and taking it off and on over her head several times throughout the day.  After lunch and putting her hoodie on she noted the pain increased.  She has used flector (diclofenac) patches in the past with moderate relief and would like a refill today.    PAST MEDICAL/SURGICAL HISTORY:  Past Medical History:   Diagnosis Date    Abnormal Pap smear     ASCUS negative HPV. Repeat pap    ADD (attention deficit disorder)     Arthritis     right knee    Asthma     exercise induced    Cystocele     with stress incontinence    Depression     Dizziness     Fracture of sternum Nov 2010    from Karate class    GERD (gastroesophageal reflux disease)     HEARING LOSS     Hearing loss in right ear     IBS (irritable bowel syndrome)     Intrauterine device     Mirena    Kidney stone 2009    PONV (postoperative nausea and vomiting)     requests Scopolamine patch    Seasonal allergies     deviated septum also    SI (sacroiliac) pain     Sinus pain July 3/2012    no fever, starting on antibiotics    Sinusitis     Skin tag of female perineum 2012    TMJ (dislocation of temporomandibular joint)      Past Surgical History:   Procedure Laterality Date    ANTERIOR VAGINAL REPAIR      CHOLECYSTECTOMY      COLONOSCOPY      COLPORRHAPHY, ANTERIOR N/A 7/9/2012    [facility]    CYSTOPEXY N/A 7/9/2012    [facility]    DILATION AND CURETTAGE OF UTERUS      EXTERNAL EAR SURGERY      Rt middle ear oval Repair    LASIK Bilateral 2015    [place]    REMOVAL,  SKIN TAG Right 7/9/2012    [facility]    TONSILLECTOMY      TONSILLECTOMY, ADENOIDECTOMY, BILATERAL MYRINGOTOMY AND TUBES      URETERAL STENT PLACEMENT  2009    cysto, laser lithotripsy left ureter stone       Current Medications:   Current Outpatient Medications:     albuterol (PROVENTIL/VENTOLIN HFA) 90 mcg/actuation inhaler, Inhale 2 puffs into the lungs every 6 (six) hours as needed for Wheezing., Disp: 18 g, Rfl: 3    azelastine (ASTELIN) 137 mcg (0.1 %) nasal spray, Use 1 spray (137 mcg total) by Nasal route 2 (two) times daily. for 10 days, Disp: 30 mL, Rfl: 11    buPROPion (WELLBUTRIN XL) 150 MG TB24 tablet, TAKE TWO TABLETS BY MOUTH EVERY DAY, Disp: 180 tablet, Rfl: 3    cetirizine (ZYRTEC) 10 MG tablet, Take 10 mg by mouth once daily., Disp: , Rfl:     fluorouracil (EFUDEX) 5 % cream, Apply topically 2 (two) times daily. Biopsy site on back and leg x 4 weeks, Disp: 40 g, Rfl: 1    fluticasone (FLONASE) 50 mcg/actuation nasal spray, 1 spray by Each Nare route once daily., Disp: , Rfl:     mirabegron (MYRBETRIQ) 50 mg Tb24, Take 1 tablet (50 mg total) by mouth once daily., Disp: 30 tablet, Rfl: 11    MONTELUKAST SODIUM (SINGULAIR ORAL), Take 1 tablet by mouth once daily., Disp: , Rfl:     MULTIVITAMIN,STRESS FORMULA (STRESS TAB NF ORAL), Take by mouth., Disp: , Rfl:     diclofenac (FLECTOR) 1.3 % PT12, Apply 1 patch topically once daily, Disp: 30 patch, Rfl: 6    FERROUS SULFATE (FEOSOL ORAL), Take 1 tablet by mouth every other day. , Disp: , Rfl:     Current Facility-Administered Medications:     methylPREDNISolone sodium succinate injection 125 mg, 125 mg, Intravenous, Q6H, Rubi Franklin NP, 125 mg at 10/30/18 1525    onabotulinumtoxina injection 200 Units, 200 Units, Intramuscular, Q90 Days, Lydia Martin MD    onabotulinumtoxina injection 200 Units, 200 Units, Intramuscular, Q90 Days, Lydia Martin MD, 200 Units at 03/15/18 1100     onabotulinumtoxina injection 200 Units, 200 Units, Intramuscular, Q90 Days, Lydia Martin MD, 200 Units at 18 1406    onabotulinumtoxina injection 200 Units, 200 Units, Intramuscular, Q90 Days, Lydia Martin MD, 200 Units at 18 1518    Family History: family history was reviewed and is noncontributory  Social History:   Social History     Socioeconomic History    Marital status:      Spouse name: Not on file    Number of children: Not on file    Years of education: Not on file    Highest education level: Not on file   Social Needs    Financial resource strain: Not on file    Food insecurity - worry: Not on file    Food insecurity - inability: Not on file    Transportation needs - medical: Not on file    Transportation needs - non-medical: Not on file   Occupational History    Not on file   Tobacco Use    Smoking status: Former Smoker     Packs/day: 0.50     Years: 5.00     Pack years: 2.50     Last attempt to quit: 2002     Years since quittin.5    Smokeless tobacco: Never Used   Substance and Sexual Activity    Alcohol use: Yes     Comment: glass of wine of day    Drug use: No    Sexual activity: Yes     Partners: Male     Birth control/protection: IUD   Other Topics Concern    Patient feels they ought to cut down on drinking/drug use Not Asked    Patient annoyed by others criticizing their drinking/drug use Not Asked    Patient has felt bad or guilty about drinking/drug use Not Asked    Patient has had a drink/used drugs as an eye opener in the AM Not Asked    Are you pregnant or think you may be? Not Asked    Breast-feeding Not Asked   Social History Narrative    Not on file       ROS:  Constitution: Negative for chills, fever, and sweats. Negative for unexplained weight loss.  HENT: Negative for headaches and blurry vision.   Cardiovascular: Negative for chest pain, irregular heartbeat, leg swelling and palpitations.   Respiratory: Negative for  "cough and shortness of breath.   Gastrointestinal: Negative for abdominal pain, heartburn, nausea and vomiting.   Genitourinary: Negative for bladder incontinence and dysuria.   Musculoskeletal: Negative for systemic arthritis, joint swelling, muscle weakness and myalgias.   Neurological: Negative for numbness.   Psychiatric/Behavioral: Negative for depression.   Endocrine: Negative for polyuria.   Hematologic/Lymphatic: Negative for bleeding disorders.  Skin: Negative for poor wound healing.       PHYSICAL EXAM:  Ht 5' 7" (1.702 m)   Wt 86.2 kg (190 lb)   BMI 29.76 kg/m²   Concepcion De La Vega is a well developed, well nourished female in no acute distress. Physical examination of the left shoulder evaluated the following:    Inspection, palpation and ROM of the cervical spine  Disc compression testing bilaterally  Inspection for swelling, ecchymosis, erythema, deformity and atrophy  Tenderness to palpation of the soft tissue and bony structures  Active and passive range of motion  Sensation of the shoulder and upper extremity  Motor strength in the deltoid, supraspinatus, internal rotators and external rotators  Impingement, apprehension, relocation and Speed's tests  Upper extremity vascular exam (skin temp,color, capillary refill)  Inspection for pseudomotor signs    Remarkable findings included:  No edema or ecchymosis  Tender to palpation about the acromion and lateral shoulder  ROM full with pain beyond 90 of flexion  Sensation intact  Skin warm, dry, intact    IMAGING:   X-ray obtained Left shoulder performed today personally reviewed with patient. Radiologist report as follows:        ASSESSMENT:   Left shoulder tendonitis    PLAN:  The nature of the diagnosis, using models and diagrams when appropriate, was explained to the patient in detail. Treatment option discussed included non-operative measures of rest, modification of activities, application of ice, over the counter pain/antiinflammatory relief, physical " therapy, cortisone injection, or medrol dosepack.  More aggressive treatment options include MRI and referral to orthopedic surgeon for consult.   All questions answered and the patient wishes to proceed today with rest, ice, flector patches refill and observation.  She will call if no improvement or worsening of symptoms.

## 2019-01-23 ENCOUNTER — PROCEDURE VISIT (OUTPATIENT)
Dept: NEUROLOGY | Facility: CLINIC | Age: 49
End: 2019-01-23
Payer: COMMERCIAL

## 2019-01-23 VITALS
BODY MASS INDEX: 29.82 KG/M2 | DIASTOLIC BLOOD PRESSURE: 85 MMHG | WEIGHT: 190 LBS | RESPIRATION RATE: 16 BRPM | HEIGHT: 67 IN | SYSTOLIC BLOOD PRESSURE: 119 MMHG | HEART RATE: 84 BPM

## 2019-01-23 DIAGNOSIS — G43.719 INTRACTABLE CHRONIC MIGRAINE WITHOUT AURA AND WITHOUT STATUS MIGRAINOSUS: Primary | ICD-10-CM

## 2019-01-23 PROCEDURE — 64615 PR CHEMODENERVATION OF MUSCLE FOR CHRONIC MIGRAINE: ICD-10-PCS | Mod: S$GLB,,, | Performed by: PSYCHIATRY & NEUROLOGY

## 2019-01-23 PROCEDURE — 64615 CHEMODENERV MUSC MIGRAINE: CPT | Mod: S$GLB,,, | Performed by: PSYCHIATRY & NEUROLOGY

## 2019-01-23 NOTE — PROCEDURES
Procedures     BOTOX PROCEDURE    PROCEDURE PERFORMED: Botulinum toxin injection (55851)    CLINICAL INDICATION: G43.719 NDC: 7691-3997-29    A time out was conducted just before the start of the procedure to verify the correct patient and procedure, procedure location, and all relevant critical information.   The Botox injections have achieved well over 50%  improvement in the patient's symptoms. Migraines have been reduced at least 7 days per month and the number of cumulative hours suffering with headaches has been reduced at least 100 total hours per month. Today she does have a headache indicating that the Botox has worn off. Frequency of treatment is every 3 months unless no response to the treatments, at which time we will discontinue the injections.      DESCRIPTION OF PROCEDURE: After obtaining informed consent and under aseptic technique, a total of 155 units of botulinum toxin type A were   injected in the following muscles: Procerus 5 units,  5 units bilaterally, frontalis 20 units, temporalis 20 units bilaterally, occipitalis 15 units, upper cervical paraspinals 10 units bilaterally and trapezius 15 units bilaterally. The patient was given a total of 155 units in 31 sites.The patient tolerated the procedure well. There were no complications. The patient was given a prescription for repeat treatment in 3 months.     Unavoidable waste 45 units          Joy Martin M.D  Medical Director, Headache and Facial Pain  [facility]

## 2019-03-18 ENCOUNTER — TELEPHONE (OUTPATIENT)
Dept: FAMILY MEDICINE | Facility: CLINIC | Age: 49
End: 2019-03-18

## 2019-03-18 ENCOUNTER — PATIENT MESSAGE (OUTPATIENT)
Dept: PRIMARY CARE CLINIC | Facility: CLINIC | Age: 49
End: 2019-03-18

## 2019-03-18 ENCOUNTER — CLINICAL SUPPORT (OUTPATIENT)
Dept: FAMILY MEDICINE | Facility: CLINIC | Age: 49
End: 2019-03-18
Payer: COMMERCIAL

## 2019-03-18 DIAGNOSIS — R68.89 FLU-LIKE SYMPTOMS: Primary | ICD-10-CM

## 2019-03-18 DIAGNOSIS — R68.89 FLU-LIKE SYMPTOMS: ICD-10-CM

## 2019-03-18 LAB
INFLUENZA A, MOLECULAR: NEGATIVE
INFLUENZA B, MOLECULAR: NEGATIVE
SPECIMEN SOURCE: NORMAL

## 2019-03-18 PROCEDURE — 87502 INFLUENZA DNA AMP PROBE: CPT | Mod: PO

## 2019-03-18 PROCEDURE — 99999 PR PBB SHADOW E&M-EST. PATIENT-LVL I: CPT | Mod: PBBFAC,,,

## 2019-03-18 PROCEDURE — 99999 PR PBB SHADOW E&M-EST. PATIENT-LVL I: ICD-10-PCS | Mod: PBBFAC,,,

## 2019-03-26 ENCOUNTER — TELEPHONE (OUTPATIENT)
Dept: ALLERGY | Facility: CLINIC | Age: 49
End: 2019-03-26

## 2019-03-26 RX ORDER — MONTELUKAST SODIUM 10 MG/1
10 TABLET ORAL
Qty: 30 TABLET | Refills: 11 | OUTPATIENT
Start: 2019-03-26 | End: 2020-03-25

## 2019-03-26 NOTE — TELEPHONE ENCOUNTER
Patient needs to be seen for further refills   singulair 10 mg has been denied per Dr. Hutchinson, follow up needed. Tried calling patient, no answer, not able to leave voicemail-voicemail full.

## 2019-04-11 ENCOUNTER — PATIENT MESSAGE (OUTPATIENT)
Dept: ALLERGY | Facility: CLINIC | Age: 49
End: 2019-04-11

## 2019-04-11 RX ORDER — MONTELUKAST SODIUM 10 MG/1
10 TABLET ORAL NIGHTLY
Qty: 30 TABLET | Refills: 0 | Status: SHIPPED | OUTPATIENT
Start: 2019-04-11 | End: 2019-05-08 | Stop reason: SDUPTHER

## 2019-05-07 DIAGNOSIS — Z12.11 COLON CANCER SCREENING: ICD-10-CM

## 2019-05-08 ENCOUNTER — OFFICE VISIT (OUTPATIENT)
Dept: ALLERGY | Facility: CLINIC | Age: 49
End: 2019-05-08
Payer: COMMERCIAL

## 2019-05-08 VITALS — BODY MASS INDEX: 29.52 KG/M2 | WEIGHT: 194.81 LBS | HEART RATE: 88 BPM | OXYGEN SATURATION: 99 % | HEIGHT: 68 IN

## 2019-05-08 DIAGNOSIS — H10.13 ALLERGIC CONJUNCTIVITIS, BILATERAL: ICD-10-CM

## 2019-05-08 DIAGNOSIS — L25.9 CONTACT DERMATITIS, UNSPECIFIED CONTACT DERMATITIS TYPE, UNSPECIFIED TRIGGER: ICD-10-CM

## 2019-05-08 DIAGNOSIS — J30.9 CHRONIC ALLERGIC RHINITIS: Primary | ICD-10-CM

## 2019-05-08 DIAGNOSIS — J45.20 MILD INTERMITTENT ASTHMA WITHOUT COMPLICATION: ICD-10-CM

## 2019-05-08 DIAGNOSIS — R09.89 RUNNY NOSE: ICD-10-CM

## 2019-05-08 PROCEDURE — 3008F BODY MASS INDEX DOCD: CPT | Mod: CPTII,S$GLB,, | Performed by: ALLERGY & IMMUNOLOGY

## 2019-05-08 PROCEDURE — 99214 OFFICE O/P EST MOD 30 MIN: CPT | Mod: S$GLB,,, | Performed by: ALLERGY & IMMUNOLOGY

## 2019-05-08 PROCEDURE — 99999 PR PBB SHADOW E&M-EST. PATIENT-LVL III: CPT | Mod: PBBFAC,,, | Performed by: ALLERGY & IMMUNOLOGY

## 2019-05-08 PROCEDURE — 99214 PR OFFICE/OUTPT VISIT, EST, LEVL IV, 30-39 MIN: ICD-10-PCS | Mod: S$GLB,,, | Performed by: ALLERGY & IMMUNOLOGY

## 2019-05-08 PROCEDURE — 99999 PR PBB SHADOW E&M-EST. PATIENT-LVL III: ICD-10-PCS | Mod: PBBFAC,,, | Performed by: ALLERGY & IMMUNOLOGY

## 2019-05-08 PROCEDURE — 3008F PR BODY MASS INDEX (BMI) DOCUMENTED: ICD-10-PCS | Mod: CPTII,S$GLB,, | Performed by: ALLERGY & IMMUNOLOGY

## 2019-05-08 RX ORDER — AZELASTINE 1 MG/ML
1 SPRAY, METERED NASAL 2 TIMES DAILY
Qty: 30 ML | Refills: 11 | Status: SHIPPED | OUTPATIENT
Start: 2019-05-08 | End: 2020-02-18

## 2019-05-08 RX ORDER — CHOLECALCIFEROL (VITAMIN D3) 25 MCG
1000 TABLET ORAL DAILY
COMMUNITY
End: 2022-01-19

## 2019-05-08 RX ORDER — MONTELUKAST SODIUM 10 MG/1
10 TABLET ORAL NIGHTLY
Qty: 90 TABLET | Refills: 4 | Status: SHIPPED | OUTPATIENT
Start: 2019-05-08 | End: 2020-02-28 | Stop reason: SDUPTHER

## 2019-05-08 RX ORDER — ALBUTEROL SULFATE 90 UG/1
2 AEROSOL, METERED RESPIRATORY (INHALATION) EVERY 6 HOURS PRN
Qty: 18 G | Refills: 3 | Status: SHIPPED | OUTPATIENT
Start: 2019-05-08 | End: 2022-07-21 | Stop reason: SDUPTHER

## 2019-05-08 NOTE — PROGRESS NOTES
Subjective:       Patient ID: Concepcion De La Vega is a 49 y.o. female.    Chief Complaint:  Annual Exam (refills, medication questions)      50 yo woman presents for continued evaluation of allergic rhinitis and conjunctivitis with mild asthma. She was last seen 7/24/17.  She has h/o allergic rhinitis, skin test in 2006 +to cat, dog, cockroach, dust mites, trees, weeds and grass. She did do allergy shots for awhile form [id number] and did help but stopped because of inconvenience. she is on montelukast daily which does help. Uses albuterol prior to exercising outside, rarely needs otherwise. Has azelastine and uses prn for congestion or drip feels well controlled on this. She does also get rash with blisters from waxing hr eyebrows, elastic waists, some blood pressure cuffs so thinks is a component in rubber. No changes to medical history.     Prior History taken 7/24/17: new patient evaluation of asthma flare. She has h/o allergic rhinitis, skin test in 2006 +to cat, dog, cockroach, dust mites, trees, weeds and grass. She did do allergy shots for awhile form [id number] and did help but stopped because of inconvenience. She take Flonase and zyrtec bush and that controls her fairly well. She has albuterol but normally uses rarely. July 10 she went to [place]. On way there she started with cough, hacking and wheeze. While there got much worse with cough with white mucus, tight, wheeze and SOB. She had 2 20 mg prednisone from poison ivy flare and she took one a day for 2 days and did help a lot. She has albuterol inhaler and using daily and now QOD. She is better but just cant clear completely. She had some PND but no congestion or runny nose, no sneeze or itchy eyes now. She has no porfirio awakening from asthma but has some BEDOLLA. For few months she has been taking her dog to agility class outside on sundays and she is SOB there. She has never been on Singulair. Never on ICS. Rarely ever has flares like this. She has no insect,  latex or food allergy. No eczema.       Environmental History: see history section for home environment  Review of Systems   Constitutional: Positive for fatigue. Negative for appetite change, chills and fever.   HENT: Positive for postnasal drip. Negative for congestion, ear discharge, ear pain, facial swelling, nosebleeds, rhinorrhea, sinus pressure, sneezing, sore throat, trouble swallowing and voice change.    Eyes: Negative for discharge, redness, itching and visual disturbance.   Respiratory: Positive for cough, chest tightness, shortness of breath and wheezing. Negative for choking.    Cardiovascular: Negative for chest pain, palpitations and leg swelling.   Gastrointestinal: Negative for abdominal distention, abdominal pain, constipation, diarrhea, nausea and vomiting.   Genitourinary: Negative for difficulty urinating.   Musculoskeletal: Negative for arthralgias, gait problem, joint swelling and myalgias.   Skin: Negative for color change and rash.   Neurological: Negative for dizziness, syncope, weakness, light-headedness and headaches.   Hematological: Negative for adenopathy. Does not bruise/bleed easily.   Psychiatric/Behavioral: Negative for agitation, behavioral problems, confusion and sleep disturbance. The patient is not nervous/anxious.         Objective:    Physical Exam   Constitutional: She is oriented to person, place, and time. She appears well-developed and well-nourished. No distress.   HENT:   Head: Normocephalic and atraumatic.   Right Ear: Hearing, tympanic membrane, external ear and ear canal normal.   Left Ear: Hearing, tympanic membrane, external ear and ear canal normal.   Nose: No mucosal edema, rhinorrhea, sinus tenderness or septal deviation. No epistaxis. Right sinus exhibits no maxillary sinus tenderness and no frontal sinus tenderness. Left sinus exhibits no maxillary sinus tenderness and no frontal sinus tenderness.   Mouth/Throat: Uvula is midline, oropharynx is clear and  moist and mucous membranes are normal. No uvula swelling.   Eyes: Conjunctivae are normal. Right eye exhibits no discharge. Left eye exhibits no discharge.   Neck: Normal range of motion. No thyromegaly present.   Cardiovascular: Normal rate, regular rhythm and normal heart sounds.   No murmur heard.  Pulmonary/Chest: Effort normal and breath sounds normal. No respiratory distress. She has no wheezes.   Abdominal: Soft. She exhibits no distension. There is no tenderness.   Musculoskeletal: Normal range of motion. She exhibits no edema or tenderness.   Lymphadenopathy:     She has no cervical adenopathy.   Neurological: She is alert and oriented to person, place, and time.   Skin: Skin is warm and dry. No rash noted. No erythema.   Psychiatric: She has a normal mood and affect. Her behavior is normal. Judgment and thought content normal.   Nursing note and vitals reviewed.      Laboratory:   none performed   Assessment:       1. Chronic allergic rhinitis    2. Allergic conjunctivitis, bilateral    3. Mild intermittent asthma without complication         Plan:       1. Pt with h/o mild asthma with exercise. Advised to use albuterol 2 puffs prior to exertion and every 4 ours as needed  2. continue montelukast 10 mg daily and azelastine 2 SEN BID as needed  3. RTC 6 months or sooner if needed

## 2019-05-14 ENCOUNTER — TELEPHONE (OUTPATIENT)
Dept: FAMILY MEDICINE | Facility: CLINIC | Age: 49
End: 2019-05-14

## 2019-05-14 DIAGNOSIS — S01.502A TONGUE WOUND, INITIAL ENCOUNTER: Primary | ICD-10-CM

## 2019-05-14 DIAGNOSIS — H92.01 OTALGIA OF RIGHT EAR: ICD-10-CM

## 2019-05-14 RX ORDER — AMOXICILLIN 500 MG/1
500 CAPSULE ORAL EVERY 12 HOURS
Qty: 6 CAPSULE | Refills: 0 | Status: SHIPPED | OUTPATIENT
Start: 2019-05-14 | End: 2019-05-17

## 2019-05-14 NOTE — TELEPHONE ENCOUNTER
Pt c/o right ear pain and fullness.  States last week she started with pain in the ear and fullness.  She also bit her tongue and it was red and swollen around the same time.  Had some Amoxicillin at home 500 tid and has taken 12 doses.  Her pain has improved but not resolved.  R ear still feels full.    TMs:  Left pearly gray with + cone of light.  Right with diffuse LR, small, clear effusion.  Tongue wound healed.  Pharynx not injected.  Left submax node palp, tender.    Extend Amox 3 more days for a full week of treatment.  Explained exam findings, diagnosis and treatment plan to patient.  Questions answered and patient states understanding.

## 2019-07-02 ENCOUNTER — LAB VISIT (OUTPATIENT)
Dept: LAB | Facility: HOSPITAL | Age: 49
End: 2019-07-02
Attending: EMERGENCY MEDICINE
Payer: COMMERCIAL

## 2019-07-02 DIAGNOSIS — Z12.11 COLON CANCER SCREENING: ICD-10-CM

## 2019-07-02 PROCEDURE — 82274 ASSAY TEST FOR BLOOD FECAL: CPT

## 2019-07-03 ENCOUNTER — OFFICE VISIT (OUTPATIENT)
Dept: OTOLARYNGOLOGY | Facility: CLINIC | Age: 49
End: 2019-07-03
Payer: COMMERCIAL

## 2019-07-03 VITALS — WEIGHT: 192.69 LBS | BODY MASS INDEX: 29.2 KG/M2 | HEIGHT: 68 IN

## 2019-07-03 DIAGNOSIS — H92.01 OTALGIA, RIGHT: ICD-10-CM

## 2019-07-03 DIAGNOSIS — R09.A2 GLOBUS SENSATION: Primary | ICD-10-CM

## 2019-07-03 DIAGNOSIS — J35.8 TONSILLITH: ICD-10-CM

## 2019-07-03 PROCEDURE — 99999 PR PBB SHADOW E&M-EST. PATIENT-LVL III: ICD-10-PCS | Mod: PBBFAC,,, | Performed by: OTOLARYNGOLOGY

## 2019-07-03 PROCEDURE — 31575 PR LARYNGOSCOPY, FLEXIBLE; DIAGNOSTIC: ICD-10-PCS | Mod: S$GLB,,, | Performed by: OTOLARYNGOLOGY

## 2019-07-03 PROCEDURE — 99203 OFFICE O/P NEW LOW 30 MIN: CPT | Mod: 25,S$GLB,, | Performed by: OTOLARYNGOLOGY

## 2019-07-03 PROCEDURE — 3008F PR BODY MASS INDEX (BMI) DOCUMENTED: ICD-10-PCS | Mod: CPTII,S$GLB,, | Performed by: OTOLARYNGOLOGY

## 2019-07-03 PROCEDURE — 3008F BODY MASS INDEX DOCD: CPT | Mod: CPTII,S$GLB,, | Performed by: OTOLARYNGOLOGY

## 2019-07-03 PROCEDURE — 31575 DIAGNOSTIC LARYNGOSCOPY: CPT | Mod: S$GLB,,, | Performed by: OTOLARYNGOLOGY

## 2019-07-03 PROCEDURE — 99999 PR PBB SHADOW E&M-EST. PATIENT-LVL III: CPT | Mod: PBBFAC,,, | Performed by: OTOLARYNGOLOGY

## 2019-07-03 PROCEDURE — 99203 PR OFFICE/OUTPT VISIT, NEW, LEVL III, 30-44 MIN: ICD-10-PCS | Mod: 25,S$GLB,, | Performed by: OTOLARYNGOLOGY

## 2019-07-03 NOTE — PROGRESS NOTES
Subjective:       Patient ID: Concepcion De La Vega is a 49 y.o. female.    Chief Complaint: bad taste in mouth and Sore Throat    Concepcion is here for sore throat.   Length of symptoms: 1-1.5 months, has been stable over this time. She feels it began May 10th, feels constant globus on the right side in the tonsil region / nasopharynx. Feels a persistent globus in this region, sticking sensation. She has mild occasional dysphagia in oropharynx. No voice changes. Occ R otalgia. Does have suspected TMJ issues  She has a history of ?PLF and had oval window repair age 16.   Therapies tried include: Amoxicillin   Previous allergy testing skin test in  +to cat, dog, cockroach, dust mites, trees, weeds and grass.      Pertinent meds: Singulair, Astelin  Pertinent medical issues: [place], mild asthma    Social History     Tobacco Use   Smoking Status Former Smoker    Packs/day: 0.50    Years: 5.00    Pack years: 2.50    Last attempt to quit: 2002    Years since quittin.9   Smokeless Tobacco Never Used     Social History     Substance and Sexual Activity   Alcohol Use Yes    Comment: glass of wine of day          Review of Systems   Constitutional: Negative for activity change and appetite change.   Eyes: Negative for discharge.   Respiratory: Negative for difficulty breathing and wheezing   Cardiovascular: Negative for chest pain.   Gastrointestinal: Negative for abdominal distention and abdominal pain.   Endocrine: Negative for cold intolerance and heat intolerance.   Genitourinary: Negative for dysuria.   Musculoskeletal: Negative for gait problem and joint swelling.   Skin: Negative for color change and pallor.   Neurological: Negative for syncope and weakness.   Psychiatric/Behavioral: Negative for agitation and confusion.     Objective:        Constitutional:   She is oriented to person, place, and time. She appears well-developed and well-nourished. She appears alert. She is active. Normal speech.       Head:  Normocephalic and atraumatic. Head is without TMJ tenderness. No scars. Salivary glands normal.  Facial strength is normal.      Ears:    Right Ear: No drainage or swelling. No middle ear effusion.   Left Ear: No drainage or swelling.  No middle ear effusion.     Nose:  No mucosal edema, rhinorrhea or sinus tenderness. No turbinate hypertrophy.      Mouth/Throat  Oropharynx clear and moist without lesions or asymmetry, normal uvula midline and mirror exam normal. Normal dentition. No uvula swelling, lacerations or trismus. No oropharyngeal exudate. Tonsils present, +1.  Tonsillar erythema, tonsillar exudate.    Mild palpable tonsillith vs. Small cyst superior pole on the right. Unable to express  ? Corresponds to symptoms  Due to patient's concern of symptoms in the superior pharynx, posterior nasal cavity, nasal endoscopy and laryngoscopy was indicated      Neck:  Full range of motion with neck supple and no adenopathy. Thyroid tenderness is present. No tracheal deviation, no edema, no erythema, normal range of motion, no stridor, no crepitus and no neck rigidity present. No thyroid mass present.     Cardiovascular:   Intact distal pulses and normal pulses.      Pulmonary/Chest:   Effort normal and breath sounds normal. No stridor.     Psychiatric:   Her speech is normal and behavior is normal. Her mood appears not anxious. Her affect is not labile.     Neurological:   She is alert and oriented to person, place, and time. No sensory deficit.     Skin:   No abrasions, lacerations, lesions, or rashes. No abrasion and no bruising noted.         Tests / Results:  Pre-procedure diagnosis: The primary encounter diagnosis was Globus sensation. Diagnoses of Tonsillith and Otalgia, right were also pertinent to this visit.     Post-procedure diagnosis: same    Procedure: Flexible fiberoptic laryngoscopy    Surgeon: Hong Dunbar MD    Anesthesia: 2% Lidocaine with Phenylephrine topical    Risks, benefits, and  alternatives of the procedure were discussed with the patient, and the patient consented to the fiberoptic examination.  We applied a topical nasal decongestant and analgesic.  After adequate anesthesia was obtained, the flexible fiberoptic scope was passed through the left and right. The entire pharynx (nasopharynx to hypopharynx) and the larynx were visualized. At the end of the examination, the scope was removed. The patient tolerated the procedure well with no complications.     Findings:  -     Laryngeal mucosa is normal  -     Post-cricoid region: normal  -     Lingual tonsils have moderate hypertrophy with a small tonsillar embedded in the right base of tongue near the glossotonsillar sulcus - removed atraumatically with the angled curette under visualization  -     Adenoids have no  hypertrophy  -     Right vocal fold: normal mobility     mass/lesion: none  -     Left vocal fold: normal mobility     mass/lesion: none  -     Other findings: Mildly thickened mucus in the posterior nasal cavity and nasopharynx      Assessment:       1. Globus sensation    2. Tonsillith    3. Otalgia, right          Plan:       Small tonsillith removed today which could possibly be related to symptoms.  Recommend conservative care in give some time. ? Small cyst or stone in the superior tonsil pole but this is soft and not concerning.   If persistent symptoms, can consider imaging to the area.  Reassured that overall endoscopy is unremarkable otherwise.

## 2019-07-05 LAB — HEMOCCULT STL QL IA: NEGATIVE

## 2019-08-06 ENCOUNTER — PATIENT MESSAGE (OUTPATIENT)
Dept: DERMATOLOGY | Facility: CLINIC | Age: 49
End: 2019-08-06

## 2019-08-13 ENCOUNTER — PATIENT MESSAGE (OUTPATIENT)
Dept: NEUROLOGY | Facility: CLINIC | Age: 49
End: 2019-08-13

## 2019-08-15 ENCOUNTER — TELEPHONE (OUTPATIENT)
Dept: PHARMACY | Facility: CLINIC | Age: 49
End: 2019-08-15

## 2019-08-15 ENCOUNTER — PROCEDURE VISIT (OUTPATIENT)
Dept: NEUROLOGY | Facility: CLINIC | Age: 49
End: 2019-08-15
Payer: COMMERCIAL

## 2019-08-15 VITALS
DIASTOLIC BLOOD PRESSURE: 82 MMHG | HEART RATE: 73 BPM | HEIGHT: 68 IN | SYSTOLIC BLOOD PRESSURE: 128 MMHG | BODY MASS INDEX: 30.04 KG/M2 | RESPIRATION RATE: 16 BRPM | WEIGHT: 198.19 LBS

## 2019-08-15 DIAGNOSIS — G43.719 INTRACTABLE CHRONIC MIGRAINE WITHOUT AURA AND WITHOUT STATUS MIGRAINOSUS: Primary | ICD-10-CM

## 2019-08-15 PROCEDURE — 64615 CHEMODENERV MUSC MIGRAINE: CPT | Mod: S$GLB,,, | Performed by: PSYCHIATRY & NEUROLOGY

## 2019-08-15 PROCEDURE — 64615 PR CHEMODENERVATION OF MUSCLE FOR CHRONIC MIGRAINE: ICD-10-PCS | Mod: S$GLB,,, | Performed by: PSYCHIATRY & NEUROLOGY

## 2019-08-15 NOTE — PROCEDURES
Procedures       BOTOX PROCEDURE    PROCEDURE PERFORMED: Botulinum toxin injection (88964)    CLINICAL INDICATION: G43.719 NDC: 1224-2119-20    A time out was conducted just before the start of the procedure to verify the correct patient and procedure, procedure location, and all relevant critical information.   The Botox injections have achieved well over 50%  improvement in the patient's symptoms. Migraines have been reduced at least 7 days per month and the number of cumulative hours suffering with headaches has been reduced at least 100 total hours per month. Today she does have a headache indicating that the Botox has worn off. Frequency of treatment is every 3 months unless no response to the treatments, at which time we will discontinue the injections.      DESCRIPTION OF PROCEDURE: After obtaining informed consent and under aseptic technique, a total of 155 units of botulinum toxin type A were injected in the following muscles: Procerus 5 units,  5 units bilaterally, frontalis 20 units, temporalis 20 units bilaterally, occipitalis 15 units, upper cervical paraspinals 10 units bilaterally and trapezius 15 units bilaterally. The patient was given a total of 155 units in 31 sites.The patient tolerated the procedure well. There were no complications. The patient was given a prescription for repeat treatment in 3 months.     Unavoidable waste 45 units          Joy Martin M.D  Medical Director, Headache and Facial Pain  [facility]

## 2019-08-19 ENCOUNTER — TELEPHONE (OUTPATIENT)
Dept: NEUROLOGY | Facility: CLINIC | Age: 49
End: 2019-08-19

## 2019-08-19 NOTE — TELEPHONE ENCOUNTER
----- Message from Modesto Truong PharmD sent at 8/19/2019  1:00 PM CDT -----  Regarding: Ajovy plan exclusion - Emgality or Aimovig?  Good afternoon,    Mrs. De La Vega's insurance requires the patient to try and fail both Aimovig and Emgality before they would cover Ajovy. Would it be possible (and appropriate) to send a prescription for one of these alternatives? Thanks very much.     Regards,    Modesto Truong, PharmD  Clinical Pharmacist  [facility]  P: [phone]

## 2019-08-21 ENCOUNTER — TELEPHONE (OUTPATIENT)
Dept: PHARMACY | Facility: CLINIC | Age: 49
End: 2019-08-21

## 2019-08-21 NOTE — TELEPHONE ENCOUNTER
LVM for callback to inform patient that [facility] received prescription for Emgality and prior authorization is required.  [facility] will be back in touch once insurance determination is received.

## 2019-08-27 NOTE — TELEPHONE ENCOUNTER
Submitted prior authorization for EMGALITY 120 mg/ml to insurance company for review on 8/27/2019 2:15 pm [facility]

## 2019-08-30 NOTE — TELEPHONE ENCOUNTER
DOCUMENTATION ONLY:   Prior authorization for EMGALITY 120 MG/ML approved from 8/27/2019 to 2/18/2020.    Case ID# [id number]      Co-pay: $25- with EMGAL[employer] E-voucher    Patient Assistance IS NOT required.     Forward to clinical pharmacist for consult & shipment. FLC

## 2019-10-08 ENCOUNTER — PATIENT MESSAGE (OUTPATIENT)
Dept: OTOLARYNGOLOGY | Facility: CLINIC | Age: 49
End: 2019-10-08

## 2019-10-09 ENCOUNTER — IMMUNIZATION (OUTPATIENT)
Dept: PHARMACY | Facility: CLINIC | Age: 49
End: 2019-10-09
Payer: COMMERCIAL

## 2019-10-11 ENCOUNTER — OFFICE VISIT (OUTPATIENT)
Dept: OTOLARYNGOLOGY | Facility: CLINIC | Age: 49
End: 2019-10-11
Payer: COMMERCIAL

## 2019-10-11 VITALS
HEART RATE: 82 BPM | RESPIRATION RATE: 20 BRPM | DIASTOLIC BLOOD PRESSURE: 81 MMHG | WEIGHT: 198 LBS | BODY MASS INDEX: 30.01 KG/M2 | SYSTOLIC BLOOD PRESSURE: 125 MMHG | HEIGHT: 68 IN

## 2019-10-11 DIAGNOSIS — R09.A2 GLOBUS SENSATION: Primary | ICD-10-CM

## 2019-10-11 PROCEDURE — 99213 PR OFFICE/OUTPT VISIT, EST, LEVL III, 20-29 MIN: ICD-10-PCS | Mod: S$GLB,,, | Performed by: OTOLARYNGOLOGY

## 2019-10-11 PROCEDURE — 3008F PR BODY MASS INDEX (BMI) DOCUMENTED: ICD-10-PCS | Mod: CPTII,S$GLB,, | Performed by: OTOLARYNGOLOGY

## 2019-10-11 PROCEDURE — 99999 PR PBB SHADOW E&M-EST. PATIENT-LVL III: CPT | Mod: PBBFAC,,, | Performed by: OTOLARYNGOLOGY

## 2019-10-11 PROCEDURE — 99213 OFFICE O/P EST LOW 20 MIN: CPT | Mod: S$GLB,,, | Performed by: OTOLARYNGOLOGY

## 2019-10-11 PROCEDURE — 3008F BODY MASS INDEX DOCD: CPT | Mod: CPTII,S$GLB,, | Performed by: OTOLARYNGOLOGY

## 2019-10-11 PROCEDURE — 99999 PR PBB SHADOW E&M-EST. PATIENT-LVL III: ICD-10-PCS | Mod: PBBFAC,,, | Performed by: OTOLARYNGOLOGY

## 2019-10-11 NOTE — PROGRESS NOTES
Subjective:       Patient ID: Concepcion De La Vega is a 49 y.o. female.    Chief Complaint: Other Misc (tonsil knot)      Concepcion is here for follow-up: sore throat not much better. No otalgia, no other new concerns.  Some days are better than others.  She always has the feeling in the right throat, but occasionally will not notice it much.     HPI 7/2019:  Concepcion is here for sore throat.   Length of symptoms: 1-1.5 months, has been stable over this time. She feels it began May 10th, feels constant globus on the right side in the tonsil region / nasopharynx. Feels a persistent globus in this region, sticking sensation. She has mild occasional dysphagia in oropharynx. No voice changes. Occ R otalgia. Does have suspected TMJ issues  She has a history of ?PLF and had oval window repair age 16.   Therapies tried include: Amoxicillin   Previous allergy testing skin test in 2006 +to cat, dog, cockroach, dust mites, trees, weeds and grass.      Pertinent meds: Singulair, Astelin  Pertinent medical issues: AR, mild asthma.    Review of Systems   Constitutional: Negative for activity change and appetite change.   Respiratory: Negative for difficulty breathing and wheezing   Cardiovascular: Negative for chest pain.      Objective:        Constitutional:   Vital signs are normal. She appears well-developed and well-nourished.     Head:  Normocephalic and atraumatic.     Ears:  Hearing normal to normal and whispered voice; external ear normal without scars, lesions, or masses; ear canal, tympanic membrane, and middle ear normal..     Nose:  Nose normal including turbinates, nasal mucosa, sinuses and nasal septum.     Mouth/Throat  Oropharynx clear and moist without lesions or asymmetry.   Moderate lingual tonsil hypertrophy  Slight improvement in the size of right superior tonsil pole cyst/tonsillar regrowth.  Mucosalization overlying. Area palpated corresponds to where patient feels symptoms.       Neck:  Neck normal without  thyromegaly masses, asymmetry, normal tracheal structure, crepitus, and tenderness.         Tests / Results:  None    Assessment:       1. Globus sensation          Plan:         Reassured the area likely contributing to her symptoms has benign appearance.  Likely represents benign tonsillar cyst or regrowth of tonsil tissue with normal mucosa overlying.  It does fluctuate up and down and is currently quiescent.  She will stop me on short notice when she feels it is swelling so we can confirm this is location.  She can consider imaging if continuing to grow in the future, but I reassured her that this is likely nothing to be worried about.  Can consider excision in the future if we confirm this is the area.  It

## 2019-10-14 ENCOUNTER — TELEPHONE (OUTPATIENT)
Dept: PHARMACY | Facility: CLINIC | Age: 49
End: 2019-10-14

## 2019-11-07 ENCOUNTER — PROCEDURE VISIT (OUTPATIENT)
Dept: NEUROLOGY | Facility: CLINIC | Age: 49
End: 2019-11-07
Payer: COMMERCIAL

## 2019-11-07 VITALS
WEIGHT: 197 LBS | RESPIRATION RATE: 16 BRPM | SYSTOLIC BLOOD PRESSURE: 132 MMHG | DIASTOLIC BLOOD PRESSURE: 84 MMHG | HEIGHT: 68 IN | HEART RATE: 81 BPM | BODY MASS INDEX: 29.86 KG/M2

## 2019-11-07 DIAGNOSIS — G43.719 INTRACTABLE CHRONIC MIGRAINE WITHOUT AURA AND WITHOUT STATUS MIGRAINOSUS: Primary | ICD-10-CM

## 2019-11-07 PROCEDURE — 64615 CHEMODENERV MUSC MIGRAINE: CPT | Mod: S$GLB,,, | Performed by: PSYCHIATRY & NEUROLOGY

## 2019-11-07 PROCEDURE — 64615 PR CHEMODENERVATION OF MUSCLE FOR CHRONIC MIGRAINE: ICD-10-PCS | Mod: S$GLB,,, | Performed by: PSYCHIATRY & NEUROLOGY

## 2019-11-07 NOTE — PROCEDURES
Procedures       BOTOX PROCEDURE    PROCEDURE PERFORMED: Botulinum toxin injection (01641)    CLINICAL INDICATION: G43.719 NDC: 4360-9679-52    A time out was conducted just before the start of the procedure to verify the correct patient and procedure, procedure location, and all relevant critical information.   The Botox injections have achieved well over 50%  improvement in the patient's symptoms. Migraines have been reduced at least 7 days per month and the number of cumulative hours suffering with headaches has been reduced at least 100 total hours per month. Today she does have a headache indicating that the Botox has worn off. Frequency of treatment is every 3 months unless no response to the treatments, at which time we will discontinue the injections.      DESCRIPTION OF PROCEDURE: After obtaining informed consent and under aseptic technique, a total of 155 units of botulinum toxin type A were injected in the following muscles: Procerus 5 units,  5 units bilaterally, frontalis 20 units, temporalis 20 units bilaterally, occipitalis 15 units, upper cervical paraspinals 10 units bilaterally and trapezius 15 units bilaterally. The patient was given a total of 155 units in 31 sites.The patient tolerated the procedure well. There were no complications. The patient was given a prescription for repeat treatment in 3 months.     Unavoidable waste 45 units          Joy Martin M.D  Medical Director, Headache and Facial Pain  [facility]

## 2019-11-19 ENCOUNTER — CLINICAL SUPPORT (OUTPATIENT)
Dept: UROLOGY | Facility: CLINIC | Age: 49
End: 2019-11-19
Payer: COMMERCIAL

## 2019-11-19 ENCOUNTER — PATIENT MESSAGE (OUTPATIENT)
Dept: NEUROLOGY | Facility: CLINIC | Age: 49
End: 2019-11-19

## 2019-11-19 DIAGNOSIS — N30.00 ACUTE CYSTITIS WITHOUT HEMATURIA: Primary | ICD-10-CM

## 2019-11-19 LAB
BILIRUB SERPL-MCNC: ABNORMAL MG/DL
BLOOD URINE, POC: ABNORMAL
COLOR, POC UA: ABNORMAL
GLUCOSE UR QL STRIP: ABNORMAL
KETONES UR QL STRIP: ABNORMAL
LEUKOCYTE ESTERASE URINE, POC: ABNORMAL
NITRITE, POC UA: ABNORMAL
PH, POC UA: 6
PROTEIN, POC: ABNORMAL
SPECIFIC GRAVITY, POC UA: 1.01
UROBILINOGEN, POC UA: ABNORMAL

## 2019-11-19 PROCEDURE — 81002 POCT URINE DIPSTICK WITHOUT MICROSCOPE: ICD-10-PCS | Mod: S$GLB,,, | Performed by: UROLOGY

## 2019-11-19 PROCEDURE — 81002 URINALYSIS NONAUTO W/O SCOPE: CPT | Mod: S$GLB,,, | Performed by: UROLOGY

## 2019-11-19 PROCEDURE — 87086 URINE CULTURE/COLONY COUNT: CPT

## 2019-11-19 RX ORDER — METHYLPREDNISOLONE 4 MG/1
TABLET ORAL
Qty: 1 PACKAGE | Refills: 0 | Status: SHIPPED | OUTPATIENT
Start: 2019-11-19 | End: 2019-12-10

## 2019-11-19 RX ORDER — CIPROFLOXACIN 500 MG/1
500 TABLET ORAL 2 TIMES DAILY
Qty: 20 TABLET | Refills: 0 | Status: SHIPPED | OUTPATIENT
Start: 2019-11-19 | End: 2019-11-29

## 2019-11-19 RX ORDER — FLUCONAZOLE 150 MG/1
150 TABLET ORAL DAILY
Qty: 3 TABLET | Refills: 0 | Status: SHIPPED | OUTPATIENT
Start: 2019-11-19 | End: 2019-11-22

## 2019-11-19 NOTE — PROGRESS NOTES
Urinalysis checked  Today and urine sent for culture. Pt to start antibiotics, diflucan, and uribel today. Will call with culture results.

## 2019-11-21 LAB — BACTERIA UR CULT: NO GROWTH

## 2019-12-03 ENCOUNTER — CLINICAL SUPPORT (OUTPATIENT)
Dept: UROLOGY | Facility: CLINIC | Age: 49
End: 2019-12-03
Payer: COMMERCIAL

## 2019-12-03 DIAGNOSIS — N30.00 ACUTE CYSTITIS WITHOUT HEMATURIA: Primary | ICD-10-CM

## 2019-12-03 LAB
BILIRUB SERPL-MCNC: ABNORMAL MG/DL
BLOOD URINE, POC: ABNORMAL
COLOR, POC UA: YELLOW
GLUCOSE UR QL STRIP: ABNORMAL
KETONES UR QL STRIP: ABNORMAL
LEUKOCYTE ESTERASE URINE, POC: ABNORMAL
NITRITE, POC UA: ABNORMAL
PH, POC UA: 6.5
PROTEIN, POC: ABNORMAL
SPECIFIC GRAVITY, POC UA: 1.02
UROBILINOGEN, POC UA: ABNORMAL

## 2019-12-03 PROCEDURE — 81002 URINALYSIS NONAUTO W/O SCOPE: CPT | Mod: S$GLB,,, | Performed by: UROLOGY

## 2019-12-03 PROCEDURE — 87086 URINE CULTURE/COLONY COUNT: CPT

## 2019-12-03 PROCEDURE — 81002 POCT URINE DIPSTICK WITHOUT MICROSCOPE: ICD-10-PCS | Mod: S$GLB,,, | Performed by: UROLOGY

## 2019-12-05 LAB — BACTERIA UR CULT: NORMAL

## 2019-12-29 ENCOUNTER — OFFICE VISIT (OUTPATIENT)
Dept: URGENT CARE | Facility: CLINIC | Age: 49
End: 2019-12-29
Payer: COMMERCIAL

## 2019-12-29 VITALS
WEIGHT: 199.81 LBS | OXYGEN SATURATION: 98 % | HEART RATE: 73 BPM | DIASTOLIC BLOOD PRESSURE: 84 MMHG | SYSTOLIC BLOOD PRESSURE: 124 MMHG | HEIGHT: 67 IN | TEMPERATURE: 98 F | BODY MASS INDEX: 31.36 KG/M2

## 2019-12-29 DIAGNOSIS — R07.81 RIB PAIN ON LEFT SIDE: Primary | ICD-10-CM

## 2019-12-29 PROCEDURE — 99214 PR OFFICE/OUTPT VISIT, EST, LEVL IV, 30-39 MIN: ICD-10-PCS | Mod: S$GLB,,, | Performed by: PHYSICIAN ASSISTANT

## 2019-12-29 PROCEDURE — 71100 X-RAY EXAM RIBS UNI 2 VIEWS: CPT | Mod: LT,S$GLB,, | Performed by: RADIOLOGY

## 2019-12-29 PROCEDURE — 71100 XR RIBS 2 VIEW LEFT: ICD-10-PCS | Mod: LT,S$GLB,, | Performed by: RADIOLOGY

## 2019-12-29 PROCEDURE — 99214 OFFICE O/P EST MOD 30 MIN: CPT | Mod: S$GLB,,, | Performed by: PHYSICIAN ASSISTANT

## 2019-12-29 RX ORDER — DICLOFENAC EPOLAMINE 0.01 G/1
1 SYSTEM TOPICAL DAILY
Qty: 30 PATCH | Refills: 0 | Status: SHIPPED | OUTPATIENT
Start: 2019-12-29 | End: 2022-03-17 | Stop reason: SDUPTHER

## 2019-12-29 NOTE — PROGRESS NOTES
"Subjective:       Patient ID: Concepcion De La Vega is a 49 y.o. female.    Vitals:  height is 5' 7" (1.702 m) and weight is 90.6 kg (199 lb 12.8 oz). Her temperature is 97.8 °F (36.6 °C). Her blood pressure is 124/84 and her pulse is 73. Her oxygen saturation is 98%.     Chief Complaint: Chest Pain    Patient is a RN. Patient presents to urgent care with left rib pain x 5 days. Patient reports that she had previously injured the same side in October. Patient currently denies fever, chills, CP, SOB, wheezing, abdominal pain, N/V, headache, numbness or tingling.  Other   This is a new (L rib pain) problem. The current episode started in the past 7 days. The problem occurs intermittently. The problem has been unchanged. Associated symptoms include myalgias. Pertinent negatives include no abdominal pain, anorexia, arthralgias, change in bowel habit, chest pain, chills, congestion, coughing, diaphoresis, fatigue, fever, headaches, joint swelling, nausea, neck pain, numbness, rash, sore throat, swollen glands, urinary symptoms, vertigo, visual change, vomiting or weakness. The symptoms are aggravated by bending and exertion. Treatments tried: DICLOFENAC PATCHES. The treatment provided significant relief.       Constitution: Negative for chills, sweating, fatigue and fever.   HENT: Negative for ear pain, drooling, congestion, sore throat, trouble swallowing and voice change.    Neck: Negative for neck pain, neck stiffness, painful lymph nodes and neck swelling.   Cardiovascular: Negative for chest pain, leg swelling, palpitations, sob on exertion and passing out.   Eyes: Negative for eye pain, eye redness, photophobia, double vision, blurred vision and eyelid swelling.   Respiratory: Negative for chest tightness, cough, sputum production, bloody sputum, shortness of breath, stridor and wheezing.    Gastrointestinal: Negative for abdominal pain, abdominal bloating, nausea, vomiting, constipation, diarrhea and heartburn. "   Genitourinary: Negative for dysuria, flank pain and hematuria.   Musculoskeletal: Positive for pain (left rib pain ) and muscle ache. Negative for joint pain, joint swelling, abnormal ROM of joint, back pain and muscle cramps.   Skin: Negative for color change, pale, rash, bruising and hives.   Allergic/Immunologic: Negative for seasonal allergies, food allergies, hives, itching and sneezing.   Neurological: Negative for dizziness, history of vertigo, light-headedness, passing out, loss of balance, headaches, altered mental status, loss of consciousness, numbness and seizures.   Hematologic/Lymphatic: Negative for swollen lymph nodes.   Psychiatric/Behavioral: Negative for altered mental status, nervous/anxious, sleep disturbance and depression. The patient is not nervous/anxious.        Objective:      Physical Exam   Constitutional: She is oriented to person, place, and time. She appears well-developed and well-nourished. She is cooperative.  Non-toxic appearance. She does not have a sickly appearance. She does not appear ill. No distress.   HENT:   Head: Normocephalic and atraumatic.   Right Ear: Hearing, tympanic membrane, external ear and ear canal normal.   Left Ear: Hearing, tympanic membrane, external ear and ear canal normal.   Nose: Nose normal. No mucosal edema, rhinorrhea or nasal deformity. No epistaxis. Right sinus exhibits no maxillary sinus tenderness and no frontal sinus tenderness. Left sinus exhibits no maxillary sinus tenderness and no frontal sinus tenderness.   Mouth/Throat: Uvula is midline, oropharynx is clear and moist and mucous membranes are normal. No trismus in the jaw. Normal dentition. No uvula swelling. No oropharyngeal exudate, posterior oropharyngeal edema or posterior oropharyngeal erythema.   Eyes: Conjunctivae and lids are normal. No scleral icterus.   Neck: Trachea normal, normal range of motion, full passive range of motion without pain and phonation normal. Neck supple. No  neck rigidity. No edema and no erythema present.   Cardiovascular: Normal rate, regular rhythm, normal heart sounds, intact distal pulses and normal pulses.   Pulmonary/Chest: Effort normal and breath sounds normal. No accessory muscle usage or stridor. No respiratory distress. She has no decreased breath sounds. She has no wheezes. She has no rhonchi. She has no rales.       Abdominal: Normal appearance.   Musculoskeletal: Normal range of motion. She exhibits no edema or deformity.   FROM to upper and lower extremities bilateral. 5/5 strength and full sensation bilateral. 2+ radial pulses bilateral. No numbness or tingling. Able to ambulate without difficulty.   Neurological: She is alert and oriented to person, place, and time. She exhibits normal muscle tone. Coordination normal.   Skin: Skin is warm, dry, intact, not diaphoretic, not pale and no rash. Capillary refill takes less than 2 seconds.   Psychiatric: She has a normal mood and affect. Her speech is normal and behavior is normal. Judgment and thought content normal. Cognition and memory are normal.   Nursing note and vitals reviewed.        X-ray Ribs 2 View Left  Result Date: 12/29/2019  EXAMINATION: XR RIBS 2 VIEW LEFT CLINICAL HISTORY: Pleurodynia TECHNIQUE: PA and oblique views of the left ribs were performed. COMPARISON: 12/03/2010 FINDINGS: These views demonstrate no fracture or osseous destructive process.  The visualized left chest as well as the remaining osseous and soft tissue structures are within normal limits.   Normal exam. Electronically signed by: Nayely Shelton MD Date:    12/29/2019 Time:    11:15    Assessment:       1. Rib pain on left side        Plan:         Rib pain on left side  -     X-Ray Ribs 2 View Left; Future; Expected date: 12/29/2019  -     diclofenac (FLECTOR) 1.3 % PT12; Apply 1 patch topically once daily  Dispense: 30 patch; Refill: 0      Patient Instructions   If you were prescribed a narcotic or controlled  medication, do not drive or operate heavy equipment or machinery while taking these medications.  You must understand that you've received an Urgent Care treatment only and that you may be released before all your medical problems are known or treated. You, the patient, will arrange for follow up care as instructed.  Follow up with your PCP or specialty clinic as directed if not improved or as needed. You can call [phone] to schedule an appointment with the appropriate provider.  If your condition worsens we recommend that you receive another evaluation at the Emergency Department for any concerns or worsening of condition.  Patient aware and verbalized understanding.    Discussed XRAY results with patient.  Patient aware and verbalized understanding.    Rest, Ice, Compression and Elevation as discussed.  Wrap during the day for better support/comfort.  Diclofenac RX as prescribed for pain as needed.  You may do gentle stretching as tolerable.  Wear supportive shoes such as tennis shoes for better support/comfort.  Follow-up with PCP for further evaluation as needed.  Follow-up with Ortho for further evaluation if still experiencing pain as discussed.  Strict ER precautions given to patient.  Patient aware and verbalized understanding.    Rib Contusion or Minor Fracture    A rib contusion is a bruise to one or more rib bones. It may cause pain, tenderness, swelling, and a purplish tint to the skin. There may be a sharp pain with each breath. A rib contusion takes anywhere from a few days to a few weeks to heal. A minor rib fracture or break may cause the same symptoms as a rib contusion. The small crack may not be seen on a regular chest X-ray. Treatment for both problems is the same.  Home care  · You may use over-the-counter pain medicine to control pain, unless another pain medicine was prescribed. If you have chronic liver or kidney disease or ever had a stomach ulcer or GI bleeding, talk with your  healthcare provider before using these medicines.  · Rest. Do not lift anything heavy or do any activity that causes pain.  · Apply an ice pack over the injured area for 15 to 20 minutes every 1 to 2 hours. You should do this for the first 24 to 48 hours. You can make an ice pack by filling a plastic bag that seals at the top with ice cubes and then wrapping it with a thin towel. Continue with ice packs as needed for the relief of pain and swelling.  · The first 3 to 4 weeks of healing will be the most painful. If your pain is not under control with the treatment given, call your healthcare provider. Sometimes a stronger pain medicine may be needed. A nerve block can be done in case of severe pain. It will numb the nerve between the ribs.  Follow-up care  Follow up with your healthcare provider, or as advised.  If X-rays were taken, you will be told of any new findings that may affect your care.  Call 911  Call 911 if you have:  · Dizziness, weakness or fainting  · Shortness of breath with or without chest discomfort  · New or worsening pain  When to seek medical advice  Call your healthcare provider right away if any of these occur:  · Fever of 100.4°F (38°C) or above lasting for 24 to 48 hours  · Stomach pain  Date Last Reviewed: 12/2/2015  © 7536-1023 [employer]. [address] [place] [address], [place] [address]. All rights reserved. This information is not intended as a substitute for professional medical care. Always follow your healthcare professional's instructions.

## 2019-12-29 NOTE — PATIENT INSTRUCTIONS
If you were prescribed a narcotic or controlled medication, do not drive or operate heavy equipment or machinery while taking these medications.  You must understand that you've received an Urgent Care treatment only and that you may be released before all your medical problems are known or treated. You, the patient, will arrange for follow up care as instructed.  Follow up with your PCP or specialty clinic as directed if not improved or as needed. You can call [phone] to schedule an appointment with the appropriate provider.  If your condition worsens we recommend that you receive another evaluation at the Emergency Department for any concerns or worsening of condition.  Patient aware and verbalized understanding.    Discussed XRAY results with patient.  Patient aware and verbalized understanding.    Rest, Ice, Compression and Elevation as discussed.  Wrap during the day for better support/comfort.  Diclofenac RX as prescribed for pain as needed.  You may do gentle stretching as tolerable.  Wear supportive shoes such as tennis shoes for better support/comfort.  Follow-up with PCP for further evaluation as needed.  Follow-up with Ortho for further evaluation if still experiencing pain as discussed.  Strict ER precautions given to patient.  Patient aware and verbalized understanding.    Rib Contusion or Minor Fracture    A rib contusion is a bruise to one or more rib bones. It may cause pain, tenderness, swelling, and a purplish tint to the skin. There may be a sharp pain with each breath. A rib contusion takes anywhere from a few days to a few weeks to heal. A minor rib fracture or break may cause the same symptoms as a rib contusion. The small crack may not be seen on a regular chest X-ray. Treatment for both problems is the same.  Home care  · You may use over-the-counter pain medicine to control pain, unless another pain medicine was prescribed. If you have chronic liver or kidney disease or ever had a  stomach ulcer or GI bleeding, talk with your healthcare provider before using these medicines.  · Rest. Do not lift anything heavy or do any activity that causes pain.  · Apply an ice pack over the injured area for 15 to 20 minutes every 1 to 2 hours. You should do this for the first 24 to 48 hours. You can make an ice pack by filling a plastic bag that seals at the top with ice cubes and then wrapping it with a thin towel. Continue with ice packs as needed for the relief of pain and swelling.  · The first 3 to 4 weeks of healing will be the most painful. If your pain is not under control with the treatment given, call your healthcare provider. Sometimes a stronger pain medicine may be needed. A nerve block can be done in case of severe pain. It will numb the nerve between the ribs.  Follow-up care  Follow up with your healthcare provider, or as advised.  If X-rays were taken, you will be told of any new findings that may affect your care.  Call 911  Call 911 if you have:  · Dizziness, weakness or fainting  · Shortness of breath with or without chest discomfort  · New or worsening pain  When to seek medical advice  Call your healthcare provider right away if any of these occur:  · Fever of 100.4°F (38°C) or above lasting for 24 to 48 hours  · Stomach pain  Date Last Reviewed: 12/2/2015  © 3843-3012 [employer]. [address] [place] [address], [place] [address]. All rights reserved. This information is not intended as a substitute for professional medical care. Always follow your healthcare professional's instructions.

## 2019-12-30 ENCOUNTER — TELEPHONE (OUTPATIENT)
Dept: PHARMACY | Facility: CLINIC | Age: 49
End: 2019-12-30

## 2019-12-30 NOTE — TELEPHONE ENCOUNTER
Call attempt 1 to see if patient made a decision about starting Emgality therapy - LVM and MyChart message sent.     Yasir Vallejo, PharmD  Clinical Pharmacist   [facility]   P: [phone]

## 2020-01-15 DIAGNOSIS — R07.81 RIB PAIN ON LEFT SIDE: Primary | ICD-10-CM

## 2020-01-15 DIAGNOSIS — R07.81 RIB PAIN: Primary | ICD-10-CM

## 2020-01-16 ENCOUNTER — CLINICAL SUPPORT (OUTPATIENT)
Dept: REHABILITATION | Facility: HOSPITAL | Age: 50
End: 2020-01-16
Attending: PHYSICAL MEDICINE & REHABILITATION
Payer: COMMERCIAL

## 2020-01-16 DIAGNOSIS — R07.81 RIB PAIN ON LEFT SIDE: ICD-10-CM

## 2020-01-16 PROCEDURE — 97161 PT EVAL LOW COMPLEX 20 MIN: CPT | Mod: PO | Performed by: PHYSICAL THERAPIST

## 2020-01-16 PROCEDURE — 97110 THERAPEUTIC EXERCISES: CPT | Mod: PO | Performed by: PHYSICAL THERAPIST

## 2020-01-16 NOTE — PATIENT INSTRUCTIONS
Diaphragmatic Breathing    In a reclined position, place one hand over your diaphragm, and the other one your chest. Take slow, deep breaths. The hand on the chest SHOULD NOT rise or fall. Attempt to push your bottom ribs out to the side as you inspire. 10 deep breaths, every 1-2 hours. Can do sitting as well.      Thoracic Matrix Rotation Left    In seated position, patient will move slowly through full, pain free range of motion. Full rotation ,right and left, of the trunk with scapular protraction and for mobility and stability of the spine. Hold 10 seconds, repeat 5-10x each way, 2x/day      Right Sidebending     Standing in a doorway side bend trunk to the R, placing L arm at the top of doorway. Hold 10 seconds, repeat 5-10x each way, 2x/day. Preferably, do this sitting to stabilize the pelvis.

## 2020-01-16 NOTE — PLAN OF CARE
"[facility]  Physical Therapy Initial Evaluation    Name: Jo Ann De La Vega  Clinic Number: [id number]    Therapy Diagnosis:   Encounter Diagnosis   Name Primary?    Rib pain on left side      Physician: Andrea Ceron, *    Physician Orders: PT Eval and Treat   Medical Diagnosis from Referral: Rib pain on left side  Evaluation Date: 1/16/2020  Authorization Period Expiration: 1/14/21  Plan of Care Expiration: 3/13/20  Visit # / Visits authorized: 1/ 1    Time In: 7:06AM  Time Out: 7:41AM  Total Billable Time: 35 minutes    Precautions: Standard    Subjective   Date of onset: Reinjury 12/23/19  History of current condition - JO ANN reports: she initially injured left rib in October when she was leaning over bed of truck and resting on rib cage while lifting. She felt a pop at that time and had pain. Pain lasted several weeks, but improved with NSAIDs and rolling her back on a roller. On 12/24/19, she raised her arms up and her  gave her a "big bear hug", and she had a pop and severe pain left rib again. She started using Flexor patches again, but pain has continued. She states she has been guarding it for several weeks now. She has pain with coughing, sneezing, hiccups. The tension of her bra sometimes irritates it as well.      Medical History:   Past Medical History:   Diagnosis Date    Abnormal Pap smear     ASCUS negative HPV. Repeat pap    ADD (attention deficit disorder)     Arthritis     right knee    Asthma     exercise induced    Cystocele     with stress incontinence    Depression     Dizziness     Fracture of sternum Nov 2010    from Karate class    GERD (gastroesophageal reflux disease)     HEARING LOSS     Hearing loss in right ear     IBS (irritable bowel syndrome)     Intrauterine device     Mirena    Kidney stone 2009    PONV (postoperative nausea and vomiting)     requests Scopolamine patch    Seasonal allergies     deviated septum also    SI " (sacroiliac) pain     Sinus pain July 3/2012    no fever, starting on antibiotics    Sinusitis     Skin tag of female perineum 2012    TMJ (dislocation of temporomandibular joint)        Surgical History:   Concepcion De La Vega  has a past surgical history that includes Cholecystectomy; TONSILLECTOMY, ADENOIDECTOMY, BILATERAL yringotomy and tubes; External ear surgery; Dilation and curettage of uterus; Ureteral stent placement (2009); Anterior vaginal repair; Tonsillectomy; Colonoscopy; and LASIK (Bilateral, 2015).    Medications:   Concepcion has a current medication list which includes the following prescription(s): albuterol, apraclonidine 1%, azelastine, bupropion, diclofenac, galcanezumab-gnlm, galcanezumab-gnlm, methen-m.blue-s.phos-phsal-hyo, mirabegron, multivit/iron/fa/k/herb no.244, and vitamin d, and the following Facility-Administered Medications: methylprednisolone sodium succinate, onabotulinumtoxina, onabotulinumtoxina, and onabotulinumtoxina.    Allergies:   Review of patient's allergies indicates:   Allergen Reactions    Oxytetracycline Swelling     Terramycin    Triple antibiotic [lczds-fxreq-wjnuppx-pramoxine] Blisters    Adhesive Rash        Imaging, Xrays: These views demonstrate no fracture or osseous destructive process.  The visualized left chest as well as the remaining osseous and soft tissue structures are within normal limits.       Prior Therapy: no  Social History:  lives with their spouse  Occupation: nurse in nephrology at [employer]  Prior Level of Function: no difficulty with turning, coughing, sneezing  Current Level of Function: pain with turning, coughing, sneezing    Pain:  Current 2/10, worst 7/10, best 2/10   Location: left rib   Description: Grabbing and tender  Aggravating Factors: Lifting and turning, coughing, sneezing  Easing Factors: Flexor patch    Pts goals: to get rid of pain and exercise again    Objective       Posture:  Slight forward head and anterior shoulders  posturing. Mild protrusion of left lower ribs    Thoracic Range of Motion:    Limited Pain   Flexion no   no        Extension no   no        Left Side Bending no no        Right Side Bending no mild        Left rotation   no moderate        Right Rotation   no mild             Upper and Lower Extremity Strength within normal limits      Joint Mobility: good joint mobility of left ribs. Pain with AP mob of ribs    Palpation: Tender with palpation at inferior angle of ribs 7-9 at ant rib cage    Sensation: intact              TREATMENT   Treatment Time In: 7:25  Treatment Time Out: 7:41  Total Treatment time separate from Evaluation: 16 minutes    JO ANN received therapeutic exercises to develop flexibility for 15 minutes including:  Diaphragmatic breathing 10x (to be performed every 1-2 hours during day)  Torso rotation 10x ea (to be performed 2x/day)  Thoracic sidebending 10x ea (to be performed 2x/day)    Education regarding rib healing, costochondral and intercostal healing, and safety with abdominal straining.    JO ANN received the following manual therapy techniques: Joint mobilizations were applied to the: left ribs for 1 minutes, including:  Gentle rib mobilization    Home Exercises and Patient Education Provided    Education provided:   - Instructed in HEP and improved transfers in/out of bed. Educated in possibility of dry needling for pain.    Written Home Exercises Provided: yes.  Exercises were reviewed and JO ANN was able to demonstrate them prior to the end of the session.  JO ANN demonstrated good  understanding of the education provided.     See EMR under Patient Instructions for exercises provided 1/16/2020.    Assessment   Jo Ann is a 49 y.o. female referred to outpatient Physical Therapy with a medical diagnosis of rib pain on left side. Pt presents with pain with palpation and mobilization of left ant lower ribs, pain with coughing, sneezing and turning and lifting.     Pt prognosis is  Excellent.   Pt will benefit from skilled outpatient Physical Therapy to address the deficits stated above and in the chart below, provide pt/family education, and to maximize pt's level of independence.     Plan of care discussed with patient: Yes  Pt's spiritual, cultural and educational needs considered and patient is agreeable to the plan of care and goals as stated below:     Anticipated Barriers for therapy: none    Medical Necessity is demonstrated by the following  History  Co-morbidities and personal factors that may impact the plan of care Co-morbidities:   depression and high BMI    Personal Factors:   no deficits     low   Examination  Body Structures and Functions, activity limitations and participation restrictions that may impact the plan of care Body Regions:   trunk    Body Systems:    gross symmetry  ROM  strength  transitions    Participation Restrictions:   none    Activity limitations:   Learning and applying knowledge  no deficits    General Tasks and Commands  no deficits    Communication  no deficits    Mobility  lifting and carrying objects    Self care  no deficits    Domestic Life  no deficits    Interactions/Relationships  no deficits    Life Areas  no deficits    Community and Social Life  no deficits         low   Clinical Presentation stable and uncomplicated low   Decision Making/ Complexity Score: low     Goals:  Short Term Goals: 4 weeks   Pt will have pain less than 4/10 at worst with coughing, sneezing, lifting.  Pt will resume all normal ADLs without difficulty.    Long Term Goals: 8 weeks   Pt will have 0/10 pain with coughing, sneezing, turning and lifting.  Pt will be independent with sx management with HEP.    Plan   Plan of care Certification: 1/16/2020 to 3/13/20.    Discussed with pt time involved with rib healing and options for PT. Recommended dry needling for pain relief if sx not controlled with anti-inflammatory patch. Instructed in HEP with focus on diaphragmatic  breathing. Pt will call if she wants to try dry needling.  Outpatient Physical Therapy 1 times weekly for 8 weeks to include the following interventions: Manual Therapy, Therapeutic Activites, Therapeutic Exercise, Ultrasound and dry needling.     Libia Falcon, PT

## 2020-01-29 ENCOUNTER — PATIENT OUTREACH (OUTPATIENT)
Dept: ADMINISTRATIVE | Facility: OTHER | Age: 50
End: 2020-01-29

## 2020-01-29 DIAGNOSIS — R35.0 URINARY FREQUENCY: ICD-10-CM

## 2020-01-29 DIAGNOSIS — Z12.31 ENCOUNTER FOR SCREENING MAMMOGRAM FOR MALIGNANT NEOPLASM OF BREAST: Primary | ICD-10-CM

## 2020-01-29 DIAGNOSIS — R30.0 DYSURIA: ICD-10-CM

## 2020-01-29 NOTE — PROGRESS NOTES
Chart reviewed. Care Everywhere updates requested. Immunizations reconciled.  updated.  Placed order for screening mammogram.

## 2020-01-30 ENCOUNTER — PROCEDURE VISIT (OUTPATIENT)
Dept: NEUROLOGY | Facility: CLINIC | Age: 50
End: 2020-01-30
Payer: COMMERCIAL

## 2020-01-30 VITALS
RESPIRATION RATE: 16 BRPM | HEIGHT: 67 IN | SYSTOLIC BLOOD PRESSURE: 135 MMHG | WEIGHT: 199.75 LBS | HEART RATE: 76 BPM | BODY MASS INDEX: 31.35 KG/M2 | DIASTOLIC BLOOD PRESSURE: 85 MMHG

## 2020-01-30 DIAGNOSIS — G43.719 INTRACTABLE CHRONIC MIGRAINE WITHOUT AURA AND WITHOUT STATUS MIGRAINOSUS: Primary | ICD-10-CM

## 2020-01-30 PROCEDURE — 64615 CHEMODENERV MUSC MIGRAINE: CPT | Mod: S$GLB,,, | Performed by: PSYCHIATRY & NEUROLOGY

## 2020-01-30 PROCEDURE — 64615 PR CHEMODENERVATION OF MUSCLE FOR CHRONIC MIGRAINE: ICD-10-PCS | Mod: S$GLB,,, | Performed by: PSYCHIATRY & NEUROLOGY

## 2020-01-30 NOTE — PROCEDURES
Procedures       BOTOX PROCEDURE    PROCEDURE PERFORMED: Botulinum toxin injection (36102)    CLINICAL INDICATION: G43.719 NDC: 5227-4874-07    A time out was conducted just before the start of the procedure to verify the correct patient and procedure, procedure location, and all relevant critical information.   The Botox injections have achieved well over 50%  improvement in the patient's symptoms. Migraines have been reduced at least 7 days per month and the number of cumulative hours suffering with headaches has been reduced at least 100 total hours per month. Today she does have a headache indicating that the Botox has worn off. Frequency of treatment is every 3 months unless no response to the treatments, at which time we will discontinue the injections.      DESCRIPTION OF PROCEDURE: After obtaining informed consent and under aseptic technique, a total of 155 units of botulinum toxin type A were injected in the following muscles: Procerus 5 units,  5 units bilaterally, frontalis 20 units, temporalis 20 units bilaterally, occipitalis 15 units, upper cervical paraspinals 10 units bilaterally and trapezius 15 units bilaterally. The patient was given a total of 155 units in 31 sites.The patient tolerated the procedure well. There were no complications. The patient was given a prescription for repeat treatment in 3 months.     Unavoidable waste 45 units          Joy Martin M.D  Medical Director, Headache and Facial Pain  [facility]

## 2020-02-04 ENCOUNTER — PATIENT OUTREACH (OUTPATIENT)
Dept: ADMINISTRATIVE | Facility: HOSPITAL | Age: 50
End: 2020-02-04

## 2020-02-18 ENCOUNTER — OFFICE VISIT (OUTPATIENT)
Dept: FAMILY MEDICINE | Facility: CLINIC | Age: 50
End: 2020-02-18
Payer: COMMERCIAL

## 2020-02-18 VITALS
SYSTOLIC BLOOD PRESSURE: 136 MMHG | OXYGEN SATURATION: 98 % | BODY MASS INDEX: 31.35 KG/M2 | HEIGHT: 67 IN | DIASTOLIC BLOOD PRESSURE: 88 MMHG | WEIGHT: 199.75 LBS | TEMPERATURE: 98 F

## 2020-02-18 DIAGNOSIS — G43.711 CHRONIC MIGRAINE WITHOUT AURA, WITH INTRACTABLE MIGRAINE, SO STATED, WITH STATUS MIGRAINOSUS: Chronic | ICD-10-CM

## 2020-02-18 DIAGNOSIS — R10.9 ABDOMINAL PAIN, UNSPECIFIED ABDOMINAL LOCATION: ICD-10-CM

## 2020-02-18 DIAGNOSIS — R87.610 ASCUS OF CERVIX WITH NEGATIVE HIGH RISK HPV: Chronic | ICD-10-CM

## 2020-02-18 DIAGNOSIS — N20.0 NEPHROLITHIASIS: Chronic | ICD-10-CM

## 2020-02-18 DIAGNOSIS — M62.838 MUSCLE SPASM: ICD-10-CM

## 2020-02-18 DIAGNOSIS — Z00.00 PREVENTATIVE HEALTH CARE: Primary | ICD-10-CM

## 2020-02-18 DIAGNOSIS — F33.0 MILD EPISODE OF RECURRENT MAJOR DEPRESSIVE DISORDER: Chronic | ICD-10-CM

## 2020-02-18 PROBLEM — R07.81 RIB PAIN ON LEFT SIDE: Status: RESOLVED | Noted: 2020-01-16 | Resolved: 2020-02-18

## 2020-02-18 PROCEDURE — 99396 PR PREVENTIVE VISIT,EST,40-64: ICD-10-PCS | Mod: S$GLB,,, | Performed by: EMERGENCY MEDICINE

## 2020-02-18 PROCEDURE — 99999 PR PBB SHADOW E&M-EST. PATIENT-LVL III: ICD-10-PCS | Mod: PBBFAC,,, | Performed by: EMERGENCY MEDICINE

## 2020-02-18 PROCEDURE — 99999 PR PBB SHADOW E&M-EST. PATIENT-LVL III: CPT | Mod: PBBFAC,,, | Performed by: EMERGENCY MEDICINE

## 2020-02-18 PROCEDURE — 99396 PREV VISIT EST AGE 40-64: CPT | Mod: S$GLB,,, | Performed by: EMERGENCY MEDICINE

## 2020-02-18 RX ORDER — CYCLOBENZAPRINE HCL 10 MG
10 TABLET ORAL 3 TIMES DAILY PRN
Qty: 30 TABLET | Refills: 1 | Status: SHIPPED | OUTPATIENT
Start: 2020-02-18 | End: 2020-02-28

## 2020-02-18 RX ORDER — HYOSCYAMINE SULFATE 0.12 MG/1
0.12 TABLET SUBLINGUAL EVERY 4 HOURS PRN
Qty: 10 TABLET | Refills: 2 | Status: SHIPPED | OUTPATIENT
Start: 2020-02-18 | End: 2022-04-12

## 2020-02-18 RX ORDER — DICYCLOMINE HYDROCHLORIDE 10 MG/1
10 CAPSULE ORAL
Qty: 30 CAPSULE | Refills: 2 | Status: SHIPPED | OUTPATIENT
Start: 2020-02-18 | End: 2021-02-02 | Stop reason: SDUPTHER

## 2020-02-18 RX ORDER — MONTELUKAST SODIUM 10 MG/1
TABLET ORAL
COMMUNITY
End: 2020-02-26 | Stop reason: SDUPTHER

## 2020-02-18 RX ORDER — OSELTAMIVIR PHOSPHATE 75 MG/1
75 CAPSULE ORAL 2 TIMES DAILY
Qty: 10 CAPSULE | Refills: 0 | Status: SHIPPED | OUTPATIENT
Start: 2020-02-18 | End: 2020-02-23

## 2020-02-18 NOTE — PROGRESS NOTES
Current Subjective:   THIS NOTE IS DONE WITH VOICE RECOGNITION        Patient ID: Concepcion De La Vega is a 49 y.o. female.    Chief Complaint: Follow-up      HPI  She is in today for a health prevention visit.     She does have concerns about influenza A prophylaxis.  A family member has been diagnosed with influenza way.  She would like to do something to prevent she and her  being infected.    She is wondering if she has attention deficit disorder.  Her  has given her this feet back.  In the past she has used a variety of medications.  This was many years ago.  She does not recall whether this was efficacious or not.  She does mention that she has trouble with being distracted easily.  This is been a lifelong problem.  She has not had formal diagnosis or any type of formal testing.  She would be interested in moving forward with testing available.    For gynecological screening is current.  She is HPV negative.  Her pap reveals  ASCUS.  She has had an IUD in place which has been removed.  I did suggest to her that she should repeat her Pap in 3 years with repeat testing.  This would be in 2021.  She has no symptoms.    Migraines are well controlled.  She is seeing Dr. Martin, neurology.  She is using Botox with excellent relief.  She is very satisfied with the outcome of this intervention.    Nephrolithiasis has not been a problem.   Currently without hematuria or renal colic.    She has been using Wellbutrin for some time.  She is modified her dose, using more during the days that her short and less during the summertime.  She never goes be on 300 milligrams a day.  She does not express any suicidal ideation or tendencies.    She is using mirabegron with success.  She is questioning whether not caffeine or other bladder urine is could be an issue.  Certainly this could be the case.  She may experience med by stopping the Myrbetriq and trying to modify her caffeine intake to see how she does.    She  continues to experience cutaneous flushing when she is stressed or nervous.  This is quite prominent.  It is not painful.  She has no respiratory component to this.  She does mention that she is having more hot flushing, consistent with her perimenopausal symptoms.  These have been absent for some time, but have recently recurred.    She does have a number of as needed medications that she would like to maintain access to.  This includes cyclobenzaprine, dicyclomine, and hyoscamine.  She does not use these medications very frequently.    Colon cancer screening is negative.    Immunization History   Administered Date(s) Administered    DT (Pediatric) 01/09/1971, 08/01/1987    DTP 08/20/1974    Influenza 10/08/2018    Influenza - Quadrivalent - PF (6 months and older) 11/02/2016, 10/16/2017, 10/01/2018, 10/09/2019    Influenza A (H1N1) 2009 Monovalent - IM 10/27/2009    Influenza Split 09/26/2007, 10/14/2008, 09/18/2009    OPV 05/15/1973, 08/20/1974    PPD Test 06/06/2008, 06/08/2009, 05/20/2010    Rubella 09/17/1969, 05/03/1972    Td (ADULT) 11/06/1992    Tdap 05/21/2007, 11/19/2013           Current Outpatient Medications   Medication Sig Dispense Refill    albuterol (PROVENTIL/VENTOLIN HFA) 90 mcg/actuation inhaler Inhale 2 puffs into the lungs every 6 (six) hours as needed for Wheezing. 18 g 3    buPROPion (WELLBUTRIN XL) 150 MG TB24 tablet TAKE TWO TABLETS BY MOUTH EVERY  tablet 3    diclofenac (FLECTOR) 1.3 % PT12 Apply 1 patch topically once daily 30 patch 0    methen-m.blue-s.phos-phsal-hyo (URIBEL) 118-10-40.8-36 mg Cap Take 1 capsule by mouth 3 (three) times daily. 40 capsule 11    mirabegron (MYRBETRIQ) 50 mg Tb24 Take 1 tablet (50 mg total) by mouth once daily. 30 tablet 11    vitamin D (VITAMIN D3) 1000 units Tab Take 1,000 Units by mouth once daily.      montelukast (SINGULAIR) 10 mg tablet       omeprazole/sodium bicarbonate (ZEGERID ORAL)        Current Facility-Administered  Medications   Medication Dose Route Frequency Provider Last Rate Last Dose    methylPREDNISolone sodium succinate injection 125 mg  125 mg Intravenous Q6H Rubi CHASETerese Franklin, NP   125 mg at 10/30/18 1525    onabotulinumtoxina injection 200 Units  200 Units Intramuscular Q90 Days Lydia Martin MD   200 Units at 08/15/19 1054    onabotulinumtoxina injection 200 Units  200 Units Intramuscular Q90 Days Lydia Martin MD   200 Units at 11/07/19 1140    onabotulinumtoxina injection 200 Units  200 Units Intramuscular Q90 Days Lydia Martin MD   200 Units at 01/30/20 1128       Patient entered via patient portal and augmented by myself.    Review of Systems   Constitutional: Negative for activity change and unexpected weight change.   HENT: Negative for hearing loss, rhinorrhea and trouble swallowing.    Eyes: Negative for discharge and visual disturbance.   Respiratory: Negative for chest tightness and wheezing.    Cardiovascular: Negative for chest pain and palpitations.   Gastrointestinal: Negative for blood in stool, constipation, diarrhea and vomiting.   Genitourinary: Negative for difficulty urinating and hematuria.   Musculoskeletal: Negative for neck pain.   Neurological: Negative for headaches.   Psychiatric/Behavioral: Positive for behavioral problems and decreased concentration. Negative for dysphoric mood and suicidal ideas.        Distractible       Objective:      Physical Exam   Constitutional: She is oriented to person, place, and time. She appears well-developed and well-nourished. No distress.   HENT:   Head: Normocephalic and atraumatic.   Right Ear: External ear normal.   Left Ear: External ear normal.   Nose: Nose normal.   Mouth/Throat: Oropharynx is clear and moist.   Eyes: Pupils are equal, round, and reactive to light. Conjunctivae and EOM are normal. No scleral icterus.   Neck: Normal range of motion. Neck supple. No thyromegaly present.   Cardiovascular: Normal rate, regular  rhythm and normal heart sounds.  No extrasystoles are present.   Pulmonary/Chest: Effort normal and breath sounds normal. No tachypnea. She has no wheezes. She has no rales.   Abdominal: Soft. Bowel sounds are normal. She exhibits no distension. There is no tenderness.   Musculoskeletal: Normal range of motion. She exhibits no edema or tenderness.        Right shoulder: She exhibits normal range of motion, no tenderness, no bony tenderness, no effusion and no deformity.   Lymphadenopathy:     She has no cervical adenopathy.     She has no axillary adenopathy.        Right: No inguinal and no supraclavicular adenopathy present.   Neurological: She is alert and oriented to person, place, and time. She has normal reflexes. No cranial nerve deficit. Coordination normal.   Skin: Skin is warm and dry. No ecchymosis, no lesion and no rash noted. No erythema.   She is having flushing mostly around the head and neck.  This is not urticarial in nature.   Psychiatric: She has a normal mood and affect. Her mood appears not anxious. She does not exhibit a depressed mood.   Vitals reviewed.      Assessment:       1. Preventative health care    2. Abdominal pain, unspecified abdominal location    3. Muscle spasm    4. ASCUS of cervix with negative high risk HPV    5. Chronic migraine without aura, with intractable migraine, so stated, with status migrainosus    6. Mild episode of recurrent major depressive disorder    7. Nephrolithiasis        Plan:       1. Preventative health care  Focus on appropriate exercise and diet discussed  Concerns about attention deficit heard, and we will look for resources to do formal screening  Shingles vaccine recommended  Colon cancer screening discussed, no known family history of colon cancer.  She has not had any recognized polyps.  Annual fecal immune testing recommended.  Breast cancer screening discussed and encouraged to complete.    - Comprehensive metabolic panel; Future  - Lipid panel;  Future  - CBC auto differential; Future  - TSH; Future    2. Abdominal pain, unspecified abdominal location  Occasionally symptomatic  These medicines have helped in the past    - dicyclomine (BENTYL) 10 MG capsule; Take 1 capsule (10 mg total) by mouth 4 (four) times daily before meals and nightly.  Dispense: 30 capsule; Refill: 2  - hyoscyamine (LEVSIN/SL) 0.125 mg Subl; Place 1 tablet (0.125 mg total) under the tongue every 4 (four) hours as needed.  Dispense: 10 tablet; Refill: 2    3. Muscle spasm  In frequently a problem, that she would like to have a small supply on and if she develops muscle spasms.    - cyclobenzaprine (FLEXERIL) 10 MG tablet; Take 1 tablet (10 mg total) by mouth 3 (three) times daily as needed for Muscle spasms.  Dispense: 30 tablet; Refill: 1    4. ASCUS of cervix with negative high risk HPV  Follow-up Pap recommended 3 years after her last Pap smear.    5. Chronic migraine without aura, with intractable migraine, so stated, with status migrainosus  Currently well controlled  Continue to follow up with the Dr. Martin, her current neurologist.  No new medications    6. Mild episode of recurrent major depressive disorder  Stable on varying doses of Wellbutrin, see above    7. Nephrolithiasis  No recent issues.  Maintain good hydration    Shingles vaccine recommended.

## 2020-02-19 PROBLEM — R87.610 ASCUS OF CERVIX WITH NEGATIVE HIGH RISK HPV: Chronic | Status: ACTIVE | Noted: 2020-02-19

## 2020-02-19 NOTE — PATIENT INSTRUCTIONS
Concepcion,    I am going to ask her mental health he whom and how we are screening for attention deficit disorder with adults.  I hope to have an answer for you in a few days.    I will get back to you after I see your blood work.    I do recommend shingles vaccine, and I think you are already received the vaccine.    Please sure to follow up on your mammogram.    Umair Kuhn MD

## 2020-02-26 ENCOUNTER — PATIENT MESSAGE (OUTPATIENT)
Dept: FAMILY MEDICINE | Facility: CLINIC | Age: 50
End: 2020-02-26

## 2020-02-26 DIAGNOSIS — F33.0 MILD EPISODE OF RECURRENT MAJOR DEPRESSIVE DISORDER: Chronic | ICD-10-CM

## 2020-02-26 RX ORDER — BUPROPION HYDROCHLORIDE 150 MG/1
TABLET ORAL
Qty: 180 TABLET | Refills: 3 | Status: CANCELLED | OUTPATIENT
Start: 2020-02-26

## 2020-02-27 ENCOUNTER — DOCUMENTATION ONLY (OUTPATIENT)
Dept: REHABILITATION | Facility: HOSPITAL | Age: 50
End: 2020-02-27

## 2020-02-27 DIAGNOSIS — F33.0 MILD EPISODE OF RECURRENT MAJOR DEPRESSIVE DISORDER: Chronic | ICD-10-CM

## 2020-02-27 RX ORDER — BUPROPION HYDROCHLORIDE 150 MG/1
TABLET ORAL
Qty: 180 TABLET | Refills: 3 | Status: SHIPPED | OUTPATIENT
Start: 2020-02-27 | End: 2021-04-09 | Stop reason: SDUPTHER

## 2020-02-28 RX ORDER — MONTELUKAST SODIUM 10 MG/1
10 TABLET ORAL DAILY
Qty: 90 TABLET | Refills: 3 | Status: SHIPPED | OUTPATIENT
Start: 2020-02-28 | End: 2021-03-15 | Stop reason: SDUPTHER

## 2020-02-28 NOTE — TELEPHONE ENCOUNTER
Refill Authorization Note     is requesting a refill authorization.    Brief assessment and rationale for refill: APPROVE: prr                                         Comments:   Requested Prescriptions   Signed Prescriptions Disp Refills    montelukast (SINGULAIR) 10 mg tablet 90 tablet 3     Sig: Take 1 tablet (10 mg total) by mouth once daily.       Pulmonology:  Leukotriene Inhibitors Passed - 2/26/2020  2:57 PM        Passed - Patient is at least 18 years old        Passed - Office visit in past 12 months or future 90 days.     Recent Outpatient Visits            1 week ago Preventative health care    [facility] Umair Kuhn Jr., MD    2 months ago Rib pain on left side    [facility] [place] Tanja Tobar PA-C    4 months ago Globus sensation    [place] [facility] Hong Dunbar MD    8 months ago Globus sensation    [facility] Hong Dunbar MD    9 months ago Chronic allergic rhinitis    [facility] Emily Hutchinson MD          Future Appointments              [place] 2 [place] L[place] [facility] [place]                Passed - An appropriate indication is on the problem list     Allergic Rhinitis  Sinusitis  Seasonal Allergies   Asthma               Appointments  past 12m or future 3m with PCP    Date Provider   Last Visit   2/18/2020 Umair Kuhn Jr., MD   Next Visit   2/27/2020 Umair Kuhn Jr., MD   .  ED visits in past 90 days: [unfilled]       Note composed:12:04 PM 02/28/2020

## 2020-04-21 DIAGNOSIS — Z01.84 ANTIBODY RESPONSE EXAMINATION: ICD-10-CM

## 2020-04-24 ENCOUNTER — LAB VISIT (OUTPATIENT)
Dept: LAB | Facility: HOSPITAL | Age: 50
End: 2020-04-24
Attending: INTERNAL MEDICINE
Payer: COMMERCIAL

## 2020-04-24 ENCOUNTER — PATIENT MESSAGE (OUTPATIENT)
Dept: FAMILY MEDICINE | Facility: CLINIC | Age: 50
End: 2020-04-24

## 2020-04-24 DIAGNOSIS — Z01.84 ANTIBODY RESPONSE EXAMINATION: ICD-10-CM

## 2020-04-24 LAB — SARS-COV-2 IGG SERPL QL IA: NEGATIVE

## 2020-04-24 PROCEDURE — 86769 SARS-COV-2 COVID-19 ANTIBODY: CPT

## 2020-04-24 PROCEDURE — 36415 COLL VENOUS BLD VENIPUNCTURE: CPT | Mod: PO

## 2020-05-01 ENCOUNTER — PATIENT MESSAGE (OUTPATIENT)
Dept: FAMILY MEDICINE | Facility: CLINIC | Age: 50
End: 2020-05-01

## 2020-05-01 DIAGNOSIS — F41.8 SITUATIONAL ANXIETY: Primary | ICD-10-CM

## 2020-05-01 RX ORDER — ALPRAZOLAM 0.5 MG/1
0.5 TABLET, ORALLY DISINTEGRATING ORAL 2 TIMES DAILY PRN
Qty: 30 TABLET | Refills: 0 | Status: SHIPPED | OUTPATIENT
Start: 2020-05-01 | End: 2020-05-01 | Stop reason: CLARIF

## 2020-05-01 RX ORDER — ALPRAZOLAM 0.5 MG/1
0.5 TABLET ORAL 2 TIMES DAILY PRN
Qty: 30 TABLET | Refills: 0 | Status: SHIPPED | OUTPATIENT
Start: 2020-05-01 | End: 2020-08-21

## 2020-05-14 NOTE — TELEPHONE ENCOUNTER
C/o right calf swelling and pain last night.  Often driving long distances.  Right calf has a firm area medial to midline posteriorly.  + edema noted. No resp distress.  Concern for blood clot.  Venous US ordered.   Pt states understanding   TC was returned to pt I left pt a VM to contact our office back.

## 2020-05-18 ENCOUNTER — PATIENT OUTREACH (OUTPATIENT)
Dept: ADMINISTRATIVE | Facility: HOSPITAL | Age: 50
End: 2020-05-18

## 2020-05-18 NOTE — PROGRESS NOTES
Dr. Barber non-compliant mammogram report.     Chart review completed 05/18/2020.  Care Everywhere updates requested and reviewed. Media reviewed.     No mammogram in DIS.  Message sent to patient's portal.

## 2020-05-22 ENCOUNTER — PATIENT MESSAGE (OUTPATIENT)
Dept: FAMILY MEDICINE | Facility: CLINIC | Age: 50
End: 2020-05-22

## 2020-05-22 DIAGNOSIS — B37.31 VAGINAL YEAST INFECTION: Primary | ICD-10-CM

## 2020-05-22 RX ORDER — FLUCONAZOLE 100 MG/1
100 TABLET ORAL DAILY
Qty: 3 TABLET | Refills: 0 | Status: SHIPPED | OUTPATIENT
Start: 2020-05-22 | End: 2020-05-25

## 2020-06-08 ENCOUNTER — PATIENT OUTREACH (OUTPATIENT)
Dept: ADMINISTRATIVE | Facility: OTHER | Age: 50
End: 2020-06-08

## 2020-06-08 ENCOUNTER — OFFICE VISIT (OUTPATIENT)
Dept: OPHTHALMOLOGY | Facility: CLINIC | Age: 50
End: 2020-06-08
Payer: COMMERCIAL

## 2020-06-08 VITALS — DIASTOLIC BLOOD PRESSURE: 86 MMHG | SYSTOLIC BLOOD PRESSURE: 131 MMHG | HEART RATE: 86 BPM

## 2020-06-08 DIAGNOSIS — H43.11 VITREOUS HEMORRHAGE, RIGHT: ICD-10-CM

## 2020-06-08 DIAGNOSIS — H43.811 POSTERIOR VITREOUS DETACHMENT, RIGHT: ICD-10-CM

## 2020-06-08 PROCEDURE — 92201 OPSCPY EXTND RTA DRAW UNI/BI: CPT | Mod: S$GLB,,, | Performed by: OPHTHALMOLOGY

## 2020-06-08 PROCEDURE — 99999 PR PBB SHADOW E&M-EST. PATIENT-LVL III: CPT | Mod: PBBFAC,,, | Performed by: OPHTHALMOLOGY

## 2020-06-08 PROCEDURE — 92201 PR OPHTHALMOSCOPY, EXT, W/RET DRAW/SCLERAL DEPR, I&R, UNI/BI: ICD-10-PCS | Mod: S$GLB,,, | Performed by: OPHTHALMOLOGY

## 2020-06-08 PROCEDURE — 92004 PR EYE EXAM, NEW PATIENT,COMPREHESV: ICD-10-PCS | Mod: S$GLB,,, | Performed by: OPHTHALMOLOGY

## 2020-06-08 PROCEDURE — 92004 COMPRE OPH EXAM NEW PT 1/>: CPT | Mod: S$GLB,,, | Performed by: OPHTHALMOLOGY

## 2020-06-08 PROCEDURE — 99999 PR PBB SHADOW E&M-EST. PATIENT-LVL III: ICD-10-PCS | Mod: PBBFAC,,, | Performed by: OPHTHALMOLOGY

## 2020-06-08 NOTE — PROGRESS NOTES
HPI     Urgent Care Visit today    H/O LASIK OU 2015 Dr. Candelaria    Sudden onset of floaters OD Saturday evening. No flashes. HX of migraines.   She states she sees a shadow temporally in the right eye.     AT's PRN OU    Last edited by Crystal Torres on 6/8/2020 11:09 AM. (History)        OCT - OD PVD  OS - no ME      A/P    1. Hemorrhagic PVD OD  NO breaks or tears on   Heme appears to emanating from ONH    RD precautions      4-5 weeks Dilate OD

## 2020-07-01 ENCOUNTER — PATIENT OUTREACH (OUTPATIENT)
Dept: ADMINISTRATIVE | Facility: OTHER | Age: 50
End: 2020-07-01

## 2020-07-06 ENCOUNTER — OFFICE VISIT (OUTPATIENT)
Dept: OPHTHALMOLOGY | Facility: CLINIC | Age: 50
End: 2020-07-06
Payer: COMMERCIAL

## 2020-07-06 DIAGNOSIS — H43.11 VITREOUS HEMORRHAGE, RIGHT: Primary | ICD-10-CM

## 2020-07-06 DIAGNOSIS — H43.811 POSTERIOR VITREOUS DETACHMENT, RIGHT: ICD-10-CM

## 2020-07-06 PROCEDURE — 92201 OPSCPY EXTND RTA DRAW UNI/BI: CPT | Mod: S$GLB,,, | Performed by: OPHTHALMOLOGY

## 2020-07-06 PROCEDURE — 92201 PR OPHTHALMOSCOPY, EXT, W/RET DRAW/SCLERAL DEPR, I&R, UNI/BI: ICD-10-PCS | Mod: S$GLB,,, | Performed by: OPHTHALMOLOGY

## 2020-07-06 PROCEDURE — 92014 COMPRE OPH EXAM EST PT 1/>: CPT | Mod: S$GLB,,, | Performed by: OPHTHALMOLOGY

## 2020-07-06 PROCEDURE — 99999 PR PBB SHADOW E&M-EST. PATIENT-LVL III: ICD-10-PCS | Mod: PBBFAC,,, | Performed by: OPHTHALMOLOGY

## 2020-07-06 PROCEDURE — 92014 PR EYE EXAM, EST PATIENT,COMPREHESV: ICD-10-PCS | Mod: S$GLB,,, | Performed by: OPHTHALMOLOGY

## 2020-07-06 PROCEDURE — 99999 PR PBB SHADOW E&M-EST. PATIENT-LVL III: CPT | Mod: PBBFAC,,, | Performed by: OPHTHALMOLOGY

## 2020-07-06 NOTE — PROGRESS NOTES
HPI     Eye Problem      Additional comments: Vitreous Hemorrhage              Comments     DLS: 06/08/2020 Dr. Burrell    Patient states she still has floaters OD. Vision stable.     AT's PRN OU      OCT - OD PVD  OS - no ME      A/P    1. Hemorrhagic PVD OD  NO breaks or tears on   Heme appears to emanating from ONH - heme settled nicely    RD precautions    If Floaters OD don't improve by 6 months and still bothered with fxn, can return for PPV consideration.    But should improved on own.      Retina PRN

## 2020-08-21 ENCOUNTER — OFFICE VISIT (OUTPATIENT)
Dept: FAMILY MEDICINE | Facility: CLINIC | Age: 50
End: 2020-08-21
Payer: COMMERCIAL

## 2020-08-21 ENCOUNTER — PATIENT MESSAGE (OUTPATIENT)
Dept: FAMILY MEDICINE | Facility: CLINIC | Age: 50
End: 2020-08-21

## 2020-08-21 VITALS
SYSTOLIC BLOOD PRESSURE: 140 MMHG | BODY MASS INDEX: 31.87 KG/M2 | TEMPERATURE: 98 F | OXYGEN SATURATION: 97 % | DIASTOLIC BLOOD PRESSURE: 84 MMHG | HEART RATE: 96 BPM | WEIGHT: 203.5 LBS

## 2020-08-21 DIAGNOSIS — R11.2 POST-OPERATIVE NAUSEA AND VOMITING: ICD-10-CM

## 2020-08-21 DIAGNOSIS — Z00.00 PREVENTATIVE HEALTH CARE: Primary | ICD-10-CM

## 2020-08-21 DIAGNOSIS — Z98.890 POST-OPERATIVE NAUSEA AND VOMITING: ICD-10-CM
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
## 2020-08-21 DIAGNOSIS — B37.31 VAGINAL CANDIDA: ICD-10-CM

## 2020-08-21 PROCEDURE — 3008F PR BODY MASS INDEX (BMI) DOCUMENTED: ICD-10-PCS | Mod: CPTII,S$GLB,, | Performed by: PHYSICIAN ASSISTANT

## 2020-08-21 PROCEDURE — 99999 PR PBB SHADOW E&M-EST. PATIENT-LVL IV: CPT | Mod: PBBFAC,,, | Performed by: PHYSICIAN ASSISTANT

## 2020-08-21 PROCEDURE — 3008F BODY MASS INDEX DOCD: CPT | Mod: CPTII,S$GLB,, | Performed by: PHYSICIAN ASSISTANT

## 2020-08-21 PROCEDURE — 99213 OFFICE O/P EST LOW 20 MIN: CPT | Mod: S$GLB,,, | Performed by: PHYSICIAN ASSISTANT

## 2020-08-21 PROCEDURE — 99213 PR OFFICE/OUTPT VISIT, EST, LEVL III, 20-29 MIN: ICD-10-PCS | Mod: S$GLB,,, | Performed by: PHYSICIAN ASSISTANT

## 2020-08-21 PROCEDURE — 99999 PR PBB SHADOW E&M-EST. PATIENT-LVL IV: ICD-10-PCS | Mod: PBBFAC,,, | Performed by: PHYSICIAN ASSISTANT

## 2020-08-21 RX ORDER — FLUCONAZOLE 150 MG/1
TABLET ORAL
Qty: 2 TABLET | Refills: 0 | Status: SHIPPED | OUTPATIENT
Start: 2020-08-21 | End: 2021-01-28

## 2020-08-21 RX ORDER — CEFADROXIL 500 MG/1
1 CAPSULE ORAL EVERY 12 HOURS
Qty: 20 CAPSULE | Refills: 0 | Status: SHIPPED | OUTPATIENT
Start: 2020-08-21 | End: 2020-08-26

## 2020-08-21 RX ORDER — ONDANSETRON HYDROCHLORIDE 8 MG/1
8 TABLET, FILM COATED ORAL EVERY 8 HOURS PRN
Qty: 30 TABLET | Refills: 1 | Status: CANCELLED | OUTPATIENT
Start: 2020-08-21 | End: 2020-08-28

## 2020-08-21 NOTE — PROGRESS NOTES
"Subjective:      Patient ID: Concepcion De La Vega is a 50 y.o. female.    Chief Complaint: Tick Removal (tick is removed but nodule is still there, sunday)    HPI   Patient has been treated by Dr. Montenegro, ID, with cefadroxil 500mg BID x 5 days.  She has not started antibiotic yet.  She had a tick bite on Sunday in top of scalp and unsure how long the tick was there.  Dr. Montenegro preferred to treat her with doxycycline, but she has a documented allergy to tetracyclines.    Right tonsil feels inflamed, "something sticks her when she swallows" and has been investigated by Dr. Dunbar.    Review of Systems   Constitutional: Positive for appetite change. Negative for chills and fever.   HENT: Negative for ear pain, sinus pressure and sinus pain.    Eyes: Negative for pain.   Respiratory: Negative for cough and shortness of breath.    Cardiovascular: Negative for chest pain.   Gastrointestinal: Positive for abdominal pain (cramps), diarrhea (2x today) and nausea. Negative for constipation and vomiting.   Musculoskeletal: Negative for myalgias.   Skin: Positive for wound (right neck abscess 3cm diameter). Negative for rash.   Neurological: Negative for dizziness, light-headedness and headaches.   Psychiatric/Behavioral: Positive for decreased concentration.       Objective:   BP (!) 140/84   Pulse 96   Temp 98.3 °F (36.8 °C)   Wt 92.3 kg (203 lb 7.8 oz)   SpO2 97%   BMI 31.87 kg/m²      Physical Exam  Vitals signs reviewed.   Constitutional:       General: She is not in acute distress.     Appearance: Normal appearance. She is well-developed and well-groomed.   HENT:      Head: Normocephalic and atraumatic.      Right Ear: Hearing and external ear normal.      Left Ear: Hearing and external ear normal.      Nose: Nose normal.      Mouth/Throat:      Lips: Pink.      Mouth: Mucous membranes are moist.      Pharynx: Oropharynx is clear.      Tonsils: No tonsillar exudate or tonsillar abscesses.   Eyes:      General: Lids " are normal.      Conjunctiva/sclera: Conjunctivae normal.   Neck:      Musculoskeletal: Normal range of motion.      Trachea: Phonation normal.   Cardiovascular:      Rate and Rhythm: Normal rate and regular rhythm.      Heart sounds: Normal heart sounds. No murmur. No friction rub. No gallop.    Pulmonary:      Effort: Pulmonary effort is normal. No respiratory distress.      Breath sounds: Normal breath sounds. No decreased breath sounds, wheezing, rhonchi or rales.   Musculoskeletal: Normal range of motion.   Lymphadenopathy:      Cervical: Cervical adenopathy (right sided TTP) present.   Skin:     General: Skin is warm and dry.      Findings: No rash.      Comments: No scalp lesion observed where tick bite was located.   Neurological:      General: No focal deficit present.      Mental Status: She is alert and oriented to person, place, and time.   Psychiatric:         Attention and Perception: Attention normal.         Mood and Affect: Mood normal.         Speech: Speech normal.         Behavior: Behavior normal. Behavior is cooperative.         Cognition and Memory: Cognition normal.         Judgment: Judgment normal.       Assessment:      1. Preventative health care    2. Tick bite, initial encounter    3. Vaginal candida       Plan:   1. Preventative health care  - Hepatitis C Antibody; Future  - Hemoglobin A1C; Future  - Fecal Immunochemical Test (iFOBT); Future    2. Tick bite, initial encounter  - Ambulatory referral/consult to Infectious Disease; Future    3. Vaginal candida  Patient requested while taking antibiotic.  - fluconazole (DIFLUCAN) 150 MG Tab; Take one tablet by mouth with symptoms, take one tablet 72 hours later with symptoms  Dispense: 2 tablet; Refill: 0    Follow up as needed.  Patient agreed with plan and expressed understanding.    Thank you for allowing me to serve you,

## 2020-08-21 NOTE — PROGRESS NOTES
Met Javier during an informal consultation due to right cervical lymphadenopathy after tick bite.    She tells me that she is feeling well otherwise denies fever, chills, night sweats but endorses nausea and diarrhea.    The prophylaxis for tick bite is managed with doxycycline but she reports remote history of significant swelling after IM tetracycline when she was young.    There is no other prophylaxis or treatment for tick bite other than doxycycline so I have recommended patient to clarify this allergy with her PCP and perhaps a referral to allergy for further evaluation.    In the meantime I will treat as a possible infection from skin adelina with cefadroxil twice daily for 5 days    I suggested that in the event that patient develops fevers or becomes significantly more symptomatic to see me any time for further workup

## 2020-09-22 ENCOUNTER — HOSPITAL ENCOUNTER (OUTPATIENT)
Dept: RADIOLOGY | Facility: HOSPITAL | Age: 50
Discharge: HOME | End: 2020-09-22
Attending: EMERGENCY MEDICINE
Payer: COMMERCIAL

## 2020-09-22 DIAGNOSIS — Z12.31 ENCOUNTER FOR SCREENING MAMMOGRAM FOR MALIGNANT NEOPLASM OF BREAST: ICD-10-CM

## 2020-09-22 PROCEDURE — 77067 MAMMO DIGITAL SCREENING BILAT WITH TOMOSYNTHESIS_CAD: ICD-10-PCS | Mod: 26,,, | Performed by: RADIOLOGY

## 2020-09-22 PROCEDURE — 77067 SCR MAMMO BI INCL CAD: CPT | Mod: 26,,, | Performed by: RADIOLOGY

## 2020-09-22 PROCEDURE — 77067 SCR MAMMO BI INCL CAD: CPT | Mod: TC,PO

## 2020-09-22 PROCEDURE — 77063 MAMMO DIGITAL SCREENING BILAT WITH TOMOSYNTHESIS_CAD: ICD-10-PCS | Mod: 26,,, | Performed by: RADIOLOGY

## 2020-09-22 PROCEDURE — 77063 BREAST TOMOSYNTHESIS BI: CPT | Mod: 26,,, | Performed by: RADIOLOGY

## 2020-09-28 ENCOUNTER — PATIENT MESSAGE (OUTPATIENT)
Dept: ALLERGY | Facility: CLINIC | Age: 50
End: 2020-09-28

## 2020-10-05 ENCOUNTER — PATIENT MESSAGE (OUTPATIENT)
Dept: ADMINISTRATIVE | Facility: HOSPITAL | Age: 50
End: 2020-10-05

## 2020-10-22 ENCOUNTER — PATIENT MESSAGE (OUTPATIENT)
Dept: FAMILY MEDICINE | Facility: CLINIC | Age: 50
End: 2020-10-22

## 2020-10-23 RX ORDER — DICYCLOMINE HYDROCHLORIDE 10 MG/1
10 CAPSULE ORAL
Qty: 120 CAPSULE | Refills: 0 | Status: SHIPPED | OUTPATIENT
Start: 2020-10-23 | End: 2020-12-02

## 2020-10-23 NOTE — TELEPHONE ENCOUNTER
LOV with SHERI 8/21/2020  LOV with PCP 2/2020    PCP is out of office.     This is a historic medication not currently on medication list, but last prescribed in February.

## 2020-11-18 ENCOUNTER — IMMUNIZATION (OUTPATIENT)
Dept: PHARMACY | Facility: CLINIC | Age: 50
End: 2020-11-18
Payer: COMMERCIAL

## 2020-11-18 DIAGNOSIS — K21.9 GASTROESOPHAGEAL REFLUX DISEASE, UNSPECIFIED WHETHER ESOPHAGITIS PRESENT: Primary | ICD-10-CM

## 2020-11-18 RX ORDER — FAMOTIDINE 20 MG/1
20 TABLET, FILM COATED ORAL 2 TIMES DAILY
Qty: 60 TABLET | Refills: 3 | Status: SHIPPED | OUTPATIENT
Start: 2020-11-18 | End: 2022-06-22 | Stop reason: SDUPTHER

## 2020-12-23 ENCOUNTER — IMMUNIZATION (OUTPATIENT)
Dept: FAMILY MEDICINE | Facility: CLINIC | Age: 50
End: 2020-12-23
Payer: COMMERCIAL

## 2020-12-23 DIAGNOSIS — Z23 NEED FOR VACCINATION: ICD-10-CM

## 2020-12-23 PROCEDURE — 0001A COVID-19, MRNA, LNP-S, PF, 30 MCG/0.3 ML DOSE VACCINE: CPT | Mod: CV19,,, | Performed by: EMERGENCY MEDICINE

## 2020-12-23 PROCEDURE — 0001A COVID-19, MRNA, LNP-S, PF, 30 MCG/0.3 ML DOSE VACCINE: ICD-10-PCS | Mod: CV19,,, | Performed by: EMERGENCY MEDICINE

## 2020-12-23 PROCEDURE — 91300 COVID-19, MRNA, LNP-S, PF, 30 MCG/0.3 ML DOSE VACCINE: CPT | Mod: ,,, | Performed by: EMERGENCY MEDICINE

## 2020-12-23 PROCEDURE — 91300 COVID-19, MRNA, LNP-S, PF, 30 MCG/0.3 ML DOSE VACCINE: ICD-10-PCS | Mod: ,,, | Performed by: EMERGENCY MEDICINE

## 2020-12-27 ENCOUNTER — PATIENT OUTREACH (OUTPATIENT)
Dept: ADMINISTRATIVE | Facility: OTHER | Age: 50
End: 2020-12-27

## 2020-12-28 NOTE — PROGRESS NOTES
Health Maintenance Due   Topic Date Due    Shingles Vaccine (1 of 2) 03/21/2018    Colorectal Cancer Screening  07/02/2020     Updates were requested from care everywhere.  Chart was reviewed for overdue Proactive [facility] Encounters (NADIR) topics (CRS, Breast Cancer Screening, Eye exam)  Health Maintenance has been updated.  LINKS immunization registry triggered.  Immunizations were reconciled.

## 2020-12-31 ENCOUNTER — OFFICE VISIT (OUTPATIENT)
Dept: DERMATOLOGY | Facility: CLINIC | Age: 50
End: 2020-12-31
Payer: COMMERCIAL

## 2020-12-31 VITALS — BODY MASS INDEX: 31.87 KG/M2 | RESPIRATION RATE: 18 BRPM | HEIGHT: 67 IN

## 2020-12-31 DIAGNOSIS — L82.1 SEBORRHEIC KERATOSES: ICD-10-CM

## 2020-12-31 DIAGNOSIS — D48.5 NEOPLASM OF UNCERTAIN BEHAVIOR OF SKIN: ICD-10-CM

## 2020-12-31 DIAGNOSIS — D22.9 MULTIPLE BENIGN NEVI: Primary | ICD-10-CM

## 2020-12-31 DIAGNOSIS — Z85.828 PERSONAL HISTORY OF MALIGNANT NEOPLASM OF SKIN: ICD-10-CM

## 2020-12-31 PROCEDURE — 1126F PR PAIN SEVERITY QUANTIFIED, NO PAIN PRESENT: ICD-10-PCS | Mod: S$GLB,,, | Performed by: DERMATOLOGY

## 2020-12-31 PROCEDURE — 1126F AMNT PAIN NOTED NONE PRSNT: CPT | Mod: S$GLB,,, | Performed by: DERMATOLOGY

## 2020-12-31 PROCEDURE — 99999 PR PBB SHADOW E&M-EST. PATIENT-LVL II: ICD-10-PCS | Mod: PBBFAC,,, | Performed by: DERMATOLOGY

## 2020-12-31 PROCEDURE — 3008F BODY MASS INDEX DOCD: CPT | Mod: CPTII,S$GLB,, | Performed by: DERMATOLOGY

## 2020-12-31 PROCEDURE — 99214 OFFICE O/P EST MOD 30 MIN: CPT | Mod: S$GLB,,, | Performed by: DERMATOLOGY

## 2020-12-31 PROCEDURE — 99214 PR OFFICE/OUTPT VISIT, EST, LEVL IV, 30-39 MIN: ICD-10-PCS | Mod: S$GLB,,, | Performed by: DERMATOLOGY

## 2020-12-31 PROCEDURE — 99999 PR PBB SHADOW E&M-EST. PATIENT-LVL II: CPT | Mod: PBBFAC,,, | Performed by: DERMATOLOGY

## 2020-12-31 PROCEDURE — 3008F PR BODY MASS INDEX (BMI) DOCUMENTED: ICD-10-PCS | Mod: CPTII,S$GLB,, | Performed by: DERMATOLOGY

## 2021-01-04 ENCOUNTER — PATIENT MESSAGE (OUTPATIENT)
Dept: ADMINISTRATIVE | Facility: HOSPITAL | Age: 51
End: 2021-01-04

## 2021-01-26 ENCOUNTER — OFFICE VISIT (OUTPATIENT)
Dept: OTOLARYNGOLOGY | Facility: CLINIC | Age: 51
End: 2021-01-26
Payer: COMMERCIAL

## 2021-01-26 VITALS — WEIGHT: 203.5 LBS | BODY MASS INDEX: 31.94 KG/M2 | HEIGHT: 67 IN

## 2021-01-26 DIAGNOSIS — J35.9 CHRONIC DISEASE OF TONSILS AND ADENOIDS, UNSPECIFIED: ICD-10-CM

## 2021-01-26 DIAGNOSIS — J35.9 LESION OF TONSIL: Primary | ICD-10-CM

## 2021-01-26 PROCEDURE — 99213 OFFICE O/P EST LOW 20 MIN: CPT | Mod: S$GLB,,, | Performed by: OTOLARYNGOLOGY

## 2021-01-26 PROCEDURE — 99999 PR PBB SHADOW E&M-EST. PATIENT-LVL III: ICD-10-PCS | Mod: PBBFAC,,, | Performed by: OTOLARYNGOLOGY

## 2021-01-26 PROCEDURE — 99999 PR PBB SHADOW E&M-EST. PATIENT-LVL III: CPT | Mod: PBBFAC,,, | Performed by: OTOLARYNGOLOGY

## 2021-01-26 PROCEDURE — 3008F BODY MASS INDEX DOCD: CPT | Mod: CPTII,S$GLB,, | Performed by: OTOLARYNGOLOGY

## 2021-01-26 PROCEDURE — 3008F PR BODY MASS INDEX (BMI) DOCUMENTED: ICD-10-PCS | Mod: CPTII,S$GLB,, | Performed by: OTOLARYNGOLOGY

## 2021-01-26 PROCEDURE — 99213 PR OFFICE/OUTPT VISIT, EST, LEVL III, 20-29 MIN: ICD-10-PCS | Mod: S$GLB,,, | Performed by: OTOLARYNGOLOGY

## 2021-01-26 RX ORDER — DICYCLOMINE HYDROCHLORIDE 10 MG/1
CAPSULE ORAL
COMMUNITY
End: 2021-02-03 | Stop reason: SDUPTHER

## 2021-01-27 ENCOUNTER — IMMUNIZATION (OUTPATIENT)
Dept: FAMILY MEDICINE | Facility: CLINIC | Age: 51
End: 2021-01-27
Payer: COMMERCIAL

## 2021-01-27 DIAGNOSIS — Z23 NEED FOR VACCINATION: Primary | ICD-10-CM

## 2021-01-27 PROCEDURE — 91300 COVID-19, MRNA, LNP-S, PF, 30 MCG/0.3 ML DOSE VACCINE: CPT | Mod: PBBFAC | Performed by: FAMILY MEDICINE

## 2021-01-27 PROCEDURE — 0002A COVID-19, MRNA, LNP-S, PF, 30 MCG/0.3 ML DOSE VACCINE: CPT | Mod: PBBFAC | Performed by: FAMILY MEDICINE

## 2021-01-30 ENCOUNTER — PATIENT MESSAGE (OUTPATIENT)
Dept: FAMILY MEDICINE | Facility: CLINIC | Age: 51
End: 2021-01-30

## 2021-01-30 DIAGNOSIS — Z20.822 EXPOSURE TO COVID-19 VIRUS: Primary | ICD-10-CM

## 2021-02-02 DIAGNOSIS — R10.9 ABDOMINAL PAIN, UNSPECIFIED ABDOMINAL LOCATION: ICD-10-CM

## 2021-02-02 RX ORDER — DICYCLOMINE HYDROCHLORIDE 10 MG/1
10 CAPSULE ORAL
Qty: 30 CAPSULE | Refills: 2 | Status: CANCELLED | OUTPATIENT
Start: 2021-02-02 | End: 2021-03-04

## 2021-02-03 ENCOUNTER — LAB VISIT (OUTPATIENT)
Dept: LAB | Facility: HOSPITAL | Age: 51
End: 2021-02-03
Attending: EMERGENCY MEDICINE
Payer: COMMERCIAL

## 2021-02-03 DIAGNOSIS — Z00.00 PREVENTATIVE HEALTH CARE: ICD-10-CM

## 2021-02-03 DIAGNOSIS — R30.0 DYSURIA: ICD-10-CM

## 2021-02-03 DIAGNOSIS — Z20.822 EXPOSURE TO COVID-19 VIRUS: ICD-10-CM

## 2021-02-03 DIAGNOSIS — R35.0 URINARY FREQUENCY: ICD-10-CM

## 2021-02-03 LAB
BASOPHILS # BLD AUTO: 0.07 K/UL (ref 0–0.2)
BASOPHILS NFR BLD: 0.8 % (ref 0–1.9)
DIFFERENTIAL METHOD: ABNORMAL
EOSINOPHIL # BLD AUTO: 0.2 K/UL (ref 0–0.5)
EOSINOPHIL NFR BLD: 2.1 % (ref 0–8)
ERYTHROCYTE [DISTWIDTH] IN BLOOD BY AUTOMATED COUNT: 13.4 % (ref 11.5–14.5)
HCT VFR BLD AUTO: 41.8 % (ref 37–48.5)
HGB BLD-MCNC: 13.1 G/DL (ref 12–16)
IMM GRANULOCYTES # BLD AUTO: 0.03 K/UL (ref 0–0.04)
IMM GRANULOCYTES NFR BLD AUTO: 0.4 % (ref 0–0.5)
LYMPHOCYTES # BLD AUTO: 2.5 K/UL (ref 1–4.8)
LYMPHOCYTES NFR BLD: 29.3 % (ref 18–48)
MCH RBC QN AUTO: 29.2 PG (ref 27–31)
MCHC RBC AUTO-ENTMCNC: 31.3 G/DL (ref 32–36)
MCV RBC AUTO: 93 FL (ref 82–98)
MONOCYTES # BLD AUTO: 0.6 K/UL (ref 0.3–1)
MONOCYTES NFR BLD: 7.4 % (ref 4–15)
NEUTROPHILS # BLD AUTO: 5.2 K/UL (ref 1.8–7.7)
NEUTROPHILS NFR BLD: 60 % (ref 38–73)
NRBC BLD-RTO: 0 /100 WBC
PLATELET # BLD AUTO: 304 K/UL (ref 150–350)
PMV BLD AUTO: 10.7 FL (ref 9.2–12.9)
RBC # BLD AUTO: 4.48 M/UL (ref 4–5.4)
WBC # BLD AUTO: 8.57 K/UL (ref 3.9–12.7)

## 2021-02-03 PROCEDURE — 80061 LIPID PANEL: CPT

## 2021-02-03 PROCEDURE — 80053 COMPREHEN METABOLIC PANEL: CPT

## 2021-02-03 PROCEDURE — 86769 SARS-COV-2 COVID-19 ANTIBODY: CPT

## 2021-02-03 PROCEDURE — 86803 HEPATITIS C AB TEST: CPT

## 2021-02-03 PROCEDURE — 85025 COMPLETE CBC W/AUTO DIFF WBC: CPT

## 2021-02-03 PROCEDURE — 84443 ASSAY THYROID STIM HORMONE: CPT

## 2021-02-03 PROCEDURE — 83036 HEMOGLOBIN GLYCOSYLATED A1C: CPT

## 2021-02-03 RX ORDER — DICYCLOMINE HYDROCHLORIDE 10 MG/1
10 CAPSULE ORAL
Qty: 30 CAPSULE | Refills: 2 | Status: SHIPPED | OUTPATIENT
Start: 2021-02-03 | End: 2021-03-05

## 2021-02-03 RX ORDER — MIRABEGRON 50 MG/1
50 TABLET, FILM COATED, EXTENDED RELEASE ORAL DAILY
Qty: 30 TABLET | Refills: 11 | Status: SHIPPED | OUTPATIENT
Start: 2021-02-03 | End: 2022-05-03 | Stop reason: SDUPTHER

## 2021-02-04 ENCOUNTER — PATIENT MESSAGE (OUTPATIENT)
Dept: FAMILY MEDICINE | Facility: CLINIC | Age: 51
End: 2021-02-04

## 2021-02-04 ENCOUNTER — HOSPITAL ENCOUNTER (OUTPATIENT)
Dept: RADIOLOGY | Facility: HOSPITAL | Age: 51
Discharge: HOME | End: 2021-02-04
Attending: OTOLARYNGOLOGY
Payer: COMMERCIAL

## 2021-02-04 DIAGNOSIS — J35.9 CHRONIC DISEASE OF TONSILS AND ADENOIDS, UNSPECIFIED: ICD-10-CM

## 2021-02-04 LAB
ALBUMIN SERPL BCP-MCNC: 3.9 G/DL (ref 3.5–5.2)
ALP SERPL-CCNC: 95 U/L (ref 55–135)
ALT SERPL W/O P-5'-P-CCNC: 52 U/L (ref 10–44)
ANION GAP SERPL CALC-SCNC: 12 MMOL/L (ref 8–16)
AST SERPL-CCNC: 38 U/L (ref 10–40)
BILIRUB SERPL-MCNC: 0.5 MG/DL (ref 0.1–1)
BUN SERPL-MCNC: 17 MG/DL (ref 6–20)
CALCIUM SERPL-MCNC: 9.4 MG/DL (ref 8.7–10.5)
CHLORIDE SERPL-SCNC: 103 MMOL/L (ref 95–110)
CHOLEST SERPL-MCNC: 224 MG/DL (ref 120–199)
CHOLEST/HDLC SERPL: 4.3 {RATIO} (ref 2–5)
CO2 SERPL-SCNC: 24 MMOL/L (ref 23–29)
CREAT SERPL-MCNC: 0.9 MG/DL (ref 0.5–1.4)
EST. GFR  (AFRICAN AMERICAN): >60 ML/MIN/1.73 M^2
EST. GFR  (NON AFRICAN AMERICAN): >60 ML/MIN/1.73 M^2
ESTIMATED AVG GLUCOSE: 126 MG/DL (ref 68–131)
GLUCOSE SERPL-MCNC: 129 MG/DL (ref 70–110)
HBA1C MFR BLD: 6 % (ref 4–5.6)
HCV AB SERPL QL IA: NEGATIVE
HDLC SERPL-MCNC: 52 MG/DL (ref 40–75)
HDLC SERPL: 23.2 % (ref 20–50)
LDLC SERPL CALC-MCNC: 115.4 MG/DL (ref 63–159)
NONHDLC SERPL-MCNC: 172 MG/DL
POTASSIUM SERPL-SCNC: 4.3 MMOL/L (ref 3.5–5.1)
PROT SERPL-MCNC: 7.5 G/DL (ref 6–8.4)
SARS-COV-2 IGG SERPLBLD QL IA.RAPID: NEGATIVE
SODIUM SERPL-SCNC: 139 MMOL/L (ref 136–145)
TRIGL SERPL-MCNC: 283 MG/DL (ref 30–150)
TSH SERPL DL<=0.005 MIU/L-ACNC: 1.81 UIU/ML (ref 0.4–4)

## 2021-02-04 PROCEDURE — 70491 CT SOFT TISSUE NECK W/DYE: CPT | Mod: 26,,, | Performed by: RADIOLOGY

## 2021-02-04 PROCEDURE — 25500020 PHARM REV CODE 255: Mod: PO | Performed by: OTOLARYNGOLOGY

## 2021-02-04 PROCEDURE — 70491 CT SOFT TISSUE NECK W/DYE: CPT | Mod: TC,PO

## 2021-02-04 PROCEDURE — 70491 CT SOFT TISSUE NECK WITH CONTRAST: ICD-10-PCS | Mod: 26,,, | Performed by: RADIOLOGY

## 2021-02-04 RX ADMIN — IOHEXOL 75 ML: 350 INJECTION, SOLUTION INTRAVENOUS at 01:02

## 2021-03-08 ENCOUNTER — OFFICE VISIT (OUTPATIENT)
Dept: FAMILY MEDICINE | Facility: CLINIC | Age: 51
End: 2021-03-08
Payer: COMMERCIAL

## 2021-03-08 DIAGNOSIS — J06.9 VIRAL UPPER RESPIRATORY INFECTION: Primary | ICD-10-CM

## 2021-03-08 PROCEDURE — 99213 PR OFFICE/OUTPT VISIT, EST, LEVL III, 20-29 MIN: ICD-10-PCS | Mod: 95,,, | Performed by: PHYSICIAN ASSISTANT

## 2021-03-08 PROCEDURE — 99213 OFFICE O/P EST LOW 20 MIN: CPT | Mod: 95,,, | Performed by: PHYSICIAN ASSISTANT

## 2021-03-15 ENCOUNTER — PATIENT MESSAGE (OUTPATIENT)
Dept: FAMILY MEDICINE | Facility: CLINIC | Age: 51
End: 2021-03-15

## 2021-03-15 DIAGNOSIS — J45.20 MILD INTERMITTENT ASTHMA, UNSPECIFIED WHETHER COMPLICATED: Primary | ICD-10-CM

## 2021-03-16 RX ORDER — MONTELUKAST SODIUM 10 MG/1
10 TABLET ORAL DAILY
Qty: 90 TABLET | Refills: 3 | Status: SHIPPED | OUTPATIENT
Start: 2021-03-16 | End: 2022-08-11 | Stop reason: SDUPTHER

## 2021-03-20 ENCOUNTER — PATIENT MESSAGE (OUTPATIENT)
Dept: FAMILY MEDICINE | Facility: CLINIC | Age: 51
End: 2021-03-20

## 2021-04-06 ENCOUNTER — PATIENT MESSAGE (OUTPATIENT)
Dept: ADMINISTRATIVE | Facility: HOSPITAL | Age: 51
End: 2021-04-06

## 2021-04-09 DIAGNOSIS — F33.0 MILD EPISODE OF RECURRENT MAJOR DEPRESSIVE DISORDER: Chronic | ICD-10-CM

## 2021-04-09 RX ORDER — BUPROPION HYDROCHLORIDE 150 MG/1
TABLET ORAL
Qty: 180 TABLET | Refills: 3 | Status: SHIPPED | OUTPATIENT
Start: 2021-04-09 | End: 2022-07-13 | Stop reason: SDUPTHER

## 2021-04-14 ENCOUNTER — OFFICE VISIT (OUTPATIENT)
Dept: PODIATRY | Facility: CLINIC | Age: 51
End: 2021-04-14
Payer: COMMERCIAL

## 2021-04-14 VITALS — BODY MASS INDEX: 31.94 KG/M2 | HEIGHT: 67 IN | WEIGHT: 203.5 LBS

## 2021-04-14 DIAGNOSIS — M77.41 METATARSALGIA OF RIGHT FOOT: Primary | ICD-10-CM

## 2021-04-14 DIAGNOSIS — M62.469 GASTROCNEMIUS EQUINUS, UNSPECIFIED LATERALITY: ICD-10-CM

## 2021-04-14 PROCEDURE — 99204 OFFICE O/P NEW MOD 45 MIN: CPT | Mod: S$GLB,,, | Performed by: PODIATRIST

## 2021-04-14 PROCEDURE — 99999 PR PBB SHADOW E&M-EST. PATIENT-LVL II: ICD-10-PCS | Mod: PBBFAC,,, | Performed by: PODIATRIST

## 2021-04-14 PROCEDURE — 99204 PR OFFICE/OUTPT VISIT, NEW, LEVL IV, 45-59 MIN: ICD-10-PCS | Mod: S$GLB,,, | Performed by: PODIATRIST

## 2021-04-14 PROCEDURE — 99999 PR PBB SHADOW E&M-EST. PATIENT-LVL II: CPT | Mod: PBBFAC,,, | Performed by: PODIATRIST

## 2021-04-14 PROCEDURE — 3008F PR BODY MASS INDEX (BMI) DOCUMENTED: ICD-10-PCS | Mod: CPTII,S$GLB,, | Performed by: PODIATRIST

## 2021-04-14 PROCEDURE — 1126F AMNT PAIN NOTED NONE PRSNT: CPT | Mod: S$GLB,,, | Performed by: PODIATRIST

## 2021-04-14 PROCEDURE — 3008F BODY MASS INDEX DOCD: CPT | Mod: CPTII,S$GLB,, | Performed by: PODIATRIST

## 2021-04-14 PROCEDURE — 1126F PR PAIN SEVERITY QUANTIFIED, NO PAIN PRESENT: ICD-10-PCS | Mod: S$GLB,,, | Performed by: PODIATRIST

## 2021-04-26 ENCOUNTER — TELEPHONE (OUTPATIENT)
Dept: FAMILY MEDICINE | Facility: CLINIC | Age: 51
End: 2021-04-26

## 2021-04-28 ENCOUNTER — CLINICAL SUPPORT (OUTPATIENT)
Dept: OTHER | Facility: CLINIC | Age: 51
End: 2021-04-28
Payer: COMMERCIAL

## 2021-04-28 DIAGNOSIS — Z00.8 ENCOUNTER FOR OTHER GENERAL EXAMINATION: ICD-10-CM

## 2021-04-29 VITALS — HEIGHT: 67 IN | BODY MASS INDEX: 31.87 KG/M2

## 2021-04-29 LAB
GLUCOSE SERPL-MCNC: 108 MG/DL (ref 60–140)
HDLC SERPL-MCNC: 49 MG/DL
POC CHOLESTEROL, LDL (DOCK): 89 MG/DL
POC CHOLESTEROL, TOTAL: 188 MG/DL
TRIGL SERPL-MCNC: 249 MG/DL

## 2021-06-07 ENCOUNTER — OFFICE VISIT (OUTPATIENT)
Dept: PHYSICAL MEDICINE AND REHAB | Facility: CLINIC | Age: 51
End: 2021-06-07
Payer: COMMERCIAL

## 2021-06-07 ENCOUNTER — PATIENT OUTREACH (OUTPATIENT)
Dept: ADMINISTRATIVE | Facility: OTHER | Age: 51
End: 2021-06-07

## 2021-06-07 VITALS — HEIGHT: 67 IN | BODY MASS INDEX: 31.86 KG/M2 | WEIGHT: 203 LBS

## 2021-06-07 DIAGNOSIS — M79.642 LEFT HAND PAIN: Primary | ICD-10-CM

## 2021-06-07 DIAGNOSIS — S63.633A SPRAIN OF INTERPHALANGEAL JOINT OF LEFT MIDDLE FINGER, INITIAL ENCOUNTER: ICD-10-CM

## 2021-06-07 PROCEDURE — 1125F PR PAIN SEVERITY QUANTIFIED, PAIN PRESENT: ICD-10-PCS | Mod: S$GLB,,, | Performed by: PHYSICAL MEDICINE & REHABILITATION

## 2021-06-07 PROCEDURE — 99202 PR OFFICE/OUTPT VISIT, NEW, LEVL II, 15-29 MIN: ICD-10-PCS | Mod: S$GLB,,, | Performed by: PHYSICAL MEDICINE & REHABILITATION

## 2021-06-07 PROCEDURE — 1125F AMNT PAIN NOTED PAIN PRSNT: CPT | Mod: S$GLB,,, | Performed by: PHYSICAL MEDICINE & REHABILITATION

## 2021-06-07 PROCEDURE — 3008F PR BODY MASS INDEX (BMI) DOCUMENTED: ICD-10-PCS | Mod: CPTII,S$GLB,, | Performed by: PHYSICAL MEDICINE & REHABILITATION

## 2021-06-07 PROCEDURE — 99202 OFFICE O/P NEW SF 15 MIN: CPT | Mod: S$GLB,,, | Performed by: PHYSICAL MEDICINE & REHABILITATION

## 2021-06-07 PROCEDURE — 99999 PR PBB SHADOW E&M-EST. PATIENT-LVL III: CPT | Mod: PBBFAC,,, | Performed by: PHYSICAL MEDICINE & REHABILITATION

## 2021-06-07 PROCEDURE — 3008F BODY MASS INDEX DOCD: CPT | Mod: CPTII,S$GLB,, | Performed by: PHYSICAL MEDICINE & REHABILITATION

## 2021-06-07 PROCEDURE — 99999 PR PBB SHADOW E&M-EST. PATIENT-LVL III: ICD-10-PCS | Mod: PBBFAC,,, | Performed by: PHYSICAL MEDICINE & REHABILITATION

## 2021-06-15 ENCOUNTER — PATIENT MESSAGE (OUTPATIENT)
Dept: PHYSICAL MEDICINE AND REHAB | Facility: CLINIC | Age: 51
End: 2021-06-15

## 2021-06-15 DIAGNOSIS — S63.633A SPRAIN OF INTERPHALANGEAL JOINT OF LEFT MIDDLE FINGER, INITIAL ENCOUNTER: Primary | ICD-10-CM

## 2021-06-16 ENCOUNTER — HOSPITAL ENCOUNTER (OUTPATIENT)
Dept: RADIOLOGY | Facility: HOSPITAL | Age: 51
Discharge: HOME | End: 2021-06-16
Attending: PHYSICAL MEDICINE & REHABILITATION
Payer: COMMERCIAL

## 2021-06-16 DIAGNOSIS — S63.633A SPRAIN OF INTERPHALANGEAL JOINT OF LEFT MIDDLE FINGER, INITIAL ENCOUNTER: ICD-10-CM

## 2021-06-16 PROCEDURE — 73140 XR FINGER 2 OR MORE VIEWS: ICD-10-PCS | Mod: 26,LT,, | Performed by: RADIOLOGY

## 2021-06-16 PROCEDURE — 73140 X-RAY EXAM OF FINGER(S): CPT | Mod: 26,LT,, | Performed by: RADIOLOGY

## 2021-06-16 PROCEDURE — 73140 X-RAY EXAM OF FINGER(S): CPT | Mod: TC,FY,PO

## 2021-06-17 ENCOUNTER — TELEPHONE (OUTPATIENT)
Dept: UROLOGY | Facility: CLINIC | Age: 51
End: 2021-06-17

## 2021-06-18 ENCOUNTER — HOSPITAL ENCOUNTER (OUTPATIENT)
Dept: RADIOLOGY | Facility: HOSPITAL | Age: 51
Discharge: HOME | End: 2021-06-18
Attending: UROLOGY
Payer: COMMERCIAL

## 2021-06-18 DIAGNOSIS — N22 CALCULUS OF URINARY TRACT IN DISEASES CLASSIFIED ELSEWHERE: ICD-10-CM

## 2021-06-18 PROCEDURE — 74176 CT ABD & PELVIS W/O CONTRAST: CPT | Mod: TC,PO

## 2021-06-18 PROCEDURE — 74176 CT ABD & PELVIS W/O CONTRAST: CPT | Mod: 26,,, | Performed by: RADIOLOGY

## 2021-06-18 PROCEDURE — 74176 CT RENAL STONE STUDY ABD PELVIS WO: ICD-10-PCS | Mod: 26,,, | Performed by: RADIOLOGY

## 2021-07-29 ENCOUNTER — OFFICE VISIT (OUTPATIENT)
Dept: OPTOMETRY | Facility: CLINIC | Age: 51
End: 2021-07-29
Payer: COMMERCIAL

## 2021-07-29 DIAGNOSIS — H52.203 ASTIGMATISM WITH PRESBYOPIA, BILATERAL: ICD-10-CM

## 2021-07-29 DIAGNOSIS — Z98.890 S/P LASIK SURGERY OF BOTH EYES: ICD-10-CM

## 2021-07-29 DIAGNOSIS — H43.811 POSTERIOR VITREOUS DETACHMENT, RIGHT: ICD-10-CM

## 2021-07-29 DIAGNOSIS — Z01.00 EXAMINATION OF EYES AND VISION: Primary | ICD-10-CM

## 2021-07-29 DIAGNOSIS — H52.4 ASTIGMATISM WITH PRESBYOPIA, BILATERAL: ICD-10-CM

## 2021-07-29 DIAGNOSIS — H02.883 MEIBOMIAN GLAND DYSFUNCTION (MGD) OF BOTH EYES: ICD-10-CM

## 2021-07-29 DIAGNOSIS — H02.886 MEIBOMIAN GLAND DYSFUNCTION (MGD) OF BOTH EYES: ICD-10-CM

## 2021-07-29 PROBLEM — H43.11 VITREOUS HEMORRHAGE, RIGHT: Status: RESOLVED | Noted: 2020-06-08 | Resolved: 2021-07-29

## 2021-07-29 PROCEDURE — 92004 COMPRE OPH EXAM NEW PT 1/>: CPT | Mod: S$GLB,,, | Performed by: OPTOMETRIST

## 2021-07-29 PROCEDURE — 92004 PR EYE EXAM, NEW PATIENT,COMPREHESV: ICD-10-PCS | Mod: S$GLB,,, | Performed by: OPTOMETRIST

## 2021-07-29 PROCEDURE — 99999 PR PBB SHADOW E&M-EST. PATIENT-LVL III: ICD-10-PCS | Mod: PBBFAC,,, | Performed by: OPTOMETRIST

## 2021-07-29 PROCEDURE — 1126F AMNT PAIN NOTED NONE PRSNT: CPT | Mod: CPTII,S$GLB,, | Performed by: OPTOMETRIST

## 2021-07-29 PROCEDURE — 3044F HG A1C LEVEL LT 7.0%: CPT | Mod: CPTII,S$GLB,, | Performed by: OPTOMETRIST

## 2021-07-29 PROCEDURE — 3044F PR MOST RECENT HEMOGLOBIN A1C LEVEL <7.0%: ICD-10-PCS | Mod: CPTII,S$GLB,, | Performed by: OPTOMETRIST

## 2021-07-29 PROCEDURE — 92015 PR REFRACTION: ICD-10-PCS | Mod: S$GLB,,, | Performed by: OPTOMETRIST

## 2021-07-29 PROCEDURE — 1159F PR MEDICATION LIST DOCUMENTED IN MEDICAL RECORD: ICD-10-PCS | Mod: CPTII,S$GLB,, | Performed by: OPTOMETRIST

## 2021-07-29 PROCEDURE — 1159F MED LIST DOCD IN RCRD: CPT | Mod: CPTII,S$GLB,, | Performed by: OPTOMETRIST

## 2021-07-29 PROCEDURE — 99999 PR PBB SHADOW E&M-EST. PATIENT-LVL III: CPT | Mod: PBBFAC,,, | Performed by: OPTOMETRIST

## 2021-07-29 PROCEDURE — 92015 DETERMINE REFRACTIVE STATE: CPT | Mod: S$GLB,,, | Performed by: OPTOMETRIST

## 2021-07-29 PROCEDURE — 1126F PR PAIN SEVERITY QUANTIFIED, NO PAIN PRESENT: ICD-10-PCS | Mod: CPTII,S$GLB,, | Performed by: OPTOMETRIST

## 2021-08-25 ENCOUNTER — PATIENT MESSAGE (OUTPATIENT)
Dept: OTOLARYNGOLOGY | Facility: CLINIC | Age: 51
End: 2021-08-25

## 2021-08-26 RX ORDER — PREDNISONE 20 MG/1
40 TABLET ORAL DAILY
Qty: 14 TABLET | Refills: 0 | Status: SHIPPED | OUTPATIENT
Start: 2021-08-26 | End: 2021-09-02

## 2021-10-06 DIAGNOSIS — Z12.31 OTHER SCREENING MAMMOGRAM: ICD-10-CM

## 2021-10-07 ENCOUNTER — OFFICE VISIT (OUTPATIENT)
Dept: PHYSICAL MEDICINE AND REHAB | Facility: CLINIC | Age: 51
End: 2021-10-07
Payer: COMMERCIAL

## 2021-10-07 ENCOUNTER — HOSPITAL ENCOUNTER (OUTPATIENT)
Dept: RADIOLOGY | Facility: HOSPITAL | Age: 51
Discharge: HOME | End: 2021-10-07
Attending: PHYSICAL MEDICINE & REHABILITATION
Payer: COMMERCIAL

## 2021-10-07 VITALS — BODY MASS INDEX: 31.87 KG/M2 | WEIGHT: 203.06 LBS | HEIGHT: 67 IN

## 2021-10-07 DIAGNOSIS — M79.671 RIGHT FOOT PAIN: ICD-10-CM

## 2021-10-07 DIAGNOSIS — S93.509A TOE SPRAIN, INITIAL ENCOUNTER: Primary | ICD-10-CM

## 2021-10-07 DIAGNOSIS — M79.671 RIGHT FOOT PAIN: Primary | ICD-10-CM

## 2021-10-07 PROCEDURE — 3008F BODY MASS INDEX DOCD: CPT | Mod: CPTII,S$GLB,, | Performed by: PHYSICAL MEDICINE & REHABILITATION

## 2021-10-07 PROCEDURE — 1159F MED LIST DOCD IN RCRD: CPT | Mod: CPTII,S$GLB,, | Performed by: PHYSICAL MEDICINE & REHABILITATION

## 2021-10-07 PROCEDURE — 1160F RVW MEDS BY RX/DR IN RCRD: CPT | Mod: CPTII,S$GLB,, | Performed by: PHYSICAL MEDICINE & REHABILITATION

## 2021-10-07 PROCEDURE — 3008F PR BODY MASS INDEX (BMI) DOCUMENTED: ICD-10-PCS | Mod: CPTII,S$GLB,, | Performed by: PHYSICAL MEDICINE & REHABILITATION

## 2021-10-07 PROCEDURE — 1160F PR REVIEW ALL MEDS BY PRESCRIBER/CLIN PHARMACIST DOCUMENTED: ICD-10-PCS | Mod: CPTII,S$GLB,, | Performed by: PHYSICAL MEDICINE & REHABILITATION

## 2021-10-07 PROCEDURE — 73630 X-RAY EXAM OF FOOT: CPT | Mod: TC,PO,RT

## 2021-10-07 PROCEDURE — 3044F HG A1C LEVEL LT 7.0%: CPT | Mod: CPTII,S$GLB,, | Performed by: PHYSICAL MEDICINE & REHABILITATION

## 2021-10-07 PROCEDURE — 73630 X-RAY EXAM OF FOOT: CPT | Mod: 26,RT,, | Performed by: RADIOLOGY

## 2021-10-07 PROCEDURE — 99212 OFFICE O/P EST SF 10 MIN: CPT | Mod: S$GLB,,, | Performed by: PHYSICAL MEDICINE & REHABILITATION

## 2021-10-07 PROCEDURE — 99999 PR PBB SHADOW E&M-EST. PATIENT-LVL III: ICD-10-PCS | Mod: PBBFAC,,, | Performed by: PHYSICAL MEDICINE & REHABILITATION

## 2021-10-07 PROCEDURE — 99999 PR PBB SHADOW E&M-EST. PATIENT-LVL III: CPT | Mod: PBBFAC,,, | Performed by: PHYSICAL MEDICINE & REHABILITATION

## 2021-10-07 PROCEDURE — 99212 PR OFFICE/OUTPT VISIT, EST, LEVL II, 10-19 MIN: ICD-10-PCS | Mod: S$GLB,,, | Performed by: PHYSICAL MEDICINE & REHABILITATION

## 2021-10-07 PROCEDURE — 73630 XR FOOT COMPLETE 3 VIEW RIGHT: ICD-10-PCS | Mod: 26,RT,, | Performed by: RADIOLOGY

## 2021-10-07 PROCEDURE — 1159F PR MEDICATION LIST DOCUMENTED IN MEDICAL RECORD: ICD-10-PCS | Mod: CPTII,S$GLB,, | Performed by: PHYSICAL MEDICINE & REHABILITATION

## 2021-10-07 PROCEDURE — 3044F PR MOST RECENT HEMOGLOBIN A1C LEVEL <7.0%: ICD-10-PCS | Mod: CPTII,S$GLB,, | Performed by: PHYSICAL MEDICINE & REHABILITATION

## 2021-10-20 ENCOUNTER — PATIENT MESSAGE (OUTPATIENT)
Dept: PHYSICAL MEDICINE AND REHAB | Facility: CLINIC | Age: 51
End: 2021-10-20
Payer: COMMERCIAL

## 2021-11-23 ENCOUNTER — IMMUNIZATION (OUTPATIENT)
Dept: FAMILY MEDICINE | Facility: CLINIC | Age: 51
End: 2021-11-23
Payer: COMMERCIAL

## 2021-11-23 DIAGNOSIS — Z23 NEED FOR VACCINATION: Primary | ICD-10-CM

## 2021-11-23 PROCEDURE — 0004A COVID-19, MRNA, LNP-S, PF, 30 MCG/0.3 ML DOSE VACCINE: CPT | Mod: PBBFAC | Performed by: FAMILY MEDICINE

## 2022-01-11 ENCOUNTER — PATIENT MESSAGE (OUTPATIENT)
Dept: FAMILY MEDICINE | Facility: CLINIC | Age: 52
End: 2022-01-11
Payer: COMMERCIAL

## 2022-01-11 DIAGNOSIS — Z12.11 COLON CANCER SCREENING: Primary | ICD-10-CM

## 2022-01-11 DIAGNOSIS — Z00.00 PREVENTATIVE HEALTH CARE: ICD-10-CM

## 2022-01-19 ENCOUNTER — PATIENT MESSAGE (OUTPATIENT)
Dept: FAMILY MEDICINE | Facility: CLINIC | Age: 52
End: 2022-01-19

## 2022-01-19 ENCOUNTER — OFFICE VISIT (OUTPATIENT)
Dept: FAMILY MEDICINE | Facility: CLINIC | Age: 52
End: 2022-01-19
Payer: COMMERCIAL

## 2022-01-19 VITALS
HEART RATE: 78 BPM | DIASTOLIC BLOOD PRESSURE: 82 MMHG | WEIGHT: 212.5 LBS | OXYGEN SATURATION: 97 % | SYSTOLIC BLOOD PRESSURE: 144 MMHG | BODY MASS INDEX: 33.35 KG/M2 | RESPIRATION RATE: 12 BRPM | HEIGHT: 67 IN

## 2022-01-19 DIAGNOSIS — R03.0 TRANSIENT ELEVATED BLOOD PRESSURE: ICD-10-CM

## 2022-01-19 DIAGNOSIS — N20.0 NEPHROLITHIASIS: Chronic | ICD-10-CM

## 2022-01-19 DIAGNOSIS — I10 PRIMARY HYPERTENSION: Chronic | ICD-10-CM

## 2022-01-19 DIAGNOSIS — N32.81 OAB (OVERACTIVE BLADDER): Chronic | ICD-10-CM

## 2022-01-19 DIAGNOSIS — E11.9 TYPE 2 DIABETES MELLITUS WITHOUT COMPLICATION, WITHOUT LONG-TERM CURRENT USE OF INSULIN: Primary | ICD-10-CM

## 2022-01-19 DIAGNOSIS — R87.610 ASCUS OF CERVIX WITH NEGATIVE HIGH RISK HPV: Chronic | ICD-10-CM

## 2022-01-19 DIAGNOSIS — G43.711 CHRONIC MIGRAINE WITHOUT AURA, WITH INTRACTABLE MIGRAINE, SO STATED, WITH STATUS MIGRAINOSUS: Chronic | ICD-10-CM

## 2022-01-19 DIAGNOSIS — F33.0 MILD EPISODE OF RECURRENT MAJOR DEPRESSIVE DISORDER: Chronic | ICD-10-CM

## 2022-01-19 PROCEDURE — 99999 PR PBB SHADOW E&M-EST. PATIENT-LVL IV: CPT | Mod: PBBFAC,,, | Performed by: EMERGENCY MEDICINE

## 2022-01-19 PROCEDURE — 3079F PR MOST RECENT DIASTOLIC BLOOD PRESSURE 80-89 MM HG: ICD-10-PCS | Mod: CPTII,S$GLB,, | Performed by: EMERGENCY MEDICINE

## 2022-01-19 PROCEDURE — 1159F PR MEDICATION LIST DOCUMENTED IN MEDICAL RECORD: ICD-10-PCS | Mod: CPTII,S$GLB,, | Performed by: EMERGENCY MEDICINE

## 2022-01-19 PROCEDURE — 99999 PR PBB SHADOW E&M-EST. PATIENT-LVL IV: ICD-10-PCS | Mod: PBBFAC,,, | Performed by: EMERGENCY MEDICINE

## 2022-01-19 PROCEDURE — 99214 OFFICE O/P EST MOD 30 MIN: CPT | Mod: S$GLB,,, | Performed by: EMERGENCY MEDICINE

## 2022-01-19 PROCEDURE — 3008F BODY MASS INDEX DOCD: CPT | Mod: CPTII,S$GLB,, | Performed by: EMERGENCY MEDICINE

## 2022-01-19 PROCEDURE — 3044F PR MOST RECENT HEMOGLOBIN A1C LEVEL <7.0%: ICD-10-PCS | Mod: CPTII,S$GLB,, | Performed by: EMERGENCY MEDICINE

## 2022-01-19 PROCEDURE — 1159F MED LIST DOCD IN RCRD: CPT | Mod: CPTII,S$GLB,, | Performed by: EMERGENCY MEDICINE

## 2022-01-19 PROCEDURE — 3008F PR BODY MASS INDEX (BMI) DOCUMENTED: ICD-10-PCS | Mod: CPTII,S$GLB,, | Performed by: EMERGENCY MEDICINE

## 2022-01-19 PROCEDURE — 3079F DIAST BP 80-89 MM HG: CPT | Mod: CPTII,S$GLB,, | Performed by: EMERGENCY MEDICINE

## 2022-01-19 PROCEDURE — 99214 PR OFFICE/OUTPT VISIT, EST, LEVL IV, 30-39 MIN: ICD-10-PCS | Mod: S$GLB,,, | Performed by: EMERGENCY MEDICINE

## 2022-01-19 PROCEDURE — 3077F PR MOST RECENT SYSTOLIC BLOOD PRESSURE >= 140 MM HG: ICD-10-PCS | Mod: CPTII,S$GLB,, | Performed by: EMERGENCY MEDICINE

## 2022-01-19 PROCEDURE — 3044F HG A1C LEVEL LT 7.0%: CPT | Mod: CPTII,S$GLB,, | Performed by: EMERGENCY MEDICINE

## 2022-01-19 PROCEDURE — 3077F SYST BP >= 140 MM HG: CPT | Mod: CPTII,S$GLB,, | Performed by: EMERGENCY MEDICINE
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
## 2022-01-20 ENCOUNTER — PATIENT OUTREACH (OUTPATIENT)
Dept: ADMINISTRATIVE | Facility: OTHER | Age: 52
End: 2022-01-20
Payer: COMMERCIAL

## 2022-01-20 NOTE — PROGRESS NOTES
Health Maintenance Due   Topic Date Due    Shingles Vaccine (1 of 2) Never done    Colorectal Cancer Screening  07/03/2019    Cervical Cancer Screening  01/22/2021    Mammogram  09/22/2021     Updates were requested from care everywhere.  Chart was reviewed for overdue Proactive [facility] Encounters (NADIR) topics (CRS, Breast Cancer Screening, Eye exam)  Health Maintenance has been updated.  LINKS immunization registry triggered.  Immunizations were reconciled.

## 2022-01-21 ENCOUNTER — CLINICAL SUPPORT (OUTPATIENT)
Dept: DIABETES | Facility: CLINIC | Age: 52
End: 2022-01-21
Payer: COMMERCIAL

## 2022-01-21 DIAGNOSIS — E11.9 TYPE 2 DIABETES MELLITUS WITHOUT COMPLICATION, WITHOUT LONG-TERM CURRENT USE OF INSULIN: ICD-10-CM

## 2022-01-21 PROCEDURE — G0108 PR DIAB MANAGE TRN  PER INDIV: ICD-10-PCS | Mod: S$GLB,,, | Performed by: DIETITIAN, REGISTERED

## 2022-01-21 PROCEDURE — G0108 DIAB MANAGE TRN  PER INDIV: HCPCS | Mod: S$GLB,,, | Performed by: DIETITIAN, REGISTERED

## 2022-01-21 PROCEDURE — 99999 PR PBB SHADOW E&M-EST. PATIENT-LVL II: ICD-10-PCS | Mod: PBBFAC,,, | Performed by: DIETITIAN, REGISTERED

## 2022-01-21 PROCEDURE — 99999 PR PBB SHADOW E&M-EST. PATIENT-LVL II: CPT | Mod: PBBFAC,,, | Performed by: DIETITIAN, REGISTERED
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
## 2022-01-23 PROBLEM — N32.81 OAB (OVERACTIVE BLADDER): Chronic | Status: ACTIVE | Noted: 2022-01-23

## 2022-01-26 ENCOUNTER — PATIENT MESSAGE (OUTPATIENT)
Dept: NEUROLOGY | Facility: CLINIC | Age: 52
End: 2022-01-26
Payer: COMMERCIAL

## 2022-01-27 ENCOUNTER — PATIENT MESSAGE (OUTPATIENT)
Dept: DIABETES | Facility: CLINIC | Age: 52
End: 2022-01-27
Payer: COMMERCIAL

## 2022-01-27 ENCOUNTER — PATIENT MESSAGE (OUTPATIENT)
Dept: NEUROLOGY | Facility: CLINIC | Age: 52
End: 2022-01-27
Payer: COMMERCIAL

## 2022-01-27 DIAGNOSIS — E11.9 TYPE 2 DIABETES MELLITUS WITHOUT COMPLICATION, WITHOUT LONG-TERM CURRENT USE OF INSULIN: Primary | ICD-10-CM

## 2022-01-27 RX ORDER — BLOOD-GLUCOSE METER
KIT MISCELLANEOUS
Qty: 1 EACH | Refills: 0 | Status: SHIPPED | OUTPATIENT
Start: 2022-01-27 | End: 2022-04-12

## 2022-01-27 RX ORDER — BLOOD-GLUCOSE METER
KIT MISCELLANEOUS
Qty: 50 STRIP | Refills: 6 | Status: SHIPPED | OUTPATIENT
Start: 2022-01-27 | End: 2022-04-12

## 2022-01-27 RX ORDER — LANCETS 28 GAUGE
EACH MISCELLANEOUS
Qty: 50 EACH | Refills: 6 | Status: SHIPPED | OUTPATIENT
Start: 2022-01-27 | End: 2022-04-12

## 2022-01-27 NOTE — TELEPHONE ENCOUNTER
No new care gaps identified.  Powered by [employer]. Reference number: [id number].   1/27/2022 10:15:41 AM CST

## 2022-01-27 NOTE — TELEPHONE ENCOUNTER
Patient seen for DM education on 1/21/22 and provided with a sample glucometer at visit.  Would like to get additional glucose test strips and lancets.  Pended Rx for glucose testing supplies to PCP.

## 2022-01-31 DIAGNOSIS — I10 PRIMARY HYPERTENSION: Primary | ICD-10-CM

## 2022-02-01 ENCOUNTER — PATIENT MESSAGE (OUTPATIENT)
Dept: ADMINISTRATIVE | Facility: OTHER | Age: 52
End: 2022-02-01
Payer: COMMERCIAL

## 2022-02-23 ENCOUNTER — PATIENT MESSAGE (OUTPATIENT)
Dept: NEUROLOGY | Facility: CLINIC | Age: 52
End: 2022-02-23
Payer: COMMERCIAL

## 2022-02-24 ENCOUNTER — TELEPHONE (OUTPATIENT)
Dept: NEUROLOGY | Facility: CLINIC | Age: 52
End: 2022-02-24
Payer: COMMERCIAL

## 2022-03-09 ENCOUNTER — HOSPITAL ENCOUNTER (OUTPATIENT)
Dept: RADIOLOGY | Facility: HOSPITAL | Age: 52
Discharge: HOME | End: 2022-03-09
Attending: UROLOGY
Payer: COMMERCIAL

## 2022-03-09 DIAGNOSIS — N30.00 ACUTE CYSTITIS WITHOUT HEMATURIA: Primary | ICD-10-CM

## 2022-03-09 DIAGNOSIS — N22 CALCULUS OF URINARY TRACT IN DISEASES CLASSIFIED ELSEWHERE: ICD-10-CM

## 2022-03-09 PROCEDURE — 74176 CT RENAL STONE STUDY ABD PELVIS WO: ICD-10-PCS | Mod: 26,,, | Performed by: RADIOLOGY

## 2022-03-09 PROCEDURE — 74176 CT ABD & PELVIS W/O CONTRAST: CPT | Mod: 26,,, | Performed by: RADIOLOGY

## 2022-03-09 PROCEDURE — 74176 CT ABD & PELVIS W/O CONTRAST: CPT | Mod: TC,PO

## 2022-03-10 ENCOUNTER — PATIENT MESSAGE (OUTPATIENT)
Dept: UROLOGY | Facility: CLINIC | Age: 52
End: 2022-03-10
Payer: COMMERCIAL

## 2022-03-17 ENCOUNTER — HOSPITAL ENCOUNTER (OUTPATIENT)
Dept: RADIOLOGY | Facility: HOSPITAL | Age: 52
Discharge: HOME | End: 2022-03-17
Attending: NURSE PRACTITIONER
Payer: COMMERCIAL

## 2022-03-17 ENCOUNTER — OFFICE VISIT (OUTPATIENT)
Dept: ORTHOPEDICS | Facility: CLINIC | Age: 52
End: 2022-03-17
Payer: COMMERCIAL

## 2022-03-17 ENCOUNTER — PATIENT MESSAGE (OUTPATIENT)
Dept: FAMILY MEDICINE | Facility: CLINIC | Age: 52
End: 2022-03-17
Payer: COMMERCIAL

## 2022-03-17 VITALS — HEIGHT: 67 IN | BODY MASS INDEX: 32.87 KG/M2 | WEIGHT: 209.44 LBS

## 2022-03-17 DIAGNOSIS — M25.532 PAIN AND SWELLING OF LEFT WRIST: ICD-10-CM

## 2022-03-17 DIAGNOSIS — M25.521 RIGHT ELBOW PAIN: ICD-10-CM

## 2022-03-17 DIAGNOSIS — M25.532 LEFT WRIST PAIN: Primary | ICD-10-CM

## 2022-03-17 DIAGNOSIS — M25.421 SWELLING OF RIGHT ELBOW: ICD-10-CM

## 2022-03-17 DIAGNOSIS — M25.511 ACUTE PAIN OF RIGHT SHOULDER: ICD-10-CM
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
## 2022-03-17 DIAGNOSIS — S42.254A CLOSED NONDISPLACED FRACTURE OF GREATER TUBEROSITY OF RIGHT HUMERUS, INITIAL ENCOUNTER: Primary | ICD-10-CM

## 2022-03-17 DIAGNOSIS — T14.8XXA BRUISING: ICD-10-CM

## 2022-03-17 DIAGNOSIS — M25.532 LEFT WRIST PAIN: ICD-10-CM

## 2022-03-17 DIAGNOSIS — R07.81 RIB PAIN ON LEFT SIDE: ICD-10-CM

## 2022-03-17 DIAGNOSIS — M25.432 PAIN AND SWELLING OF LEFT WRIST: ICD-10-CM

## 2022-03-17 PROCEDURE — 3044F HG A1C LEVEL LT 7.0%: CPT | Mod: CPTII,S$GLB,, | Performed by: NURSE PRACTITIONER

## 2022-03-17 PROCEDURE — 73110 X-RAY EXAM OF WRIST: CPT | Mod: 26,LT,, | Performed by: RADIOLOGY

## 2022-03-17 PROCEDURE — 3008F BODY MASS INDEX DOCD: CPT | Mod: CPTII,S$GLB,, | Performed by: NURSE PRACTITIONER

## 2022-03-17 PROCEDURE — 3066F PR DOCUMENTATION OF TREATMENT FOR NEPHROPATHY: ICD-10-PCS | Mod: CPTII,S$GLB,, | Performed by: NURSE PRACTITIONER

## 2022-03-17 PROCEDURE — 1159F MED LIST DOCD IN RCRD: CPT | Mod: CPTII,S$GLB,, | Performed by: NURSE PRACTITIONER

## 2022-03-17 PROCEDURE — 1160F RVW MEDS BY RX/DR IN RCRD: CPT | Mod: CPTII,S$GLB,, | Performed by: NURSE PRACTITIONER

## 2022-03-17 PROCEDURE — 73080 X-RAY EXAM OF ELBOW: CPT | Mod: 26,RT,, | Performed by: RADIOLOGY

## 2022-03-17 PROCEDURE — 73110 X-RAY EXAM OF WRIST: CPT | Mod: TC,PO,LT

## 2022-03-17 PROCEDURE — 3061F PR NEG MICROALBUMINURIA RESULT DOCUMENTED/REVIEW: ICD-10-PCS | Mod: CPTII,S$GLB,, | Performed by: NURSE PRACTITIONER

## 2022-03-17 PROCEDURE — 3061F NEG MICROALBUMINURIA REV: CPT | Mod: CPTII,S$GLB,, | Performed by: NURSE PRACTITIONER

## 2022-03-17 PROCEDURE — 3066F NEPHROPATHY DOC TX: CPT | Mod: CPTII,S$GLB,, | Performed by: NURSE PRACTITIONER

## 2022-03-17 PROCEDURE — 99214 OFFICE O/P EST MOD 30 MIN: CPT | Mod: 57,S$GLB,, | Performed by: NURSE PRACTITIONER

## 2022-03-17 PROCEDURE — 1159F PR MEDICATION LIST DOCUMENTED IN MEDICAL RECORD: ICD-10-PCS | Mod: CPTII,S$GLB,, | Performed by: NURSE PRACTITIONER

## 2022-03-17 PROCEDURE — 3008F PR BODY MASS INDEX (BMI) DOCUMENTED: ICD-10-PCS | Mod: CPTII,S$GLB,, | Performed by: NURSE PRACTITIONER

## 2022-03-17 PROCEDURE — 23620 PR CLOSED RX GR TUBEROSITY HUM FX: ICD-10-PCS | Mod: RT,S$GLB,, | Performed by: NURSE PRACTITIONER

## 2022-03-17 PROCEDURE — 99999 PR PBB SHADOW E&M-EST. PATIENT-LVL III: CPT | Mod: PBBFAC,,, | Performed by: NURSE PRACTITIONER

## 2022-03-17 PROCEDURE — 1160F PR REVIEW ALL MEDS BY PRESCRIBER/CLIN PHARMACIST DOCUMENTED: ICD-10-PCS | Mod: CPTII,S$GLB,, | Performed by: NURSE PRACTITIONER

## 2022-03-17 PROCEDURE — 3044F PR MOST RECENT HEMOGLOBIN A1C LEVEL <7.0%: ICD-10-PCS | Mod: CPTII,S$GLB,, | Performed by: NURSE PRACTITIONER

## 2022-03-17 PROCEDURE — 23620 CLTX GR HMRL TBRS FX WO MNPJ: CPT | Mod: RT,S$GLB,, | Performed by: NURSE PRACTITIONER

## 2022-03-17 PROCEDURE — 73110 XR WRIST COMPLETE 3 VIEWS LEFT: ICD-10-PCS | Mod: 26,LT,, | Performed by: RADIOLOGY

## 2022-03-17 PROCEDURE — 99214 PR OFFICE/OUTPT VISIT, EST, LEVL IV, 30-39 MIN: ICD-10-PCS | Mod: 57,S$GLB,, | Performed by: NURSE PRACTITIONER

## 2022-03-17 PROCEDURE — 99999 PR PBB SHADOW E&M-EST. PATIENT-LVL III: ICD-10-PCS | Mod: PBBFAC,,, | Performed by: NURSE PRACTITIONER

## 2022-03-17 PROCEDURE — 73080 X-RAY EXAM OF ELBOW: CPT | Mod: TC,PO,RT

## 2022-03-17 PROCEDURE — 73080 XR ELBOW COMPLETE 3 VIEW RIGHT: ICD-10-PCS | Mod: 26,RT,, | Performed by: RADIOLOGY

## 2022-03-17 RX ORDER — HYDROCODONE BITARTRATE AND ACETAMINOPHEN 10; 325 MG/1; MG/1
1 TABLET ORAL EVERY 4 HOURS PRN
Qty: 44 TABLET | Refills: 0 | Status: SHIPPED | OUTPATIENT
Start: 2022-03-17 | End: 2022-03-24

## 2022-03-17 RX ORDER — DICLOFENAC EPOLAMINE 0.01 G/1
1 SYSTEM TOPICAL DAILY
Qty: 30 PATCH | Refills: 3 | Status: SHIPPED | OUTPATIENT
Start: 2022-03-17 | End: 2023-03-28 | Stop reason: SDUPTHER

## 2022-03-17 RX ORDER — METHOCARBAMOL 750 MG/1
750 TABLET, FILM COATED ORAL 4 TIMES DAILY PRN
Qty: 90 TABLET | Refills: 2 | Status: SHIPPED | OUTPATIENT
Start: 2022-03-17 | End: 2022-05-24

## 2022-03-17 NOTE — PROGRESS NOTES
Chief Complaint   Patient presents with    Right Shoulder - Injury       HPI:    This is a 51 y.o. F who presents today complaining of right elbow pain for 1 day after fall. Reports she was running and fell; she reports her right arm/hand took all the blunt force so that she didn't hit her head. Pain is with any movement and achy. No numbness or tingling. No associated signs or symptoms. She complains of soreness to her left wrist and has bruising noted to her inner wrist with swelling. She has good ROM, but does report some pain with movement. She has not tried to move her right shoulder. She did present to [facility] ED for eval and was diagnosed with right humeral greater tuberosity fracture and she was placed in a sling; c/o tightness to her right bicep and some tenderness to her right AC. Sling in place from ED to immobilizer her right shoulder. She is taking norco 5 mg tablet prescription from [facility] ED, and diclofenac patches that she was previously prescribed. Both medications do help per the patient, but norco isn't taking pain away, so we reviewed meds and changes to be made. Discussed icing periodically at least four times daily, resting, no movement with R shoulder- will get DME to immobilize with velpo splint, as patient states she is having to undo the sling, making it looser bc if her forearm is pulled up higher, it hurts her worse.      Past Medical History:   Diagnosis Date    Abnormal Pap smear     ASCUS negative HPV. Repeat pap    ADD (attention deficit disorder)     Arthritis     right knee    Asthma     exercise induced    Basal cell carcinoma 02/2018    Left upper back     BCC (basal cell carcinoma)     Right medial ankle  - ED& C     BCC (basal cell carcinoma) 2016    Left anterior thigh    Cystocele     with stress incontinence    Depression     Dizziness     Fracture of sternum Nov 2010    from Karate class    GERD (gastroesophageal reflux disease)     HEARING LOSS     Hearing loss in  right ear     IBS (irritable bowel syndrome)     Intrauterine device     Mirena    Kidney stone 2009    PONV (postoperative nausea and vomiting)     requests Scopolamine patch    Primary hypertension 1/31/2022    SCC (squamous cell carcinoma), hand, right 2015    excisied [facility]     Seasonal allergies     deviated septum also    SI (sacroiliac) pain     Sinus pain July 3/2012    no fever, starting on antibiotics    Sinusitis     Skin tag of female perineum 2012    Squamous cell carcinoma of skin 09/2018    Left shin     TMJ (dislocation of temporomandibular joint)       Past Surgical History:   Procedure Laterality Date    ANTERIOR VAGINAL REPAIR      CHOLECYSTECTOMY      COLONOSCOPY      DILATION AND CURETTAGE OF UTERUS      EXTERNAL EAR SURGERY      Rt middle ear oval Repair    LASIK Bilateral 2015    Singer    TONSILLECTOMY      TONSILLECTOMY, ADENOIDECTOMY, BILATERAL MYRINGOTOMY AND TUBES      URETERAL STENT PLACEMENT  2009    cysto, laser lithotripsy left ureter stone      Current Outpatient Medications on File Prior to Visit   Medication Sig Dispense Refill    buPROPion (WELLBUTRIN XL) 150 MG TB24 tablet TAKE TWO TABLETS BY MOUTH EVERY  tablet 3    loratadine (CLARITIN) 10 mg tablet Take 10 mg by mouth daily as needed.      mirabegron (MYRBETRIQ) 50 mg Tb24 Take 1 tablet (50 mg total) by mouth once daily. 30 tablet 11    montelukast (SINGULAIR) 10 mg tablet Take 1 tablet (10 mg total) by mouth once daily. 90 tablet 3    [DISCONTINUED] diclofenac (FLECTOR) 1.3 % PT12 Apply 1 patch topically once daily 30 patch 0    [DISCONTINUED] HYDROcodone-acetaminophen (NORCO) 5-325 mg per tablet Take 1 tablet by mouth every 6 (six) hours as needed for Pain. 15 tablet 0    albuterol (PROVENTIL/VENTOLIN HFA) 90 mcg/actuation inhaler Inhale 2 puffs into the lungs every 6 (six) hours as needed for Wheezing. 18 g 3    FREESTYLE LITE STRIPS Strp To check BLOOD GLUCOSE 1 times daily  (Patient not taking: Reported on 3/17/2022) 50 strip 6    FREESTYLE LITE METER kit To check BLOOD GLUCOSE 1 time daily (Patient not taking: Reported on 3/17/2022) 1 each 0    famotidine (PEPCID) 20 MG tablet Take 1 tablet (20 mg total) by mouth 2 (two) times daily. 60 tablet 3    hyoscyamine (LEVSIN/SL) 0.125 mg Subl Place 1 tablet (0.125 mg total) under the tongue every 4 (four) hours as needed. (Patient not taking: Reported on 3/17/2022) 10 tablet 2    FREESTYLE LANCETS 28 gauge lancets To check BLOOD GLUCOSE 1 times daily (Patient not taking: Reported on 3/17/2022) 50 each 6    tamsulosin (FLOMAX) 0.4 mg Cap Take 1 capsule (0.4 mg total) by mouth daily as needed for renal colic (Patient not taking: Reported on 3/17/2022) 30 capsule 1     Current Facility-Administered Medications on File Prior to Visit   Medication Dose Route Frequency Provider Last Rate Last Admin    methylPREDNISolone sodium succinate injection 125 mg  125 mg Intravenous Q6H Rubi Franklin NP   125 mg at 10/30/18 1525    [COMPLETED] Tdap (BOOSTRIX) vaccine injection 0.5 mL  0.5 mL Intramuscular vaccine x 1 dose Skyla Canales NP   0.5 mL at 22 2301      Review of patient's allergies indicates:   Allergen Reactions    Venom-wasp Hives and Swelling    Oxytetracycline Swelling     Terramycin    Triple antibiotic [cbggc-ornfu-xbnhklm-pramoxine] Blisters    Adhesive Rash      Family History not pertinent   Social History     Socioeconomic History    Marital status:    Tobacco Use    Smoking status: Former Smoker     Packs/day: 0.50     Years: 5.00     Pack years: 2.50     Quit date: 2002     Years since quittin.7    Smokeless tobacco: Never Used   Substance and Sexual Activity    Alcohol use: Yes     Comment: glass of wine of day    Drug use: No    Sexual activity: Yes     Partners: Male     Birth control/protection: I.U.D.     Social Determinants of Health     Financial Resource Strain: Unknown     Difficulty of Paying Living Expenses: Patient refused   Food Insecurity: No Food Insecurity    Worried About Running Out of Food in the Last Year: Never true    Ran Out of Food in the Last Year: Never true   Transportation Needs: No Transportation Needs    Lack of Transportation (Medical): No    Lack of Transportation (Non-Medical): No   Physical Activity: Insufficiently Active    Days of Exercise per Week: 4 days    Minutes of Exercise per Session: 30 min   Stress: No Stress Concern Present    Feeling of Stress : Not at all   Social Connections: Unknown    Frequency of Communication with Friends and Family: Three times a week    Frequency of Social Gatherings with Friends and Family: Patient refused    Active Member of Clubs or Organizations: Yes    Attends Club or Organization Meetings: More than 4 times per year    Marital Status:    Housing Stability: Unknown    Unable to Pay for Housing in the Last Year: Patient refused    Unstable Housing in the Last Year: Patient refused         Review of Systems:   Constitutional:  Denies fever or chills    Eyes:  Denies change in visual acuity    HENT:  Denies nasal congestion or sore throat    Respiratory:  Denies cough or shortness of breath    Cardiovascular:  Denies chest pain or edema    GI:  Denies abdominal pain, nausea, vomiting, bloody stools or diarrhea    :  Denies dysuria    Integument:  Denies rash    Neurologic:  Denies headache, focal weakness or sensory changes    Endocrine:  Denies polyuria or polydipsia    Lymphatic:  Denies swollen glands    Psychiatric:  Denies depression or anxiety       Physical Exam:    Constitutional:  Well developed, well nourished, no acute distress, non-toxic appearance    Integument:  Well hydrated, no rash    Lymphatic:  No lymphadenopathy noted    Neurologic:  Alert & oriented x 3,     Psychiatric:  Speech and behavior appropriate    Gi: abdomen soft  Eyes: EOMI   R Elbow- passive ROM - normal without  pain. Active ROM she has some pain radiating up back of her arm to her shoulder. +TTP to lateral AC area on forearm/elbow with diffuse swelling.   R Shoulder- pedro ROM due to greater tuberosity fracture. Swelling/hematoma palpated to right upper biceps; +TTP.   L wrist- normal ROM but tenderness to touch and soreness per the patient with active ROM.  RUE and LUE nvi.     Imaging  Reviewed her right shoulder x-rays done at [facility] ED with the patient.   -R Elbow  X-rays were performed today, personally reviewed by me and findings discussed with the patient.   3 views of the right elbow show no acute fracture or dislocation.    -L Wrist    X-rays were performed today, personally reviewed by me and findings discussed with the patient.   3 views of the left wrist show no acute fractures.       Assessment   Closed nondisplaced fracture of greater tuberosity of right humerus, initial encounter  -     methocarbamoL (ROBAXIN) 750 MG Tab; Take 1 tablet (750 mg total) by mouth 4 (four) times daily as needed (muscle spasms).  Dispense: 90 tablet; Refill: 2  -     HYDROcodone-acetaminophen (NORCO)  mg per tablet; Take 1 tablet by mouth every 4 (four) hours as needed for Pain.  Dispense: 44 tablet; Refill: 0    Rib pain on left side  -     diclofenac (FLECTOR) 1.3 % PT12; Apply 1 patch topically once daily  Dispense: 30 patch; Refill: 3    Acute pain of right shoulder    Swelling of right elbow    Pain and swelling of left wrist    Bruising    Fall while running, initial encounter    Plan  Take medications as prescribed.   NWB and no raising her right shoulder x 6 weeks.   Continue velpo splint. May use sling if able to tolerate later.   Notify us for any NV changes in sensation, any new numbness/tingling, color changes to fingers, etc.   Rest and elevate the affected painful area.  Apply cold compresses intermittently as needed.  As pain recedes, begin normal activities slowly as tolerated.  Call if symptoms persist.    RTC  in 6 weeks for repeat x-rays.

## 2022-03-18 DIAGNOSIS — Z98.890 POST-OPERATIVE NAUSEA AND VOMITING: ICD-10-CM

## 2022-03-18 DIAGNOSIS — R11.2 POST-OPERATIVE NAUSEA AND VOMITING: ICD-10-CM

## 2022-03-18 RX ORDER — ONDANSETRON 8 MG/1
8 TABLET, ORALLY DISINTEGRATING ORAL EVERY 8 HOURS PRN
Qty: 30 TABLET | Refills: 0 | Status: SHIPPED | OUTPATIENT
Start: 2022-03-18 | End: 2022-04-01

## 2022-03-22 ENCOUNTER — CLINICAL SUPPORT (OUTPATIENT)
Dept: OTHER | Facility: CLINIC | Age: 52
End: 2022-03-22
Payer: COMMERCIAL

## 2022-03-22 ENCOUNTER — PATIENT MESSAGE (OUTPATIENT)
Dept: ORTHOPEDICS | Facility: CLINIC | Age: 52
End: 2022-03-22
Payer: COMMERCIAL

## 2022-03-22 DIAGNOSIS — Z00.8 ENCOUNTER FOR OTHER GENERAL EXAMINATION: ICD-10-CM

## 2022-03-23 LAB
GLUCOSE SERPL-MCNC: 124 MG/DL (ref 60–140)
HDLC SERPL-MCNC: 38 MG/DL
POC CHOLESTEROL, LDL (DOCK): 89.4 MG/DL
POC CHOLESTEROL, TOTAL: 156 MG/DL
TRIGL SERPL-MCNC: 143 MG/DL

## 2022-03-31 ENCOUNTER — PATIENT MESSAGE (OUTPATIENT)
Dept: ORTHOPEDICS | Facility: CLINIC | Age: 52
End: 2022-03-31
Payer: COMMERCIAL

## 2022-04-04 ENCOUNTER — PATIENT MESSAGE (OUTPATIENT)
Dept: ORTHOPEDICS | Facility: CLINIC | Age: 52
End: 2022-04-04
Payer: COMMERCIAL

## 2022-04-05 ENCOUNTER — PATIENT MESSAGE (OUTPATIENT)
Dept: ORTHOPEDICS | Facility: CLINIC | Age: 52
End: 2022-04-05
Payer: COMMERCIAL

## 2022-04-06 ENCOUNTER — PATIENT MESSAGE (OUTPATIENT)
Dept: ORTHOPEDICS | Facility: CLINIC | Age: 52
End: 2022-04-06
Payer: COMMERCIAL

## 2022-04-06 ENCOUNTER — PATIENT OUTREACH (OUTPATIENT)
Dept: ADMINISTRATIVE | Facility: OTHER | Age: 52
End: 2022-04-06
Payer: COMMERCIAL

## 2022-04-06 NOTE — PROGRESS NOTES
LINKS immunization registry updated  Care Everywhere updated  Health Maintenance updated  DIS/Chart reviewed for overdue Proactive [facility] (NADIR) health maintenance testing (CRS, Breast Ca, Diabetic Eye Exam)   Orders entered:N/A

## 2022-04-07 DIAGNOSIS — M25.511 ACUTE PAIN OF RIGHT SHOULDER: Primary | ICD-10-CM

## 2022-04-12 ENCOUNTER — OFFICE VISIT (OUTPATIENT)
Dept: ORTHOPEDICS | Facility: CLINIC | Age: 52
End: 2022-04-12
Payer: COMMERCIAL

## 2022-04-12 ENCOUNTER — HOSPITAL ENCOUNTER (OUTPATIENT)
Dept: RADIOLOGY | Facility: HOSPITAL | Age: 52
Discharge: HOME | End: 2022-04-12
Attending: NURSE PRACTITIONER
Payer: COMMERCIAL

## 2022-04-12 ENCOUNTER — PATIENT MESSAGE (OUTPATIENT)
Dept: ADMINISTRATIVE | Facility: OTHER | Age: 52
End: 2022-04-12
Payer: COMMERCIAL

## 2022-04-12 VITALS — WEIGHT: 209 LBS | BODY MASS INDEX: 31.67 KG/M2 | HEIGHT: 68 IN

## 2022-04-12 DIAGNOSIS — M25.511 ACUTE PAIN OF RIGHT SHOULDER: ICD-10-CM

## 2022-04-12 DIAGNOSIS — M25.511 ACUTE PAIN OF RIGHT SHOULDER: Primary | ICD-10-CM

## 2022-04-12 PROCEDURE — 73030 XR SHOULDER COMPLETE 2 OR MORE VIEWS RIGHT: ICD-10-PCS | Mod: 26,RT,, | Performed by: RADIOLOGY

## 2022-04-12 PROCEDURE — 3066F NEPHROPATHY DOC TX: CPT | Mod: CPTII,S$GLB,, | Performed by: NURSE PRACTITIONER

## 2022-04-12 PROCEDURE — 1160F RVW MEDS BY RX/DR IN RCRD: CPT | Mod: CPTII,S$GLB,, | Performed by: NURSE PRACTITIONER

## 2022-04-12 PROCEDURE — 3061F PR NEG MICROALBUMINURIA RESULT DOCUMENTED/REVIEW: ICD-10-PCS | Mod: CPTII,S$GLB,, | Performed by: NURSE PRACTITIONER

## 2022-04-12 PROCEDURE — 99999 PR PBB SHADOW E&M-EST. PATIENT-LVL III: ICD-10-PCS | Mod: PBBFAC,,, | Performed by: NURSE PRACTITIONER

## 2022-04-12 PROCEDURE — 73030 X-RAY EXAM OF SHOULDER: CPT | Mod: 26,RT,, | Performed by: RADIOLOGY

## 2022-04-12 PROCEDURE — 99499 NO LOS: ICD-10-PCS | Mod: S$GLB,,, | Performed by: NURSE PRACTITIONER

## 2022-04-12 PROCEDURE — 3044F HG A1C LEVEL LT 7.0%: CPT | Mod: CPTII,S$GLB,, | Performed by: NURSE PRACTITIONER

## 2022-04-12 PROCEDURE — 1159F PR MEDICATION LIST DOCUMENTED IN MEDICAL RECORD: ICD-10-PCS | Mod: CPTII,S$GLB,, | Performed by: NURSE PRACTITIONER

## 2022-04-12 PROCEDURE — 3008F PR BODY MASS INDEX (BMI) DOCUMENTED: ICD-10-PCS | Mod: CPTII,S$GLB,, | Performed by: NURSE PRACTITIONER

## 2022-04-12 PROCEDURE — 73030 X-RAY EXAM OF SHOULDER: CPT | Mod: TC,PO,RT

## 2022-04-12 PROCEDURE — 3008F BODY MASS INDEX DOCD: CPT | Mod: CPTII,S$GLB,, | Performed by: NURSE PRACTITIONER

## 2022-04-12 PROCEDURE — 99999 PR PBB SHADOW E&M-EST. PATIENT-LVL III: CPT | Mod: PBBFAC,,, | Performed by: NURSE PRACTITIONER

## 2022-04-12 PROCEDURE — 1160F PR REVIEW ALL MEDS BY PRESCRIBER/CLIN PHARMACIST DOCUMENTED: ICD-10-PCS | Mod: CPTII,S$GLB,, | Performed by: NURSE PRACTITIONER

## 2022-04-12 PROCEDURE — 3066F PR DOCUMENTATION OF TREATMENT FOR NEPHROPATHY: ICD-10-PCS | Mod: CPTII,S$GLB,, | Performed by: NURSE PRACTITIONER

## 2022-04-12 PROCEDURE — 99499 UNLISTED E&M SERVICE: CPT | Mod: S$GLB,,, | Performed by: NURSE PRACTITIONER

## 2022-04-12 PROCEDURE — 1159F MED LIST DOCD IN RCRD: CPT | Mod: CPTII,S$GLB,, | Performed by: NURSE PRACTITIONER

## 2022-04-12 PROCEDURE — 3061F NEG MICROALBUMINURIA REV: CPT | Mod: CPTII,S$GLB,, | Performed by: NURSE PRACTITIONER

## 2022-04-12 PROCEDURE — 3044F PR MOST RECENT HEMOGLOBIN A1C LEVEL <7.0%: ICD-10-PCS | Mod: CPTII,S$GLB,, | Performed by: NURSE PRACTITIONER

## 2022-04-12 RX ORDER — DICLOFENAC SODIUM 75 MG/1
75 TABLET, DELAYED RELEASE ORAL 2 TIMES DAILY
Qty: 10 TABLET | Refills: 0 | Status: SHIPPED | OUTPATIENT
Start: 2022-04-12 | End: 2022-04-17

## 2022-04-12 NOTE — LETTER
April 12, 2022      [facility]  [address]  [place] [address]  Phone: [phone]       Patient: Concepcion De La Vega   YOB: 1970  Date of Visit: 04/12/2022    To Whom It May Concern:    CONTRERAS De La Vega  was at [facility] on 04/12/2022. She may return to work on  4/25/22 with restrictions- reduce schedule/intermittent leave.      If you have any questions or concerns, or if I can be of further assistance, please do not hesitate to contact me.    Sincerely,      ____________________________    KANU Cadet

## 2022-04-12 NOTE — PROGRESS NOTES
Chief Complaint   Patient presents with    Right Shoulder - Injury, Pain         HPI:  52 y.o. F returns to clinic today status post non-operative treatment of a  right greater tuberosity fracture 3 weeks ago. Pain is intermittent. Patient is compliant most of the time with restrictions.     Right shoulder   Sling in place. NVI.       Imaging  X-rays were performed today, personally reviewed by me and findings discussed with the patient.  2 views of the right shoulder show healing right greater tuberosity fracture.     Acute pain of right shoulder  -     diclofenac (VOLTAREN) 75 MG EC tablet; Take 1 tablet (75 mg total) by mouth 2 (two) times daily. for 5 days  Dispense: 10 tablet; Refill: 0       RTC in 3 weeks as scheduled.

## 2022-04-21 DIAGNOSIS — S42.254A CLOSED NONDISPLACED FRACTURE OF GREATER TUBEROSITY OF RIGHT HUMERUS, INITIAL ENCOUNTER: Primary | ICD-10-CM

## 2022-04-25 ENCOUNTER — PATIENT MESSAGE (OUTPATIENT)
Dept: ORTHOPEDICS | Facility: CLINIC | Age: 52
End: 2022-04-25
Payer: COMMERCIAL

## 2022-04-26 ENCOUNTER — TELEPHONE (OUTPATIENT)
Dept: ORTHOPEDICS | Facility: CLINIC | Age: 52
End: 2022-04-26
Payer: COMMERCIAL

## 2022-04-26 NOTE — TELEPHONE ENCOUNTER
Spoke with Carlos with [employer] All questions answered. Requested new paperwork to be faxed. Understanding verbalized.

## 2022-04-26 NOTE — TELEPHONE ENCOUNTER
----- Message from Olivia Fontana sent at 4/26/2022  2:47 PM CDT -----  Type: Needs Medical Advice         Who Called: Carlos robins/ [employer]  Best Call Back Number:[phone]  Additional Information: Requesting a call back regarding  they need office to call regarding paperwork they received on pt  Please Advise- Thank you

## 2022-04-27 ENCOUNTER — HOSPITAL ENCOUNTER (OUTPATIENT)
Dept: RADIOLOGY | Facility: HOSPITAL | Age: 52
Discharge: HOME | End: 2022-04-27
Attending: NURSE PRACTITIONER
Payer: COMMERCIAL

## 2022-04-27 DIAGNOSIS — S42.254A CLOSED NONDISPLACED FRACTURE OF GREATER TUBEROSITY OF RIGHT HUMERUS, INITIAL ENCOUNTER: ICD-10-CM

## 2022-04-27 PROCEDURE — 73030 XR SHOULDER COMPLETE 2 OR MORE VIEWS RIGHT: ICD-10-PCS | Mod: 26,RT,, | Performed by: RADIOLOGY

## 2022-04-27 PROCEDURE — 73030 X-RAY EXAM OF SHOULDER: CPT | Mod: TC,FY,PO,RT

## 2022-04-27 PROCEDURE — 73030 X-RAY EXAM OF SHOULDER: CPT | Mod: 26,RT,, | Performed by: RADIOLOGY

## 2022-04-28 ENCOUNTER — OFFICE VISIT (OUTPATIENT)
Dept: ORTHOPEDICS | Facility: CLINIC | Age: 52
End: 2022-04-28
Payer: COMMERCIAL

## 2022-04-28 VITALS — WEIGHT: 200.63 LBS | HEIGHT: 68 IN | BODY MASS INDEX: 30.41 KG/M2

## 2022-04-28 DIAGNOSIS — S42.254A CLOSED NONDISPLACED FRACTURE OF GREATER TUBEROSITY OF RIGHT HUMERUS, INITIAL ENCOUNTER: Primary | ICD-10-CM

## 2022-04-28 PROCEDURE — 3066F NEPHROPATHY DOC TX: CPT | Mod: CPTII,S$GLB,, | Performed by: NURSE PRACTITIONER

## 2022-04-28 PROCEDURE — 1160F PR REVIEW ALL MEDS BY PRESCRIBER/CLIN PHARMACIST DOCUMENTED: ICD-10-PCS | Mod: CPTII,S$GLB,, | Performed by: NURSE PRACTITIONER

## 2022-04-28 PROCEDURE — 3061F NEG MICROALBUMINURIA REV: CPT | Mod: CPTII,S$GLB,, | Performed by: NURSE PRACTITIONER

## 2022-04-28 PROCEDURE — 3008F PR BODY MASS INDEX (BMI) DOCUMENTED: ICD-10-PCS | Mod: CPTII,S$GLB,, | Performed by: NURSE PRACTITIONER

## 2022-04-28 PROCEDURE — 1160F RVW MEDS BY RX/DR IN RCRD: CPT | Mod: CPTII,S$GLB,, | Performed by: NURSE PRACTITIONER

## 2022-04-28 PROCEDURE — 3066F PR DOCUMENTATION OF TREATMENT FOR NEPHROPATHY: ICD-10-PCS | Mod: CPTII,S$GLB,, | Performed by: NURSE PRACTITIONER

## 2022-04-28 PROCEDURE — 1159F MED LIST DOCD IN RCRD: CPT | Mod: CPTII,S$GLB,, | Performed by: NURSE PRACTITIONER

## 2022-04-28 PROCEDURE — 99024 POSTOP FOLLOW-UP VISIT: CPT | Mod: S$GLB,,, | Performed by: NURSE PRACTITIONER

## 2022-04-28 PROCEDURE — 99999 PR PBB SHADOW E&M-EST. PATIENT-LVL III: CPT | Mod: PBBFAC,,, | Performed by: NURSE PRACTITIONER

## 2022-04-28 PROCEDURE — 1159F PR MEDICATION LIST DOCUMENTED IN MEDICAL RECORD: ICD-10-PCS | Mod: CPTII,S$GLB,, | Performed by: NURSE PRACTITIONER

## 2022-04-28 PROCEDURE — 3008F BODY MASS INDEX DOCD: CPT | Mod: CPTII,S$GLB,, | Performed by: NURSE PRACTITIONER

## 2022-04-28 PROCEDURE — 99024 PR POST-OP FOLLOW-UP VISIT: ICD-10-PCS | Mod: S$GLB,,, | Performed by: NURSE PRACTITIONER

## 2022-04-28 PROCEDURE — 3044F PR MOST RECENT HEMOGLOBIN A1C LEVEL <7.0%: ICD-10-PCS | Mod: CPTII,S$GLB,, | Performed by: NURSE PRACTITIONER

## 2022-04-28 PROCEDURE — 3061F PR NEG MICROALBUMINURIA RESULT DOCUMENTED/REVIEW: ICD-10-PCS | Mod: CPTII,S$GLB,, | Performed by: NURSE PRACTITIONER

## 2022-04-28 PROCEDURE — 3044F HG A1C LEVEL LT 7.0%: CPT | Mod: CPTII,S$GLB,, | Performed by: NURSE PRACTITIONER

## 2022-04-28 PROCEDURE — 99999 PR PBB SHADOW E&M-EST. PATIENT-LVL III: ICD-10-PCS | Mod: PBBFAC,,, | Performed by: NURSE PRACTITIONER

## 2022-04-28 RX ORDER — ACETAMINOPHEN 500 MG
TABLET ORAL DAILY
COMMUNITY

## 2022-04-28 NOTE — PROGRESS NOTES
Chief Complaint   Patient presents with    Right Shoulder - Injury, Follow-up         HPI:  52 y.o. F returns to clinic today status post non-operative treatment of a  right humeral fracture 6 weeks ago. Pain is intermittent. Patient is compliant most of the time with restrictions.     R humerus  Neurovascularly intact. Velcro sling in place.     X-rays were performed today, personally reviewed by me and findings discussed with the patient.  2 views of the right humerus show interval healing of right humeral greater tuberosity.         Closed nondisplaced fracture of greater tuberosity of right humerus, initial encounter  -     MRI Humerus Without Contrast Right; Future; Expected date: 04/28/2022    Will obtain MRI of right humerus 2/2 to nerve pain, and pt c/o feeling something move with any arm movement.  Once MRI findings reviewed, results will further determine plan of care.

## 2022-04-29 ENCOUNTER — PATIENT MESSAGE (OUTPATIENT)
Dept: ORTHOPEDICS | Facility: CLINIC | Age: 52
End: 2022-04-29
Payer: COMMERCIAL

## 2022-04-29 ENCOUNTER — HOSPITAL ENCOUNTER (OUTPATIENT)
Dept: RADIOLOGY | Facility: HOSPITAL | Age: 52
Discharge: HOME | End: 2022-04-29
Attending: NURSE PRACTITIONER
Payer: COMMERCIAL

## 2022-04-29 DIAGNOSIS — S42.254A CLOSED NONDISPLACED FRACTURE OF GREATER TUBEROSITY OF RIGHT HUMERUS, INITIAL ENCOUNTER: ICD-10-CM

## 2022-04-29 PROCEDURE — 73221 MRI JOINT UPR EXTREM W/O DYE: CPT | Mod: 26,RT,, | Performed by: RADIOLOGY

## 2022-04-29 PROCEDURE — 73221 MRI SHOULDER WITHOUT CONTRAST RIGHT: ICD-10-PCS | Mod: 26,RT,, | Performed by: RADIOLOGY

## 2022-04-29 PROCEDURE — 73221 MRI JOINT UPR EXTREM W/O DYE: CPT | Mod: TC,PO,RT

## 2022-05-02 ENCOUNTER — PATIENT MESSAGE (OUTPATIENT)
Dept: ORTHOPEDICS | Facility: CLINIC | Age: 52
End: 2022-05-02
Payer: COMMERCIAL

## 2022-05-03 ENCOUNTER — OFFICE VISIT (OUTPATIENT)
Dept: ORTHOPEDICS | Facility: CLINIC | Age: 52
End: 2022-05-03
Payer: COMMERCIAL

## 2022-05-03 ENCOUNTER — PATIENT MESSAGE (OUTPATIENT)
Dept: ORTHOPEDICS | Facility: CLINIC | Age: 52
End: 2022-05-03

## 2022-05-03 DIAGNOSIS — R30.0 DYSURIA: ICD-10-CM

## 2022-05-03 DIAGNOSIS — S42.254D CLOSED NONDISPLACED FRACTURE OF GREATER TUBEROSITY OF RIGHT HUMERUS WITH ROUTINE HEALING, SUBSEQUENT ENCOUNTER: Primary | ICD-10-CM

## 2022-05-03 DIAGNOSIS — R35.0 URINARY FREQUENCY: ICD-10-CM

## 2022-05-03 PROCEDURE — 1159F MED LIST DOCD IN RCRD: CPT | Mod: CPTII,S$GLB,, | Performed by: NURSE PRACTITIONER

## 2022-05-03 PROCEDURE — 3061F NEG MICROALBUMINURIA REV: CPT | Mod: CPTII,S$GLB,, | Performed by: NURSE PRACTITIONER

## 2022-05-03 PROCEDURE — 3066F PR DOCUMENTATION OF TREATMENT FOR NEPHROPATHY: ICD-10-PCS | Mod: CPTII,S$GLB,, | Performed by: NURSE PRACTITIONER

## 2022-05-03 PROCEDURE — 3061F PR NEG MICROALBUMINURIA RESULT DOCUMENTED/REVIEW: ICD-10-PCS | Mod: CPTII,S$GLB,, | Performed by: NURSE PRACTITIONER

## 2022-05-03 PROCEDURE — 3044F HG A1C LEVEL LT 7.0%: CPT | Mod: CPTII,S$GLB,, | Performed by: NURSE PRACTITIONER

## 2022-05-03 PROCEDURE — 99024 POSTOP FOLLOW-UP VISIT: CPT | Mod: S$GLB,,, | Performed by: NURSE PRACTITIONER

## 2022-05-03 PROCEDURE — 1160F PR REVIEW ALL MEDS BY PRESCRIBER/CLIN PHARMACIST DOCUMENTED: ICD-10-PCS | Mod: CPTII,S$GLB,, | Performed by: NURSE PRACTITIONER

## 2022-05-03 PROCEDURE — 3066F NEPHROPATHY DOC TX: CPT | Mod: CPTII,S$GLB,, | Performed by: NURSE PRACTITIONER

## 2022-05-03 PROCEDURE — 1159F PR MEDICATION LIST DOCUMENTED IN MEDICAL RECORD: ICD-10-PCS | Mod: CPTII,S$GLB,, | Performed by: NURSE PRACTITIONER

## 2022-05-03 PROCEDURE — 99024 PR POST-OP FOLLOW-UP VISIT: ICD-10-PCS | Mod: S$GLB,,, | Performed by: NURSE PRACTITIONER

## 2022-05-03 PROCEDURE — 99999 PR PBB SHADOW E&M-EST. PATIENT-LVL III: ICD-10-PCS | Mod: PBBFAC,,, | Performed by: NURSE PRACTITIONER

## 2022-05-03 PROCEDURE — 99999 PR PBB SHADOW E&M-EST. PATIENT-LVL III: CPT | Mod: PBBFAC,,, | Performed by: NURSE PRACTITIONER

## 2022-05-03 PROCEDURE — 1160F RVW MEDS BY RX/DR IN RCRD: CPT | Mod: CPTII,S$GLB,, | Performed by: NURSE PRACTITIONER

## 2022-05-03 PROCEDURE — 3044F PR MOST RECENT HEMOGLOBIN A1C LEVEL <7.0%: ICD-10-PCS | Mod: CPTII,S$GLB,, | Performed by: NURSE PRACTITIONER

## 2022-05-03 RX ORDER — MIRABEGRON 50 MG/1
50 TABLET, FILM COATED, EXTENDED RELEASE ORAL DAILY
Qty: 30 TABLET | Refills: 11 | Status: SHIPPED | OUTPATIENT
Start: 2022-05-03 | End: 2023-03-02

## 2022-05-03 NOTE — PROGRESS NOTES
Chief Complaint   Patient presents with    Right Shoulder - Injury         HPI:  52 y.o. returns to clinic today status post non-operative treatment of a right greater tuberosity fracture 6 weeks ago. Pain is intermittent. Patient is compliant most of the time with restrictions. Here to review MRI.    R humerus  No erythema or fluctuance. Decreased ROM due to stiffness. Compartments soft. Skin intact. NVI distally. Has torso shoulder belt in place.       Closed nondisplaced fracture of greater tuberosity of right humerus with routine healing, subsequent encounter      She was instructed to do pendulum exercises for her right shoulder 5 to 6 times a day.     Pendulum shoulder exercises:   Start exercise with your affected arm.   1. Lean over with your good arm supported on a table or chair.    2. Relax the arm on the painful side, letting it hang straight down.    3. Slowly start to swing the relaxed arm by moving your body. Move it in a [place], then reverse the direction. Next,         move the arm backward and forward. Lastly, move it side to side. Do each exercise approximately 30 seconds each.   4. Let gravity gently sway your arm. Do not actively lift or move it with your shoulder muscles.   5. Do the exercise 5-6 times daily for 5 -10 minutes each time. Change the direction of your movement after 1 minute of motion.     She will return to clinic in 3 weeks for re-eval. Get her back with MD Heidy as he wants to examine her shoulder and approve her to go to PT.

## 2022-05-09 ENCOUNTER — PATIENT MESSAGE (OUTPATIENT)
Dept: SMOKING CESSATION | Facility: CLINIC | Age: 52
End: 2022-05-09
Payer: COMMERCIAL

## 2022-05-10 ENCOUNTER — PATIENT MESSAGE (OUTPATIENT)
Dept: ORTHOPEDICS | Facility: CLINIC | Age: 52
End: 2022-05-10
Payer: COMMERCIAL

## 2022-05-12 ENCOUNTER — PATIENT OUTREACH (OUTPATIENT)
Dept: ADMINISTRATIVE | Facility: OTHER | Age: 52
End: 2022-05-12
Payer: COMMERCIAL

## 2022-05-13 NOTE — PROGRESS NOTES
Health Maintenance Due   Topic Date Due    Shingles Vaccine (1 of 2) Never done    Colorectal Cancer Screening  07/03/2019    Mammogram  09/22/2021    COVID-19 Vaccine (4 - Booster for Pfizer series) 03/23/2022     Updates were requested from care everywhere.  Chart was reviewed for overdue [facility]active [facility]ters (NADIR) topics (CRS, Breast Cancer Screening, Eye exam)  Health Maintenance has been updated.  LINKS immunization registry triggered.  Immunizations were reconciled.

## 2022-05-16 ENCOUNTER — OFFICE VISIT (OUTPATIENT)
Dept: NEUROLOGY | Facility: CLINIC | Age: 52
End: 2022-05-16
Payer: COMMERCIAL

## 2022-05-16 DIAGNOSIS — N32.81 OAB (OVERACTIVE BLADDER): Chronic | ICD-10-CM

## 2022-05-16 DIAGNOSIS — E11.9 TYPE 2 DIABETES MELLITUS WITHOUT COMPLICATION, WITHOUT LONG-TERM CURRENT USE OF INSULIN: Chronic | ICD-10-CM

## 2022-05-16 DIAGNOSIS — I10 PRIMARY HYPERTENSION: Chronic | ICD-10-CM

## 2022-05-16 DIAGNOSIS — H43.811 POSTERIOR VITREOUS DETACHMENT, RIGHT: ICD-10-CM

## 2022-05-16 DIAGNOSIS — G43.711 CHRONIC MIGRAINE WITHOUT AURA, WITH INTRACTABLE MIGRAINE, SO STATED, WITH STATUS MIGRAINOSUS: Primary | ICD-10-CM

## 2022-05-16 PROCEDURE — 99499 NO LOS: ICD-10-PCS | Mod: 95,,, | Performed by: PSYCHIATRY & NEUROLOGY

## 2022-05-16 PROCEDURE — 99499 UNLISTED E&M SERVICE: CPT | Mod: 95,,, | Performed by: PSYCHIATRY & NEUROLOGY

## 2022-05-16 PROCEDURE — 3044F HG A1C LEVEL LT 7.0%: CPT | Mod: CPTII,95,, | Performed by: PSYCHIATRY & NEUROLOGY

## 2022-05-16 PROCEDURE — 3044F PR MOST RECENT HEMOGLOBIN A1C LEVEL <7.0%: ICD-10-PCS | Mod: CPTII,95,, | Performed by: PSYCHIATRY & NEUROLOGY

## 2022-05-16 PROCEDURE — 3061F PR NEG MICROALBUMINURIA RESULT DOCUMENTED/REVIEW: ICD-10-PCS | Mod: CPTII,95,, | Performed by: PSYCHIATRY & NEUROLOGY

## 2022-05-16 PROCEDURE — 3066F NEPHROPATHY DOC TX: CPT | Mod: CPTII,95,, | Performed by: PSYCHIATRY & NEUROLOGY

## 2022-05-16 PROCEDURE — 3061F NEG MICROALBUMINURIA REV: CPT | Mod: CPTII,95,, | Performed by: PSYCHIATRY & NEUROLOGY

## 2022-05-16 PROCEDURE — 3066F PR DOCUMENTATION OF TREATMENT FOR NEPHROPATHY: ICD-10-PCS | Mod: CPTII,95,, | Performed by: PSYCHIATRY & NEUROLOGY

## 2022-05-16 RX ORDER — ACETAZOLAMIDE 250 MG/1
250 TABLET ORAL 3 TIMES DAILY PRN
Qty: 20 TABLET | Refills: 4 | Status: SHIPPED | OUTPATIENT
Start: 2022-05-16 | End: 2022-07-21 | Stop reason: SDUPTHER

## 2022-05-16 NOTE — PROGRESS NOTES
Subjective:       Patient ID: Concepcion De La Vega is a 52 y.o. female.    Chief Complaint: Headache  INTERVAL HISTORY 5/16/2022  We were unable to connect via virtual visit. I called her on the phone. She stated she is doing very well with respect to the headaches. They are mainly clustered around weather changes.     HPI   The patient is a pleasant 46 y/o female presenting with chief complaint of headache starting about 5 to 7 years ago. She states that she was under the impression that once in menopause her headaches would disappear. Nonetheless, she continues to suffer. About a month ago she had a Mirena IUD removed but states that it was way overdue to come out. She was diagnosed with migraine headache and placed on Elavil that made her very sleepy. Topamax because of history of kidney stones. Review of her diaries reveals 15 of more days of headache per month and 6 to 8 escalations to migraine. She also suffers with TMD which is a contributor  Please see details of headache characteristics below.    Headache questionnaire     1. When did your Headaches start?               5 to 7 years        2. Where are your headaches located?              Usually left        3. Your headache's characteristics:               Pressure, Throbbing, Pounding, Sharp        4. How long does the headache last?              hours        5. How often does the headache occur?              weekly        6. Are your headaches preceded or accompanied by other symptoms? yes              If yes, please describe.  Fatigue rarely dizzy/nausea        7. Does the headache awaken you at night? yes              If so, how often? monthly           8. Please margot the word that best describes your headache's intensity:               moderate        9. Using a scale of 1 through 10, with 0 = no pain and 10 = the worst pain:              What score is your headache now? 2              What score is your headache at its worst? 0              What score is  your headache at its best? 0           10. Possible associated headache symptoms:  [x]  Sensitivity to light                  [x] Dizziness                    [] Nasal or sinus pressure/ pain              [] Sensitivity to noise                 [] Vertigo                        [x] Problems with concentration  [x] Sensitivity to smells   [x] Ringing in ears           [x] Problems with memory                        [] Blurred vision             [x] Irritability                     [x] Problems with task completion   [] Double vision             [x] Anger              [x]  Problems with relaxation  [] Loss of appetite                     [x] Anxiety                       [x] Neck tightness, Neck pain  [x] Nausea                                   [] Nasal congestion  [] Vomiting                                         11. Headache improving factors:  [x] Sleep              [] Heat  [x] Darkness                    [] Ice  [x] Local pressure           [] Menses (period)  [x] Massage                    [x] Medications:          12. Headache worsening factors:   [] Fatigue           [] Sneezing                    [x] Changes in Weather  [x] Light   [x] Bending Over [] Stress  [] Noise  [] Ovulation                    [] Multiple Sclerosis Flare-Up  [] Smells            [] Menses                      [x] Food   [] Coughing        [] Alcohol        13. Number of caffeinated drinks per day: 1not usually        14. Number of diet drinks per day:  1 not usually             Review of Systems   Constitutional: Positive for fatigue. Negative for activity change, appetite change and fever.   HENT: Positive for tinnitus. Negative for congestion, dental problem, hearing loss, sinus pressure, trouble swallowing and voice change.    Eyes: Negative for photophobia, pain, redness and visual disturbance.   Respiratory: Negative for cough, chest tightness and shortness of breath.    Cardiovascular: Negative for chest pain, palpitations and  leg swelling.   Gastrointestinal: Negative for abdominal pain, blood in stool, nausea and vomiting.   Endocrine: Positive for cold intolerance and heat intolerance.   Genitourinary: Negative for difficulty urinating, frequency, menstrual problem and urgency.   Musculoskeletal: Negative for arthralgias, back pain, gait problem, joint swelling, myalgias, neck pain and neck stiffness.   Skin: Negative.    Neurological: Positive for headaches. Negative for dizziness, tremors, seizures, syncope, facial asymmetry, speech difficulty, weakness, light-headedness and numbness.   Hematological: Negative for adenopathy. Does not bruise/bleed easily.   Psychiatric/Behavioral: Negative for agitation, behavioral problems, confusion, decreased concentration, self-injury, sleep disturbance and suicidal ideas. The patient is not nervous/anxious and is not hyperactive.                Past Medical History:   Diagnosis Date    Abnormal Pap smear     ASCUS negative HPV. Repeat pap    ADD (attention deficit disorder)     Arthritis     right knee    Asthma     exercise induced    Cystocele     with stress incontinence    Depression     Dizziness     Fracture of sternum Nov 2010    from Karate class    GERD (gastroesophageal reflux disease)     HEARING LOSS     Hearing loss in right ear     IBS (irritable bowel syndrome)     Intrauterine device     Mirena    Kidney stone 2009    PONV (postoperative nausea and vomiting)     requests Scopolamine patch    Seasonal allergies     deviated septum also    SI (sacroiliac) pain     Sinus pain July 3/2012    no fever, starting on antibiotics    Sinusitis     Skin tag of female perineum 2012    TMJ (dislocation of temporomandibular joint)      Past Surgical History:   Procedure Laterality Date    ANTERIOR VAGINAL REPAIR      CHOLECYSTECTOMY      COLONOSCOPY      DILATION AND CURETTAGE OF UTERUS      EXTERNAL EAR SURGERY      Rt middle ear oval Repair    LASIK Bilateral 2015     Singer    TONSILLECTOMY      TONSILLECTOMY, ADENOIDECTOMY, BILATERAL MYRINGOTOMY AND TUBES      URETERAL STENT PLACEMENT  2009    cysto, laser lithotripsy left ureter stone     Family History   Problem Relation Age of Onset    Cancer Maternal Grandmother      uncertain of which GYN cancer    Anesthesia problems Mother      Mom hard to awaken post-op    Multiple sclerosis Mother     Diabetes Mother     Kidney disease Father     Diabetes Brother     Clotting disorder Neg Hx     Breast cancer Neg Hx     Allergic rhinitis Neg Hx     Allergies Neg Hx     Angioedema Neg Hx     Asthma Neg Hx     Atopy Neg Hx     Eczema Neg Hx     Immunodeficiency Neg Hx     Rhinitis Neg Hx     Urticaria Neg Hx      Social History     Social History    Marital status:      Spouse name: N/A    Number of children: N/A    Years of education: N/A     Occupational History    Not on file.     Social History Main Topics    Smoking status: Former Smoker     Packs/day: 0.50     Years: 5.00     Quit date: 7/9/2002    Smokeless tobacco: Never Used    Alcohol use Yes      Comment: glass of wine of day    Drug use: No    Sexual activity: Yes     Partners: Male     Birth control/ protection: IUD     Other Topics Concern    Not on file     Social History Narrative    No narrative on file     Review of patient's allergies indicates:   Allergen Reactions    Oxytetracycline Swelling     Terramycin    Triple antibiotic [gvwjz-tntpf-tmqnyek-pramoxine] Blisters       Current Outpatient Medications   Medication Sig    acetaZOLAMIDE (DIAMOX) 250 MG tablet Take 1 tablet (250 mg total) by mouth 3 (three) times daily as needed (migraine).    albuterol (PROVENTIL/VENTOLIN HFA) 90 mcg/actuation inhaler Inhale 2 puffs into the lungs every 6 (six) hours as needed for Wheezing.    buPROPion (WELLBUTRIN XL) 150 MG TB24 tablet TAKE TWO TABLETS BY MOUTH EVERY DAY    cholecalciferol, vitamin D3, (VITAMIN D3) 50 mcg (2,000 unit)  Cap capsule Take by mouth once daily.    diclofenac (FLECTOR) 1.3 % PT12 Apply 1 patch topically once daily    famotidine (PEPCID) 20 MG tablet Take 1 tablet (20 mg total) by mouth 2 (two) times daily.    loratadine (CLARITIN) 10 mg tablet Take 10 mg by mouth daily as needed.    methocarbamoL (ROBAXIN) 750 MG Tab Take 1 tablet (750 mg total) by mouth 4 (four) times daily as needed (muscle spasms).    mirabegron (MYRBETRIQ) 50 mg Tb24 Take 1 tablet (50 mg total) by mouth once daily.    montelukast (SINGULAIR) 10 mg tablet Take 1 tablet (10 mg total) by mouth once daily.     Current Facility-Administered Medications   Medication    methylPREDNISolone sodium succinate injection 125 mg         Objective:      Review of Data:  Lab Results   Component Value Date     11/16/2017    K 4.0 11/16/2017     11/16/2017    CO2 26 11/16/2017    BUN 8 11/16/2017    CREATININE 1.0 11/16/2017     (H) 11/16/2017    HGBA1C 5.8 (H) 11/21/2017    AST 17 11/16/2017    ALT 23 11/16/2017    ALBUMIN 3.8 11/16/2017    PROT 7.7 11/16/2017    BILITOT 0.6 11/16/2017    CHOL 184 11/16/2017    CHOL 184 11/16/2017    HDL 40 11/16/2017    HDL 40 11/16/2017    LDLCALC 107.6 11/16/2017    LDLCALC 107.6 11/16/2017    TRIG 182 (H) 11/16/2017    TRIG 182 (H) 11/16/2017       Lab Results   Component Value Date    WBC 8.27 11/16/2017    HGB 11.9 (L) 11/16/2017    HCT 38.6 11/16/2017    MCV 84 11/16/2017     (H) 11/16/2017       Lab Results   Component Value Date    TSH 1.567 11/10/2015     Results for orders placed or performed during the hospital encounter of 06/30/14   CT Head Without Contrast    Narrative    Axial images were obtained at 5-mm intervals from the level of the skull apex to the level of the skull base.    The ventricles and sulci appear age appropriate.  No abnormal intracranial densities are noted to suggest the presence of acute blood products.  No large hypodensities are identified to suggest the presence  of major vascular infarction.  Some   subcutaneous swelling appears to be present overlying the left orbital rim laterally that may relate to a region of direct contusion.    Impression     No acute intracranial process is convincingly noted.  Some soft tissue swelling appears to be present adjacent to the left orbit suggestive of a region of contusion      Electronically signed by: Fede Callahan MD  Date:     06/30/14  Time:    14:47              Assessment and Plan   The patient is doing well. Weather related attacks.  Will start Diamox 250 mg TID prn for atmospheric pressure changes  I have discussed the side effects of the medications prescribed and the patient acknowledges understanding      Joy Martin M.D  Medical Director, Headache and Facial Pain  [facility]

## 2022-05-18 ENCOUNTER — PATIENT MESSAGE (OUTPATIENT)
Dept: FAMILY MEDICINE | Facility: CLINIC | Age: 52
End: 2022-05-18
Payer: COMMERCIAL

## 2022-05-24 ENCOUNTER — OFFICE VISIT (OUTPATIENT)
Dept: ORTHOPEDICS | Facility: CLINIC | Age: 52
End: 2022-05-24
Payer: COMMERCIAL

## 2022-05-24 ENCOUNTER — HOSPITAL ENCOUNTER (OUTPATIENT)
Dept: RADIOLOGY | Facility: HOSPITAL | Age: 52
Discharge: HOME | End: 2022-05-24
Attending: ORTHOPAEDIC SURGERY
Payer: COMMERCIAL

## 2022-05-24 VITALS — HEIGHT: 68 IN | WEIGHT: 200.63 LBS | BODY MASS INDEX: 30.41 KG/M2

## 2022-05-24 DIAGNOSIS — S42.254D CLOSED NONDISPLACED FRACTURE OF GREATER TUBEROSITY OF RIGHT HUMERUS WITH ROUTINE HEALING, SUBSEQUENT ENCOUNTER: ICD-10-CM

## 2022-05-24 DIAGNOSIS — S42.254D CLOSED NONDISPLACED FRACTURE OF GREATER TUBEROSITY OF RIGHT HUMERUS WITH ROUTINE HEALING, SUBSEQUENT ENCOUNTER: Primary | ICD-10-CM

## 2022-05-24 PROCEDURE — 3061F PR NEG MICROALBUMINURIA RESULT DOCUMENTED/REVIEW: ICD-10-PCS | Mod: CPTII,S$GLB,, | Performed by: ORTHOPAEDIC SURGERY

## 2022-05-24 PROCEDURE — 3008F BODY MASS INDEX DOCD: CPT | Mod: CPTII,S$GLB,, | Performed by: ORTHOPAEDIC SURGERY

## 2022-05-24 PROCEDURE — 99999 PR PBB SHADOW E&M-EST. PATIENT-LVL IV: ICD-10-PCS | Mod: PBBFAC,,, | Performed by: ORTHOPAEDIC SURGERY

## 2022-05-24 PROCEDURE — 73030 XR SHOULDER TRAUMA 3 VIEW RIGHT: ICD-10-PCS | Mod: 26,RT,, | Performed by: RADIOLOGY

## 2022-05-24 PROCEDURE — 1159F PR MEDICATION LIST DOCUMENTED IN MEDICAL RECORD: ICD-10-PCS | Mod: CPTII,S$GLB,, | Performed by: ORTHOPAEDIC SURGERY

## 2022-05-24 PROCEDURE — 3061F NEG MICROALBUMINURIA REV: CPT | Mod: CPTII,S$GLB,, | Performed by: ORTHOPAEDIC SURGERY

## 2022-05-24 PROCEDURE — 20610 LARGE JOINT ASPIRATION/INJECTION: R SUBACROMIAL BURSA: ICD-10-PCS | Mod: RT,S$GLB,, | Performed by: ORTHOPAEDIC SURGERY

## 2022-05-24 PROCEDURE — 3044F HG A1C LEVEL LT 7.0%: CPT | Mod: CPTII,S$GLB,, | Performed by: ORTHOPAEDIC SURGERY

## 2022-05-24 PROCEDURE — 99024 PR POST-OP FOLLOW-UP VISIT: ICD-10-PCS | Mod: S$GLB,,, | Performed by: ORTHOPAEDIC SURGERY

## 2022-05-24 PROCEDURE — 3066F NEPHROPATHY DOC TX: CPT | Mod: CPTII,S$GLB,, | Performed by: ORTHOPAEDIC SURGERY

## 2022-05-24 PROCEDURE — 1160F PR REVIEW ALL MEDS BY PRESCRIBER/CLIN PHARMACIST DOCUMENTED: ICD-10-PCS | Mod: CPTII,S$GLB,, | Performed by: ORTHOPAEDIC SURGERY

## 2022-05-24 PROCEDURE — 3008F PR BODY MASS INDEX (BMI) DOCUMENTED: ICD-10-PCS | Mod: CPTII,S$GLB,, | Performed by: ORTHOPAEDIC SURGERY

## 2022-05-24 PROCEDURE — 1159F MED LIST DOCD IN RCRD: CPT | Mod: CPTII,S$GLB,, | Performed by: ORTHOPAEDIC SURGERY

## 2022-05-24 PROCEDURE — 99999 PR PBB SHADOW E&M-EST. PATIENT-LVL IV: CPT | Mod: PBBFAC,,, | Performed by: ORTHOPAEDIC SURGERY

## 2022-05-24 PROCEDURE — 73030 X-RAY EXAM OF SHOULDER: CPT | Mod: 26,RT,, | Performed by: RADIOLOGY

## 2022-05-24 PROCEDURE — 99024 POSTOP FOLLOW-UP VISIT: CPT | Mod: S$GLB,,, | Performed by: ORTHOPAEDIC SURGERY

## 2022-05-24 PROCEDURE — 1160F RVW MEDS BY RX/DR IN RCRD: CPT | Mod: CPTII,S$GLB,, | Performed by: ORTHOPAEDIC SURGERY

## 2022-05-24 PROCEDURE — 73030 X-RAY EXAM OF SHOULDER: CPT | Mod: TC,PO,RT

## 2022-05-24 PROCEDURE — 3044F PR MOST RECENT HEMOGLOBIN A1C LEVEL <7.0%: ICD-10-PCS | Mod: CPTII,S$GLB,, | Performed by: ORTHOPAEDIC SURGERY

## 2022-05-24 PROCEDURE — 3066F PR DOCUMENTATION OF TREATMENT FOR NEPHROPATHY: ICD-10-PCS | Mod: CPTII,S$GLB,, | Performed by: ORTHOPAEDIC SURGERY

## 2022-05-24 PROCEDURE — 20610 DRAIN/INJ JOINT/BURSA W/O US: CPT | Mod: RT,S$GLB,, | Performed by: ORTHOPAEDIC SURGERY

## 2022-05-24 RX ORDER — METHOCARBAMOL 750 MG/1
750 TABLET, FILM COATED ORAL 4 TIMES DAILY PRN
Qty: 44 TABLET | Refills: 3 | Status: SHIPPED | OUTPATIENT
Start: 2022-05-24 | End: 2023-07-17 | Stop reason: SDUPTHER

## 2022-05-24 RX ORDER — TRIAMCINOLONE ACETONIDE 40 MG/ML
40 INJECTION, SUSPENSION INTRA-ARTICULAR; INTRAMUSCULAR
Status: DISCONTINUED | OUTPATIENT
Start: 2022-05-24 | End: 2022-05-24 | Stop reason: HOSPADM

## 2022-05-24 RX ADMIN — TRIAMCINOLONE ACETONIDE 40 MG: 40 INJECTION, SUSPENSION INTRA-ARTICULAR; INTRAMUSCULAR at 11:05

## 2022-05-24 NOTE — LETTER
May 24, 2022      [facility]  [address]  [place] [address]  Phone: [phone]       Patient: Concepcion De La Vega   YOB: 1970  Date of Visit: 05/24/2022    To Whom It May Concern:    CONTRERAS De La Vega  was at [facility] on 05/24/2022. The patient may return to work/school on 05/24/2022 part time until further notice with no restrictions. If you have any questions or concerns, or if I can be of further assistance, please do not hesitate to contact me.    Sincerely,    Glenn Weaver MD

## 2022-05-24 NOTE — PROCEDURES
Large Joint Aspiration/Injection: R subacromial bursa    Date/Time: 5/24/2022 11:00 AM  Performed by: Glenn Moody MD  Authorized by: Glenn Moody MD     Consent Done?:  Yes (Verbal)  Indications:  Pain  Timeout: prior to procedure the correct patient, procedure, and site was verified    Prep: patient was prepped and draped in usual sterile fashion      Local anesthesia used?: Yes    Local anesthetic:  Lidocaine 1% without epinephrine  Anesthetic total (ml):  5      Details:  Needle Size:  22 G  Ultrasonic Guidance for needle placement?: No    Approach:  Posterior  Location:  Shoulder  Site:  R subacromial bursa  Medications:  40 mg triamcinolone acetonide 40 mg/mL  Patient tolerance:  Patient tolerated the procedure well with no immediate complications

## 2022-05-24 NOTE — PROGRESS NOTES
Chief Complaint   Patient presents with    Right Shoulder - Injury         HPI:  52 y.o. returns to clinic today status post non-operative treatment of a  right greater tuberosity fracture 6 weeks ago. Pain is mild. Patient is compliant most of the time with restrictions.     R shoulder    No erythema or fluctuance. Overall normal alignment. No point TTP about the fracture site. Decreased ROM due to stiffness. Compartments soft. Skin intact. NVI distally.        X-rays were performed today, personally reviewed by me and findings discussed with the patient.  2 views of the right shoulder show healed fracture in good position    Closed nondisplaced fracture of greater tuberosity of right humerus with routine healing, subsequent encounter  -     X-Ray Shoulder Trauma 3 view Right; Future; Expected date: 05/24/2022  -     Ambulatory referral/consult to Physical/Occupational Therapy; Future; Expected date: 05/31/2022  -     Large Joint Aspiration/Injection: R subacromial bursa    Other orders  -     methocarbamoL (ROBAXIN) 750 MG Tab; Take 1 tablet (750 mg total) by mouth 4 (four) times daily as needed.  Dispense: 44 tablet; Refill: 3          Using an aseptic technique, I injected 5 cc of lidocaine 1% without and 1 cc of kenalog 40mg into the right Shoulder. The patient tolerated this well. I will have them return to clinic in 6 weeks.

## 2022-05-30 ENCOUNTER — OFFICE VISIT (OUTPATIENT)
Dept: FAMILY MEDICINE | Facility: CLINIC | Age: 52
End: 2022-05-30
Payer: COMMERCIAL

## 2022-05-30 VITALS
BODY MASS INDEX: 30.62 KG/M2 | OXYGEN SATURATION: 98 % | WEIGHT: 202.06 LBS | DIASTOLIC BLOOD PRESSURE: 74 MMHG | HEIGHT: 68 IN | SYSTOLIC BLOOD PRESSURE: 118 MMHG | HEART RATE: 88 BPM

## 2022-05-30 DIAGNOSIS — I10 PRIMARY HYPERTENSION: Primary | Chronic | ICD-10-CM

## 2022-05-30 DIAGNOSIS — E11.9 TYPE 2 DIABETES MELLITUS WITHOUT COMPLICATION, WITHOUT LONG-TERM CURRENT USE OF INSULIN: Chronic | ICD-10-CM

## 2022-05-30 PROCEDURE — 3066F NEPHROPATHY DOC TX: CPT | Mod: CPTII,S$GLB,, | Performed by: EMERGENCY MEDICINE

## 2022-05-30 PROCEDURE — 3008F PR BODY MASS INDEX (BMI) DOCUMENTED: ICD-10-PCS | Mod: CPTII,S$GLB,, | Performed by: EMERGENCY MEDICINE

## 2022-05-30 PROCEDURE — 3074F PR MOST RECENT SYSTOLIC BLOOD PRESSURE < 130 MM HG: ICD-10-PCS | Mod: CPTII,S$GLB,, | Performed by: EMERGENCY MEDICINE

## 2022-05-30 PROCEDURE — 3044F PR MOST RECENT HEMOGLOBIN A1C LEVEL <7.0%: ICD-10-PCS | Mod: CPTII,S$GLB,, | Performed by: EMERGENCY MEDICINE

## 2022-05-30 PROCEDURE — 99213 OFFICE O/P EST LOW 20 MIN: CPT | Mod: 24,S$GLB,, | Performed by: EMERGENCY MEDICINE

## 2022-05-30 PROCEDURE — 1159F PR MEDICATION LIST DOCUMENTED IN MEDICAL RECORD: ICD-10-PCS | Mod: CPTII,S$GLB,, | Performed by: EMERGENCY MEDICINE

## 2022-05-30 PROCEDURE — 3066F PR DOCUMENTATION OF TREATMENT FOR NEPHROPATHY: ICD-10-PCS | Mod: CPTII,S$GLB,, | Performed by: EMERGENCY MEDICINE

## 2022-05-30 PROCEDURE — 3061F NEG MICROALBUMINURIA REV: CPT | Mod: CPTII,S$GLB,, | Performed by: EMERGENCY MEDICINE

## 2022-05-30 PROCEDURE — 99213 PR OFFICE/OUTPT VISIT, EST, LEVL III, 20-29 MIN: ICD-10-PCS | Mod: 24,S$GLB,, | Performed by: EMERGENCY MEDICINE

## 2022-05-30 PROCEDURE — 3044F HG A1C LEVEL LT 7.0%: CPT | Mod: CPTII,S$GLB,, | Performed by: EMERGENCY MEDICINE

## 2022-05-30 PROCEDURE — 99999 PR PBB SHADOW E&M-EST. PATIENT-LVL III: ICD-10-PCS | Mod: PBBFAC,,, | Performed by: EMERGENCY MEDICINE

## 2022-05-30 PROCEDURE — 3061F PR NEG MICROALBUMINURIA RESULT DOCUMENTED/REVIEW: ICD-10-PCS | Mod: CPTII,S$GLB,, | Performed by: EMERGENCY MEDICINE

## 2022-05-30 PROCEDURE — 3074F SYST BP LT 130 MM HG: CPT | Mod: CPTII,S$GLB,, | Performed by: EMERGENCY MEDICINE

## 2022-05-30 PROCEDURE — 3078F DIAST BP <80 MM HG: CPT | Mod: CPTII,S$GLB,, | Performed by: EMERGENCY MEDICINE

## 2022-05-30 PROCEDURE — 3078F PR MOST RECENT DIASTOLIC BLOOD PRESSURE < 80 MM HG: ICD-10-PCS | Mod: CPTII,S$GLB,, | Performed by: EMERGENCY MEDICINE

## 2022-05-30 PROCEDURE — 99999 PR PBB SHADOW E&M-EST. PATIENT-LVL III: CPT | Mod: PBBFAC,,, | Performed by: EMERGENCY MEDICINE

## 2022-05-30 PROCEDURE — 1159F MED LIST DOCD IN RCRD: CPT | Mod: CPTII,S$GLB,, | Performed by: EMERGENCY MEDICINE

## 2022-05-30 PROCEDURE — 3008F BODY MASS INDEX DOCD: CPT | Mod: CPTII,S$GLB,, | Performed by: EMERGENCY MEDICINE

## 2022-05-30 NOTE — PROGRESS NOTES
A Subjective:   THIS NOTE IS DONE WITH VOICE RECOGNITION        Patient ID: Concepcion De La Vega is a 52 y.o. female.    Chief Complaint: Follow-up (Review labs)         Assessment:       1. Primary hypertension    2. Type 2 diabetes mellitus without complication, without long-term current use of insulin        Plan:       1. Primary hypertension  Improved  Routine blood work recommended at her convenience    - Comprehensive Metabolic Panel; Future    2. Type 2 diabetes mellitus without complication, without long-term current use of insulin  Significant improvement  Continue with current management  Recheck hemoglobin A1c in 3 months.    - Hemoglobin A1C; Future  - Comprehensive Metabolic Panel; Future        HPI     She is in to follow-up on her metabolic problems.  Her social situation remains complicated with her house being rebuilt, this will not be any time soon.    She also fractured her humerus.  She is currently in a sling.  Elbow function is good.  Anticipating removal of sling.  Healing has been unremarkable, but the fracture has interfered with her anticipated physical activity.    Here to follow up on blood pressure.  Taking current medications.    BP Readings from Last 3 Encounters:   05/30/22 118/74   03/16/22 129/64   01/19/22 (!) 144/82     Her diabetic control has improved.  She has seen Diabetes Education, and continues to work with him.    Lab Results   Component Value Date    HGBA1C 5.8 (H) 05/24/2022    HGBA1C 6.4 (H) 01/18/2022    HGBA1C 6.0 (H) 02/03/2021       Lab Results   Component Value Date    LDLCALC 119.0 01/18/2022    LDLCALC 115.4 02/03/2021    LDLCALC 107.6 11/16/2017    LDLCALC 107.6 11/16/2017       Lab Results   Component Value Date    CREATININE 0.91 01/18/2022    CREATININE 0.9 02/03/2021    CREATININE 1.0 11/16/2017       Lab Results   Component Value Date    EGFRNONAA >60 01/18/2022    EGFRNONAA >60.0 02/03/2021    EGFRNONAA >60.0 11/16/2017     Humerus fracture March 2022.  Left.   Scheduled to start physical therapy tomorrow.    Her mood is currently stable.  She is continuing to use Wellbutrin without significant side effects.  She feels the medicine has been helpful in would like to continue this.  No major sleep issues.  No anxiety issues.    Immunization History   Administered Date(s) Administered    COVID-19, MRNA, LN-S, PF (Pfizer) (Purple Cap) 12/23/2020, 01/27/2021, 11/23/2021    DT (Pediatric) 01/09/1971, 08/01/1987    DTP 08/20/1974    Influenza 10/08/2018, 10/20/2021    Influenza - Quadrivalent - PF *Preferred* (6 months and older) 11/02/2016, 10/16/2017, 10/01/2018, 10/09/2019, 11/18/2020, 10/26/2021    Influenza A (H1N1) 2009 Monovalent - IM 10/27/2009    Influenza Split 09/26/2007, 10/14/2008, 09/18/2009    OPV 05/15/1973, 08/20/1974    PPD Test 06/06/2008, 06/08/2009, 05/20/2010    Rubella 09/17/1969, 05/03/1972    Td (ADULT) 11/06/1992    Tdap 05/21/2007, 11/19/2013, 03/16/2022     Shingrix (recombinant shingles vaccine) has been discussed and recommended.    Current Outpatient Medications   Medication Sig Dispense Refill    acetaZOLAMIDE (DIAMOX) 250 MG tablet Take 1 tablet (250 mg total) by mouth 3 (three) times daily as needed (migraine). (Patient not taking: Reported on 5/24/2022) 20 tablet 4    albuterol (PROVENTIL/VENTOLIN HFA) 90 mcg/actuation inhaler Inhale 2 puffs into the lungs every 6 (six) hours as needed for Wheezing. 18 g 3    buPROPion (WELLBUTRIN XL) 150 MG TB24 tablet TAKE TWO TABLETS BY MOUTH EVERY  tablet 3    cholecalciferol, vitamin D3, (VITAMIN D3) 50 mcg (2,000 unit) Cap capsule Take by mouth once daily.      diclofenac (FLECTOR) 1.3 % PT12 Apply 1 patch topically once daily 30 patch 3    famotidine (PEPCID) 20 MG tablet Take 1 tablet (20 mg total) by mouth 2 (two) times daily. 60 tablet 3    loratadine (CLARITIN) 10 mg tablet Take 10 mg by mouth daily as needed.      methocarbamoL (ROBAXIN) 750 MG Tab Take 1 tablet (750 mg  total) by mouth 4 (four) times daily as needed. 44 tablet 3    mirabegron (MYRBETRIQ) 50 mg Tb24 Take 1 tablet (50 mg total) by mouth once daily. 30 tablet 11    montelukast (SINGULAIR) 10 mg tablet Take 1 tablet (10 mg total) by mouth once daily. 90 tablet 3     Current Facility-Administered Medications   Medication Dose Route Frequency Provider Last Rate Last Admin    methylPREDNISolone sodium succinate injection 125 mg  125 mg Intravenous Q6H Rubi Franklin, NP   125 mg at 10/30/18 1525         Review of Systems   Constitutional: Positive for activity change ( secondary to on fracture). Negative for fever.   HENT: Negative.    Respiratory: Negative for wheezing.    Cardiovascular: Negative.    Gastrointestinal: Negative for abdominal pain and diarrhea.   Endocrine: Negative for polydipsia, polyphagia and polyuria.   Genitourinary: Negative.    Musculoskeletal:        Recovering from fractured humerus.  Anticipating starting physical therapy soon   Neurological: Positive for headaches (Headaches have been improved and stable.).   Psychiatric/Behavioral: Positive for dysphoric mood ( currently controlled). Negative for sleep disturbance. The patient is not nervous/anxious.        Objective:      Physical Exam  Vitals reviewed.   Constitutional:       Appearance: Normal appearance. She is not ill-appearing.   HENT:      Head: Normocephalic.      Right Ear: External ear normal.      Left Ear: External ear normal.      Mouth/Throat:      Mouth: Mucous membranes are moist.      Pharynx: Oropharynx is clear.   Eyes:      Conjunctiva/sclera: Conjunctivae normal.   Cardiovascular:      Rate and Rhythm: Normal rate and regular rhythm.      Pulses: Normal pulses.   Pulmonary:      Effort: Pulmonary effort is normal.      Breath sounds: Normal breath sounds.   Abdominal:      General: There is no distension.   Musculoskeletal:      Cervical back: Normal range of motion and neck supple.      Comments: Arm sling secondary  to humeral fracture.  Range of motion of elbow is acceptable, will be starting rehab shortly   Skin:     General: Skin is warm.      Capillary Refill: Capillary refill takes less than 2 seconds.      Coloration: Skin is not pale.      Findings: No rash.   Neurological:      General: No focal deficit present.      Mental Status: She is alert and oriented to person, place, and time.      Cranial Nerves: No cranial nerve deficit.   Psychiatric:         Mood and Affect: Mood normal.         Thought Content: Thought content normal.

## 2022-05-31 ENCOUNTER — PATIENT MESSAGE (OUTPATIENT)
Dept: ADMINISTRATIVE | Facility: HOSPITAL | Age: 52
End: 2022-05-31
Payer: COMMERCIAL

## 2022-05-31 ENCOUNTER — CLINICAL SUPPORT (OUTPATIENT)
Dept: REHABILITATION | Facility: HOSPITAL | Age: 52
End: 2022-05-31
Attending: ORTHOPAEDIC SURGERY
Payer: COMMERCIAL

## 2022-05-31 DIAGNOSIS — R29.898 WEAKNESS OF RIGHT UPPER EXTREMITY: ICD-10-CM

## 2022-05-31 DIAGNOSIS — R26.89 DECREASED FUNCTIONAL MOBILITY: ICD-10-CM

## 2022-05-31 DIAGNOSIS — M25.511 CHRONIC RIGHT SHOULDER PAIN: ICD-10-CM

## 2022-05-31 DIAGNOSIS — M25.611 DECREASED RANGE OF MOTION OF RIGHT SHOULDER: ICD-10-CM

## 2022-05-31 DIAGNOSIS — S42.254D CLOSED NONDISPLACED FRACTURE OF GREATER TUBEROSITY OF RIGHT HUMERUS WITH ROUTINE HEALING, SUBSEQUENT ENCOUNTER: ICD-10-CM

## 2022-05-31 DIAGNOSIS — G89.29 CHRONIC RIGHT SHOULDER PAIN: ICD-10-CM

## 2022-05-31 PROCEDURE — 97161 PT EVAL LOW COMPLEX 20 MIN: CPT | Mod: PO

## 2022-05-31 PROCEDURE — 97110 THERAPEUTIC EXERCISES: CPT | Mod: PO

## 2022-05-31 PROCEDURE — 97140 MANUAL THERAPY 1/> REGIONS: CPT | Mod: PO

## 2022-05-31 NOTE — PLAN OF CARE
[facility]  Physical Therapy Initial Evaluation    Name: Jo Ann De La Vega  Clinic Number: [id number]    Therapy Diagnosis:   Encounter Diagnoses   Name Primary?    Closed nondisplaced fracture of greater tuberosity of right humerus with routine healing, subsequent encounter     Chronic right shoulder pain     Decreased range of motion of right shoulder     Weakness of right upper extremity     Decreased functional mobility      Physician: Glenn Moody MD    Physician Orders: PT Eval and Treat  Medical Diagnosis from Referral: S42.254D (ICD-10-CM) - Closed nondisplaced fracture of greater tuberosity of right humerus with routine healing, subsequent encounter   Evaluation Date: 5/31/2022  Authorization Period Expiration: 06/25/2022   Plan of Care Expiration: 9/2/22  Visit # / Visits authorized: 1 / 1    Time In: 4:00  Time Out: 5:00  Total Billable Time: 25 minutes    Precautions: Diabetes    Subjective   Date of onset: 3/16/22  History of current condition - JO ANN reports: Pt reports that she was running and fell and fractured her humerus. She still has a sling that she is to wear when is in crowded areas. Her doctor also to avoid lifting heavy objects at this time. 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 She likes to run and does dog agility with her dog, which she does competitively.   She is still taking a muscle relaxer, aspirin, and tylenol as needed.   She wakes up in pain throughout the night.      Medical History:   Past Medical History:   Diagnosis Date    Abnormal Pap smear     ASCUS negative HPV. Repeat pap    ADD (attention deficit disorder)     Arthritis     right knee    Asthma     exercise induced    Basal cell carcinoma 02/2018    Left upper back     BCC (basal cell carcinoma)     Right medial ankle  - ED& C     BCC (basal cell carcinoma) 2016    Left anterior thigh    Cystocele     with stress  incontinence    Depression     Dizziness     Fracture of sternum Nov 2010    from [employer] class    GERD (gastroesophageal reflux disease)     HEARING LOSS     Hearing loss in right ear     IBS (irritable bowel syndrome)     Intrauterine device     Mirena    Kidney stone 2009    PONV (postoperative nausea and vomiting)     requests Scopolamine patch    Primary hypertension 1/31/2022    SCC (squamous cell carcinoma), hand, right 2015    excisied [facility]     Seasonal allergies     deviated septum also    SI (sacroiliac) pain     Sinus pain July 3/2012    no fever, starting on antibiotics    Sinusitis     Skin tag of female perineum 2012    Squamous cell carcinoma of skin 09/2018    Left shin     TMJ (dislocation of temporomandibular joint)        Surgical History:   Concepcion De La Vega  has a past surgical history that includes Cholecystectomy; TONSILLECTOMY, ADENOIDECTOMY, BILATERAL yringotomy and tubes; External ear surgery; Dilation and curettage of uterus; Ureteral stent placement (2009); Anterior vaginal repair; Tonsillectomy; Colonoscopy; and LASIK (Bilateral, 2015).    Medications:   Concepcion has a current medication list which includes the following prescription(s): acetazolamide, albuterol, bupropion, cholecalciferol (vitamin d3), diclofenac, famotidine, loratadine, methocarbamol, myrbetriq, and montelukast, and the following Facility-Administered Medications: methylprednisolone sodium succinate.    Allergies:   Review of patient's allergies indicates:   Allergen Reactions    Venom-wasp Hives and Swelling    Oxytetracycline Swelling     Terramycin    Triple antibiotic [[id number]-pramoxine] Blisters    Adhesive Rash        Imaging,     MRI Studies: 04/29/2022   FINDINGS: There is an oblique fracture of the greater tuberosity of the humerus with minimal lateral displacement of the fracture fragment.  There is also a transverse fracture through the neck of the humerus with  surrounding bone marrow edema.  There is no displacement of this fracture line.  There is a small partial-thickness articular surface tear of the distal supraspinatus tendon near the attachment with the humerus.  There is no evidence of full-thickness tear or avulsion.  Other rotator cuff tendons appear normal.  The glenoid labrum and bicipital labral complex are intact. The biceps tendons appear normal.  The acromioclavicular joint is well maintained and there is no shoulder impingement.  Visualized ligaments are intact.  No muscular abnormalities are evident.  No significant joint effusion is present.      Xray: 03/16/2022   FINDINGS: There are changes most consistent with a fracture of the greater tuberosity.  Fracture is mildly displaced.  Humeral head is not dislocated.    Xray: 04/12/2022   FINDINGS: Displaced fracture of the right humerus greater tuberosity again demonstrated.  Fracture fragment is displaced superolaterally approximately 6 mm.  No definite additional fracture or dislocation.  Mild right AC joint arthrosis.    Xray: 04/27/2022   FINDINGS: Fracture of the greater tuberosity is noted with a step-off of approximately 4 mm present unchanged in alignment appearance since 04/12/2022.  Spurring is noted at the acromioclavicular joint.  Osseous demineralization may be present diffusely.     Xray: 05/24/2022   FINDINGS: There has been progressive ongoing healing of the minimally displaced oblique fracture through the greater tuberosity of the humerus.  The glenohumeral joint is well maintained and well aligned.  There is chronic mild degenerative arthrosis of the acromioclavicular joint.      Prior Therapy: Yes  Social History: Pt lives with their spouse  Occupation: LPN with [employer]  Prior Level of Function: Independent in all ADLs  Current Level of Function: Needs assistance with heavy lifting    Pain:  Current 4/10, worst 6/10, best 0/10   Location: right shoulder   Description: Sharp, Shooting and  muscle spasm  Aggravating Factors: Lifting  Easing Factors: rest    Pts goals: Reduce the pain, return to normal function    Objective     Observation: Pt has pain with movement in all directions. R shoulder sits lower than left shoulder. Pain largely limited MMT into flexion and abd.     Active Range of Motion: Measured in degrees  Shoulder Left Right   Flexion 180 70   Abduction 180 50   ER at 0 80 5   ER at 90 80 NT   IR 80 NT       Passive Range of Motion: Measured in degrees  Shoulder Left Right   Flexion 180 90   Abduction 180 80   ER at 0 80 10   ER at 90 80 NT   IR 80 NT     Upper Extremity Strength  Elbow Left Right   Biceps 5/5 3+/5   Triceps 4+/5 3+/5       Shoulder Left Right   Shoulder flexion: 5/5 3+/5   Shoulder Abduction: 5/5 4-/5   Shoulder ER 5/5 4/5   Shoulder IR 5/5 4/5       Palpation Shoulder:  TTP across the deltoid and in R upper trap         CMS Impairment/Limitation/Restriction for FOTO SHOULDER Survey    Therapist reviewed FOTO scores for Jo Ann De La Vega on 5/31/2022.   FOTO documents entered into [employer] - see Media section.    Limitation Score: 52%  Category: Mobility         TREATMENT   Treatment Time In: 4:35  Treatment Time Out: 5:00  Total Treatment time separate from Evaluation: 25 minutes    JO ANN received therapeutic exercises to develop strength, ROM and flexibility for 15 minutes including:  Pendulums, fwd/bwd, 1 min  Pendulums, side to side, 1 min  Pulleys, flexion, 1 min  Pulleys, scaption, 1 min  Supine shoulder flexion, 1x10  Supine ER stretch w/ wand, 1x10 w/ 3s hold  Shoulder isometrics, 10x 10s hold: flexion, ext, abd, add, ER, IR    JO ANN received the following manual therapy techniques: Joint mobilizations and Soft tissue Mobilization were applied to the: R shoulder for 10 minutes, including:  PROM  Posterior and inferior joint mobs  STM to R shoulder and upper trap      Home Exercises and Patient Education Provided    Education provided:   - Role of PT, PT POC, PT  diagnosis, PT prognosis, HEP, heat to relax upper trap, possible dry needling in future sessions for upper trap pain    Written Home Exercises Provided: yes.  Exercises were reviewed and JO ANN was able to demonstrate them prior to the end of the session.  JO ANN demonstrated good  understanding of the education provided.     See EMR under Patient Instructions for exercises provided 5/31/2022.    Assessment   Jo Ann is a 52 y.o. female referred to outpatient Physical Therapy with a medical diagnosis of S42.254D (ICD-10-CM) - Closed nondisplaced fracture of greater tuberosity of right humerus with routine healing, subsequent encounter. Physical exam is consistent with pain, decreased strength, ROM, and functional mobility secondary to R humeral fracture. Primary impairments include AROM, PROM, joint mobility, strength, soft tissue restrictions, and pain which limits functional mobility. This pt is a good candidate for skilled PT tx and stands to benefit from a combination of manual therapy including joint mobilizations with trigger point/myofacscial release, therapeutic exercise to establish core/joint stability, neuromuscular re-education, dry needling, and modalities Prn. The pt has been educated on their dx/POC and consents to further PT tx.    Pt prognosis is Good.   Pt will benefit from skilled outpatient Physical Therapy to address the deficits stated above and in the chart below, provide pt/family education, and to maximize pt's level of independence.     Plan of care discussed with patient: Yes  Pt's spiritual, cultural and educational needs considered and patient is agreeable to the plan of care and goals as stated below:     Anticipated Barriers for therapy: None    Medical Necessity is demonstrated by the following  History  Co-morbidities and personal factors that may impact the plan of care Co-morbidities:   depression, diabetes, difficulty sleeping, high BMI and HTN    Personal Factors:   no  deficits     moderate   Examination  Body Structures and Functions, activity limitations and participation restrictions that may impact the plan of care Body Regions:   upper extremities    Body Systems:    ROM  strength  motor control    Participation Restrictions:   None    Activity limitations:   Learning and applying knowledge  no deficits    General Tasks and Commands  no deficits    Communication  no deficits    Mobility  lifting and carrying objects    Self care  no deficits    Domestic Life  shopping  cooking  doing house work (cleaning house, washing dishes, laundry)  assisting others    Interactions/Relationships  no deficits    Life Areas  employment    Community and Social Life  recreation and leisure         moderate   Clinical Presentation stable and uncomplicated low   Decision Making/ Complexity Score: low     Goals:  Short Term Goals (6 Weeks):   1. Pt to increase strength to at least 4/5 of muscles tested to allow for improvement in functional activities and remediate dysfunctional movement patterns.  2. Pt to improve shoulder to full PROM to allow for improved functional mobility.   3. Pt to improve shoulder AROM to 130 degrees flexion to improve functional mobility.     Long Term Goals (12 Weeks):   1. Pt to achieve <37% disability as measured by the FOTO to demonstrate decreased disability with functional activities.   2. Pt to increase strength to at least 5/5 of muscles tested to allow for improvement in functional activities including.   3. Pt to improve gross shoulder motion to <10% limitations to allow for improved functional mobility with performing ADL's   4. Pt to report compliance with HEP 3x/week and demonstrate proper exercise technique to PT to show independence with self management of condition.    Plan   Plan of care Certification: 5/31/2022 to 9/2/22.    Outpatient Physical Therapy 2 times weekly for 10 weeks to include the following interventions: Aquatic Therapy, Electrical  Stimulation  , Manual Therapy, Moist Heat/ Ice, Neuromuscular Re-ed, Patient Education, Therapeutic Activities and Therapeutic Exercise.     Clark Tamayo, PT

## 2022-05-31 NOTE — PROGRESS NOTES
See chad,   Thank you for consult.     
MD notification
MD notification. Metoprolol held until verification with MD.

## 2022-06-01 PROBLEM — R29.898 WEAKNESS OF RIGHT UPPER EXTREMITY: Status: ACTIVE | Noted: 2022-06-01

## 2022-06-01 PROBLEM — M25.611 DECREASED RANGE OF MOTION OF RIGHT SHOULDER: Status: ACTIVE | Noted: 2022-06-01

## 2022-06-01 PROBLEM — R26.89 DECREASED FUNCTIONAL MOBILITY: Status: ACTIVE | Noted: 2022-06-01

## 2022-06-01 PROBLEM — M25.511 CHRONIC RIGHT SHOULDER PAIN: Status: ACTIVE | Noted: 2022-06-01

## 2022-06-01 PROBLEM — G89.29 CHRONIC RIGHT SHOULDER PAIN: Status: ACTIVE | Noted: 2022-06-01

## 2022-06-03 ENCOUNTER — CLINICAL SUPPORT (OUTPATIENT)
Dept: REHABILITATION | Facility: HOSPITAL | Age: 52
End: 2022-06-03
Attending: ORTHOPAEDIC SURGERY
Payer: COMMERCIAL

## 2022-06-03 DIAGNOSIS — R29.898 WEAKNESS OF RIGHT UPPER EXTREMITY: ICD-10-CM

## 2022-06-03 DIAGNOSIS — R26.89 DECREASED FUNCTIONAL MOBILITY: ICD-10-CM

## 2022-06-03 DIAGNOSIS — G89.29 CHRONIC RIGHT SHOULDER PAIN: Primary | ICD-10-CM

## 2022-06-03 DIAGNOSIS — M25.511 CHRONIC RIGHT SHOULDER PAIN: Primary | ICD-10-CM

## 2022-06-03 DIAGNOSIS — M25.611 DECREASED RANGE OF MOTION OF RIGHT SHOULDER: ICD-10-CM

## 2022-06-03 PROCEDURE — 97110 THERAPEUTIC EXERCISES: CPT | Mod: PO,CQ

## 2022-06-03 PROCEDURE — 97140 MANUAL THERAPY 1/> REGIONS: CPT | Mod: PO,CQ

## 2022-06-03 NOTE — PROGRESS NOTES
Physical Therapy Daily Treatment Note     Name: Jo Ann De La Vega  Clinic Number: [id number]    Therapy Diagnosis:   Encounter Diagnoses   Name Primary?    Chronic right shoulder pain Yes    Decreased range of motion of right shoulder     Weakness of right upper extremity     Decreased functional mobility      Physician: Glenn Moody MD    Visit Date: 6/3/2022  Physician Orders: PT Eval and Treat  Medical Diagnosis from Referral: S42.254D (ICD-10-CM) - Closed nondisplaced fracture of greater tuberosity of right humerus with routine healing, subsequent encounter   Evaluation Date: 5/31/2022  Authorization Period Expiration: 06/25/2022   Plan of Care Expiration: 9/2/22  Visit # / Visits authorized: 1/20     Time In: 1217   Time Out:1300   Total Billable Time: 43 minutes     Precautions: Diabetes      Subjective     Pt reports: Yesterday was her first full day without a sling.  Pt reports she took some aspirin, tylenol, and muscle relaxer which helps.  Pt reports the pain is more lateral deltoid and it feels like a burning or sharp pain.  Pt reports she ordered a pulley for home.  She was compliant with home exercise program.   Response to previous treatment: soreness but till the next morning  Functional change: no sling    Pain: 3/10  Location: right shoulder       Objective       JO ANN received therapeutic exercises to develop strength, ROM and flexibility for 33 minutes including:    Pendulums, fwd/bwd, 1 min  Pendulums, side to side, 1 min  Pulleys, flexion, 2 min  Pulleys, scaption, 2 min  Supine shoulder flexion with wand, 1x10  Supine ER stretch w/ wand, 1x10 w/ 3s hold  Shoulder isometrics, 10x 10s hold: flexion, ext, abd, add, ER, IR     JO ANN received the following manual therapy techniques: Joint mobilizations and Soft tissue Mobilization were applied to the: R shoulder for 10 minutes, including:  STM to R deltoid and lateral deltoid       Home Exercises Provided and Patient Education Provided      Education provided:   - HEP    Written Home Exercises Provided: Patient instructed to cont prior HEP.  Exercises were reviewed and JO ANN was able to demonstrate them prior to the end of the session.  JO ANN demonstrated good  understanding of the education provided.     See EMR under Patient Instructions for exercises provided prior visit.    Assessment     Pt with improved ROM at the end of the treatment session.  Pt reported decreased muscle tightness following soft tissue to R shoulder.  Pt educated on the importance of progressing ROM and performing ROM exercises at home.  Pt verbalized understanding.  Pt would good recall of isometric exercises.  JO ANN Is progressing well towards her goals.   Pt prognosis is Good.     Pt will continue to benefit from skilled outpatient physical therapy to address the deficits listed in the problem list box on initial evaluation, provide pt/family education and to maximize pt's level of independence in the home and community environment.     Pt's spiritual, cultural and educational needs considered and pt agreeable to plan of care and goals.    Anticipated barriers to physical therapy: None    Goals:     Short Term Goals (6 Weeks):   1. Pt to increase strength to at least 4/5 of muscles tested to allow for improvement in functional activities and remediate dysfunctional movement patterns.  2. Pt to improve shoulder to full PROM to allow for improved functional mobility.   3. Pt to improve shoulder AROM to 130 degrees flexion to improve functional mobility.      Long Term Goals (12 Weeks):   1. Pt to achieve <37% disability as measured by the FOTO to demonstrate decreased disability with functional activities.   2. Pt to increase strength to at least 5/5 of muscles tested to allow for improvement in functional activities including.   3. Pt to improve gross shoulder motion to <10% limitations to allow for improved functional mobility with performing ADL's   4. Pt to  report compliance with HEP 3x/week and demonstrate proper exercise technique to PT to show independence with self management of condition      Plan     Plan of care Certification: 5/31/2022 to 9/2/22.         Shanda Metcalf, PTA

## 2022-06-07 NOTE — PROGRESS NOTES
Physical Therapy Daily Treatment Note     Name: Jo Ann De La Vega  [facility] Number: [id number]    Therapy Diagnosis:   Encounter Diagnoses   Name Primary?    Chronic right shoulder pain Yes    Decreased range of motion of right shoulder     Weakness of right upper extremity     Decreased functional mobility      Physician: Glenn Moody MD    Visit Date: 6/8/2022  Physician Orders: PT Eval and Treat  Medical Diagnosis from Referral: S42.254D (ICD-10-CM) - Closed nondisplaced fracture of greater tuberosity of right humerus with routine healing, subsequent encounter   Evaluation Date: 5/31/2022  Authorization Period Expiration: 06/25/2022   Plan of Care Expiration: 9/2/22  Visit # / Visits authorized: 2/20     Time In: 4:49 pm  Time Out: 5:34 pm  Total Billable Time: 45 minutes     Precautions: Diabetes      Subjective     Pt reports: She has been using the pulleys at least twice a day at work.  Pain is more on top of the shoulder and by the shoulder blade.  Pt has noticed at the end of the day  She was compliant with home exercise program.   Response to previous treatment: mild soreness  Functional change: no sling    Pain: 4/10  Location: right shoulder       Objective       JO ANN received therapeutic exercises to develop strength, ROM and flexibility for 33 minutes including:    Pendulums, fwd/bwd, 1 min  Pendulums, side to side, 1 min  Pulleys, flexion, 2 min  Pulleys, scaption, 2 min  Pulleys, abduction, 2 min  Supine shoulder flexion with wand, 1x15  Supine ER stretch w/ wand, 2x10 w/ 3s hold  Seated scapular retraction, 2 x 10  Shoulder isometrics, 10x 10s hold: flexion, ext, abd, add, ER, IR  IR and ER walkouts, YTB, x 10     JO ANN received the following manual therapy techniques: Joint mobilizations and Soft tissue Mobilization were applied to the: R shoulder for 5 minutes, including:  STM to R UT       Home Exercises Provided and Patient Education Provided     Education provided:   - HEP    Written Home  Exercises Provided: yes.  Exercises were reviewed and JO ANN was able to demonstrate them prior to the end of the session.  JO ANN demonstrated good  understanding of the education provided.     See EMR under Patient Instructions for exercises provided prior visit.    Assessment     Pt with improved ROM getting to about 110 in flexion with AAROM of wand.  Pt able to get to around 90 degrees of abduction on the pulleys.  Pt still reports shoulder ER to be the most limiting.  Will continue working on ROM and progressing as tolerated.  JO ANN Is progressing well towards her goals.   Pt prognosis is Good.     Pt will continue to benefit from skilled outpatient physical therapy to address the deficits listed in the problem list box on initial evaluation, provide pt/family education and to maximize pt's level of independence in the home and community environment.     Pt's spiritual, cultural and educational needs considered and pt agreeable to plan of care and goals.    Anticipated barriers to physical therapy: None    Goals:     Short Term Goals (6 Weeks):   1. Pt to increase strength to at least 4/5 of muscles tested to allow for improvement in functional activities and remediate dysfunctional movement patterns.  2. Pt to improve shoulder to full PROM to allow for improved functional mobility.   3. Pt to improve shoulder AROM to 130 degrees flexion to improve functional mobility.      Long Term Goals (12 Weeks):   1. Pt to achieve <37% disability as measured by the FOTO to demonstrate decreased disability with functional activities.   2. Pt to increase strength to at least 5/5 of muscles tested to allow for improvement in functional activities including.   3. Pt to improve gross shoulder motion to <10% limitations to allow for improved functional mobility with performing ADL's   4. Pt to report compliance with HEP 3x/week and demonstrate proper exercise technique to PT to show independence with self management of  condition      Plan     Plan of care Certification: 5/31/2022 to 9/2/22.         Shanda Metcalf, PTA

## 2022-06-08 ENCOUNTER — PATIENT OUTREACH (OUTPATIENT)
Dept: ADMINISTRATIVE | Facility: HOSPITAL | Age: 52
End: 2022-06-08
Payer: COMMERCIAL

## 2022-06-08 ENCOUNTER — CLINICAL SUPPORT (OUTPATIENT)
Dept: REHABILITATION | Facility: HOSPITAL | Age: 52
End: 2022-06-08
Attending: ORTHOPAEDIC SURGERY
Payer: COMMERCIAL

## 2022-06-08 DIAGNOSIS — M25.511 CHRONIC RIGHT SHOULDER PAIN: Primary | ICD-10-CM

## 2022-06-08 DIAGNOSIS — R29.898 WEAKNESS OF RIGHT UPPER EXTREMITY: ICD-10-CM

## 2022-06-08 DIAGNOSIS — G89.29 CHRONIC RIGHT SHOULDER PAIN: Primary | ICD-10-CM

## 2022-06-08 DIAGNOSIS — R26.89 DECREASED FUNCTIONAL MOBILITY: ICD-10-CM

## 2022-06-08 DIAGNOSIS — M25.611 DECREASED RANGE OF MOTION OF RIGHT SHOULDER: ICD-10-CM

## 2022-06-08 PROCEDURE — 97110 THERAPEUTIC EXERCISES: CPT | Mod: PO,CQ

## 2022-06-09 NOTE — PROGRESS NOTES
Physical Therapy Daily Treatment Note     Name: Jo Ann De La Vega  Clinic Number: [id number]    Therapy Diagnosis:   Encounter Diagnoses   Name Primary?    Chronic right shoulder pain Yes    Decreased range of motion of right shoulder     Weakness of right upper extremity     Decreased functional mobility      Physician: Glenn Moody MD    Visit Date: 6/10/2022  Physician Orders: PT Eval and Treat  Medical Diagnosis from Referral: S42.254D (ICD-10-CM) - Closed nondisplaced fracture of greater tuberosity of right humerus with routine healing, subsequent encounter   Evaluation Date: 5/31/2022  Authorization Period Expiration: 06/25/2022   Plan of Care Expiration: 9/2/22  Visit # / Visits authorized: 3/20     Time In: 1232  Time Out: 1324  Total Billable Time: 26 minutes     Precautions: Diabetes      Subjective     Pt reports: Still having pain. Feels like her like ROM is slowly improving.   She was compliant with home exercise program.   Response to previous treatment: mild soreness  Functional change: no sling    Pain: 4/10  Location: right shoulder       Objective       JO ANN received therapeutic exercises to develop strength, ROM and flexibility for 27 minutes including:    Pulleys, flexion, 2 min  Pulleys, scaption, 2 min   PROM of (R) shoulder to tolerance  Supine shoulder flexion with wand,2x 10 3#  Serratus punch 2 x 10 3#  Supine ER stretch w/ wand, 2x10 w/ 3s hold  Rhythmic stabilization 2 x 30s   Seated scapular retraction, 2 x 10 YTB  Finger walks  Shoulder isometrics, 10x 10s hold: flexion, ext, abd, add, ER, IR  IR and ER walkouts, YTB, x 10       JO ANN received the following manual therapy techniques: Joint mobilizations and Soft tissue Mobilization were applied to the: R shoulder for 25  minutes, including:  GH joint mobs grade I-IV with PA/AP/inferior    Home Exercises Provided and Patient Education Provided     Education provided:   - HEP    Written Home Exercises Provided: yes.  Exercises were  reviewed and JO ANN was able to demonstrate them prior to the end of the session.  JO ANN demonstrated good  understanding of the education provided.     See EMR under Patient Instructions for exercises provided prior visit.    Assessment     Pt tolerated shoulder joint mobilization well with improved shoulder ROM noted following treatment today. Good stretch felt with AAROM into flexion and muscle activation with banded isometrics.     JO ANN Is progressing well towards her goals.   Pt prognosis is Good.     Pt will continue to benefit from skilled outpatient physical therapy to address the deficits listed in the problem list box on initial evaluation, provide pt/family education and to maximize pt's level of independence in the home and community environment.     Pt's spiritual, cultural and educational needs considered and pt agreeable to plan of care and goals.    Anticipated barriers to physical therapy: None    Goals:     Short Term Goals (6 Weeks):   1. Pt to increase strength to at least 4/5 of muscles tested to allow for improvement in functional activities and remediate dysfunctional movement patterns.  2. Pt to improve shoulder to full PROM to allow for improved functional mobility.   3. Pt to improve shoulder AROM to 130 degrees flexion to improve functional mobility.      Long Term Goals (12 Weeks):   1. Pt to achieve <37% disability as measured by the FOTO to demonstrate decreased disability with functional activities.   2. Pt to increase strength to at least 5/5 of muscles tested to allow for improvement in functional activities including.   3. Pt to improve gross shoulder motion to <10% limitations to allow for improved functional mobility with performing ADL's   4. Pt to report compliance with HEP 3x/week and demonstrate proper exercise technique to PT to show independence with self management of condition      Plan     Continue emphasizing shoulder ROM, rotator cuff strength      Gregorio KELLY  Alanna, PT

## 2022-06-10 ENCOUNTER — CLINICAL SUPPORT (OUTPATIENT)
Dept: REHABILITATION | Facility: HOSPITAL | Age: 52
End: 2022-06-10
Attending: ORTHOPAEDIC SURGERY
Payer: COMMERCIAL

## 2022-06-10 DIAGNOSIS — R29.898 WEAKNESS OF RIGHT UPPER EXTREMITY: ICD-10-CM

## 2022-06-10 DIAGNOSIS — M25.611 DECREASED RANGE OF MOTION OF RIGHT SHOULDER: ICD-10-CM

## 2022-06-10 DIAGNOSIS — R26.89 DECREASED FUNCTIONAL MOBILITY: ICD-10-CM

## 2022-06-10 DIAGNOSIS — G89.29 CHRONIC RIGHT SHOULDER PAIN: Primary | ICD-10-CM

## 2022-06-10 DIAGNOSIS — M25.511 CHRONIC RIGHT SHOULDER PAIN: Primary | ICD-10-CM

## 2022-06-10 PROCEDURE — 97140 MANUAL THERAPY 1/> REGIONS: CPT | Mod: PO

## 2022-06-10 PROCEDURE — 97110 THERAPEUTIC EXERCISES: CPT | Mod: PO

## 2022-06-13 ENCOUNTER — PATIENT MESSAGE (OUTPATIENT)
Dept: FAMILY MEDICINE | Facility: CLINIC | Age: 52
End: 2022-06-13
Payer: COMMERCIAL

## 2022-06-14 ENCOUNTER — CLINICAL SUPPORT (OUTPATIENT)
Dept: REHABILITATION | Facility: HOSPITAL | Age: 52
End: 2022-06-14
Attending: ORTHOPAEDIC SURGERY
Payer: COMMERCIAL

## 2022-06-14 ENCOUNTER — OFFICE VISIT (OUTPATIENT)
Dept: FAMILY MEDICINE | Facility: CLINIC | Age: 52
End: 2022-06-14
Payer: COMMERCIAL

## 2022-06-14 VITALS
WEIGHT: 204.81 LBS | DIASTOLIC BLOOD PRESSURE: 74 MMHG | HEART RATE: 84 BPM | BODY MASS INDEX: 31.04 KG/M2 | SYSTOLIC BLOOD PRESSURE: 128 MMHG | OXYGEN SATURATION: 97 % | HEIGHT: 68 IN

## 2022-06-14 DIAGNOSIS — B37.31 VAGINAL CANDIDA: ICD-10-CM

## 2022-06-14 DIAGNOSIS — R26.89 DECREASED FUNCTIONAL MOBILITY: ICD-10-CM

## 2022-06-14 DIAGNOSIS — G89.29 CHRONIC RIGHT SHOULDER PAIN: Primary | ICD-10-CM

## 2022-06-14 DIAGNOSIS — R30.0 DYSURIA: Primary | ICD-10-CM

## 2022-06-14 DIAGNOSIS — M25.511 CHRONIC RIGHT SHOULDER PAIN: Primary | ICD-10-CM

## 2022-06-14 DIAGNOSIS — R29.898 WEAKNESS OF RIGHT UPPER EXTREMITY: ICD-10-CM

## 2022-06-14 DIAGNOSIS — M25.611 DECREASED RANGE OF MOTION OF RIGHT SHOULDER: ICD-10-CM

## 2022-06-14 LAB
BILIRUB UR QL STRIP: NEGATIVE
CLARITY UR: CLEAR
COLOR UR: ABNORMAL
GLUCOSE UR QL STRIP: NEGATIVE
HGB UR QL STRIP: ABNORMAL
KETONES UR QL STRIP: NEGATIVE
LEUKOCYTE ESTERASE UR QL STRIP: NEGATIVE
NITRITE UR QL STRIP: NEGATIVE
PH UR STRIP: 6 [PH] (ref 5–8)
PROT UR QL STRIP: NEGATIVE
SP GR UR STRIP: 1.02 (ref 1–1.03)
URN SPEC COLLECT METH UR: ABNORMAL

## 2022-06-14 PROCEDURE — 1159F PR MEDICATION LIST DOCUMENTED IN MEDICAL RECORD: ICD-10-PCS | Mod: CPTII,S$GLB,, | Performed by: PHYSICIAN ASSISTANT

## 2022-06-14 PROCEDURE — 1160F PR REVIEW ALL MEDS BY PRESCRIBER/CLIN PHARMACIST DOCUMENTED: ICD-10-PCS | Mod: CPTII,S$GLB,, | Performed by: PHYSICIAN ASSISTANT

## 2022-06-14 PROCEDURE — 99213 PR OFFICE/OUTPT VISIT, EST, LEVL III, 20-29 MIN: ICD-10-PCS | Mod: S$GLB,,, | Performed by: PHYSICIAN ASSISTANT

## 2022-06-14 PROCEDURE — 1159F MED LIST DOCD IN RCRD: CPT | Mod: CPTII,S$GLB,, | Performed by: PHYSICIAN ASSISTANT

## 2022-06-14 PROCEDURE — 99213 OFFICE O/P EST LOW 20 MIN: CPT | Mod: S$GLB,,, | Performed by: PHYSICIAN ASSISTANT

## 2022-06-14 PROCEDURE — 3061F PR NEG MICROALBUMINURIA RESULT DOCUMENTED/REVIEW: ICD-10-PCS | Mod: CPTII,S$GLB,, | Performed by: PHYSICIAN ASSISTANT

## 2022-06-14 PROCEDURE — 3078F DIAST BP <80 MM HG: CPT | Mod: CPTII,S$GLB,, | Performed by: PHYSICIAN ASSISTANT

## 2022-06-14 PROCEDURE — 81003 URINALYSIS AUTO W/O SCOPE: CPT | Mod: PO | Performed by: PHYSICIAN ASSISTANT

## 2022-06-14 PROCEDURE — 1160F RVW MEDS BY RX/DR IN RCRD: CPT | Mod: CPTII,S$GLB,, | Performed by: PHYSICIAN ASSISTANT

## 2022-06-14 PROCEDURE — 97140 MANUAL THERAPY 1/> REGIONS: CPT | Mod: PO

## 2022-06-14 PROCEDURE — 3044F HG A1C LEVEL LT 7.0%: CPT | Mod: CPTII,S$GLB,, | Performed by: PHYSICIAN ASSISTANT

## 2022-06-14 PROCEDURE — 97110 THERAPEUTIC EXERCISES: CPT | Mod: PO

## 2022-06-14 PROCEDURE — 3044F PR MOST RECENT HEMOGLOBIN A1C LEVEL <7.0%: ICD-10-PCS | Mod: CPTII,S$GLB,, | Performed by: PHYSICIAN ASSISTANT

## 2022-06-14 PROCEDURE — 3078F PR MOST RECENT DIASTOLIC BLOOD PRESSURE < 80 MM HG: ICD-10-PCS | Mod: CPTII,S$GLB,, | Performed by: PHYSICIAN ASSISTANT

## 2022-06-14 PROCEDURE — 3061F NEG MICROALBUMINURIA REV: CPT | Mod: CPTII,S$GLB,, | Performed by: PHYSICIAN ASSISTANT

## 2022-06-14 PROCEDURE — 3074F SYST BP LT 130 MM HG: CPT | Mod: CPTII,S$GLB,, | Performed by: PHYSICIAN ASSISTANT

## 2022-06-14 PROCEDURE — 3066F PR DOCUMENTATION OF TREATMENT FOR NEPHROPATHY: ICD-10-PCS | Mod: CPTII,S$GLB,, | Performed by: PHYSICIAN ASSISTANT

## 2022-06-14 PROCEDURE — 87086 URINE CULTURE/COLONY COUNT: CPT | Performed by: PHYSICIAN ASSISTANT

## 2022-06-14 PROCEDURE — 3074F PR MOST RECENT SYSTOLIC BLOOD PRESSURE < 130 MM HG: ICD-10-PCS | Mod: CPTII,S$GLB,, | Performed by: PHYSICIAN ASSISTANT

## 2022-06-14 PROCEDURE — 3008F PR BODY MASS INDEX (BMI) DOCUMENTED: ICD-10-PCS | Mod: CPTII,S$GLB,, | Performed by: PHYSICIAN ASSISTANT

## 2022-06-14 PROCEDURE — 99999 PR PBB SHADOW E&M-EST. PATIENT-LVL IV: ICD-10-PCS | Mod: PBBFAC,,, | Performed by: PHYSICIAN ASSISTANT

## 2022-06-14 PROCEDURE — 3008F BODY MASS INDEX DOCD: CPT | Mod: CPTII,S$GLB,, | Performed by: PHYSICIAN ASSISTANT

## 2022-06-14 PROCEDURE — 3066F NEPHROPATHY DOC TX: CPT | Mod: CPTII,S$GLB,, | Performed by: PHYSICIAN ASSISTANT

## 2022-06-14 PROCEDURE — 99999 PR PBB SHADOW E&M-EST. PATIENT-LVL IV: CPT | Mod: PBBFAC,,, | Performed by: PHYSICIAN ASSISTANT

## 2022-06-14 RX ORDER — NITROFURANTOIN 25; 75 MG/1; MG/1
100 CAPSULE ORAL 2 TIMES DAILY
Qty: 10 CAPSULE | Refills: 0 | Status: SHIPPED | OUTPATIENT
Start: 2022-06-14 | End: 2022-06-19

## 2022-06-14 RX ORDER — FLUCONAZOLE 150 MG/1
150 TABLET ORAL DAILY
Qty: 1 TABLET | Refills: 0 | Status: SHIPPED | OUTPATIENT
Start: 2022-06-14 | End: 2022-06-15

## 2022-06-14 NOTE — PROGRESS NOTES
"Subjective:      Patient ID: Concepcion De La Vega is a 52 y.o. female.    Chief Complaint: Urinary Tract Infection (Painful urination and frequency; for 3 days)    HPI   Patient has PMH of HTN, depression, migraines, Type 2 DM, and nephrolithiasis.  History of UTIs.    Patient reports dysuria and urinary frequency for 3 days.  Taken pyridium and methylene blue with some resolution of symptoms.  Patient denies fever, abdominal pain, or hematuria.    Review of Systems   Constitutional: Negative for appetite change, chills and fever.   Respiratory: Negative for shortness of breath.    Cardiovascular: Negative for chest pain.   Gastrointestinal: Positive for constipation. Negative for abdominal pain, blood in stool, diarrhea, nausea and vomiting.   Genitourinary: Positive for dysuria, flank pain (left), frequency and urgency. Negative for hematuria.       Objective:   /74   Pulse 84   Ht 5' 7.5" (1.715 m)   Wt 92.9 kg (204 lb 12.9 oz)   SpO2 97%   BMI 31.60 kg/m²      Physical Exam  Vitals reviewed.   Constitutional:       General: She is not in acute distress.     Appearance: Normal appearance. She is well-developed and well-groomed.   HENT:      Head: Normocephalic and atraumatic.      Right Ear: Hearing and external ear normal.      Left Ear: Hearing and external ear normal.   Eyes:      General: Lids are normal.      Conjunctiva/sclera: Conjunctivae normal.   Neck:      Trachea: Phonation normal.   Cardiovascular:      Rate and Rhythm: Normal rate and regular rhythm.      Heart sounds: Normal heart sounds. No murmur heard.    No friction rub. No gallop.   Pulmonary:      Effort: Pulmonary effort is normal. No respiratory distress.      Breath sounds: Normal breath sounds. No decreased breath sounds, wheezing, rhonchi or rales.   Abdominal:      General: Bowel sounds are normal.      Palpations: Abdomen is soft.      Tenderness: There is no abdominal tenderness. There is no right CVA tenderness or left CVA " tenderness.   Musculoskeletal:         General: Normal range of motion.      Cervical back: Normal range of motion.   Skin:     General: Skin is warm and dry.      Findings: No rash.   Neurological:      General: No focal deficit present.      Mental Status: She is alert and oriented to person, place, and time.   Psychiatric:         Attention and Perception: Attention normal.         Mood and Affect: Mood normal.         Speech: Speech normal.         Behavior: Behavior normal. Behavior is cooperative.         Cognition and Memory: Cognition normal.         Judgment: Judgment normal.        Assessment:      1. Dysuria    2. Vaginal candida       Plan:   1. Dysuria  - Urinalysis, Reflex to Urine Culture Urine, Clean Catch  - nitrofurantoin, macrocrystal-monohydrate, (MACROBID) 100 MG capsule; Take 1 capsule (100 mg total) by mouth 2 (two) times daily. for 5 days  Dispense: 10 capsule; Refill: 0  - CULTURE, URINE    2. Vaginal candida  Patient requested this.  - fluconazole (DIFLUCAN) 150 MG Tab; Take 1 tablet (150 mg total) by mouth once daily. for 1 day  Dispense: 1 tablet; Refill: 0    Follow up as needed.  Patient agreed with plan and expressed understanding.    Thank you for allowing me to serve you,

## 2022-06-14 NOTE — PROGRESS NOTES
Physical Therapy Daily Treatment Note     Name: Jo Ann De La Vega  Clinic Number: [id number]    Therapy Diagnosis:   Encounter Diagnoses   Name Primary?    Chronic right shoulder pain Yes    Decreased range of motion of right shoulder     Weakness of right upper extremity     Decreased functional mobility      Physician: Glenn Moody MD    Visit Date: 6/14/2022  Physician Orders: PT Eval and Treat  Medical Diagnosis from Referral: S42.254D (ICD-10-CM) - Closed nondisplaced fracture of greater tuberosity of right humerus with routine healing, subsequent encounter   Evaluation Date: 5/31/2022  Authorization Period Expiration: 12/31/2022   Plan of Care Expiration: 9/2/22  Visit # / Visits authorized: 4 /20     Time In: 12:05  Time Out: 1:00  Total Billable Time: 55 minutes     Precautions: Diabetes    Subjective     Pt reports: Shoulder is doing ok. She was unable to do her HEP yesterday, but she has been doing it every day at work. Still having trouble raising her arm overhead. Has not used her sling since initial eval.   She was compliant with home exercise program.   Response to previous treatment: mild soreness  Functional change: no sling    Pain: 2 /10  Location: right shoulder     Objective     JO ANN received therapeutic exercises to develop strength, ROM and flexibility for 30 minutes including:  Pulleys, flexion, 2 min  Pulleys, scaption, 2 min   PROM of (R) shoulder to tolerance  Supine shoulder flexion with wand, 2x10 3#  Supine ER stretch w/ wand, 3x10 w/ 3s hold - 1 set at 90 degrees abd   Serratus punch, 2 x 10 3#  Rhythmic stabilization 2 x 30s   Shoulder isometrics, 10x 10s hold: flexion, ext, abd, add, ER, IR  Seated scapular retraction, 2x10 YTB   IR and ER walkouts, YTB, 1x10    Finger walks     JO ANN received the following manual therapy techniques: Joint mobilizations and Soft tissue Mobilization were applied to the: R shoulder for 25 minutes, including:  GH joint mobs grade I-IV with  PA/AP/inferior    Home Exercises Provided and Patient Education Provided     Education provided:   - HEP    Written Home Exercises Provided: yes.  Exercises were reviewed and JO ANN was able to demonstrate them prior to the end of the session.  JO ANN demonstrated good  understanding of the education provided.     See EMR under Patient Instructions for exercises provided prior visit.    Assessment     Pt tolerated shoulder joint mobilization well with improved shoulder ROM noted following treatment today. Good stretch felt with AAROM into flexion and muscle activation with banded isometrics.     JO ANN Is progressing well towards her goals.   Pt prognosis is Good.     Pt will continue to benefit from skilled outpatient physical therapy to address the deficits listed in the problem list box on initial evaluation, provide pt/family education and to maximize pt's level of independence in the home and community environment.     Pt's spiritual, cultural and educational needs considered and pt agreeable to plan of care and goals.    Anticipated barriers to physical therapy: None    Goals:   Short Term Goals (6 Weeks):   1. Pt to increase strength to at least 4/5 of muscles tested to allow for improvement in functional activities and remediate dysfunctional movement patterns.  2. Pt to improve shoulder to full PROM to allow for improved functional mobility.   3. Pt to improve shoulder AROM to 130 degrees flexion to improve functional mobility.      Long Term Goals (12 Weeks):   1. Pt to achieve <37% disability as measured by the FOTO to demonstrate decreased disability with functional activities.   2. Pt to increase strength to at least 5/5 of muscles tested to allow for improvement in functional activities including.   3. Pt to improve gross shoulder motion to <10% limitations to allow for improved functional mobility with performing ADL's   4. Pt to report compliance with HEP 3x/week and demonstrate proper exercise  technique to PT to show independence with self management of condition    Plan     Continue emphasizing shoulder ROM, rotator cuff strength      Clark Tamayo, PT

## 2022-06-15 LAB — BACTERIA UR CULT: NO GROWTH

## 2022-06-16 ENCOUNTER — CLINICAL SUPPORT (OUTPATIENT)
Dept: REHABILITATION | Facility: HOSPITAL | Age: 52
End: 2022-06-16
Attending: ORTHOPAEDIC SURGERY
Payer: COMMERCIAL

## 2022-06-16 DIAGNOSIS — R29.898 WEAKNESS OF RIGHT UPPER EXTREMITY: ICD-10-CM

## 2022-06-16 DIAGNOSIS — R26.89 DECREASED FUNCTIONAL MOBILITY: ICD-10-CM

## 2022-06-16 DIAGNOSIS — M25.611 DECREASED RANGE OF MOTION OF RIGHT SHOULDER: ICD-10-CM

## 2022-06-16 DIAGNOSIS — M25.511 CHRONIC RIGHT SHOULDER PAIN: Primary | ICD-10-CM

## 2022-06-16 DIAGNOSIS — G89.29 CHRONIC RIGHT SHOULDER PAIN: Primary | ICD-10-CM

## 2022-06-16 PROCEDURE — 97110 THERAPEUTIC EXERCISES: CPT | Mod: PO

## 2022-06-16 PROCEDURE — 97112 NEUROMUSCULAR REEDUCATION: CPT | Mod: PO

## 2022-06-16 NOTE — PROGRESS NOTES
Physical Therapy Daily Treatment Note     Name: Jo Ann De La Vega  Clinic Number: [id number]    Therapy Diagnosis:   Encounter Diagnoses   Name Primary?    Chronic right shoulder pain Yes    Decreased range of motion of right shoulder     Weakness of right upper extremity     Decreased functional mobility      Physician: Glenn Moody MD    Visit Date: 6/16/2022  Physician Orders: PT Eval and Treat  Medical Diagnosis from Referral: S42.254D (ICD-10-CM) - Closed nondisplaced fracture of greater tuberosity of right humerus with routine healing, subsequent encounter   Evaluation Date: 5/31/2022  Authorization Period Expiration: 12/31/2022   Plan of Care Expiration: 9/2/22  Visit # / Visits authorized: 5 /20     Time In: 5:00  Time Out: 6:00  Total Billable Time: 55 minutes     Precautions: Diabetes    Subjective     Pt reports: Having increased pain and stiffness today, not sure why. Having pain in the right upper trap muscle.   She was compliant with home exercise program.   Response to previous treatment: mild soreness  Functional change: no sling    Pain: 2 /10  Location: right shoulder     Objective     JO ANN received therapeutic exercises to develop strength, ROM and flexibility for 30 minutes including:  Pulleys, flexion, 2 min  Pulleys, scaption, 2 min   PROM of (R) shoulder to tolerance  Supine shoulder flexion with wand, 2x10 3#  Supine ER stretch w/ wand, 3x10 w/ 3s hold - 1 set at 90 degrees abd   Supine serratus punch, 3 x 10 3#  Rhythmic stabilization 2 x 30s   Shoulder isometrics, 10x 10s hold: flexion, ext, abd, add, ER, IR - NP  Standing rows, 2x10 YTB   Shoulder IR, 2x10 yellow band  Shoulder ER step out, 2x10 yellow band  Finger walks     JO ANN received the following manual therapy techniques: Joint mobilizations and Soft tissue Mobilization were applied to the: R shoulder for 25 minutes, including:  GH joint mobs grade I-IV with PA/AP/inferior    Discussed the purpose, mechanism, and indications  for dry needling with JO ANN . Patient was cleared of all precautions and contraindications and pt signed written consent and gave verbal consent to dry needling Rx today.   Palpation used to determine dry needling sites. Increased tone noted at R upper trap. Pt rec'd dry needling in Prone position to R upper trap with 2x 0.30x0.40 mm needles w/ e-stim.  Pt tolerated treatment well and was not in any distress at the completion of treatment.         Home Exercises Provided and Patient Education Provided     Education provided:   - HEP    Written Home Exercises Provided: yes.  Exercises were reviewed and JO ANN was able to demonstrate them prior to the end of the session.  JO ANN demonstrated good  understanding of the education provided.     See EMR under Patient Instructions for exercises provided prior visit.    Assessment     Pt tolerated shoulder joint mobilization well with improved shoulder ROM noted following treatment today. No issues with strengthening exercises today. Initiated dry needling into the right upper trap to help reduce pain. Pt reported benefit of reduced pain and stiffness by end of session.     JO ANN Is progressing well towards her goals.   Pt prognosis is Good.     Pt will continue to benefit from skilled outpatient physical therapy to address the deficits listed in the problem list box on initial evaluation, provide pt/family education and to maximize pt's level of independence in the home and community environment.     Pt's spiritual, cultural and educational needs considered and pt agreeable to plan of care and goals.    Anticipated barriers to physical therapy: None    Goals:   Short Term Goals (6 Weeks):   1. Pt to increase strength to at least 4/5 of muscles tested to allow for improvement in functional activities and remediate dysfunctional movement patterns.  2. Pt to improve shoulder to full PROM to allow for improved functional mobility.   3. Pt to improve shoulder AROM to 130  degrees flexion to improve functional mobility.      Long Term Goals (12 Weeks):   1. Pt to achieve <37% disability as measured by the FOTO to demonstrate decreased disability with functional activities.   2. Pt to increase strength to at least 5/5 of muscles tested to allow for improvement in functional activities including.   3. Pt to improve gross shoulder motion to <10% limitations to allow for improved functional mobility with performing ADL's   4. Pt to report compliance with HEP 3x/week and demonstrate proper exercise technique to PT to show independence with self management of condition    Plan     Continue emphasizing shoulder ROM, rotator cuff strength      Clark Tamayo, PT

## 2022-06-20 ENCOUNTER — CLINICAL SUPPORT (OUTPATIENT)
Dept: REHABILITATION | Facility: HOSPITAL | Age: 52
End: 2022-06-20
Attending: ORTHOPAEDIC SURGERY
Payer: COMMERCIAL

## 2022-06-20 DIAGNOSIS — G89.29 CHRONIC RIGHT SHOULDER PAIN: Primary | ICD-10-CM

## 2022-06-20 DIAGNOSIS — M25.611 DECREASED RANGE OF MOTION OF RIGHT SHOULDER: ICD-10-CM

## 2022-06-20 DIAGNOSIS — R26.89 DECREASED FUNCTIONAL MOBILITY: ICD-10-CM

## 2022-06-20 DIAGNOSIS — R29.898 WEAKNESS OF RIGHT UPPER EXTREMITY: ICD-10-CM

## 2022-06-20 DIAGNOSIS — M25.511 CHRONIC RIGHT SHOULDER PAIN: Primary | ICD-10-CM

## 2022-06-20 PROCEDURE — 97110 THERAPEUTIC EXERCISES: CPT | Mod: PO | Performed by: PHYSICAL THERAPIST

## 2022-06-20 PROCEDURE — 97140 MANUAL THERAPY 1/> REGIONS: CPT | Mod: PO | Performed by: PHYSICAL THERAPIST

## 2022-06-20 NOTE — PROGRESS NOTES
Physical Therapy Daily Treatment Note     Name: Jo Ann De La Vega  [facility] Number: [id number]    Therapy Diagnosis:   Encounter Diagnoses   Name Primary?    Chronic right shoulder pain Yes    Decreased range of motion of right shoulder     Weakness of right upper extremity     Decreased functional mobility      Physician: Glenn Moody MD    Visit Date: 6/20/2022  Physician Orders: PT Eval and Treat  Medical Diagnosis from Referral: S42.254D (ICD-10-CM) - Closed nondisplaced fracture of greater tuberosity of right humerus with routine healing, subsequent encounter   Evaluation Date: 5/31/2022  Authorization Period Expiration: 12/31/2022   Plan of Care Expiration: 9/2/22  Visit # / Visits authorized: 6/20     Time In: 1100 am  Time Out: 1145 am  Total Billable Time: 45 minutes     Precautions: Diabetes    Subjective     Pt reports: no pain for the first time in a long time. Had a lot of soreness and fatigue in the R shoulder for a day after last session, with some stabbing pain in the R GHJ, but this got better by Sunday. Still having a lot of muscle tenderness in the lateral upper arm.   She was compliant with home exercise program.   Response to previous treatment: mild soreness  Functional change: no sling    Pain: 0 /10  Location: right shoulder     Objective     JO ANN received therapeutic exercises to develop strength, ROM and flexibility for 30 minutes including:  Pulleys, flexion, 2 min  Pulleys, scaption, 2 min   PROM of (R) shoulder to tolerance  Supine shoulder flexion with wand, 2x10 3#  Supine ER stretch w/ wand, 3x10 w/ 3s hold - 1 set at 90 degrees abd   Supine serratus punch, 3 x 10 3#  Rhythmic stabilization 2 x 30s   Standing rows, 2x10 YTB   Shoulder IR, 2x10 yellow band  Shoulder ER step out, 2x10 yellow band  Finger walks--np    Shoulder isometrics, 10x 10s hold: flexion, ext, abd, add, ER, IR - NP    JO ANN received the following manual therapy techniques: Joint mobilizations and Soft tissue  Mobilization were applied to the: R shoulder for 15 minutes, including:  GH joint mobs grade I-IV with PA/AP/inferior  STM R deltoid insertion, bicep T.    Discussed the purpose, mechanism, and indications for dry needling with JO ANN . Patient was cleared of all precautions and contraindications and pt signed written consent and gave verbal consent to dry needling Rx today.   Palpation used to determine dry needling sites. Increased tone noted at R upper trap. Pt rec'd dry needling in Prone position to R upper trap with 2x 0.30x0.40 mm needles w/ e-stim.  Pt tolerated treatment well and was not in any distress at the completion of treatment.         Home Exercises Provided and Patient Education Provided     Education provided:   - HEP    Written Home Exercises Provided: yes.  Exercises were reviewed and JO ANN was able to demonstrate them prior to the end of the session.  JO ANN demonstrated good  understanding of the education provided.     See EMR under Patient Instructions for exercises provided prior visit.    Assessment     Pt reported significant reduction in R lateral arm soreness following manual therapy today. Continues to show ER lag with isometric walk outs, indicating weakness of the RTC.      JO ANN Is progressing well towards her goals.   Pt prognosis is Good.     Pt will continue to benefit from skilled outpatient physical therapy to address the deficits listed in the problem list box on initial evaluation, provide pt/family education and to maximize pt's level of independence in the home and community environment.     Pt's spiritual, cultural and educational needs considered and pt agreeable to plan of care and goals.    Anticipated barriers to physical therapy: None    Goals:   Short Term Goals (6 Weeks):   1. Pt to increase strength to at least 4/5 of muscles tested to allow for improvement in functional activities and remediate dysfunctional movement patterns.  2. Pt to improve shoulder to  full PROM to allow for improved functional mobility.   3. Pt to improve shoulder AROM to 130 degrees flexion to improve functional mobility.      Long Term Goals (12 Weeks):   1. Pt to achieve <37% disability as measured by the FOTO to demonstrate decreased disability with functional activities.   2. Pt to increase strength to at least 5/5 of muscles tested to allow for improvement in functional activities including.   3. Pt to improve gross shoulder motion to <10% limitations to allow for improved functional mobility with performing ADL's   4. Pt to report compliance with HEP 3x/week and demonstrate proper exercise technique to PT to show independence with self management of condition    Plan     Continue emphasizing shoulder ROM, rotator cuff strength      Janie Bright, PT

## 2022-06-22 ENCOUNTER — CLINICAL SUPPORT (OUTPATIENT)
Dept: REHABILITATION | Facility: HOSPITAL | Age: 52
End: 2022-06-22
Attending: ORTHOPAEDIC SURGERY
Payer: COMMERCIAL

## 2022-06-22 ENCOUNTER — PATIENT MESSAGE (OUTPATIENT)
Dept: ORTHOPEDICS | Facility: CLINIC | Age: 52
End: 2022-06-22
Payer: COMMERCIAL

## 2022-06-22 DIAGNOSIS — R29.898 WEAKNESS OF RIGHT UPPER EXTREMITY: ICD-10-CM

## 2022-06-22 DIAGNOSIS — K21.9 GASTROESOPHAGEAL REFLUX DISEASE, UNSPECIFIED WHETHER ESOPHAGITIS PRESENT: ICD-10-CM

## 2022-06-22 DIAGNOSIS — R26.89 DECREASED FUNCTIONAL MOBILITY: ICD-10-CM

## 2022-06-22 DIAGNOSIS — M25.611 DECREASED RANGE OF MOTION OF RIGHT SHOULDER: ICD-10-CM

## 2022-06-22 DIAGNOSIS — M25.511 CHRONIC RIGHT SHOULDER PAIN: Primary | ICD-10-CM

## 2022-06-22 DIAGNOSIS — G89.29 CHRONIC RIGHT SHOULDER PAIN: Primary | ICD-10-CM

## 2022-06-22 PROCEDURE — 97110 THERAPEUTIC EXERCISES: CPT | Mod: PO

## 2022-06-22 PROCEDURE — 97010 HOT OR COLD PACKS THERAPY: CPT | Mod: PO

## 2022-06-22 PROCEDURE — 97140 MANUAL THERAPY 1/> REGIONS: CPT | Mod: PO

## 2022-06-22 RX ORDER — FAMOTIDINE 20 MG/1
20 TABLET, FILM COATED ORAL 2 TIMES DAILY
Qty: 60 TABLET | Refills: 3 | Status: CANCELLED | OUTPATIENT
Start: 2022-06-22 | End: 2023-06-22

## 2022-06-23 ENCOUNTER — PATIENT MESSAGE (OUTPATIENT)
Dept: ORTHOPEDICS | Facility: CLINIC | Age: 52
End: 2022-06-23
Payer: COMMERCIAL

## 2022-06-23 DIAGNOSIS — S42.254D CLOSED NONDISPLACED FRACTURE OF GREATER TUBEROSITY OF RIGHT HUMERUS WITH ROUTINE HEALING, SUBSEQUENT ENCOUNTER: Primary | ICD-10-CM

## 2022-06-23 RX ORDER — FAMOTIDINE 20 MG/1
20 TABLET, FILM COATED ORAL 2 TIMES DAILY
Qty: 60 TABLET | Refills: 3 | Status: SHIPPED | OUTPATIENT
Start: 2022-06-23 | End: 2023-06-23

## 2022-06-23 NOTE — PROGRESS NOTES
Physical Therapy Daily Treatment Note     Name: Jo Ann De La Vega  Clinic Number: [id number]    Therapy Diagnosis:   Encounter Diagnoses   Name Primary?    Chronic right shoulder pain Yes    Decreased range of motion of right shoulder     Weakness of right upper extremity     Decreased functional mobility      Physician: Glenn Moody MD    Visit Date: 6/22/2022  Physician Orders: PT Eval and Treat  Medical Diagnosis from Referral: S42.254D (ICD-10-CM) - Closed nondisplaced fracture of greater tuberosity of right humerus with routine healing, subsequent encounter   Evaluation Date: 5/31/2022  Authorization Period Expiration: 12/31/2022   Plan of Care Expiration: 9/2/22  Visit # / Visits authorized: 7 /20     Time In: 11:00  Time Out: 12:00  Total Billable Time: 60 minutes     Precautions: Diabetes    Subjective     Pt reports: Having some pain inside the joint.   She was compliant with home exercise program.   Response to previous treatment: mild soreness  Functional change: no sling    Pain: 1 /10  Location: right shoulder     Objective     JO ANN received therapeutic exercises to develop strength, ROM and flexibility for 30 minutes including:  Pulleys, flexion, 2 min  Pulleys, scaption, 2 min   PROM of (R) shoulder to tolerance  Supine shoulder flexion with wand, 2x10 3#  Supine ER stretch w/ wand, 3x10 w/ 3s hold - 1 set at 90 degrees abd   Supine serratus punch, 3 x 10 3#  Rhythmic stabilization 2 x 30s   Standing rows, 2x10 YTB   Shoulder IR, 2x10 yellow band  Shoulder ER step out, 2x10 yellow band  Finger walks    JO ANN received the following manual therapy techniques: Joint mobilizations and Soft tissue Mobilization were applied to the: R shoulder for 20 minutes, including:  GH joint mobs grade I-IV with PA/AP/inferior  STM R deltoid insertion, bicep T.    Discussed the purpose, mechanism, and indications for dry needling with JO ANN . Patient was cleared of all precautions and contraindications and pt  signed written consent and gave verbal consent to dry needling Rx today.   Palpation used to determine dry needling sites. Increased tone noted at R upper trap. Pt rec'd dry needling in Prone position to R upper trap with 2x 0.30x0.40 mm needles w/ e-stim.  Pt tolerated treatment well and was not in any distress at the completion of treatment.  - NP    Ice for 10 mins        Home Exercises Provided and Patient Education Provided     Education provided:   - HEP    Written Home Exercises Provided: yes.  Exercises were reviewed and JO ANN was able to demonstrate them prior to the end of the session.  JO ANN demonstrated good  understanding of the education provided.     See EMR under Patient Instructions for exercises provided prior visit.    Assessment     Fair tolerance to PROM. Able to relax and improve PROM with cues for breathing. Achieving about 160 degrees flexion with PROM. Strength continuing to improve. Will progress as tolerated.     JO ANN Is progressing well towards her goals.   Pt prognosis is Good.     Pt will continue to benefit from skilled outpatient physical therapy to address the deficits listed in the problem list box on initial evaluation, provide pt/family education and to maximize pt's level of independence in the home and community environment.     Pt's spiritual, cultural and educational needs considered and pt agreeable to plan of care and goals.    Anticipated barriers to physical therapy: None    Goals:   Short Term Goals (6 Weeks):   1. Pt to increase strength to at least 4/5 of muscles tested to allow for improvement in functional activities and remediate dysfunctional movement patterns.  2. Pt to improve shoulder to full PROM to allow for improved functional mobility.   3. Pt to improve shoulder AROM to 130 degrees flexion to improve functional mobility.      Long Term Goals (12 Weeks):   1. Pt to achieve <37% disability as measured by the FOTO to demonstrate decreased disability with  functional activities.   2. Pt to increase strength to at least 5/5 of muscles tested to allow for improvement in functional activities including.   3. Pt to improve gross shoulder motion to <10% limitations to allow for improved functional mobility with performing ADL's   4. Pt to report compliance with HEP 3x/week and demonstrate proper exercise technique to PT to show independence with self management of condition    Plan     Continue emphasizing shoulder ROM, rotator cuff strength      Clark Tamayo, PT

## 2022-06-24 ENCOUNTER — LAB VISIT (OUTPATIENT)
Dept: LAB | Facility: HOSPITAL | Age: 52
End: 2022-06-24
Attending: EMERGENCY MEDICINE
Payer: COMMERCIAL

## 2022-06-24 DIAGNOSIS — Z12.11 COLON CANCER SCREENING: ICD-10-CM

## 2022-06-24 PROCEDURE — 82274 ASSAY TEST FOR BLOOD FECAL: CPT | Performed by: EMERGENCY MEDICINE

## 2022-06-28 ENCOUNTER — CLINICAL SUPPORT (OUTPATIENT)
Dept: REHABILITATION | Facility: HOSPITAL | Age: 52
End: 2022-06-28
Attending: ORTHOPAEDIC SURGERY
Payer: COMMERCIAL

## 2022-06-28 DIAGNOSIS — M25.611 DECREASED RANGE OF MOTION OF RIGHT SHOULDER: ICD-10-CM

## 2022-06-28 DIAGNOSIS — R29.898 WEAKNESS OF RIGHT UPPER EXTREMITY: ICD-10-CM

## 2022-06-28 DIAGNOSIS — M25.511 CHRONIC RIGHT SHOULDER PAIN: Primary | ICD-10-CM

## 2022-06-28 DIAGNOSIS — G89.29 CHRONIC RIGHT SHOULDER PAIN: Primary | ICD-10-CM

## 2022-06-28 DIAGNOSIS — R26.89 DECREASED FUNCTIONAL MOBILITY: ICD-10-CM

## 2022-06-28 LAB — HEMOCCULT STL QL IA: NEGATIVE

## 2022-06-28 PROCEDURE — 97010 HOT OR COLD PACKS THERAPY: CPT | Mod: PO

## 2022-06-28 PROCEDURE — 97110 THERAPEUTIC EXERCISES: CPT | Mod: PO

## 2022-06-28 PROCEDURE — 97140 MANUAL THERAPY 1/> REGIONS: CPT | Mod: PO

## 2022-06-28 NOTE — PROGRESS NOTES
Physical Therapy Daily Treatment Note     Name: Jo Ann De La Vega  Clinic Number: [id number]    Therapy Diagnosis:   Encounter Diagnoses   Name Primary?    Chronic right shoulder pain Yes    Decreased range of motion of right shoulder     Weakness of right upper extremity     Decreased functional mobility      Physician: Glenn Moody MD    Visit Date: 6/28/2022  Physician Orders: PT Eval and Treat  Medical Diagnosis from Referral: S42.254D (ICD-10-CM) - Closed nondisplaced fracture of greater tuberosity of right humerus with routine healing, subsequent encounter   Evaluation Date: 5/31/2022  Authorization Period Expiration: 12/31/2022   Plan of Care Expiration: 9/2/22  Visit # / Visits authorized: 8 /20     Time In: 3:05  Time Out: 4:05  Total Billable Time: 60 minutes     Precautions: Diabetes    Subjective     Pt reports: No pain to start the session today. Feels her ER has been getting better, she has been working on that stretch at home. [place] ok after last session.   She was compliant with home exercise program.   Response to previous treatment: mild soreness  Functional change: no sling    Pain: 1 /10  Location: right shoulder     Objective     JO ANN received therapeutic exercises to develop strength, ROM and flexibility for 30 minutes including:  Pulleys, flexion, 2 min  Pulleys, scaption, 2 min   Finger walks  PROM of (R) shoulder to tolerance  Supine shoulder flexion with wand, 2x10 3#  Supine ER stretch w/ wand, 3x10 w/ 3s hold - 1 set at 90 degrees abd   Supine serratus punch, 3 x 10 3#  Rhythmic stabilization 3 x 20s   Standing rows w/ red band, 3x10 - progress next session  Shoulder IR, 2x10 red band  Shoulder ER, 2x10 yellow band  Wall ball circles, 30s cw/ccw    JO ANN received the following manual therapy techniques: Joint mobilizations and Soft tissue Mobilization were applied to the: R shoulder for 20 minutes, including:  GH joint mobs grade I-IV with PA/AP/inferior  STM R Upper trap and  biceps    Discussed the purpose, mechanism, and indications for dry needling with JO ANN . Patient was cleared of all precautions and contraindications and pt signed written consent and gave verbal consent to dry needling Rx today.   Palpation used to determine dry needling sites. Increased tone noted at R upper trap. Pt rec'd dry needling in Prone position to R upper trap with 2x 0.30x0.40 mm needles w/ e-stim.  Pt tolerated treatment well and was not in any distress at the completion of treatment.  - NP    Ice for 10 mins    Home Exercises Provided and Patient Education Provided     Education provided:   - HEP    Written Home Exercises Provided: yes.  Exercises were reviewed and JO ANN was able to demonstrate them prior to the end of the session.  JO ANN demonstrated good  understanding of the education provided.     See EMR under Patient Instructions for exercises provided prior visit.    Assessment     Pt tolerated tx well, continuing to achieve about 160 degrees flexion with PROM, 55 degrees ER, 120 degrees abd. Able to progress sets/reps and resistance of several exercises without complaint. Will progress as tolerated.     JO ANN Is progressing well towards her goals.   Pt prognosis is Good.     Pt will continue to benefit from skilled outpatient physical therapy to address the deficits listed in the problem list box on initial evaluation, provide pt/family education and to maximize pt's level of independence in the home and community environment.     Pt's spiritual, cultural and educational needs considered and pt agreeable to plan of care and goals.    Anticipated barriers to physical therapy: None    Goals:   Short Term Goals (6 Weeks):   1. Pt to increase strength to at least 4/5 of muscles tested to allow for improvement in functional activities and remediate dysfunctional movement patterns.  2. Pt to improve shoulder to full PROM to allow for improved functional mobility.   3. Pt to improve shoulder  AROM to 130 degrees flexion to improve functional mobility.      Long Term Goals (12 Weeks):   1. Pt to achieve <37% disability as measured by the FOTO to demonstrate decreased disability with functional activities.   2. Pt to increase strength to at least 5/5 of muscles tested to allow for improvement in functional activities including.   3. Pt to improve gross shoulder motion to <10% limitations to allow for improved functional mobility with performing ADL's   4. Pt to report compliance with HEP 3x/week and demonstrate proper exercise technique to PT to show independence with self management of condition    Plan     Continue emphasizing shoulder ROM, rotator cuff strength      Clark Tamayo, PT

## 2022-06-30 ENCOUNTER — CLINICAL SUPPORT (OUTPATIENT)
Dept: REHABILITATION | Facility: HOSPITAL | Age: 52
End: 2022-06-30
Attending: ORTHOPAEDIC SURGERY
Payer: COMMERCIAL

## 2022-06-30 ENCOUNTER — HOSPITAL ENCOUNTER (OUTPATIENT)
Dept: RADIOLOGY | Facility: HOSPITAL | Age: 52
Discharge: HOME | End: 2022-06-30
Attending: ORTHOPAEDIC SURGERY
Payer: COMMERCIAL

## 2022-06-30 DIAGNOSIS — R26.89 DECREASED FUNCTIONAL MOBILITY: ICD-10-CM

## 2022-06-30 DIAGNOSIS — M25.511 CHRONIC RIGHT SHOULDER PAIN: Primary | ICD-10-CM

## 2022-06-30 DIAGNOSIS — G89.29 CHRONIC RIGHT SHOULDER PAIN: Primary | ICD-10-CM

## 2022-06-30 DIAGNOSIS — R29.898 WEAKNESS OF RIGHT UPPER EXTREMITY: ICD-10-CM

## 2022-06-30 DIAGNOSIS — M25.611 DECREASED RANGE OF MOTION OF RIGHT SHOULDER: ICD-10-CM

## 2022-06-30 DIAGNOSIS — S42.254D CLOSED NONDISPLACED FRACTURE OF GREATER TUBEROSITY OF RIGHT HUMERUS WITH ROUTINE HEALING, SUBSEQUENT ENCOUNTER: ICD-10-CM

## 2022-06-30 PROCEDURE — 73030 X-RAY EXAM OF SHOULDER: CPT | Mod: 26,RT,, | Performed by: RADIOLOGY

## 2022-06-30 PROCEDURE — 97110 THERAPEUTIC EXERCISES: CPT | Mod: PO

## 2022-06-30 PROCEDURE — 73030 X-RAY EXAM OF SHOULDER: CPT | Mod: TC,FY,PO,RT

## 2022-06-30 PROCEDURE — 97140 MANUAL THERAPY 1/> REGIONS: CPT | Mod: PO

## 2022-06-30 PROCEDURE — 97010 HOT OR COLD PACKS THERAPY: CPT | Mod: PO

## 2022-06-30 PROCEDURE — 73030 XR SHOULDER TRAUMA 3 VIEW RIGHT: ICD-10-PCS | Mod: 26,RT,, | Performed by: RADIOLOGY

## 2022-06-30 PROCEDURE — 97112 NEUROMUSCULAR REEDUCATION: CPT | Mod: PO

## 2022-06-30 NOTE — PLAN OF CARE
[facility]  Physical Therapy Re-Assessment    Name: Jo Ann De La Vega  Clinic Number: [id number]    Therapy Diagnosis:   Encounter Diagnoses   Name Primary?    Chronic right shoulder pain Yes    Decreased range of motion of right shoulder     Weakness of right upper extremity     Decreased functional mobility      Physician: Glenn Moody MD    Physician Orders: PT Eval and Treat  Medical Diagnosis from Referral: S42.254D (ICD-10-CM) - Closed nondisplaced fracture of greater tuberosity of right humerus with routine healing, subsequent encounter   Evaluation Date: 5/31/2022  Authorization Period Expiration: 12/31/2022   Plan of Care Expiration: 9/2/22  Visit # / Visits authorized: 9 / 20    Time In: 3:00  Time Out: 4:15  Total Billable Time: 70 minutes    Precautions: Diabetes    Subjective   Date of onset: 3/16/22  History of current condition - JO ANN reports: Pt feels her shoulder just doesn't go the places it needs to go yet. She can reach more things now. She feels her motor skills are still not good, like her muscles are not working. She is sleeping better now.     Medical History:   Past Medical History:   Diagnosis Date    Abnormal Pap smear     ASCUS negative HPV. Repeat pap    ADD (attention deficit disorder)     Arthritis     right knee    Asthma     exercise induced    Basal cell carcinoma 02/2018    Left upper back     BCC (basal cell carcinoma)     Right medial ankle  - ED& C     BCC (basal cell carcinoma) 2016    Left anterior thigh    Cystocele     with stress incontinence    Depression     Dizziness     Fracture of sternum Nov 2010    from [employer]ate class    GERD (gastroesophageal reflux disease)     HEARING LOSS     Hearing loss in right ear     IBS (irritable bowel syndrome)     Intrauterine device     Mirena    Kidney stone 2009    PONV (postoperative nausea and vomiting)     requests Scopolamine patch    Primary hypertension 1/31/2022    SCC (squamous  cell carcinoma), hand, right 2015    excisied [facility]     Seasonal allergies     deviated septum also    SI (sacroiliac) pain     Sinus pain July 3/2012    no fever, starting on antibiotics    Sinusitis     Skin tag of female perineum 2012    Squamous cell carcinoma of skin 09/2018    Left shin     TMJ (dislocation of temporomandibular joint)        Surgical History:   Concepcion De La Vega  has a past surgical history that includes Cholecystectomy; TONSILLECTOMY, ADENOIDECTOMY, BILATERAL yringotomy and tubes; External ear surgery; Dilation and curettage of uterus; Ureteral stent placement (2009); Anterior vaginal repair; Tonsillectomy; Colonoscopy; and LASIK (Bilateral, 2015).    Medications:   Concepcion has a current medication list which includes the following prescription(s): acetazolamide, albuterol, bupropion, cholecalciferol (vitamin d3), diclofenac, famotidine, loratadine, methocarbamol, myrbetriq, and montelukast, and the following Facility-Administered Medications: methylprednisolone sodium succinate.    Allergies:   Review of patient's allergies indicates:   Allergen Reactions    Venom-wasp Hives and Swelling    Oxytetracycline Swelling     Terramycin    Triple antibiotic [[id number]-pramoxine] Blisters    Adhesive Rash        Imaging,   MRI Studies: 04/29/2022   FINDINGS: There is an oblique fracture of the greater tuberosity of the humerus with minimal lateral displacement of the fracture fragment.  There is also a transverse fracture through the neck of the humerus with surrounding bone marrow edema.  There is no displacement of this fracture line.  There is a small partial-thickness articular surface tear of the distal supraspinatus tendon near the attachment with the humerus.  There is no evidence of full-thickness tear or avulsion.  Other rotator cuff tendons appear normal.  The glenoid labrum and bicipital labral complex are intact. The biceps tendons appear normal.  The  acromioclavicular joint is well maintained and there is no shoulder impingement.  Visualized ligaments are intact.  No muscular abnormalities are evident.  No significant joint effusion is present.    Xray: 03/16/2022   FINDINGS: There are changes most consistent with a fracture of the greater tuberosity.  Fracture is mildly displaced.  Humeral head is not dislocated.    Xray: 04/12/2022   FINDINGS: Displaced fracture of the right humerus greater tuberosity again demonstrated.  Fracture fragment is displaced superolaterally approximately 6 mm.  No definite additional fracture or dislocation.  Mild right AC joint arthrosis.    Xray: 04/27/2022   FINDINGS: Fracture of the greater tuberosity is noted with a step-off of approximately 4 mm present unchanged in alignment appearance since 04/12/2022.  Spurring is noted at the acromioclavicular joint.  Osseous demineralization may be present diffusely.     Xray: 05/24/2022   FINDINGS: There has been progressive ongoing healing of the minimally displaced oblique fracture through the greater tuberosity of the humerus.  The glenohumeral joint is well maintained and well aligned.  There is chronic mild degenerative arthrosis of the acromioclavicular joint.      Prior Therapy: Yes  Social History: Pt lives with their spouse  Occupation: LPN with [employer]  Prior Level of Function: Independent in all ADLs  Current Level of Function: Needs assistance with heavy lifting    Pain:  Current 0/10, worst 4/10, best 0/10   Location: right shoulder   Description: Sharp, Shooting and muscle spasm  Aggravating Factors: Lifting  Easing Factors: rest    Pts goals: Reduce the pain, return to normal function    Objective     Active Range of Motion: Measured in degrees  Shoulder Left Right   Flexion 180 140   Abduction 180 150   ER at 45 80 60   ER at 90 80 50   IR 80 65       Passive Range of Motion: Measured in degrees  Shoulder Left Right   Flexion 180 160   Abduction 180 160   ER at 45 80 70    ER at 90 80 58   IR 80 75     Upper Extremity Strength  Elbow Left Right   Biceps 5/5 5/5   Triceps 4+/5 4/5       Shoulder Left Right   Shoulder flexion: 5/5 4/5   Shoulder Abduction: 5/5 4-/5   Shoulder ER 5/5 4+/5   Shoulder IR 5/5 4+/5       Palpation Shoulder:  TTP across the deltoid and in R upper trap         CMS Impairment/Limitation/Restriction for FOTO SHOULDER Survey    Therapist reviewed FOTO scores for Jo Ann De La Vega on 6/30/2022.   FOTO documents entered into [employer] - see Media section.    Limitation Score: 36%  Category: Mobility         TREATMENT   Treatment Time In: 3:05  Treatment Time Out: 4:15  Total Treatment time separate from Evaluation: 70 minutes    JO ANN received therapeutic exercises to develop strength, endurance, ROM, flexibility and posture for 30 minutes including:  UBE, 2' fwd / 2' bwd  Pulleys, flexion, 2 min  Pulleys, scaption, 2 min   Finger walks   IR stretch w/ strap, 10x 10s hold  Supine ER stretch w/ wand, 3x10 w/ 3s hold - 1 set at 90 degrees abd    Supine shoulder flexion with wand, 2x10 5#  Shoulder IR, 2x10 red band  Shoulder ER, 2x10 yellow band  Wall ball circles, 30s cw/ccw  Shelf touches, 5x at each shelf     JO ANN received the following manual therapy techniques: Joint mobilizations and Soft tissue Mobilization were applied to the: R shoulder for 15 minutes, including:  GH joint mobs grade I-IV with PA/AP/inferior  PROM  STM R Upper trap and biceps    JO ANN participated in neuromuscular re-education activities to improve: Coordination, Proprioception and Posture for 15 minutes. The following activities were included:  Supine serratus punch, 3 x 10 5#  Rhythmic stabilization 3 x 20s   Supine D2 flexion w/ yellow band, 2x10  Standing rows w/ red band, 3x10    JO ANN received cold pack for 10 minutes to to decrease circulation, pain, and swelling.    Home Exercises and Patient Education Provided    Education provided:   - Continue HEP    Written Home Exercises  Provided: yes.  Exercises were reviewed and JO ANN was able to demonstrate them prior to the end of the session.  JO ANN demonstrated good  understanding of the education provided.     See EMR under Patient Instructions for exercises provided 5/31/2022.    Assessment   Jo Ann is a 52 y.o. female referred to outpatient Physical Therapy with a medical diagnosis of S42.254D (ICD-10-CM) - Closed nondisplaced fracture of greater tuberosity of right humerus with routine healing, subsequent encounter. Pt has met or made good progress towards all established goals. Has made good improvement in strength, AROM, and PROM. Overhead movements have improved, but continues to demonstrate compensatory shoulder hike. Continues to have significant functional limitations secondary to pain and decreased strength. Will continue to benefit from skilled PT to address remaining deficits in pain, strength, ROM, and functional mobility.     Pt prognosis is Good.   Pt will benefit from skilled outpatient Physical Therapy to address the deficits stated above and in the chart below, provide pt/family education, and to maximize pt's level of independence.     Plan of care discussed with patient: Yes  Pt's spiritual, cultural and educational needs considered and patient is agreeable to the plan of care and goals as stated below:     Anticipated Barriers for therapy: None    Medical Necessity is demonstrated by the following  History  Co-morbidities and personal factors that may impact the plan of care Co-morbidities:   depression, diabetes, difficulty sleeping, high BMI and HTN    Personal Factors:   no deficits     moderate   Examination  Body Structures and Functions, activity limitations and participation restrictions that may impact the plan of care Body Regions:   upper extremities    Body Systems:    ROM  strength  motor control    Participation Restrictions:   None    Activity limitations:   Learning and applying knowledge  no  deficits    General Tasks and Commands  no deficits    Communication  no deficits    Mobility  lifting and carrying objects    Self care  no deficits    Domestic Life  shopping  cooking  doing house work (cleaning house, washing dishes, laundry)  assisting others    Interactions/Relationships  no deficits    Life Areas  employment    Community and Social Life  recreation and leisure         moderate   Clinical Presentation stable and uncomplicated low   Decision Making/ Complexity Score: low     Goals:  Short Term Goals (6 Weeks):   1. Pt to increase strength to at least 4/5 of muscles tested to allow for improvement in functional activities and remediate dysfunctional movement patterns. - NOT MET  2. Pt to improve shoulder to full PROM to allow for improved functional mobility. - NOT MET  3. Pt to improve shoulder AROM to 130 degrees flexion to improve functional mobility. - MET    Long Term Goals (12 Weeks):   1. Pt to achieve <37% disability as measured by the FOTO to demonstrate decreased disability with functional activities. - MET  2. Pt to increase strength to at least 5/5 of muscles tested to allow for improvement in functional activities including. - NOT MET  3. Pt to improve gross shoulder motion to <10% limitations to allow for improved functional mobility with performing ADL's. - NOT MET  4. Pt to report compliance with HEP 3x/week and demonstrate proper exercise technique to PT to show independence with self management of condition. - MET    Plan   Plan of care Certification: 5/31/2022 to 9/2/22.    Outpatient Physical Therapy 2 times weekly for 10 weeks to include the following interventions: Aquatic Therapy, Electrical Stimulation  , Manual Therapy, Moist Heat/ Ice, Neuromuscular Re-ed, Patient Education, Therapeutic Activities and Therapeutic Exercise.     Clark Tamayo, PT

## 2022-06-30 NOTE — PROGRESS NOTES
Physical Therapy Daily Treatment Note     Name: Jo Ann De La Vega  Clinic Number: [id number]    Therapy Diagnosis:   Encounter Diagnoses   Name Primary?    Chronic right shoulder pain Yes    Decreased range of motion of right shoulder     Weakness of right upper extremity     Decreased functional mobility      Physician: Glenn Moody MD    Visit Date: 6/30/2022  Physician Orders: PT Eval and Treat  Medical Diagnosis from Referral: S42.254D (ICD-10-CM) - Closed nondisplaced fracture of greater tuberosity of right humerus with routine healing, subsequent encounter   Evaluation Date: 5/31/2022  Authorization Period Expiration: 12/31/2022   Plan of Care Expiration: 9/2/22  Visit # / Visits authorized: 9 / 20     Time In: 3:00  Time Out: 4:15  Total Billable Time: 70 minutes     Precautions: Diabetes    Subjective     Pt reports: Pt feels her shoulder just doesn't go the places it needs to go yet. She can reach more things now. She feels her motor skills are still not good, like her muscles are not working. She is sleeping better now.   She was compliant with home exercise program.   Response to previous treatment: mild soreness  Functional change: no sling    Pain: 0 /10  Location: right shoulder     Objective     JO ANN received therapeutic exercises to develop strength, endurance, ROM, flexibility and posture for 30 minutes including:  UBE, 2' fwd / 2' bwd  Pulleys, flexion, 2 min  Pulleys, scaption, 2 min   Finger walks   IR stretch w/ strap, 10x 10s hold  Supine ER stretch w/ wand, 3x10 w/ 3s hold - 1 set at 90 degrees abd    Supine shoulder flexion with wand, 2x10 5#  Shoulder IR, 2x10 red band  Shoulder ER, 2x10 yellow band  Wall ball circles, 30s cw/ccw  Shelf touches, 5x at each shelf     OJ ANN received the following manual therapy techniques: Joint mobilizations and Soft tissue Mobilization were applied to the: R shoulder for 15 minutes, including:  GH joint mobs grade I-IV with PA/AP/inferior  PROM  STM R  Upper trap and biceps    JO ANN participated in neuromuscular re-education activities to improve: Coordination, Proprioception and Posture for 15 minutes. The following activities were included:  Supine serratus punch, 3 x 10 5#  Rhythmic stabilization 3 x 20s   Supine D2 flexion w/ yellow band, 2x10  Standing rows w/ red band, 3x10    JO ANN received cold pack for 10 minutes to to decrease circulation, pain, and swelling.      Home Exercises Provided and Patient Education Provided     Education provided:   - HEP    Written Home Exercises Provided: yes.  Exercises were reviewed and JO ANN was able to demonstrate them prior to the end of the session.  JO ANN demonstrated good  understanding of the education provided.     See EMR under Patient Instructions for exercises provided prior visit.    Assessment     Pt tolerated tx well, continues to have appropriate discomfort with PROM. Empty end feel into flexion and abd, firm end feel into ER and IR. Pain is limiting factor in PROM in all directions. Strength continues to improve, though still demonstrating compensatory shoulder hike for overhead movements. Will continue with RTC strengthening as tolerated.     JO ANN Is progressing well towards her goals.   Pt prognosis is Good.     Pt will continue to benefit from skilled outpatient physical therapy to address the deficits listed in the problem list box on initial evaluation, provide pt/family education and to maximize pt's level of independence in the home and community environment.     Pt's spiritual, cultural and educational needs considered and pt agreeable to plan of care and goals.    Anticipated barriers to physical therapy: None    Goals:   Short Term Goals (6 Weeks):   1. Pt to increase strength to at least 4/5 of muscles tested to allow for improvement in functional activities and remediate dysfunctional movement patterns. - NOT MET  2. Pt to improve shoulder to full PROM to allow for improved functional  mobility. - NOT MET  3. Pt to improve shoulder AROM to 130 degrees flexion to improve functional mobility. - MET    Long Term Goals (12 Weeks):   1. Pt to achieve <37% disability as measured by the FOTO to demonstrate decreased disability with functional activities. - MET  2. Pt to increase strength to at least 5/5 of muscles tested to allow for improvement in functional activities including. - NOT MET  3. Pt to improve gross shoulder motion to <10% limitations to allow for improved functional mobility with performing ADL's. - NOT MET  4. Pt to report compliance with HEP 3x/week and demonstrate proper exercise technique to PT to show independence with self management of condition. - ME    Plan     Continue emphasizing shoulder ROM, rotator cuff strength    Clark Tamayo, PT

## 2022-07-01 DIAGNOSIS — S42.254D CLOSED NONDISPLACED FRACTURE OF GREATER TUBEROSITY OF RIGHT HUMERUS WITH ROUTINE HEALING, SUBSEQUENT ENCOUNTER: Primary | ICD-10-CM

## 2022-07-05 ENCOUNTER — OFFICE VISIT (OUTPATIENT)
Dept: ORTHOPEDICS | Facility: CLINIC | Age: 52
End: 2022-07-05
Payer: COMMERCIAL

## 2022-07-05 ENCOUNTER — PATIENT MESSAGE (OUTPATIENT)
Dept: ORTHOPEDICS | Facility: CLINIC | Age: 52
End: 2022-07-05

## 2022-07-05 VITALS — HEIGHT: 67 IN | WEIGHT: 204 LBS | BODY MASS INDEX: 32.02 KG/M2

## 2022-07-05 DIAGNOSIS — S42.254D CLOSED NONDISPLACED FRACTURE OF GREATER TUBEROSITY OF RIGHT HUMERUS WITH ROUTINE HEALING, SUBSEQUENT ENCOUNTER: Primary | ICD-10-CM

## 2022-07-05 PROCEDURE — 20610 LARGE JOINT ASPIRATION/INJECTION: R SUBACROMIAL BURSA: ICD-10-PCS | Mod: RT,S$GLB,, | Performed by: ORTHOPAEDIC SURGERY

## 2022-07-05 PROCEDURE — 3066F PR DOCUMENTATION OF TREATMENT FOR NEPHROPATHY: ICD-10-PCS | Mod: CPTII,S$GLB,, | Performed by: ORTHOPAEDIC SURGERY

## 2022-07-05 PROCEDURE — 99999 PR PBB SHADOW E&M-EST. PATIENT-LVL III: ICD-10-PCS | Mod: PBBFAC,,, | Performed by: ORTHOPAEDIC SURGERY

## 2022-07-05 PROCEDURE — 3061F NEG MICROALBUMINURIA REV: CPT | Mod: CPTII,S$GLB,, | Performed by: ORTHOPAEDIC SURGERY

## 2022-07-05 PROCEDURE — 1160F RVW MEDS BY RX/DR IN RCRD: CPT | Mod: CPTII,S$GLB,, | Performed by: ORTHOPAEDIC SURGERY

## 2022-07-05 PROCEDURE — 1160F PR REVIEW ALL MEDS BY PRESCRIBER/CLIN PHARMACIST DOCUMENTED: ICD-10-PCS | Mod: CPTII,S$GLB,, | Performed by: ORTHOPAEDIC SURGERY

## 2022-07-05 PROCEDURE — 1159F MED LIST DOCD IN RCRD: CPT | Mod: CPTII,S$GLB,, | Performed by: ORTHOPAEDIC SURGERY

## 2022-07-05 PROCEDURE — 3044F PR MOST RECENT HEMOGLOBIN A1C LEVEL <7.0%: ICD-10-PCS | Mod: CPTII,S$GLB,, | Performed by: ORTHOPAEDIC SURGERY

## 2022-07-05 PROCEDURE — 3066F NEPHROPATHY DOC TX: CPT | Mod: CPTII,S$GLB,, | Performed by: ORTHOPAEDIC SURGERY

## 2022-07-05 PROCEDURE — 1159F PR MEDICATION LIST DOCUMENTED IN MEDICAL RECORD: ICD-10-PCS | Mod: CPTII,S$GLB,, | Performed by: ORTHOPAEDIC SURGERY

## 2022-07-05 PROCEDURE — 99214 OFFICE O/P EST MOD 30 MIN: CPT | Mod: 25,S$GLB,, | Performed by: ORTHOPAEDIC SURGERY

## 2022-07-05 PROCEDURE — 3008F BODY MASS INDEX DOCD: CPT | Mod: CPTII,S$GLB,, | Performed by: ORTHOPAEDIC SURGERY

## 2022-07-05 PROCEDURE — 99214 PR OFFICE/OUTPT VISIT, EST, LEVL IV, 30-39 MIN: ICD-10-PCS | Mod: 25,S$GLB,, | Performed by: ORTHOPAEDIC SURGERY

## 2022-07-05 PROCEDURE — 3044F HG A1C LEVEL LT 7.0%: CPT | Mod: CPTII,S$GLB,, | Performed by: ORTHOPAEDIC SURGERY

## 2022-07-05 PROCEDURE — 99999 PR PBB SHADOW E&M-EST. PATIENT-LVL III: CPT | Mod: PBBFAC,,, | Performed by: ORTHOPAEDIC SURGERY

## 2022-07-05 PROCEDURE — 3061F PR NEG MICROALBUMINURIA RESULT DOCUMENTED/REVIEW: ICD-10-PCS | Mod: CPTII,S$GLB,, | Performed by: ORTHOPAEDIC SURGERY

## 2022-07-05 PROCEDURE — 3008F PR BODY MASS INDEX (BMI) DOCUMENTED: ICD-10-PCS | Mod: CPTII,S$GLB,, | Performed by: ORTHOPAEDIC SURGERY

## 2022-07-05 PROCEDURE — 20610 DRAIN/INJ JOINT/BURSA W/O US: CPT | Mod: RT,S$GLB,, | Performed by: ORTHOPAEDIC SURGERY

## 2022-07-05 RX ORDER — TRIAMCINOLONE ACETONIDE 40 MG/ML
40 INJECTION, SUSPENSION INTRA-ARTICULAR; INTRAMUSCULAR
Status: DISCONTINUED | OUTPATIENT
Start: 2022-07-05 | End: 2022-07-05 | Stop reason: HOSPADM

## 2022-07-05 RX ADMIN — TRIAMCINOLONE ACETONIDE 40 MG: 40 INJECTION, SUSPENSION INTRA-ARTICULAR; INTRAMUSCULAR at 01:07

## 2022-07-05 NOTE — PROGRESS NOTES
Chief Complaint   Patient presents with    Right Shoulder - Injury, Pain       HPI:   This is a 52 y.o. who returns today status post nonop treatment of a  Right greater tuberosity fracture 3 months ago. Patient has been FWB.  Pain is dull. No numbness or tingling. No associated signs or symptoms.    Past Medical History:   Diagnosis Date    Abnormal Pap smear     ASCUS negative HPV. Repeat pap    ADD (attention deficit disorder)     Arthritis     right knee    Asthma     exercise induced    Basal cell carcinoma 02/2018    Left upper back     BCC (basal cell carcinoma)     Right medial ankle  - ED& C     BCC (basal cell carcinoma) 2016    Left anterior thigh    Cystocele     with stress incontinence    Depression     Dizziness     Fracture of sternum Nov 2010    from [employer] class    GERD (gastroesophageal reflux disease)     HEARING LOSS     Hearing loss in right ear     IBS (irritable bowel syndrome)     Intrauterine device     Mirena    Kidney stone 2009    PONV (postoperative nausea and vomiting)     requests Scopolamine patch    Primary hypertension 1/31/2022    SCC (squamous cell carcinoma), hand, right 2015    excisied [facility]     Seasonal allergies     deviated septum also    SI (sacroiliac) pain     Sinus pain July 3/2012    no fever, starting on antibiotics    Sinusitis     Skin tag of female perineum 2012    Squamous cell carcinoma of skin 09/2018    Left shin     TMJ (dislocation of temporomandibular joint)      Past Surgical History:   Procedure Laterality Date    ANTERIOR VAGINAL REPAIR      CHOLECYSTECTOMY      COLONOSCOPY      DILATION AND CURETTAGE OF UTERUS      EXTERNAL EAR SURGERY      Rt middle ear oval Repair    LASIK Bilateral 2015    [place]    TONSILLECTOMY      TONSILLECTOMY, ADENOIDECTOMY, BILATERAL MYRINGOTOMY AND TUBES      URETERAL STENT PLACEMENT  2009    cysto, laser lithotripsy left ureter stone     Current Outpatient Medications on  File Prior to Visit   Medication Sig Dispense Refill    acetaZOLAMIDE (DIAMOX) 250 MG tablet Take 1 tablet (250 mg total) by mouth 3 (three) times daily as needed (migraine). 20 tablet 4    buPROPion (WELLBUTRIN XL) 150 MG TB24 tablet TAKE TWO TABLETS BY MOUTH EVERY  tablet 3    cholecalciferol, vitamin D3, (VITAMIN D3) 50 mcg (2,000 unit) Cap capsule Take by mouth once daily.      diclofenac (FLECTOR) 1.3 % PT12 Apply 1 patch topically once daily 30 patch 3    famotidine (PEPCID) 20 MG tablet Take 1 tablet (20 mg total) by mouth 2 (two) times daily. 60 tablet 3    loratadine (CLARITIN) 10 mg tablet Take 10 mg by mouth daily as needed.      methocarbamoL (ROBAXIN) 750 MG Tab Take 1 tablet (750 mg total) by mouth 4 (four) times daily as needed. 44 tablet 3    mirabegron (MYRBETRIQ) 50 mg Tb24 Take 1 tablet (50 mg total) by mouth once daily. 30 tablet 11    montelukast (SINGULAIR) 10 mg tablet Take 1 tablet (10 mg total) by mouth once daily. 90 tablet 3    albuterol (PROVENTIL/VENTOLIN HFA) 90 mcg/actuation inhaler Inhale 2 puffs into the lungs every 6 (six) hours as needed for Wheezing. 18 g 3     Current Facility-Administered Medications on File Prior to Visit   Medication Dose Route Frequency Provider Last Rate Last Admin    methylPREDNISolone sodium succinate injection 125 mg  125 mg Intravenous Q6H Rubi Franklin, CARLITA   125 mg at 10/30/18 1525     Review of patient's allergies indicates:   Allergen Reactions    Venom-wasp Hives and Swelling    Oxytetracycline Swelling     Terramycin    Triple antibiotic [fjtkk-lanjb-dffkepu-pramoxine] Blisters    Adhesive Rash     Family History   Problem Relation Age of Onset    Cancer Maternal Grandmother         uncertain of which GYN cancer    Anesthesia problems Mother         Mom hard to awaken post-op    Multiple sclerosis Mother     Diabetes Mother     Cataracts Mother     Kidney disease Father     Cataracts Father     Diabetes Brother      Clotting disorder Neg Hx     Breast cancer Neg Hx     Allergic rhinitis Neg Hx     Allergies Neg Hx     Angioedema Neg Hx     Asthma Neg Hx     Atopy Neg Hx     Eczema Neg Hx     Immunodeficiency Neg Hx     Rhinitis Neg Hx     Urticaria Neg Hx     Macular degeneration Neg Hx     Retinal detachment Neg Hx     Glaucoma Neg Hx      Social History     Socioeconomic History    Marital status:    Tobacco Use    Smoking status: Former Smoker     Packs/day: 0.50     Years: 5.00     Pack years: 2.50     Quit date: 2002     Years since quittin.0    Smokeless tobacco: Never Used   Substance and Sexual Activity    Alcohol use: Yes     Comment: glass of wine of day    Drug use: No    Sexual activity: Yes     Partners: Male     Birth control/protection: I.U.D.     Social Determinants of Health     Financial Resource Strain: Low Risk     Difficulty of Paying Living Expenses: Not very hard   Food Insecurity: No Food Insecurity    Worried About Running Out of Food in the Last Year: Never true    Ran Out of Food in the Last Year: Never true   Transportation Needs: No Transportation Needs    Lack of Transportation (Medical): No    Lack of Transportation (Non-Medical): No   Physical Activity: Sufficiently Active    Days of Exercise per Week: 5 days    Minutes of Exercise per Session: 30 min   Stress: No Stress Concern Present    Feeling of Stress : Only a little   Social Connections: Unknown    Frequency of Communication with Friends and Family: Three times a week    Frequency of Social Gatherings with Friends and Family: Once a week    Active Member of Clubs or Organizations: Yes    Attends Club or Organization Meetings: More than 4 times per year    Marital Status:    Housing Stability: High Risk    Unable to Pay for Housing in the Last Year: No    Number of Places Lived in the Last Year: 3    Unstable Housing in the Last Year: Yes       Review of Systems:  Constitutional:   Denies fever or chills   Eyes:  Denies change in visual acuity   HENT:  Denies nasal congestion or sore throat   Respiratory:  Denies cough or shortness of breath   Cardiovascular:  Denies chest pain or edema   GI:  Denies abdominal pain, nausea, vomiting, bloody stools or diarrhea   :  Denies dysuria   Integument:  Denies rash   Neurologic:  Denies headache, focal weakness or sensory changes   Endocrine:  Denies polyuria or polydipsia   Lymphatic:  Denies swollen glands   Psychiatric:  Denies depression or anxiety     Physical Exam:   Constitutional:  Well developed, well nourished, no acute distress, non-toxic appearance   Integument:  Well hydrated, no rash   Lymphatic:  No lymphadenopathy noted   Neurologic:  Alert & oriented x 3, CN 2-12 normal, normal motor function, normal sensory function, no focal deficits noted   Psychiatric:  Speech and behavior appropriate   Gi: abdomen soft  Eyes: EOMI      Bilateral Shoulder Exam    left Shoulder Exam   Shoulder exam performed same as contralateral side and is normal.    right Shoulder Exam   Tenderness   Shoulder tenderness location: diffusely about shoulder.    Range of Motion   Forward Flexion: abnormal   External Rotation: abnormal     Muscle Strength   Supraspinatus: 4/5     Tests   Hawkin's test: positive  Impingement: positive    Other   Erythema: absent  Sensation: normal  Pulse: present     X-rays were performed today, personally reviewed by me and findings discussed with the patient.  3 views of the right shoulder show healed fracture in good position      Closed nondisplaced fracture of greater tuberosity of right humerus with routine healing, subsequent encounter          Using an aseptic technique, I injected 5 cc of lidocaine 1% without and 1 cc of kenalog 40mg into the right Shoulder. The patient tolerated this well. I will have them return to clinic as needed.

## 2022-07-05 NOTE — PROCEDURES
Large Joint Aspiration/Injection: R subacromial bursa    Date/Time: 7/5/2022 1:15 PM  Performed by: Glenn Moody MD  Authorized by: Glenn Moody MD     Consent Done?:  Yes (Verbal)  Indications:  Pain  Timeout: prior to procedure the correct patient, procedure, and site was verified    Prep: patient was prepped and draped in usual sterile fashion      Local anesthesia used?: Yes    Local anesthetic:  Lidocaine 1% without epinephrine  Anesthetic total (ml):  5      Details:  Needle Size:  22 G  Ultrasonic Guidance for needle placement?: No    Approach:  Posterior  Location:  Shoulder  Site:  R subacromial bursa  Medications:  40 mg triamcinolone acetonide 40 mg/mL  Patient tolerance:  Patient tolerated the procedure well with no immediate complications

## 2022-07-06 ENCOUNTER — CLINICAL SUPPORT (OUTPATIENT)
Dept: REHABILITATION | Facility: HOSPITAL | Age: 52
End: 2022-07-06
Attending: ORTHOPAEDIC SURGERY
Payer: COMMERCIAL

## 2022-07-06 DIAGNOSIS — R26.89 DECREASED FUNCTIONAL MOBILITY: ICD-10-CM

## 2022-07-06 DIAGNOSIS — R29.898 WEAKNESS OF RIGHT UPPER EXTREMITY: ICD-10-CM

## 2022-07-06 DIAGNOSIS — M25.511 CHRONIC RIGHT SHOULDER PAIN: Primary | ICD-10-CM

## 2022-07-06 DIAGNOSIS — M25.611 DECREASED RANGE OF MOTION OF RIGHT SHOULDER: ICD-10-CM

## 2022-07-06 DIAGNOSIS — G89.29 CHRONIC RIGHT SHOULDER PAIN: Primary | ICD-10-CM

## 2022-07-06 PROCEDURE — 97110 THERAPEUTIC EXERCISES: CPT | Mod: PO,CQ

## 2022-07-06 PROCEDURE — 97112 NEUROMUSCULAR REEDUCATION: CPT | Mod: PO,CQ

## 2022-07-06 PROCEDURE — 97140 MANUAL THERAPY 1/> REGIONS: CPT | Mod: PO,CQ

## 2022-07-06 NOTE — PROGRESS NOTES
Physical Therapy Daily Treatment Note     Name: Jo Ann De La Vega  Clinic Number: [id number]    Therapy Diagnosis:   Encounter Diagnoses   Name Primary?    Chronic right shoulder pain Yes    Decreased range of motion of right shoulder     Weakness of right upper extremity     Decreased functional mobility      Physician: Glenn Moody MD    Visit Date: 7/6/2022  Physician Orders: PT Eval and Treat  Medical Diagnosis from Referral: S42.254D (ICD-10-CM) - Closed nondisplaced fracture of greater tuberosity of right humerus with routine healing, subsequent encounter   Evaluation Date: 5/31/2022  Authorization Period Expiration: 12/31/2022   Plan of Care Expiration: 9/2/22  Visit # / Visits authorized: 10 / 20     Time In: 0940 AM  Time Out: 1045 AM  Total Billable Time: 55 minutes     Precautions: Diabetes    Subjective     Pt reports: her shoulder is sore this morning. Patient states she feeling more soreness into her neck as well. Patient states she saw MD yesterday and he told her fracture shifted upward slightly. Patient states she did get a steroid injection yesterday as well. She was compliant with home exercise program.   Response to previous treatment: mild soreness  Functional change: no sling    Pain: 0 /10  Location: right shoulder     Objective     JO ANN received therapeutic exercises to develop strength, endurance, ROM, flexibility and posture for 30 minutes including:  UBE, 2' fwd / 2' bwd  Pulleys, flexion, 2 min (NP)  Pulleys, scaption, 2 min (NP)  Finger walks   IR stretch w/ strap, 10x 10s hold  Supine ER stretch w/ wand, 3x10 w/ 3s hold - 1 set at 90 degrees abd    Supine shoulder flexion with wand, 2x10 5#  S/L ER 3x10  S/L abduction with inferior glide 2x10  Shoulder IR, 2x10 red band  Shoulder ER, 2x10 yellow band  Wall ball circles, 30s cw/ccw  Shelf touches, 5x at each shelf     JO ANN received the following manual therapy techniques: Joint mobilizations and Soft tissue Mobilization were  applied to the: R shoulder for 15 minutes, including:  GH joint mobs grade I-IV with PA/AP/inferior  PROM  STM R Upper trap and biceps    JO ANN participated in neuromuscular re-education activities to improve: Coordination, Proprioception and Posture for 15 minutes. The following activities were included:  Supine serratus punch, 3 x 10 5#  Landmine press 2x10 (unweighted dowel)  Rhythmic stabilization 3 x 20s   Supine D2 flexion w/ yellow band, 2x10 (NP)  Standing rows w/ red band, 3x10    JO ANN received cold pack for 10 minutes to to decrease circulation, pain, and swelling.    Home Exercises Provided and Patient Education Provided     Education provided:   - HEP    Written Home Exercises Provided: Patient instructed to cont prior HEP.  Exercises were reviewed and JO ANN was able to demonstrate them prior to the end of the session.  JO ANN demonstrated good  understanding of the education provided.     See EMR under Patient Instructions for exercises provided prior visit.    Assessment     PROM initially limited with firm end feel noted but this improved significantly with repetition and slight overpressure. Patient able to progress to sidelying rotator cuff strengthening without pain but training effect easily achieved. Patient much more aware of compensatory shoulder hike today. Will continue with RTC strengthening as tolerated.     JO ANN Is progressing well towards her goals.   Pt prognosis is Good.     Pt will continue to benefit from skilled outpatient physical therapy to address the deficits listed in the problem list box on initial evaluation, provide pt/family education and to maximize pt's level of independence in the home and community environment.     Pt's spiritual, cultural and educational needs considered and pt agreeable to plan of care and goals.    Anticipated barriers to physical therapy: None    Goals:   Short Term Goals (6 Weeks):   1. Pt to increase strength to at least 4/5 of muscles  tested to allow for improvement in functional activities and remediate dysfunctional movement patterns. - NOT MET  2. Pt to improve shoulder to full PROM to allow for improved functional mobility. - NOT MET  3. Pt to improve shoulder AROM to 130 degrees flexion to improve functional mobility. - MET    Long Term Goals (12 Weeks):   1. Pt to achieve <37% disability as measured by the FOTO to demonstrate decreased disability with functional activities. - MET  2. Pt to increase strength to at least 5/5 of muscles tested to allow for improvement in functional activities including. - NOT MET  3. Pt to improve gross shoulder motion to <10% limitations to allow for improved functional mobility with performing ADL's. - NOT MET  4. Pt to report compliance with HEP 3x/week and demonstrate proper exercise technique to PT to show independence with self management of condition. - ME    Plan     Continue emphasizing shoulder ROM, rotator cuff strength    Coby Luke, PTA

## 2022-07-08 ENCOUNTER — PATIENT MESSAGE (OUTPATIENT)
Dept: ORTHOPEDICS | Facility: CLINIC | Age: 52
End: 2022-07-08
Payer: COMMERCIAL

## 2022-07-08 ENCOUNTER — CLINICAL SUPPORT (OUTPATIENT)
Dept: REHABILITATION | Facility: HOSPITAL | Age: 52
End: 2022-07-08
Attending: ORTHOPAEDIC SURGERY
Payer: COMMERCIAL

## 2022-07-08 DIAGNOSIS — M25.511 CHRONIC RIGHT SHOULDER PAIN: Primary | ICD-10-CM

## 2022-07-08 DIAGNOSIS — M25.611 DECREASED RANGE OF MOTION OF RIGHT SHOULDER: ICD-10-CM

## 2022-07-08 DIAGNOSIS — R26.89 DECREASED FUNCTIONAL MOBILITY: ICD-10-CM

## 2022-07-08 DIAGNOSIS — R29.898 WEAKNESS OF RIGHT UPPER EXTREMITY: ICD-10-CM

## 2022-07-08 DIAGNOSIS — G89.29 CHRONIC RIGHT SHOULDER PAIN: Primary | ICD-10-CM

## 2022-07-08 PROCEDURE — 97140 MANUAL THERAPY 1/> REGIONS: CPT | Mod: PO,CQ

## 2022-07-08 PROCEDURE — 97112 NEUROMUSCULAR REEDUCATION: CPT | Mod: PO,CQ

## 2022-07-08 PROCEDURE — 97110 THERAPEUTIC EXERCISES: CPT | Mod: PO,CQ

## 2022-07-08 NOTE — PROGRESS NOTES
Physical Therapy Daily Treatment Note     Name: Jo Ann De La Vega  Clinic Number: [id number]    Therapy Diagnosis:   Encounter Diagnoses   Name Primary?    Chronic right shoulder pain Yes    Decreased range of motion of right shoulder     Weakness of right upper extremity     Decreased functional mobility      Physician: Glenn Moody MD    Visit Date: 7/8/2022  Physician Orders: PT Eval and Treat  Medical Diagnosis from Referral: S42.254D (ICD-10-CM) - Closed nondisplaced fracture of greater tuberosity of right humerus with routine healing, subsequent encounter   Evaluation Date: 5/31/2022  Authorization Period Expiration: 12/31/2022   Plan of Care Expiration: 9/2/22  Visit # / Visits authorized: 11 / 20     Time In: 1158 AM  Time Out: 1300  Total Billable Time: 55 minutes     Precautions: Diabetes    Subjective     Pt reports: her shoulder is a little sore today but hasn't taken a muscle relaxer. Patient states she felt good after last treatment session. She was compliant with home exercise program.   Response to previous treatment: mild soreness  Functional change: no sling    Pain: 0 /10  Location: right shoulder     Objective     JO ANN received therapeutic exercises to develop strength, endurance, ROM, flexibility and posture for 30 minutes including:  UBE, 2' fwd / 2' bwd  Pulleys, flexion, 2 min (NP)  Pulleys, scaption, 2 min (NP)  Finger walks   IR stretch w/ strap, 10x 10s hold  Supine ER stretch w/ wand, 3x10 w/ 3s hold - 1 set at 90 degrees abd    Supine shoulder flexion with wand, 2x10 5#  S/L ER 3x10  S/L abduction with inferior glide 2x10  Shoulder IR, 2x10 red band  Shoulder ER, 2x10 yellow band  Wall ball circles, 30s cw/ccw  Shelf touches, 5x at each shelf   Towel wall slides (B) x 10, 5 sec hold     JO ANN received the following manual therapy techniques: Joint mobilizations and Soft tissue Mobilization were applied to the: R shoulder for 10 minutes, including:  GH joint mobs grade I-IV with  PA/AP/inferior  PROM  STM R Upper trap and biceps (NP)    JO ANN participated in neuromuscular re-education activities to improve: Coordination, Proprioception and Posture for 15 minutes. The following activities were included:  Supine serratus punch, 3 x 10 5#  Landmine press 2x10 (unweighted dowel)  Rhythmic stabilization 3 x 20s   Supine D2 flexion w/ yellow band, 2x10 (NP)  Standing rows w/ green band, 3x10    JO ANN received cold pack for 10 minutes to to decrease circulation, pain, and swelling.    Home Exercises Provided and Patient Education Provided     Education provided:   - HEP    Written Home Exercises Provided: Patient instructed to cont prior HEP.  Exercises were reviewed and JO ANN was able to demonstrate them prior to the end of the session.  JO ANN demonstrated good  understanding of the education provided.     See EMR under Patient Instructions for exercises provided prior visit.    Assessment     Jo Ann responded well to joint mobilizations with significant improvements in both active and passive ROM achieved. Patient able to reach 3rd shelf height without complaints of pain but limitations noted due to weakness. Patient continues to respond well to consistent progression of strengthening exercises.     JO ANN Is progressing well towards her goals.   Pt prognosis is Good.     Pt will continue to benefit from skilled outpatient physical therapy to address the deficits listed in the problem list box on initial evaluation, provide pt/family education and to maximize pt's level of independence in the home and community environment.     Pt's spiritual, cultural and educational needs considered and pt agreeable to plan of care and goals.    Anticipated barriers to physical therapy: None    Goals:   Short Term Goals (6 Weeks):   1. Pt to increase strength to at least 4/5 of muscles tested to allow for improvement in functional activities and remediate dysfunctional movement patterns. - NOT MET  2.  Pt to improve shoulder to full PROM to allow for improved functional mobility. - NOT MET  3. Pt to improve shoulder AROM to 130 degrees flexion to improve functional mobility. - MET    Long Term Goals (12 Weeks):   1. Pt to achieve <37% disability as measured by the FOTO to demonstrate decreased disability with functional activities. - MET  2. Pt to increase strength to at least 5/5 of muscles tested to allow for improvement in functional activities including. - NOT MET  3. Pt to improve gross shoulder motion to <10% limitations to allow for improved functional mobility with performing ADL's. - NOT MET  4. Pt to report compliance with HEP 3x/week and demonstrate proper exercise technique to PT to show independence with self management of condition. - ME    Plan     Continue emphasizing shoulder ROM, rotator cuff strength    Coby Luke, PTA

## 2022-07-13 ENCOUNTER — CLINICAL SUPPORT (OUTPATIENT)
Dept: REHABILITATION | Facility: HOSPITAL | Age: 52
End: 2022-07-13
Attending: ORTHOPAEDIC SURGERY
Payer: COMMERCIAL

## 2022-07-13 DIAGNOSIS — R29.898 WEAKNESS OF RIGHT UPPER EXTREMITY: ICD-10-CM

## 2022-07-13 DIAGNOSIS — M25.611 DECREASED RANGE OF MOTION OF RIGHT SHOULDER: ICD-10-CM

## 2022-07-13 DIAGNOSIS — F33.0 MILD EPISODE OF RECURRENT MAJOR DEPRESSIVE DISORDER: Chronic | ICD-10-CM

## 2022-07-13 DIAGNOSIS — M25.511 CHRONIC RIGHT SHOULDER PAIN: Primary | ICD-10-CM

## 2022-07-13 DIAGNOSIS — R26.89 DECREASED FUNCTIONAL MOBILITY: ICD-10-CM

## 2022-07-13 DIAGNOSIS — G89.29 CHRONIC RIGHT SHOULDER PAIN: Primary | ICD-10-CM

## 2022-07-13 PROCEDURE — 97140 MANUAL THERAPY 1/> REGIONS: CPT | Mod: PO,CQ

## 2022-07-13 PROCEDURE — 97110 THERAPEUTIC EXERCISES: CPT | Mod: PO,CQ

## 2022-07-13 PROCEDURE — 97112 NEUROMUSCULAR REEDUCATION: CPT | Mod: PO,CQ

## 2022-07-13 RX ORDER — ALBUTEROL SULFATE 90 UG/1
2 AEROSOL, METERED RESPIRATORY (INHALATION) EVERY 6 HOURS PRN
Qty: 18 G | Refills: 3 | Status: CANCELLED | OUTPATIENT
Start: 2022-07-13 | End: 2023-07-13

## 2022-07-13 RX ORDER — ALBUTEROL SULFATE 90 UG/1
2 AEROSOL, METERED RESPIRATORY (INHALATION) EVERY 6 HOURS PRN
Qty: 18 G | Refills: 3 | OUTPATIENT
Start: 2022-07-13 | End: 2023-07-13

## 2022-07-13 RX ORDER — BUPROPION HYDROCHLORIDE 150 MG/1
TABLET ORAL
Qty: 180 TABLET | Refills: 3 | Status: CANCELLED | OUTPATIENT
Start: 2022-07-13

## 2022-07-13 NOTE — TELEPHONE ENCOUNTER
No new care gaps identified.  [facility] Embedded Care Gaps. Reference number: [id number]. 7/13/2022   11:55:55 AM TAYLORT

## 2022-07-13 NOTE — PROGRESS NOTES
Physical Therapy Daily Treatment Note     Name: Jo Ann De La Vega  Clinic Number: [id number]    Therapy Diagnosis:   Encounter Diagnoses   Name Primary?    Chronic right shoulder pain Yes    Decreased range of motion of right shoulder     Weakness of right upper extremity     Decreased functional mobility      Physician: Glenn Moody MD    Visit Date: 7/13/2022  Physician Orders: PT Eval and Treat  Medical Diagnosis from Referral: S42.254D (ICD-10-CM) - Closed nondisplaced fracture of greater tuberosity of right humerus with routine healing, subsequent encounter   Evaluation Date: 5/31/2022  Authorization Period Expiration: 12/31/2022   Plan of Care Expiration: 9/2/22  Visit # / Visits authorized: 12 / 20     Time In: 1328  Time Out: 1425  Total Billable Time: 57 minutes     Precautions: Diabetes    Subjective     Pt reports: her shoulder is a little more sore than usual today. Patient states she is able to  her 16# dog without (R) shoulder pain. She was compliant with home exercise program.   Response to previous treatment: mild soreness  Functional change: improving functional reach    Pain: 0 /10  Location: right shoulder     Objective     JO ANN received therapeutic exercises to develop strength, endurance, ROM, flexibility and posture for 30 minutes including:  UBE x 6 minutes (3 forward, 3 backward)   Pulleys, flexion, 2 min (NP)  Pulleys, scaption, 2 min (NP)  IR stretch w/ strap 10x 10s hold  Supine ER stretch w/ wand, 3x10 w/ 3s hold - 1 set at 90 degrees abd    Supine shoulder flexion with wand, 2x10, 5#  S/L ER 3x10  S/L abduction with inferior glide 2x10  Shoulder IR, 2x10 red band  Shoulder ER, 2x10 red band  Wall ball circles, 30s cw/ccw  Shelf touches, 5x at each shelf   Towel wall slides (B) x 15, 5 sec hold     JO ANN received the following manual therapy techniques: Joint mobilizations and Soft tissue Mobilization were applied to the: R shoulder for 10 minutes, including:  GH joint mobs  grade I-IV with PA/AP/inferior  PROM  STM R Upper trap and biceps (NP)    JO ANN participated in neuromuscular re-education activities to improve: Coordination, Proprioception and Posture for 15 minutes. The following activities were included:  Supine serratus punch, 3 x 10 5#  Landmine press 2x10, 3# (unweighted dowel)  Supine D2 flexion w/ yellow band, 2x10 (NP)  Standing rows w/ green band, 3x10    JO ANN received cold pack for 00 minutes to decrease circulation, pain, and swelling.    Home Exercises Provided and Patient Education Provided     Education provided:   - HEP    Written Home Exercises Provided: Patient instructed to cont prior HEP.  Exercises were reviewed and JO ANN was able to demonstrate them prior to the end of the session.  JO ANN demonstrated good  understanding of the education provided.     See EMR under Patient Instructions for exercises provided prior visit.    Assessment     Jo Ann responded well to joint mobilizations with significant improvements in both active and passive ROM achieved. Patient is able to perform rotator cuff strengthening without complaints of pain but training effect is easily achieved. Active overhead reach consistently improving.    JO ANN Is progressing well towards her goals.   Pt prognosis is Good.     Pt will continue to benefit from skilled outpatient physical therapy to address the deficits listed in the problem list box on initial evaluation, provide pt/family education and to maximize pt's level of independence in the home and community environment.     Pt's spiritual, cultural and educational needs considered and pt agreeable to plan of care and goals.    Anticipated barriers to physical therapy: None    Goals:   Short Term Goals (6 Weeks):   1. Pt to increase strength to at least 4/5 of muscles tested to allow for improvement in functional activities and remediate dysfunctional movement patterns. - NOT MET  2. Pt to improve shoulder to full PROM to  allow for improved functional mobility. - NOT MET  3. Pt to improve shoulder AROM to 130 degrees flexion to improve functional mobility. - MET    Long Term Goals (12 Weeks):   1. Pt to achieve <37% disability as measured by the FOTO to demonstrate decreased disability with functional activities. - MET  2. Pt to increase strength to at least 5/5 of muscles tested to allow for improvement in functional activities including. - NOT MET  3. Pt to improve gross shoulder motion to <10% limitations to allow for improved functional mobility with performing ADL's. - NOT MET  4. Pt to report compliance with HEP 3x/week and demonstrate proper exercise technique to PT to show independence with self management of condition. - ME    Plan     Continue emphasizing shoulder ROM, rotator cuff strength    Coby Luke, PTA

## 2022-07-13 NOTE — TELEPHONE ENCOUNTER
No new care gaps identified.  [facility] Embedded Care Gaps. Reference number: [id number]. 7/13/2022   12:10:31 PM CDT

## 2022-07-14 RX ORDER — BUPROPION HYDROCHLORIDE 150 MG/1
300 TABLET ORAL DAILY
Qty: 180 TABLET | Refills: 3 | Status: SHIPPED | OUTPATIENT
Start: 2022-07-14 | End: 2023-07-17 | Stop reason: SDUPTHER

## 2022-07-15 ENCOUNTER — CLINICAL SUPPORT (OUTPATIENT)
Dept: REHABILITATION | Facility: HOSPITAL | Age: 52
End: 2022-07-15
Attending: ORTHOPAEDIC SURGERY
Payer: COMMERCIAL

## 2022-07-15 DIAGNOSIS — M25.611 DECREASED RANGE OF MOTION OF RIGHT SHOULDER: ICD-10-CM

## 2022-07-15 DIAGNOSIS — R26.89 DECREASED FUNCTIONAL MOBILITY: ICD-10-CM

## 2022-07-15 DIAGNOSIS — R29.898 WEAKNESS OF RIGHT UPPER EXTREMITY: ICD-10-CM

## 2022-07-15 DIAGNOSIS — M25.511 CHRONIC RIGHT SHOULDER PAIN: Primary | ICD-10-CM

## 2022-07-15 DIAGNOSIS — G89.29 CHRONIC RIGHT SHOULDER PAIN: Primary | ICD-10-CM

## 2022-07-15 PROCEDURE — 97140 MANUAL THERAPY 1/> REGIONS: CPT | Mod: PO,CQ

## 2022-07-15 PROCEDURE — 97110 THERAPEUTIC EXERCISES: CPT | Mod: PO,CQ

## 2022-07-15 NOTE — PROGRESS NOTES
Physical Therapy Daily Treatment Note     Name: Jo Ann De La Vega  Clinic Number: [id number]    Therapy Diagnosis:   Encounter Diagnoses   Name Primary?    Chronic right shoulder pain Yes    Decreased range of motion of right shoulder     Weakness of right upper extremity     Decreased functional mobility      Physician: Glenn Moody MD    Visit Date: 7/15/2022  Physician Orders: PT Eval and Treat  Medical Diagnosis from Referral: S42.254D (ICD-10-CM) - Closed nondisplaced fracture of greater tuberosity of right humerus with routine healing, subsequent encounter   Evaluation Date: 5/31/2022  Authorization Period Expiration: 12/31/2022   Plan of Care Expiration: 9/2/22  Visit # / Visits authorized: 13 / 20     Time In: 1200  Time Out: 1300  Total Billable Time: 25 minutes     Precautions: Diabetes    Subjective     Pt reports: her shoulder is doing okay this morning. Patient states it's just sore. Patient states she is training for a dog agility competition that she hopes to participate in at the end of the month but some motions/movements are still uncomfortable. She was compliant with home exercise program.   Response to previous treatment: mild soreness  Functional change: improving functional reach    Pain: 1/10  Location: right shoulder     Objective     JO ANN received therapeutic exercises to develop strength, endurance, ROM, flexibility and posture for 30 minutes including:  UBE x 6 minutes (3 forward, 3 backward)   Pulleys, flexion, 2 min (NP)  Pulleys, scaption, 2 min (NP)  IR stretch w/ strap 10x 10s hold  Supine ER stretch w/ wand, 3x10 w/ 3s hold - 1 set at 90 degrees abd    Supine shoulder flexion with wand, 2x10, 5#  S/L ER 3x10  S/L abduction with inferior glide 2x10  Shoulder IR, 2x10 red band  Shoulder ER, 2x10 red band  Wall ball circles, 30s cw/ccw  Shelf touches, 5x at each shelf   Towel wall slides (B) x 15, 5 sec hold     JO ANN received the following manual therapy techniques: Joint  mobilizations and Soft tissue Mobilization were applied to the: R shoulder for 10 minutes, including:  GH joint mobs grade I-IV with PA/AP/inferior  PROM  STM R Upper trap and biceps (NP)    JO ANN participated in neuromuscular re-education activities to improve: Coordination, Proprioception and Posture for 15 minutes. The following activities were included:  Supine serratus punch, 3 x 10 5#  Landmine press 2x10, 3# (unweighted dowel)  Supine D2 flexion w/ yellow band, 2x10 (NP)  Standing rows w/ green band, 3x10    JO ANN received cold pack for 10 minutes to decrease circulation, pain, and swelling.    Home Exercises Provided and Patient Education Provided     Education provided:   - HEP    Written Home Exercises Provided: Patient instructed to cont prior HEP.  Exercises were reviewed and JO ANN was able to demonstrate them prior to the end of the session.  JO ANN demonstrated good  understanding of the education provided.     See EMR under Patient Instructions for exercises provided prior visit.    Assessment     Jo Ann demonstrates consistent improvements in functional ability to reach behind back and reach overhead. Patient continues to respond very positively to manual therapy techniques as ROM significantly improves with joint mobilizations and repetition. Patient remains appropriately challenged by rotator cuff strengthening exercises. Patient hopes to participate in dog agility trial in 2 weeks so will start to incorporate exercises that mimic these motions.     JO ANN Is progressing well towards her goals.   Pt prognosis is Good.     Pt will continue to benefit from skilled outpatient physical therapy to address the deficits listed in the problem list box on initial evaluation, provide pt/family education and to maximize pt's level of independence in the home and community environment.     Pt's spiritual, cultural and educational needs considered and pt agreeable to plan of care and  goals.    Anticipated barriers to physical therapy: None    Goals:   Short Term Goals (6 Weeks):   1. Pt to increase strength to at least 4/5 of muscles tested to allow for improvement in functional activities and remediate dysfunctional movement patterns. - NOT MET  2. Pt to improve shoulder to full PROM to allow for improved functional mobility. - NOT MET  3. Pt to improve shoulder AROM to 130 degrees flexion to improve functional mobility. - MET    Long Term Goals (12 Weeks):   1. Pt to achieve <37% disability as measured by the FOTO to demonstrate decreased disability with functional activities. - MET  2. Pt to increase strength to at least 5/5 of muscles tested to allow for improvement in functional activities including. - NOT MET  3. Pt to improve gross shoulder motion to <10% limitations to allow for improved functional mobility with performing ADL's. - NOT MET  4. Pt to report compliance with HEP 3x/week and demonstrate proper exercise technique to PT to show independence with self management of condition. - ME    Plan     Continue emphasizing shoulder ROM, rotator cuff strength    Coby Luke, PTA

## 2022-07-19 ENCOUNTER — CLINICAL SUPPORT (OUTPATIENT)
Dept: REHABILITATION | Facility: HOSPITAL | Age: 52
End: 2022-07-19
Attending: ORTHOPAEDIC SURGERY
Payer: COMMERCIAL

## 2022-07-19 DIAGNOSIS — M25.511 CHRONIC RIGHT SHOULDER PAIN: Primary | ICD-10-CM

## 2022-07-19 DIAGNOSIS — R29.898 WEAKNESS OF RIGHT UPPER EXTREMITY: ICD-10-CM

## 2022-07-19 DIAGNOSIS — M25.611 DECREASED RANGE OF MOTION OF RIGHT SHOULDER: ICD-10-CM

## 2022-07-19 DIAGNOSIS — G89.29 CHRONIC RIGHT SHOULDER PAIN: Primary | ICD-10-CM

## 2022-07-19 DIAGNOSIS — R26.89 DECREASED FUNCTIONAL MOBILITY: ICD-10-CM

## 2022-07-19 PROCEDURE — 97110 THERAPEUTIC EXERCISES: CPT | Mod: PO,CQ

## 2022-07-19 PROCEDURE — 97112 NEUROMUSCULAR REEDUCATION: CPT | Mod: PO,CQ

## 2022-07-19 PROCEDURE — 97140 MANUAL THERAPY 1/> REGIONS: CPT | Mod: PO,CQ

## 2022-07-19 NOTE — PROGRESS NOTES
Physical Therapy Daily Treatment Note     Name: Jo Ann De La Vega  Clinic Number: [id number]    Therapy Diagnosis:   Encounter Diagnoses   Name Primary?    Chronic right shoulder pain Yes    Decreased range of motion of right shoulder     Weakness of right upper extremity     Decreased functional mobility      Physician: Glenn Moody MD    Visit Date: 7/19/2022  Physician Orders: PT Eval and Treat  Medical Diagnosis from Referral: S42.254D (ICD-10-CM) - Closed nondisplaced fracture of greater tuberosity of right humerus with routine healing, subsequent encounter   Evaluation Date: 5/31/2022  Authorization Period Expiration: 12/31/2022   Plan of Care Expiration: 9/2/22  Visit # / Visits authorized: 15 / 20     Time In: 1532  Time Out: 1435  Total Billable Time: 55 minutes     Precautions: Diabetes    Subjective     Pt reports: her shoulder is feeling better. Patient state she was able to do agility training with her dog and she noticed her arm had more motion. Patient states she feels like she had better control of arm when moving. She was compliant with home exercise program.   Response to previous treatment: mild soreness  Functional change: improving functional reach    Pain: 1/10  Location: right shoulder     Objective     JO ANN received therapeutic exercises to develop strength, endurance, ROM, flexibility and posture for 30 minutes including:  UBE x 6 minutes (3 forward, 3 backward)   IR stretch w/ strap 10x 10s hold  Supine ER stretch w/ wand, 3x10 w/ 3s hold - 1 set at 90 degrees abd    Supine shoulder flexion with wand x 10, 5#  S/L ER 3x10, 1#   S/L abduction with inferior glide 2x10  Shoulder IR, 2x10 red band  Shoulder ER, 2x10 red band  Wall ball circles, 30s cw/ccw  Shelf touches, 5x at each shelf   Towel wall slides (B) x 15, 5 sec hold   Standing flexion and scaption 2x10, 1#    JO ANN received the following manual therapy techniques: Joint mobilizations and Soft tissue Mobilization were applied  to the: R shoulder for 10 minutes, including:  GH joint mobs grade I-IV with PA/AP/inferior  PROM  STM R Upper trap and biceps (NP)    JO ANN participated in neuromuscular re-education activities to improve: Coordination, Proprioception and Posture for 15 minutes. The following activities were included:  Supine serratus punch, 3 x 10 5# DB (RUE only)   Landmine press 2x10, 3# (unweighted dowel)  Supine D2 flexion w/ yellow band, 2x10   Standing rows w/ green band, 3x10    JO ANN received cold pack for 10 minutes to decrease circulation, pain, and swelling.    Home Exercises Provided and Patient Education Provided     Education provided:   - HEP    Written Home Exercises Provided: yes with red and green theraband for progression.  Exercises were reviewed and JO ANN was able to demonstrate them prior to the end of the session.  JO ANN demonstrated good  understanding of the education provided.     See EMR under Patient Instructions for exercises provided 07/19/2022.    Assessment     Jo Ann demonstrates compensatory shoulder hike at end range of shoulder flexion against gravity but she does push to improve ROM. Patient remains challenged by rotator cuff strengthening and this is most evident with ER focused exercises. AROM of (R) shoulder continues to consistently improve and she is utilizing arm when practicing agility training with her dog.    JO ANN Is progressing well towards her goals.   Pt prognosis is Good.     Pt will continue to benefit from skilled outpatient physical therapy to address the deficits listed in the problem list box on initial evaluation, provide pt/family education and to maximize pt's level of independence in the home and community environment.     Pt's spiritual, cultural and educational needs considered and pt agreeable to plan of care and goals.    Anticipated barriers to physical therapy: None    Goals:   Short Term Goals (6 Weeks):   1. Pt to increase strength to at least 4/5 of  muscles tested to allow for improvement in functional activities and remediate dysfunctional movement patterns. - NOT MET  2. Pt to improve shoulder to full PROM to allow for improved functional mobility. - NOT MET  3. Pt to improve shoulder AROM to 130 degrees flexion to improve functional mobility. - MET    Long Term Goals (12 Weeks):   1. Pt to achieve <37% disability as measured by the FOTO to demonstrate decreased disability with functional activities. - MET  2. Pt to increase strength to at least 5/5 of muscles tested to allow for improvement in functional activities including. - NOT MET  3. Pt to improve gross shoulder motion to <10% limitations to allow for improved functional mobility with performing ADL's. - NOT MET  4. Pt to report compliance with HEP 3x/week and demonstrate proper exercise technique to PT to show independence with self management of condition. - ME    Plan     Continue emphasizing shoulder ROM, rotator cuff strength    Coby Luke, PTA

## 2022-07-21 ENCOUNTER — PATIENT MESSAGE (OUTPATIENT)
Dept: NEUROLOGY | Facility: CLINIC | Age: 52
End: 2022-07-21
Payer: COMMERCIAL

## 2022-07-21 DIAGNOSIS — G43.711 CHRONIC MIGRAINE WITHOUT AURA, WITH INTRACTABLE MIGRAINE, SO STATED, WITH STATUS MIGRAINOSUS: ICD-10-CM

## 2022-07-21 RX ORDER — ACETAZOLAMIDE 250 MG/1
250 TABLET ORAL 3 TIMES DAILY PRN
Qty: 20 TABLET | Refills: 4 | Status: SHIPPED | OUTPATIENT
Start: 2022-07-21 | End: 2023-07-21

## 2022-07-21 RX ORDER — ALBUTEROL SULFATE 90 UG/1
2 AEROSOL, METERED RESPIRATORY (INHALATION) EVERY 6 HOURS PRN
Qty: 18 G | Refills: 3 | Status: SHIPPED | OUTPATIENT
Start: 2022-07-21 | End: 2023-08-20

## 2022-07-22 ENCOUNTER — CLINICAL SUPPORT (OUTPATIENT)
Dept: REHABILITATION | Facility: HOSPITAL | Age: 52
End: 2022-07-22
Attending: ORTHOPAEDIC SURGERY
Payer: COMMERCIAL

## 2022-07-22 DIAGNOSIS — R26.89 DECREASED FUNCTIONAL MOBILITY: ICD-10-CM

## 2022-07-22 DIAGNOSIS — R29.898 WEAKNESS OF RIGHT UPPER EXTREMITY: ICD-10-CM

## 2022-07-22 DIAGNOSIS — M25.511 CHRONIC RIGHT SHOULDER PAIN: Primary | ICD-10-CM

## 2022-07-22 DIAGNOSIS — M25.611 DECREASED RANGE OF MOTION OF RIGHT SHOULDER: ICD-10-CM

## 2022-07-22 DIAGNOSIS — G89.29 CHRONIC RIGHT SHOULDER PAIN: Primary | ICD-10-CM

## 2022-07-22 PROCEDURE — 97110 THERAPEUTIC EXERCISES: CPT | Mod: PO

## 2022-07-22 PROCEDURE — 97140 MANUAL THERAPY 1/> REGIONS: CPT | Mod: PO

## 2022-07-22 PROCEDURE — 97112 NEUROMUSCULAR REEDUCATION: CPT | Mod: PO

## 2022-07-22 PROCEDURE — 97010 HOT OR COLD PACKS THERAPY: CPT | Mod: PO

## 2022-07-22 NOTE — PROGRESS NOTES
Physical Therapy Daily Treatment Note     Name: Jo Ann De La Vega  Clinic Number: [id number]    Therapy Diagnosis:   Encounter Diagnoses   Name Primary?    Chronic right shoulder pain Yes    Decreased range of motion of right shoulder     Weakness of right upper extremity     Decreased functional mobility      Physician: Glenn Moody MD    Visit Date: 7/22/2022  Physician Orders: PT Eval and Treat  Medical Diagnosis from Referral: S42.254D (ICD-10-CM) - Closed nondisplaced fracture of greater tuberosity of right humerus with routine healing, subsequent encounter   Evaluation Date: 5/31/2022  Authorization Period Expiration: 12/31/2022   Plan of Care Expiration: 9/2/22  Visit # / Visits authorized: 15 / 20     Time In: 10:00  Time Out: 11:05  Total Billable Time: 65 minutes     Precautions: Diabetes    Subjective     Pt reports: She is doing ok today, minimal pain to start the session, mostly feeling stiff because she did not take a muscle relaxer today. She has been practicing agility with her dog and feels like she is getting better, has more control of her arm. She is competing this weekend with her dog.   She was compliant with home exercise program.   Response to previous treatment: mild soreness  Functional change: improving functional reach    Pain: 1/10  Location: right shoulder     Objective     JO ANN received therapeutic exercises to develop strength, endurance, ROM, flexibility and posture for 30 minutes including:  UBE x 6 minutes lvl 1.3 (3 forward, 3 backward)   IR stretch w/ strap 10x 10s hold  Supine ER stretch w/ wand, 3x10 w/ 3s hold - 1 set at 90 degrees abd    Supine shoulder flexion with wand x 10, 5#  S/L ER 3x10, 1#   S/L abduction with inferior glide 2x10, 1#  Shoulder IR, 2x10 red band  Shoulder ER, 2x10 red band  Wall ball circles, 30s cw/ccw  Shelf touches, 5x at each shelf   Towel wall slides (B) x 15, 5 sec hold   Standing flexion and scaption 2x10, 1#      JO ANN received the  following manual therapy techniques: Joint mobilizations and Soft tissue Mobilization were applied to the: R shoulder for 10 minutes, including:  GH joint mobs grade I-IV with PA/AP/inferior  PROM  STM R Upper trap and biceps (NP)    JO ANN participated in neuromuscular re-education activities to improve: Coordination, Proprioception and Posture for 15 minutes. The following activities were included:  Supine serratus punch, 3 x 10 5# DB (RUE only)   Landmine press 2x10, 3# (unweighted dowel)  Supine D2 flexion w/ yellow band, 2x10   Standing rows w/ green band, 3x10    JO ANN received cold pack for 10 minutes to decrease circulation, pain, and swelling.    Home Exercises Provided and Patient Education Provided     Education provided:   - HEP    Written Home Exercises Provided: yes with red and green theraband for progression.  Exercises were reviewed and JO ANN was able to demonstrate them prior to the end of the session.  JO ANN demonstrated good  understanding of the education provided.     See EMR under Patient Instructions for exercises provided 07/19/2022.    Assessment     Pt continuing to progress well, more control with weight serratus punches. Continues to have pain with end range PROM flexion. ROM continues to be limited in all directions due to pain. Will continue to progress as tolerated.     JO ANN Is progressing well towards her goals.   Pt prognosis is Good.     Pt will continue to benefit from skilled outpatient physical therapy to address the deficits listed in the problem list box on initial evaluation, provide pt/family education and to maximize pt's level of independence in the home and community environment.     Pt's spiritual, cultural and educational needs considered and pt agreeable to plan of care and goals.    Anticipated barriers to physical therapy: None    Goals:   Short Term Goals (6 Weeks):   1. Pt to increase strength to at least 4/5 of muscles tested to allow for improvement in  functional activities and remediate dysfunctional movement patterns. - NOT MET  2. Pt to improve shoulder to full PROM to allow for improved functional mobility. - NOT MET  3. Pt to improve shoulder AROM to 130 degrees flexion to improve functional mobility. - MET    Long Term Goals (12 Weeks):   1. Pt to achieve <37% disability as measured by the FOTO to demonstrate decreased disability with functional activities. - MET  2. Pt to increase strength to at least 5/5 of muscles tested to allow for improvement in functional activities including. - NOT MET  3. Pt to improve gross shoulder motion to <10% limitations to allow for improved functional mobility with performing ADL's. - NOT MET  4. Pt to report compliance with HEP 3x/week and demonstrate proper exercise technique to PT to show independence with self management of condition. - ME    Plan     Continue emphasizing shoulder ROM, rotator cuff strength    Clark Tamayo, PT

## 2022-08-03 ENCOUNTER — CLINICAL SUPPORT (OUTPATIENT)
Dept: REHABILITATION | Facility: HOSPITAL | Age: 52
End: 2022-08-03
Attending: ORTHOPAEDIC SURGERY
Payer: COMMERCIAL

## 2022-08-03 DIAGNOSIS — R29.898 WEAKNESS OF RIGHT UPPER EXTREMITY: ICD-10-CM

## 2022-08-03 DIAGNOSIS — G89.29 CHRONIC RIGHT SHOULDER PAIN: Primary | ICD-10-CM

## 2022-08-03 DIAGNOSIS — M25.611 DECREASED RANGE OF MOTION OF RIGHT SHOULDER: ICD-10-CM

## 2022-08-03 DIAGNOSIS — M25.511 CHRONIC RIGHT SHOULDER PAIN: Primary | ICD-10-CM

## 2022-08-03 DIAGNOSIS — R26.89 DECREASED FUNCTIONAL MOBILITY: ICD-10-CM

## 2022-08-03 PROCEDURE — 97110 THERAPEUTIC EXERCISES: CPT | Mod: PO,CQ

## 2022-08-03 PROCEDURE — 97140 MANUAL THERAPY 1/> REGIONS: CPT | Mod: PO,CQ

## 2022-08-03 PROCEDURE — 97112 NEUROMUSCULAR REEDUCATION: CPT | Mod: PO,CQ

## 2022-08-03 NOTE — PROGRESS NOTES
Physical Therapy Daily Treatment Note     Name: Jo Ann De La Vega  Clinic Number: [id number]    Therapy Diagnosis:   Encounter Diagnoses   Name Primary?    Chronic right shoulder pain Yes    Decreased range of motion of right shoulder     Weakness of right upper extremity     Decreased functional mobility      Physician: Glenn Moody MD    Visit Date: 8/3/2022  Physician Orders: PT Eval and Treat  Medical Diagnosis from Referral: S42.254D (ICD-10-CM) - Closed nondisplaced fracture of greater tuberosity of right humerus with routine healing, subsequent encounter   Evaluation Date: 5/31/2022  Authorization Period Expiration: 12/31/2022   Plan of Care Expiration: 9/2/22  Visit # / Visits authorized: 16 / 20     Time In: 1328  Time Out: 1422  Total Billable Time: 54 minutes     Precautions: Diabetes    Subjective     Pt reports: she competed with her dog in an agility trial over the weekend. Patient states her arm was sore between the trial and carrying luggage to and from hotel room but overall this went very well. Patient states she canceled appointments last week due to a root canal. She was compliant with home exercise program.   Response to previous treatment: mild soreness  Functional change: able to compete in agility trial with her dog     Pain: 0/10  Location: right shoulder     Objective     JO ANN received therapeutic exercises to develop strength, endurance, ROM, flexibility and posture for 30 minutes including:  UBE x 6 minutes lvl 2 (3 forward, 3 backward)   Supine ER stretch w/ wand, x 10 - 1 set at 90 degrees abd    Supine shoulder flexion with wand x 10, 5#  S/L ER 3x10, 1#   S/L abduction with inferior glide 2x10, 1#    Shoulder IR, 2x10 red band  Shoulder ER, 2x10 red band  Wall ball circles, 30s cw/ccw  Shelf touches, x 10 ea shelf x 1#  Towel wall slides (B) x 15, 5 sec hold     Standing flexion and scaption 2x10, 1#    IR stretch w/ strap 10x 10s hold  Seated lat pull downs 2x10, 7#   Wall push  ups 2x10  Table push ups x10    JO ANN received the following manual therapy techniques: Joint mobilizations and Soft tissue Mobilization were applied to the: R shoulder for 10 minutes, including:  GH joint mobs grade I-IV with PA/AP/inferior  PROM  STM R Upper trap and biceps (NP)    JO ANN participated in neuromuscular re-education activities to improve: Coordination, Proprioception and Posture for 15 minutes. The following activities were included:  Supine serratus punch, 3 x 10 5# DB (RUE only)   Landmine press 2x10, 3#   Supine D2 flexion w/ yellow band, 2x10   Standing rows w/ green band, 3x10    JO ANN received cold pack for 00 minutes to decrease circulation, pain, and swelling. (not today)    Home Exercises Provided and Patient Education Provided     Education provided:   - HEP    Written Home Exercises Provided: Patient instructed to cont prior HEP.  Exercises were reviewed and JO ANN was able to demonstrate them prior to the end of the session.  JO ANN demonstrated good  understanding of the education provided.     See EMR under Patient Instructions for exercises provided 07/19/2022.    Assessment     Jo Ann able to progress to wall push ups without complaints of pain but patient does demonstrate increased weightbearing through (L) shoulder due to (R) UE weakness and apprehension. Patient able to perform overhead lat pull down without pain. PROM much improved this date with only mild soreness at end ranges of motion. Patient continues to struggle with functional IR ROM.     JO ANN Is progressing well towards her goals.   Pt prognosis is Good.     Pt will continue to benefit from skilled outpatient physical therapy to address the deficits listed in the problem list box on initial evaluation, provide pt/family education and to maximize pt's level of independence in the home and community environment.     Pt's spiritual, cultural and educational needs considered and pt agreeable to plan of care and  goals.    Anticipated barriers to physical therapy: None    Goals:   Short Term Goals (6 Weeks):   1. Pt to increase strength to at least 4/5 of muscles tested to allow for improvement in functional activities and remediate dysfunctional movement patterns. - NOT MET  2. Pt to improve shoulder to full PROM to allow for improved functional mobility. - NOT MET  3. Pt to improve shoulder AROM to 130 degrees flexion to improve functional mobility. - MET    Long Term Goals (12 Weeks):   1. Pt to achieve <37% disability as measured by the FOTO to demonstrate decreased disability with functional activities. - MET  2. Pt to increase strength to at least 5/5 of muscles tested to allow for improvement in functional activities including. - NOT MET  3. Pt to improve gross shoulder motion to <10% limitations to allow for improved functional mobility with performing ADL's. - NOT MET  4. Pt to report compliance with HEP 3x/week and demonstrate proper exercise technique to PT to show independence with self management of condition. - ME    Plan     Continue emphasizing shoulder ROM, rotator cuff strength    Coby Luke, PTA

## 2022-08-05 ENCOUNTER — CLINICAL SUPPORT (OUTPATIENT)
Dept: REHABILITATION | Facility: HOSPITAL | Age: 52
End: 2022-08-05
Attending: ORTHOPAEDIC SURGERY
Payer: COMMERCIAL

## 2022-08-05 DIAGNOSIS — M25.511 CHRONIC RIGHT SHOULDER PAIN: Primary | ICD-10-CM

## 2022-08-05 DIAGNOSIS — G89.29 CHRONIC RIGHT SHOULDER PAIN: Primary | ICD-10-CM

## 2022-08-05 DIAGNOSIS — R29.898 WEAKNESS OF RIGHT UPPER EXTREMITY: ICD-10-CM

## 2022-08-05 DIAGNOSIS — R26.89 DECREASED FUNCTIONAL MOBILITY: ICD-10-CM

## 2022-08-05 DIAGNOSIS — M25.611 DECREASED RANGE OF MOTION OF RIGHT SHOULDER: ICD-10-CM

## 2022-08-05 PROCEDURE — 97112 NEUROMUSCULAR REEDUCATION: CPT | Mod: PO,CQ

## 2022-08-05 PROCEDURE — 97110 THERAPEUTIC EXERCISES: CPT | Mod: PO,CQ

## 2022-08-05 NOTE — PROGRESS NOTES
Physical Therapy Daily Treatment Note     Name: Jo Ann De La Vega  Clinic Number: [id number]    Therapy Diagnosis:   Encounter Diagnoses   Name Primary?    Chronic right shoulder pain Yes    Decreased range of motion of right shoulder     Weakness of right upper extremity     Decreased functional mobility      Physician: Glenn Moody MD    Visit Date: 8/5/2022  Physician Orders: PT Eval and Treat  Medical Diagnosis from Referral: S42.254D (ICD-10-CM) - Closed nondisplaced fracture of greater tuberosity of right humerus with routine healing, subsequent encounter   Evaluation Date: 5/31/2022  Authorization Period Expiration: 12/31/2022   Plan of Care Expiration: 9/2/22  Visit # / Visits authorized: 17 / 20     Time In: 1000 AM  Time Out: 1105 AM  Total Billable Time: 30 minutes     Precautions: Diabetes    Subjective     Pt reports: she was sore following last treatment session but she is really doing well with exercise progression. Patient states she is comfortable decreasing frequency to 1x a week following today's session.   She was compliant with home exercise program.   Response to previous treatment: mild soreness  Functional change: able to compete in agility trial with her dog     Pain: 1/10  Location: right shoulder     Objective     JO ANN received therapeutic exercises to develop strength, endurance, ROM, flexibility and posture for 30 minutes including:  UBE x 6 minutes lvl 2 (3 forward, 3 backward)   Supine ER stretch w/ wand, x 10 - 1 set at 90 degrees abd    Supine shoulder flexion with wand x 10, 5#  S/L ER 3x10, 1#   S/L abduction with inferior glide 2x10, 1#    Shoulder IR, 2x10 red band  Shoulder ER, 2x10 red band  Wall ball circles, 30s cw/ccw  Shelf touches, x 10 ea shelf x 1#  Towel wall slides (B) x 15, 5 sec hold     Standing flexion and scaption 2x10, 1#    IR stretch w/ strap 10x 10s hold  Seated lat pull downs 2x10, 7#   Wall push ups 2x10  Table push ups x10    JO ANN received the  following manual therapy techniques: Joint mobilizations and Soft tissue Mobilization were applied to the: R shoulder for 10 minutes, including:  GH joint mobs grade I-IV with PA/AP/inferior  PROM  STM R Upper trap and biceps (NP)    JO ANN participated in neuromuscular re-education activities to improve: Coordination, Proprioception and Posture for 15 minutes. The following activities were included:  Supine serratus punch, 3 x 10 5# DB (RUE only)   Landmine press 2x10, 3#   Supine D2 flexion w/ yellow band, 2x10   Standing rows w/ green band, 3x10    JO ANN received cold pack for 00 minutes to decrease circulation, pain, and swelling. (not today)    Home Exercises Provided and Patient Education Provided     Education provided:   - HEP    Written Home Exercises Provided: Patient instructed to cont prior HEP.  Exercises were reviewed and JO ANN was able to demonstrate them prior to the end of the session.  JO ANN demonstrated good  understanding of the education provided.     See EMR under Patient Instructions for exercises provided 07/19/2022.    Assessment     Jo Ann with easily achieved muscle soreness and fatigue this date likely due to exercise progression earlier this week. Patient demonstrates improving overhead reach without complaints of pain. Patient able to weightbear through (R) UE without increasing pain. Will decrease frequency to 1x in preparation for discharge with HEP. Supervising PT in agreement with plan.     JO ANN Is progressing well towards her goals.   Pt prognosis is Good.     Pt will continue to benefit from skilled outpatient physical therapy to address the deficits listed in the problem list box on initial evaluation, provide pt/family education and to maximize pt's level of independence in the home and community environment.     Pt's spiritual, cultural and educational needs considered and pt agreeable to plan of care and goals.    Anticipated barriers to physical therapy:  None    Goals:   Short Term Goals (6 Weeks):   1. Pt to increase strength to at least 4/5 of muscles tested to allow for improvement in functional activities and remediate dysfunctional movement patterns. - NOT MET  2. Pt to improve shoulder to full PROM to allow for improved functional mobility. - NOT MET  3. Pt to improve shoulder AROM to 130 degrees flexion to improve functional mobility. - MET    Long Term Goals (12 Weeks):   1. Pt to achieve <37% disability as measured by the FOTO to demonstrate decreased disability with functional activities. - MET  2. Pt to increase strength to at least 5/5 of muscles tested to allow for improvement in functional activities including. - NOT MET  3. Pt to improve gross shoulder motion to <10% limitations to allow for improved functional mobility with performing ADL's. - NOT MET  4. Pt to report compliance with HEP 3x/week and demonstrate proper exercise technique to PT to show independence with self management of condition. - ME    Plan     Continue emphasizing shoulder ROM, rotator cuff strength    Coby Luke, PTA

## 2022-08-11 ENCOUNTER — CLINICAL SUPPORT (OUTPATIENT)
Dept: REHABILITATION | Facility: HOSPITAL | Age: 52
End: 2022-08-11
Attending: ORTHOPAEDIC SURGERY
Payer: COMMERCIAL

## 2022-08-11 DIAGNOSIS — M25.511 CHRONIC RIGHT SHOULDER PAIN: Primary | ICD-10-CM

## 2022-08-11 DIAGNOSIS — G89.29 CHRONIC RIGHT SHOULDER PAIN: Primary | ICD-10-CM

## 2022-08-11 DIAGNOSIS — J45.20 MILD INTERMITTENT ASTHMA, UNSPECIFIED WHETHER COMPLICATED: ICD-10-CM

## 2022-08-11 DIAGNOSIS — R26.89 DECREASED FUNCTIONAL MOBILITY: ICD-10-CM

## 2022-08-11 DIAGNOSIS — M25.611 DECREASED RANGE OF MOTION OF RIGHT SHOULDER: ICD-10-CM

## 2022-08-11 DIAGNOSIS — R29.898 WEAKNESS OF RIGHT UPPER EXTREMITY: ICD-10-CM

## 2022-08-11 PROCEDURE — 97110 THERAPEUTIC EXERCISES: CPT | Mod: PO

## 2022-08-11 PROCEDURE — 97112 NEUROMUSCULAR REEDUCATION: CPT | Mod: PO

## 2022-08-11 RX ORDER — MONTELUKAST SODIUM 10 MG/1
10 TABLET ORAL DAILY
Qty: 90 TABLET | Refills: 3 | Status: CANCELLED | OUTPATIENT
Start: 2022-08-11

## 2022-08-11 RX ORDER — MONTELUKAST SODIUM 10 MG/1
10 TABLET ORAL DAILY
Qty: 90 TABLET | Refills: 3 | Status: SHIPPED | OUTPATIENT
Start: 2022-08-11

## 2022-08-11 NOTE — TELEPHONE ENCOUNTER
No new care gaps identified.  [facility] Embedded Care Gaps. Reference number: [id number]. 8/11/2022   3:57:23 PM CDT

## 2022-08-11 NOTE — PLAN OF CARE
[facility]  Physical Therapy Re-Assessment    Name: Jo Ann De La Vega  Clinic Number: [id number]    Therapy Diagnosis:   Encounter Diagnoses   Name Primary?    Chronic right shoulder pain Yes    Decreased range of motion of right shoulder     Weakness of right upper extremity     Decreased functional mobility      Physician: Glenn Moody MD    Physician Orders: PT Eval and Treat  Medical Diagnosis from Referral: S42.254D (ICD-10-CM) - Closed nondisplaced fracture of greater tuberosity of right humerus with routine healing, subsequent encounter   Evaluation Date: 5/31/2022  Authorization Period Expiration: 12/31/2022   Plan of Care Expiration: 9/2/22  Visit # / Visits authorized: 18 / 20    Time In: 4:00  Time Out: 4:45  Total Billable Time: 40 minutes    Precautions: Diabetes    Subjective   Date of onset: 3/16/22  History of current condition - JO ANN reports: Pt has been doing well. She has recently performed in a competition with her dog and did well. She is still having to take one muscle relaxer each day, typically takes two on days she comes for PT. Feels her ROM and strength is getting better, still doing some of her HEP. She is agreeable to reducing treatment to one time per week, expected discharge in two weeks, and continuing with HEP.     Medical History:   Past Medical History:   Diagnosis Date    Abnormal Pap smear     ASCUS negative HPV. Repeat pap    ADD (attention deficit disorder)     Arthritis     right knee    Asthma     exercise induced    Basal cell carcinoma 02/2018    Left upper back     BCC (basal cell carcinoma)     Right medial ankle  - ED& C     BCC (basal cell carcinoma) 2016    Left anterior thigh    Cystocele     with stress incontinence    Depression     Dizziness     Fracture of sternum Nov 2010    from Karate class    GERD (gastroesophageal reflux disease)     HEARING LOSS     Hearing loss in right ear     IBS (irritable bowel syndrome)      Intrauterine device     Mirena    Kidney stone 2009    PONV (postoperative nausea and vomiting)     requests Scopolamine patch    Primary hypertension 1/31/2022    SCC (squamous cell carcinoma), hand, right 2015    excisied [facility] dermatology     Seasonal allergies     deviated septum also    SI (sacroiliac) pain     Sinus pain July 3/2012    no fever, starting on antibiotics    Sinusitis     Skin tag of female perineum 2012    Squamous cell carcinoma of skin 09/2018    Left shin     TMJ (dislocation of temporomandibular joint)        Surgical History:   Concepcion De La Vega  has a past surgical history that includes Cholecystectomy; TONSILLECTOMY, ADENOIDECTOMY, BILATERAL yringotomy and tubes; External ear surgery; Dilation and curettage of uterus; Ureteral stent placement (2009); Anterior vaginal repair; Tonsillectomy; Colonoscopy; and LASIK (Bilateral, 2015).    Medications:   Concepcion has a current medication list which includes the following prescription(s): acetazolamide, albuterol, bupropion, cholecalciferol (vitamin d3), diclofenac, famotidine, loratadine, methocarbamol, myrbetriq, and montelukast, and the following Facility-Administered Medications: methylprednisolone sodium succinate.    Allergies:   Review of patient's allergies indicates:   Allergen Reactions    Venom-wasp Hives and Swelling    Oxytetracycline Swelling     Terramycin    Triple antibiotic [[id number]-pramoxine] Blisters    Adhesive Rash        Imaging,   MRI Studies: 04/29/2022   FINDINGS: There is an oblique fracture of the greater tuberosity of the humerus with minimal lateral displacement of the fracture fragment.  There is also a transverse fracture through the neck of the humerus with surrounding bone marrow edema.  There is no displacement of this fracture line.  There is a small partial-thickness articular surface tear of the distal supraspinatus tendon near the attachment with the humerus.  There is no evidence  of full-thickness tear or avulsion.  Other rotator cuff tendons appear normal.  The glenoid labrum and bicipital labral complex are intact. The biceps tendons appear normal.  The acromioclavicular joint is well maintained and there is no shoulder impingement.  Visualized ligaments are intact.  No muscular abnormalities are evident.  No significant joint effusion is present.    Xray: 03/16/2022   FINDINGS: There are changes most consistent with a fracture of the greater tuberosity.  Fracture is mildly displaced.  Humeral head is not dislocated.    Xray: 04/12/2022   FINDINGS: Displaced fracture of the right humerus greater tuberosity again demonstrated.  Fracture fragment is displaced superolaterally approximately 6 mm.  No definite additional fracture or dislocation.  Mild right AC joint arthrosis.    Xray: 04/27/2022   FINDINGS: Fracture of the greater tuberosity is noted with a step-off of approximately 4 mm present unchanged in alignment appearance since 04/12/2022.  Spurring is noted at the acromioclavicular joint.  Osseous demineralization may be present diffusely.     Xray: 05/24/2022   FINDINGS: There has been progressive ongoing healing of the minimally displaced oblique fracture through the greater tuberosity of the humerus.  The glenohumeral joint is well maintained and well aligned.  There is chronic mild degenerative arthrosis of the acromioclavicular joint.      Prior Therapy: Yes  Social History: Pt lives with their spouse  Occupation: LPN with [employer]  Prior Level of Function: Independent in all ADLs  Current Level of Function: Needs assistance with heavy lifting    Pain:  Current 0/10, worst 2/10, best 0/10   Location: right shoulder   Description: Sharp, Shooting and muscle spasm  Aggravating Factors: Lifting  Easing Factors: rest    Pts goals: Reduce the pain, return to normal function    Objective     Active Range of Motion: Measured in degrees  Shoulder Left Right   Flexion 180 180   Abduction  180 180   ER at 45 80 60   ER at 90 80 60   IR 80 70       Passive Range of Motion: Measured in degrees  Shoulder Left Right   Flexion 180 180   Abduction 180 180   ER at 45 80 75   ER at 90 80 65   IR 80 75     Upper Extremity Strength  Elbow Left Right   Biceps 5/5 5/5   Triceps 4+/5 4+/5       Shoulder Left Right   Shoulder flexion: 5/5 5/5   Shoulder Abduction: 5/5 5/5   Shoulder ER 5/5 5/5   Shoulder IR 5/5 5/5       Palpation Shoulder:  TTP across the deltoid and in R upper trap         CMS Impairment/Limitation/Restriction for FOTO SHOULDER Survey    Therapist reviewed FOTO scores for Jo Ann De La Vega on 8/11/2022.   FOTO documents entered into [employer] - see Media section.    Limitation Score: 30%  Category: Mobility         TREATMENT   Treatment Time In: 4:05  Treatment Time Out: 4:45  Total Treatment time separate from Evaluation: 40 minutes    JO ANN received therapeutic exercises to develop strength, endurance, ROM, flexibility and posture for 30 minutes including:  UBE x 6 minutes lvl 2 (3 forward, 3 backward)   Supine ER stretch w/ wand, x 10 - 1 set at 90 degrees abd    Supine shoulder flexion with wand x 10, 5#  S/L ER 3x10, 1# - not performed  S/L abduction with inferior glide 2x10, 1# - not performed     Shoulder IR, 2x10 green band  Shoulder ER, 2x10 red band  Wall ball circles, 30s cw/ccw  Shelf touches, x 10 ea shelf x 1#  Towel wall slides (B) x 15, 5 sec hold - not performed     Standing flexion and scaption 2x10, 1#    IR stretch w/ strap 10x 10s hold  Seated lat pull downs 2x10, 7#   Wall push ups 2x10 - not performed  Table push ups, 2x8     JO ANN received the following manual therapy techniques: Joint mobilizations and Soft tissue Mobilization were applied to the: R shoulder for 00 minutes, including:  GH joint mobs grade I-IV with PA/AP/inferior  PROM  STM R Upper trap and biceps     JO ANN participated in neuromuscular re-education activities to improve: Coordination, Proprioception and  Posture for 15 minutes. The following activities were included:  Supine serratus punch, 3 x 10 5# DB (RUE only)   Supine D2 flexion w/ yellow band, 2x10   Landmine press 2x10, 3#   Standing rows w/ green band, 3x10     JO ANN received cold pack for 00 minutes to decrease circulation, pain, and swelling.     Home Exercises and Patient Education Provided    Education provided:   - Continue HEP    Written Home Exercises Provided: yes.  Exercises were reviewed and JO ANN was able to demonstrate them prior to the end of the session.  JO ANN demonstrated good  understanding of the education provided.     See EMR under Patient Instructions for exercises provided 5/31/2022.    Assessment   Jo Ann is a 52 y.o. female referred to outpatient Physical Therapy with a medical diagnosis of S42.254D (ICD-10-CM) - Closed nondisplaced fracture of greater tuberosity of right humerus with routine healing, subsequent encounter. Pt has met or made good progress towards all established goals. Has made good improvement in strength, AROM, and PROM. No longer demonstrating compensatory shoulder hike for overhead movements. Will continue to benefit from skilled PT to address remaining deficits in strength and ROM and provide updated HEP.     Pt prognosis is Good.   Pt will benefit from skilled outpatient Physical Therapy to address the deficits stated above and in the chart below, provide pt/family education, and to maximize pt's level of independence.     Plan of care discussed with patient: Yes  Pt's spiritual, cultural and educational needs considered and patient is agreeable to the plan of care and goals as stated below:     Anticipated Barriers for therapy: None    Medical Necessity is demonstrated by the following  History  Co-morbidities and personal factors that may impact the plan of care Co-morbidities:   depression, diabetes, difficulty sleeping, high BMI and HTN    Personal Factors:   no deficits     moderate    Examination  Body Structures and Functions, activity limitations and participation restrictions that may impact the plan of care Body Regions:   upper extremities    Body Systems:    ROM  strength  motor control    Participation Restrictions:   None    Activity limitations:   Learning and applying knowledge  no deficits    General Tasks and Commands  no deficits    Communication  no deficits    Mobility  lifting and carrying objects    Self care  no deficits    Domestic Life  shopping  cooking  doing house work (cleaning house, washing dishes, laundry)  assisting others    Interactions/Relationships  no deficits    Life Areas  employment    Community and Social Life  recreation and leisure         moderate   Clinical Presentation stable and uncomplicated low   Decision Making/ Complexity Score: low     Goals:  Short Term Goals (6 Weeks):   1. Pt to increase strength to at least 4/5 of muscles tested to allow for improvement in functional activities and remediate dysfunctional movement patterns. - MET  2. Pt to improve shoulder to full PROM to allow for improved functional mobility. - NOT MET  3. Pt to improve shoulder AROM to 130 degrees flexion to improve functional mobility. - MET    Long Term Goals (12 Weeks):   1. Pt to achieve <37% disability as measured by the FOTO to demonstrate decreased disability with functional activities. - MET  2. Pt to increase strength to at least 5/5 of muscles tested to allow for improvement in functional activities including. - NOT MET  3. Pt to improve gross shoulder active range of motion to <10% limitations to allow for improved functional mobility with performing ADL's. - MET  4. Pt to report compliance with HEP 3x/week and demonstrate proper exercise technique to PT to show independence with self management of condition. - MET    Plan   Plan of care Certification: 5/31/2022 to 9/2/22.    Outpatient Physical Therapy 2 times weekly for 10 weeks to include the following  interventions: Aquatic Therapy, Electrical Stimulation  , Manual Therapy, Moist Heat/ Ice, Neuromuscular Re-ed, Patient Education, Therapeutic Activities and Therapeutic Exercise.     Clark Tamayo, PT

## 2022-08-11 NOTE — PROGRESS NOTES
Physical Therapy Daily Treatment Note     Name: Jo Ann De La Vega  Clinic Number: [id number]    Therapy Diagnosis:   Encounter Diagnoses   Name Primary?    Chronic right shoulder pain Yes    Decreased range of motion of right shoulder     Weakness of right upper extremity     Decreased functional mobility      Physician: Glenn Moody MD    Visit Date: 8/11/2022  Physician Orders: PT Eval and Treat  Medical Diagnosis from Referral: S42.254D (ICD-10-CM) - Closed nondisplaced fracture of greater tuberosity of right humerus with routine healing, subsequent encounter   Evaluation Date: 5/31/2022  Authorization Period Expiration: 12/31/2022   Plan of Care Expiration: 9/2/22  Visit # / Visits authorized: 18 / 20     Time In: 4:00  Time Out: 4:45  Total Billable Time: 40 minutes     Precautions: Diabetes    Subjective     Pt reports:  Pt has been doing well. She has recently performed in a competition with her dog and did well. She is still having to take one muscle relaxer each day, typically takes two on days she comes for PT. Feels her ROM and strength is getting better, still doing some of her HEP. She is agreeable to reducing treatment to one time per week, expected discharge in two weeks, and continuing with HEP.  She was compliant with home exercise program.   Response to previous treatment: mild soreness  Functional change: able to compete in agility trial with her dog     Pain: 0/10  Location: right shoulder     Objective     JO ANN received therapeutic exercises to develop strength, endurance, ROM, flexibility and posture for 30 minutes including:  UBE x 6 minutes lvl 2 (3 forward, 3 backward)   Supine ER stretch w/ wand, x 10 - 1 set at 90 degrees abd    Supine shoulder flexion with wand x 10, 5#  S/L ER 3x10, 1# - not performed  S/L abduction with inferior glide 2x10, 1# - not performed     Shoulder IR, 2x10 green band  Shoulder ER, 2x10 red band  Wall ball circles, 30s cw/ccw  Shelf touches, x 10 ea shelf x  1#  Towel wall slides (B) x 15, 5 sec hold - not performed     Standing flexion and scaption 2x10, 1#    IR stretch w/ strap 10x 10s hold  Seated lat pull downs 2x10, 7#   Wall push ups 2x10 - not performed  Table push ups, 2x8    JO ANN received the following manual therapy techniques: Joint mobilizations and Soft tissue Mobilization were applied to the: R shoulder for 00 minutes, including:  GH joint mobs grade I-IV with PA/AP/inferior  PROM  STM R Upper trap and biceps (NP)    JO ANN participated in neuromuscular re-education activities to improve: Coordination, Proprioception and Posture for 15 minutes. The following activities were included:  Supine serratus punch, 3 x 10 5# DB (RUE only)   Landmine press 2x10, 3#   Supine D2 flexion w/ yellow band, 2x10   Standing rows w/ green band, 3x10    JO ANN received cold pack for 00 minutes to decrease circulation, pain, and swelling. (not today)    Home Exercises Provided and Patient Education Provided     Education provided:   - HEP    Written Home Exercises Provided: Patient instructed to cont prior HEP.  Exercises were reviewed and JO ANN was able to demonstrate them prior to the end of the session.  JO ANN demonstrated good  understanding of the education provided.     See EMR under Patient Instructions for exercises provided 07/19/2022.    Assessment     Pt tolerated tx well. Demonstrates full AROM into flexion and abduction, with mild restrictions into ER. Very challenged with table top push ups, but not painful. Will decrease frequency to 1x in preparation for discharge with HEP. Plan for discharge in two weeks.     JO ANN Is progressing well towards her goals.   Pt prognosis is Good.     Pt will continue to benefit from skilled outpatient physical therapy to address the deficits listed in the problem list box on initial evaluation, provide pt/family education and to maximize pt's level of independence in the home and community environment.     Pt's  spiritual, cultural and educational needs considered and pt agreeable to plan of care and goals.    Anticipated barriers to physical therapy: None    Goals:   Short Term Goals (6 Weeks):   1. Pt to increase strength to at least 4/5 of muscles tested to allow for improvement in functional activities and remediate dysfunctional movement patterns. - MET  2. Pt to improve shoulder to full PROM to allow for improved functional mobility. - NOT MET  3. Pt to improve shoulder AROM to 130 degrees flexion to improve functional mobility. - MET    Long Term Goals (12 Weeks):   1. Pt to achieve <37% disability as measured by the FOTO to demonstrate decreased disability with functional activities. - MET  2. Pt to increase strength to at least 5/5 of muscles tested to allow for improvement in functional activities including. - NOT MET  3. Pt to improve gross shoulder active range of motion to <10% limitations to allow for improved functional mobility with performing ADL's. - MET  4. Pt to report compliance with HEP 3x/week and demonstrate proper exercise technique to PT to show independence with self management of condition. - MET    Plan     Continue emphasizing shoulder ROM, rotator cuff strength    Clark Tamayo, PT

## 2022-08-24 ENCOUNTER — CLINICAL SUPPORT (OUTPATIENT)
Dept: REHABILITATION | Facility: HOSPITAL | Age: 52
End: 2022-08-24
Attending: ORTHOPAEDIC SURGERY
Payer: COMMERCIAL

## 2022-08-24 ENCOUNTER — PATIENT MESSAGE (OUTPATIENT)
Dept: ORTHOPEDICS | Facility: CLINIC | Age: 52
End: 2022-08-24
Payer: COMMERCIAL

## 2022-08-24 DIAGNOSIS — M25.611 DECREASED RANGE OF MOTION OF RIGHT SHOULDER: ICD-10-CM

## 2022-08-24 DIAGNOSIS — G89.29 CHRONIC RIGHT SHOULDER PAIN: Primary | ICD-10-CM

## 2022-08-24 DIAGNOSIS — R26.89 DECREASED FUNCTIONAL MOBILITY: ICD-10-CM

## 2022-08-24 DIAGNOSIS — M25.511 CHRONIC RIGHT SHOULDER PAIN: Primary | ICD-10-CM

## 2022-08-24 DIAGNOSIS — R29.898 WEAKNESS OF RIGHT UPPER EXTREMITY: ICD-10-CM

## 2022-08-24 PROCEDURE — 97110 THERAPEUTIC EXERCISES: CPT | Mod: PO,CQ

## 2022-08-24 PROCEDURE — 97112 NEUROMUSCULAR REEDUCATION: CPT | Mod: PO,CQ

## 2022-08-24 NOTE — PROGRESS NOTES
Physical Therapy Daily Treatment Note     Name: Jo Ann De La Vega  [facility] Number: [id number]    Therapy Diagnosis:   Encounter Diagnoses   Name Primary?    Chronic right shoulder pain Yes    Decreased range of motion of right shoulder     Weakness of right upper extremity     Decreased functional mobility      Physician: Glenn Moody MD    Visit Date: 8/24/2022  Physician Orders: PT Eval and Treat  Medical Diagnosis from Referral: S42.254D (ICD-10-CM) - Closed nondisplaced fracture of greater tuberosity of right humerus with routine healing, subsequent encounter   Evaluation Date: 5/31/2022  Authorization Period Expiration: 12/31/2022   Plan of Care Expiration: 9/2/22  Visit # / Visits authorized: 19 / 20     Time In: 0835 AM  Time Out: 0930 AM  Total Billable Time: 30 minutes     Precautions: Diabetes    Subjective     Pt reports: she reached toward a wall and feels like this used the muscles in ways she hasn't used them before. Patient states she had increased soreness for 2 days but this resolved. Patient states she has been successful with HEP performance.   Response to previous treatment: mild soreness  Functional change: able to compete in agility trial with her dog     Pain: 0/10  Location: right shoulder     Objective     JO ANN received therapeutic exercises to develop strength, endurance, ROM, flexibility and posture for 30 minutes including:  UBE x 6 minutes lvl 2 (3 forward, 3 backward)   Supine shoulder flexion with wand x 10, 5#  S/L ER 3x10, 2#   S/L abduction with inferior glide 2x10, 2#      Shoulder IR, 2x10 green band  Shoulder ER, 2x10 red band  Wall ball circles, 30s cw/ccw  Shelf touches, x 10 ea shelf x 1#  Towel wall slides (B) x 15, 5 sec hold - not performed     Standing flexion and scaption 2x10, 1#    IR stretch w/ strap 10x 10s hold  Seated lat pull downs 2x10, 7#   Wall push ups 2x10 - not performed  Table push ups, 2x8    JO ANN received the following manual therapy techniques:  Joint mobilizations and Soft tissue Mobilization were applied to the: R shoulder for 05 minutes, including:  GH joint mobs grade I-IV with PA/AP/inferior  PROM    JO ANN participated in neuromuscular re-education activities to improve: Coordination, Proprioception and Posture for 15 minutes. The following activities were included:  Supine serratus punch, 3 x 10 5# DB (RUE only)   Landmine press 2x10, 3#   Supine D2 flexion w/ red band, 2x10   Standing rows w/ green band, 3x10    JO ANN received cold pack for 00 minutes to decrease circulation, pain, and swelling. (not today)    Home Exercises Provided and Patient Education Provided     Education provided:   - HEP    Written Home Exercises Provided: Patient instructed to cont prior HEP.  Exercises were reviewed and JO ANN was able to demonstrate them prior to the end of the session.  JO ANN demonstrated good  understanding of the education provided.     See EMR under Patient Instructions for exercises provided 07/19/2022.    Assessment     Jo Ann demonstrates good recall of exercises indicating compliance with HEP. Patient able to progress resistance of strengthening exercises without provoking pain. Patient has one remaining visit on referral and she will call within current POC if she chooses to use it.    JO ANN Is progressing well towards her goals.   Pt prognosis is Good.     Pt will continue to benefit from skilled outpatient physical therapy to address the deficits listed in the problem list box on initial evaluation, provide pt/family education and to maximize pt's level of independence in the home and community environment.     Pt's spiritual, cultural and educational needs considered and pt agreeable to plan of care and goals.    Anticipated barriers to physical therapy: None    Goals:   Short Term Goals (6 Weeks):   1. Pt to increase strength to at least 4/5 of muscles tested to allow for improvement in functional activities and remediate  dysfunctional movement patterns. - MET  2. Pt to improve shoulder to full PROM to allow for improved functional mobility. - NOT MET  3. Pt to improve shoulder AROM to 130 degrees flexion to improve functional mobility. - MET    Long Term Goals (12 Weeks):   1. Pt to achieve <37% disability as measured by the FOTO to demonstrate decreased disability with functional activities. - MET  2. Pt to increase strength to at least 5/5 of muscles tested to allow for improvement in functional activities including. - NOT MET  3. Pt to improve gross shoulder active range of motion to <10% limitations to allow for improved functional mobility with performing ADL's. - MET  4. Pt to report compliance with HEP 3x/week and demonstrate proper exercise technique to PT to show independence with self management of condition. - MET    Plan     Continue emphasizing shoulder ROM, rotator cuff strength    Coby Luke, PTA

## 2022-08-28 ENCOUNTER — PATIENT MESSAGE (OUTPATIENT)
Dept: ORTHOPEDICS | Facility: CLINIC | Age: 52
End: 2022-08-28
Payer: COMMERCIAL

## 2022-08-29 ENCOUNTER — PATIENT MESSAGE (OUTPATIENT)
Dept: PAIN MEDICINE | Facility: CLINIC | Age: 52
End: 2022-08-29
Payer: COMMERCIAL

## 2022-09-08 ENCOUNTER — HOSPITAL ENCOUNTER (OUTPATIENT)
Dept: RADIOLOGY | Facility: HOSPITAL | Age: 52
Discharge: HOME | End: 2022-09-08
Attending: ORTHOPAEDIC SURGERY
Payer: COMMERCIAL

## 2022-09-08 DIAGNOSIS — S42.254D CLOSED NONDISPLACED FRACTURE OF GREATER TUBEROSITY OF RIGHT HUMERUS WITH ROUTINE HEALING, SUBSEQUENT ENCOUNTER: ICD-10-CM

## 2022-09-08 PROCEDURE — 73030 X-RAY EXAM OF SHOULDER: CPT | Mod: TC,FY,PO,RT

## 2022-09-08 PROCEDURE — 73030 XR SHOULDER TRAUMA 3 VIEW RIGHT: ICD-10-PCS | Mod: 26,RT,, | Performed by: RADIOLOGY

## 2022-09-08 PROCEDURE — 73030 X-RAY EXAM OF SHOULDER: CPT | Mod: 26,RT,, | Performed by: RADIOLOGY

## 2022-10-29 ENCOUNTER — DOCUMENTATION ONLY (OUTPATIENT)
Dept: REHABILITATION | Facility: HOSPITAL | Age: 52
End: 2022-10-29
Payer: COMMERCIAL

## 2022-10-29 NOTE — PROGRESS NOTES
Outpatient Therapy Discharge Summary     Name: Concepcion De La Vega  Clinic Number: [id number]    Therapy Diagnosis:    Chronic right shoulder pain Yes    Decreased range of motion of right shoulder      Weakness of right upper extremity      Decreased functional mobility      Physician: Glenn Moody MD     Physician Orders: PT Eval and Treat  Medical Diagnosis from Referral: S42.254D (ICD-10-CM) - Closed nondisplaced fracture of greater tuberosity of right humerus with routine healing, subsequent encounter   Evaluation Date: 5/31/2022      Date of Last visit: 8/24/2022   Total Visits Received: 19  Cancelled Visits: 1  No Show Visits: 0    Assessment    Goals:   Short Term Goals (6 Weeks):   1. Pt to increase strength to at least 4/5 of muscles tested to allow for improvement in functional activities and remediate dysfunctional movement patterns. - MET  2. Pt to improve shoulder to full PROM to allow for improved functional mobility. - NOT MET  3. Pt to improve shoulder AROM to 130 degrees flexion to improve functional mobility. - MET     Long Term Goals (12 Weeks):   1. Pt to achieve <37% disability as measured by the FOTO to demonstrate decreased disability with functional activities. - MET  2. Pt to increase strength to at least 5/5 of muscles tested to allow for improvement in functional activities including. - NOT MET  3. Pt to improve gross shoulder active range of motion to <10% limitations to allow for improved functional mobility with performing ADL's. - MET  4. Pt to report compliance with HEP 3x/week and demonstrate proper exercise technique to PT to show independence with self management of condition. - MET    Discharge reason: Patient has not attended therapy since 8/24/2022     Plan   This patient is discharged from Physical Therapy      Clark Tamayo, PT, DPT  10/29/2022

## 2022-12-19 ENCOUNTER — OCCUPATIONAL HEALTH (OUTPATIENT)
Dept: URGENT CARE | Facility: CLINIC | Age: 52
End: 2022-12-19

## 2022-12-19 DIAGNOSIS — Z20.822 ENCOUNTER FOR LABORATORY TESTING FOR COVID-19 VIRUS: Primary | ICD-10-CM

## 2022-12-19 DIAGNOSIS — U07.1 COVID-19 VIRUS DETECTED: ICD-10-CM

## 2022-12-19 LAB
CTP QC/QA: YES
SARS-COV-2 AG RESP QL IA.RAPID: POSITIVE

## 2022-12-19 PROCEDURE — 87811 SARS CORONAVIRUS 2 ANTIGEN POCT, MANUAL READ: ICD-10-PCS | Mod: QW,S$GLB,, | Performed by: FAMILY MEDICINE

## 2022-12-19 PROCEDURE — 87811 SARS-COV-2 COVID19 W/OPTIC: CPT | Mod: QW,S$GLB,, | Performed by: FAMILY MEDICINE

## 2023-01-18 ENCOUNTER — PATIENT MESSAGE (OUTPATIENT)
Dept: ADMINISTRATIVE | Facility: HOSPITAL | Age: 53
End: 2023-01-18
Payer: COMMERCIAL

## 2023-02-22 ENCOUNTER — PATIENT MESSAGE (OUTPATIENT)
Dept: ADMINISTRATIVE | Facility: HOSPITAL | Age: 53
End: 2023-02-22
Payer: COMMERCIAL

## 2023-02-22 DIAGNOSIS — E11.9 TYPE 2 DIABETES MELLITUS WITHOUT COMPLICATION, UNSPECIFIED WHETHER LONG TERM INSULIN USE: ICD-10-CM

## 2023-02-28 ENCOUNTER — CLINICAL SUPPORT (OUTPATIENT)
Dept: OTHER | Facility: CLINIC | Age: 53
End: 2023-02-28
Payer: COMMERCIAL

## 2023-02-28 DIAGNOSIS — Z00.8 ENCOUNTER FOR OTHER GENERAL EXAMINATION: ICD-10-CM

## 2023-03-01 VITALS
BODY MASS INDEX: 29.55 KG/M2 | WEIGHT: 195 LBS | DIASTOLIC BLOOD PRESSURE: 78 MMHG | HEIGHT: 68 IN | SYSTOLIC BLOOD PRESSURE: 150 MMHG

## 2023-03-01 LAB
GLUCOSE SERPL-MCNC: 131 MG/DL (ref 60–140)
HDLC SERPL-MCNC: 48 MG/DL
POC CHOLESTEROL, LDL (DOCK): 96 MG/DL
POC CHOLESTEROL, TOTAL: 181 MG/DL
TRIGL SERPL-MCNC: 220 MG/DL

## 2023-03-02 ENCOUNTER — OFFICE VISIT (OUTPATIENT)
Dept: FAMILY MEDICINE | Facility: CLINIC | Age: 53
End: 2023-03-02
Payer: COMMERCIAL

## 2023-03-02 VITALS
SYSTOLIC BLOOD PRESSURE: 134 MMHG | HEART RATE: 80 BPM | DIASTOLIC BLOOD PRESSURE: 82 MMHG | HEIGHT: 68 IN | WEIGHT: 199.5 LBS | OXYGEN SATURATION: 98 % | BODY MASS INDEX: 30.23 KG/M2

## 2023-03-02 DIAGNOSIS — I10 REACTIVE HYPERTENSION: ICD-10-CM

## 2023-03-02 DIAGNOSIS — Z00.00 WELLNESS EXAMINATION: Primary | ICD-10-CM

## 2023-03-02 DIAGNOSIS — F41.1 GAD (GENERALIZED ANXIETY DISORDER): ICD-10-CM

## 2023-03-02 DIAGNOSIS — E66.9 OBESITY (BMI 30.0-34.9): ICD-10-CM

## 2023-03-02 PROBLEM — E11.9 TYPE 2 DIABETES MELLITUS WITHOUT COMPLICATION, WITHOUT LONG-TERM CURRENT USE OF INSULIN: Chronic | Status: RESOLVED | Noted: 2022-01-19 | Resolved: 2023-03-02

## 2023-03-02 PROCEDURE — 1160F RVW MEDS BY RX/DR IN RCRD: CPT | Mod: CPTII,S$GLB,, | Performed by: FAMILY MEDICINE

## 2023-03-02 PROCEDURE — 99214 PR OFFICE/OUTPT VISIT, EST, LEVL IV, 30-39 MIN: ICD-10-PCS | Mod: S$GLB,,, | Performed by: FAMILY MEDICINE

## 2023-03-02 PROCEDURE — 3075F SYST BP GE 130 - 139MM HG: CPT | Mod: CPTII,S$GLB,, | Performed by: FAMILY MEDICINE

## 2023-03-02 PROCEDURE — 3079F DIAST BP 80-89 MM HG: CPT | Mod: CPTII,S$GLB,, | Performed by: FAMILY MEDICINE

## 2023-03-02 PROCEDURE — 3075F PR MOST RECENT SYSTOLIC BLOOD PRESS GE 130-139MM HG: ICD-10-PCS | Mod: CPTII,S$GLB,, | Performed by: FAMILY MEDICINE

## 2023-03-02 PROCEDURE — 1159F PR MEDICATION LIST DOCUMENTED IN MEDICAL RECORD: ICD-10-PCS | Mod: CPTII,S$GLB,, | Performed by: FAMILY MEDICINE

## 2023-03-02 PROCEDURE — 3008F PR BODY MASS INDEX (BMI) DOCUMENTED: ICD-10-PCS | Mod: CPTII,S$GLB,, | Performed by: FAMILY MEDICINE

## 2023-03-02 PROCEDURE — 99999 PR PBB SHADOW E&M-EST. PATIENT-LVL IV: CPT | Mod: PBBFAC,,, | Performed by: FAMILY MEDICINE

## 2023-03-02 PROCEDURE — 1159F MED LIST DOCD IN RCRD: CPT | Mod: CPTII,S$GLB,, | Performed by: FAMILY MEDICINE

## 2023-03-02 PROCEDURE — 3008F BODY MASS INDEX DOCD: CPT | Mod: CPTII,S$GLB,, | Performed by: FAMILY MEDICINE

## 2023-03-02 PROCEDURE — 3079F PR MOST RECENT DIASTOLIC BLOOD PRESSURE 80-89 MM HG: ICD-10-PCS | Mod: CPTII,S$GLB,, | Performed by: FAMILY MEDICINE

## 2023-03-02 PROCEDURE — 99999 PR PBB SHADOW E&M-EST. PATIENT-LVL IV: ICD-10-PCS | Mod: PBBFAC,,, | Performed by: FAMILY MEDICINE

## 2023-03-02 PROCEDURE — 99214 OFFICE O/P EST MOD 30 MIN: CPT | Mod: S$GLB,,, | Performed by: FAMILY MEDICINE

## 2023-03-02 PROCEDURE — 1160F PR REVIEW ALL MEDS BY PRESCRIBER/CLIN PHARMACIST DOCUMENTED: ICD-10-PCS | Mod: CPTII,S$GLB,, | Performed by: FAMILY MEDICINE

## 2023-03-02 RX ORDER — FLUOXETINE 10 MG/1
10 CAPSULE ORAL DAILY
Qty: 90 CAPSULE | Refills: 3 | Status: SHIPPED | OUTPATIENT
Start: 2023-03-02 | End: 2024-03-01

## 2023-03-02 RX ORDER — METOPROLOL SUCCINATE 25 MG/1
25 TABLET, EXTENDED RELEASE ORAL DAILY
Qty: 90 TABLET | Refills: 3 | Status: SHIPPED | OUTPATIENT
Start: 2023-03-02 | End: 2024-03-01

## 2023-03-02 NOTE — PATIENT INSTRUCTIONS
please ask her pharmacist about the pneumococcal vaccine series, shingles vaccine, and and the COVID series.

## 2023-03-02 NOTE — PROGRESS NOTES
"Subjective:       Patient ID: Concepcion De La Vega is a 52 y.o. female.    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  She actually has been doing fairly well with her current situation.  She does have reactive hypertension and would more than likely benefit from low-dose beta-blocker.  After reviewing her chart she does not have the definition of diabetes mellitus type 2.  She has had some elevations in her A1c but not significantly.  She has not had to A1cs is 6.5% or above and has not had a blood sugar reading of 200 or above.  I did remove the diagnosis from her chart because it did sugar all the diabetic metrics which she does need right now.  Importantly, she has not had symptoms consistent with CAD or CHF.  She is currently at 30.7 BMI and should be at 162 lb.  Recommend a Mediterranean diet at least to incorporate as much evident to her diet as she can with activity.    Review of Systems   Constitutional: Negative.    HENT: Negative.     Eyes: Negative.    Respiratory: Negative.     Cardiovascular: Negative.    Gastrointestinal: Negative.    Endocrine: Negative.    Genitourinary: Negative.    Musculoskeletal: Negative.    Skin: Negative.    Allergic/Immunologic: Negative.    Neurological: Negative.    Hematological: Negative.    Psychiatric/Behavioral: Negative.       Objective:      Vitals:    03/02/23 1527   BP: 134/82   Pulse: 80   SpO2: 98%   Weight: 90.5 kg (199 lb 8.3 oz)   Height: 5' 7.5" (1.715 m)      Physical Exam  Vitals and nursing note reviewed.   Constitutional:       Appearance: Normal appearance. She is normal weight.   HENT:      Head: Normocephalic and atraumatic.      Nose: Nose normal.      Mouth/Throat:      Mouth: Mucous membranes are moist.      Pharynx: Oropharynx is clear.   Eyes:      Extraocular Movements: Extraocular movements intact.      Conjunctiva/sclera: Conjunctivae normal.      Pupils: " Pupils are equal, round, and reactive to light.   Cardiovascular:      Rate and Rhythm: Normal rate and regular rhythm.      Pulses: Normal pulses.      Heart sounds: Normal heart sounds.   Pulmonary:      Effort: Pulmonary effort is normal.      Breath sounds: Normal breath sounds.   Abdominal:      General: Abdomen is flat. Bowel sounds are normal.      Palpations: Abdomen is soft.   Musculoskeletal:         General: Normal range of motion.      Cervical back: Normal range of motion and neck supple.   Skin:     General: Skin is warm and dry.      Capillary Refill: Capillary refill takes less than 2 seconds.   Neurological:      General: No focal deficit present.      Mental Status: She is alert and oriented to person, place, and time. Mental status is at baseline.   Psychiatric:         Mood and Affect: Mood normal.         Behavior: Behavior normal.         Thought Content: Thought content normal.         Judgment: Judgment normal.       Results for orders placed or performed in visit on 02/28/23   POCT Lipid Profile w/ Glucose   Result Value Ref Range    POC Cholesterol, Total 181 <240 MG/DL    POC Cholesterol, HDL 48 MG/DL    POC Cholesterol, LDL 96 <160 MG/DL    POC Triglycerides 220 (H) <160 MG/DL    POC Glucose 131 60 - 140 MG/DL      Assessment:       1. Wellness examination    2. Reactive hypertension    3. Obesity (BMI 30.0-34.9)    4. ROS (generalized anxiety disorder)        Plan:       Wellness examination  -     Ambulatory referral/consult to Gynecology; Future; Expected date: 03/09/2023  -     Fecal Immunochemical Test (iFOBT); Future; Expected date: 03/02/2023  -     Mammo Digital Screening Bilat w/ Henry; Future; Expected date: 03/02/2023    Reactive hypertension  -     metoprolol succinate (TOPROL-XL) 25 MG 24 hr tablet; Take 1 tablet (25 mg total) by mouth once daily.  Dispense: 90 tablet; Refill: 3    Obesity (BMI 30.0-34.9)    ROS (generalized anxiety disorder)  -     FLUoxetine 10 MG capsule;  Take 1 capsule (10 mg total) by mouth once daily.  Dispense: 90 capsule; Refill: 3          Medication List with Changes/Refills   New Medications    FLUOXETINE 10 MG CAPSULE    Take 1 capsule (10 mg total) by mouth once daily.    METOPROLOL SUCCINATE (TOPROL-XL) 25 MG 24 HR TABLET    Take 1 tablet (25 mg total) by mouth once daily.   Current Medications    ACETAZOLAMIDE (DIAMOX) 250 MG TABLET    Take 1 tablet (250 mg total) by mouth 3 (three) times daily as needed (migraine).    ALBUTEROL (PROVENTIL/VENTOLIN HFA) 90 MCG/ACTUATION INHALER    Inhale 2 puffs into the lungs every 6 (six) hours as needed for Wheezing.    ASPIRIN 325 MG CAP    Take by mouth as needed.    BUPROPION (WELLBUTRIN XL) 150 MG TB24 TABLET    Take 2 tablets (300 mg total) by mouth once daily.    CHOLECALCIFEROL, VITAMIN D3, (VITAMIN D3) 50 MCG (2,000 UNIT) CAP CAPSULE    Take by mouth once daily.    DICLOFENAC (FLECTOR) 1.3 % PT12    Apply 1 patch topically once daily    FAMOTIDINE (PEPCID) 20 MG TABLET    Take 1 tablet (20 mg total) by mouth 2 (two) times daily.    METHOCARBAMOL (ROBAXIN) 750 MG TAB    Take 1 tablet (750 mg total) by mouth 4 (four) times daily as needed.    MONTELUKAST (SINGULAIR) 10 MG TABLET    Take 1 tablet (10 mg total) by mouth once daily.   Discontinued Medications    LORATADINE (CLARITIN) 10 MG TABLET    Take 10 mg by mouth daily as needed.    MIRABEGRON (MYRBETRIQ) 50 MG TB24    Take 1 tablet (50 mg total) by mouth once daily.

## 2023-03-13 DIAGNOSIS — R30.0 DYSURIA: ICD-10-CM

## 2023-03-13 DIAGNOSIS — R35.0 URINARY FREQUENCY: ICD-10-CM

## 2023-03-13 RX ORDER — MIRABEGRON 50 MG/1
50 TABLET, FILM COATED, EXTENDED RELEASE ORAL DAILY
Qty: 30 TABLET | Refills: 11 | Status: SHIPPED | OUTPATIENT
Start: 2023-03-13 | End: 2024-04-04

## 2023-03-28 DIAGNOSIS — R07.81 RIB PAIN ON LEFT SIDE: ICD-10-CM

## 2023-03-28 RX ORDER — DICLOFENAC EPOLAMINE 0.01 G/1
1 SYSTEM TOPICAL DAILY
Qty: 30 PATCH | Refills: 3 | Status: SHIPPED | OUTPATIENT
Start: 2023-03-28

## 2023-05-31 ENCOUNTER — PATIENT MESSAGE (OUTPATIENT)
Dept: ADMINISTRATIVE | Facility: HOSPITAL | Age: 53
End: 2023-05-31
Payer: COMMERCIAL

## 2023-05-31 ENCOUNTER — PATIENT OUTREACH (OUTPATIENT)
Dept: ADMINISTRATIVE | Facility: HOSPITAL | Age: 53
End: 2023-05-31
Payer: COMMERCIAL

## 2023-05-31 DIAGNOSIS — E11.9 DIABETES MELLITUS WITHOUT COMPLICATION: Primary | ICD-10-CM

## 2023-05-31 NOTE — PROGRESS NOTES
Population Health Chart Review & Patient Outreach Details:     Reason for Outreach Encounter:     [x]  Non-Compliant Report   []  Payor Report (Humana, PHN, BCBS, MSSP, MCIP, UHC, etc.)   []  Pre-Visit Chart Review     Updates Requested / Reviewed:     []  Care Everywhere    []     []  External Sources ([facility], [facility], DIS, etc.)   []  Care Team Updated    Patient Outreach Method:    []  Telephone Outreach Completed   [] Successful   [] Left Voicemail   [] Unable to Contact (wrong number, no voicemail)  [x]  [facility] Outreach Sent  []  Letter Outreach Mailed  []  Fax Sent for External Records  []  External Records Upload    Health Maintenance Topics Addressed and Outreach Outcomes / Actions Taken:        [x]      Breast Cancer Screening []  Mammo Scheduled      []  External Records Requested     []  Added Reminder to Complete to Upcoming Primary Care Appt Notes     []  Patient Declined     []  Patient Will Call Back to Schedule     []  Patient Will Schedule with External Provider / Order Routed if Applicable             [x]       Cervical Cancer Screening []  Pap Scheduled      []  External Records Requested     []  Added Reminder to Complete to Upcoming Primary Care Appt Notes     []  Patient Declined     []  Patient Will Call Back to Schedule     []  Patient Will Schedule with External Provider               [x]          Colorectal Cancer Screening []  Colonoscopy Case Request or Referral Placed     []  External Records Requested     []  Added Reminder to Complete to Upcoming Primary Care Appt Notes     []  Patient Declined     []  Patient Will Call Back to Schedule     []  Patient Will Schedule with External Provider     []  Fit Kit Mailed (add the SmartPhrase under additional notes)     []  Reminded Patient to Complete Home Test             []      Diabetic Eye Exam []  Eye Camera Scheduled or Optometry Referral Placed     []  External Records Requested     []  Added Reminder to Complete to  Upcoming Primary Care Appt Notes     []  Patient Declined     []  Patient Will Call Back to Schedule     []  Patient Will Schedule with External Provider             []      Blood Pressure Control []  Primary Care Follow Up Visit Scheduled     []  Remote Blood Pressure Reading Captured     []  Added Reminder to Complete to Upcoming Primary Care Appt Notes     []  Patient Declined     []  Patient Will Call Back / Patient Will Send Portal Message with Reading     []  Patient Will Call Back to Schedule Provider Visit             [x]       HbA1c & Other Labs []  Lab Appt Scheduled for Due Labs     []  Primary Care Follow Up Visit Scheduled      []  Reminded Patient to Complete Home Test     []  Added Reminder to Complete to Upcoming Primary Care Appt Notes     []  Patient Declined     []  Patient Will Call Back to Schedule     []  Patient Will Schedule with External Provider / Order Routed if Applicable           []    Schedule Primary Care Appt []  Primary Care Appt Scheduled     []  Patient Declined     []  Patient Will Call Back to Schedule     []  Pt Established with External Provider & Updated Care Team             []      Medication Adherence []  Primary Care Appointment Scheduled     []  Added Reminder to Upcoming Primary Care Appt Notes     []  Patient Reminded to  Prescription     []  Patient Declined, Provider Notified if Needed     []  Sent Provider Message to Review and/or Add Exclusion to Problem List             []      Osteoporosis Screening []  DXA Appointment Scheduled     []  External Records Requested     []  Added Reminder to Complete to Upcoming Primary Care Appt Notes     []  Patient Declined     []  Patient Will Call Back to Schedule     []  Patient Will Schedule with External Provider / Order Routed if Applicable     Additional Care Coordinator Notes:         Further Action Needed If Patient Returns Outreach:

## 2023-06-30 ENCOUNTER — PATIENT MESSAGE (OUTPATIENT)
Dept: FAMILY MEDICINE | Facility: CLINIC | Age: 53
End: 2023-06-30
Payer: COMMERCIAL
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
## 2023-06-30 RX ORDER — METHYLPREDNISOLONE 4 MG/1
TABLET ORAL
Qty: 21 EACH | Refills: 0 | Status: SHIPPED | OUTPATIENT
Start: 2023-06-30 | End: 2023-07-21

## 2023-07-10 ENCOUNTER — PATIENT OUTREACH (OUTPATIENT)
Dept: ADMINISTRATIVE | Facility: HOSPITAL | Age: 53
End: 2023-07-10
Payer: COMMERCIAL

## 2023-07-10 ENCOUNTER — PATIENT MESSAGE (OUTPATIENT)
Dept: ADMINISTRATIVE | Facility: HOSPITAL | Age: 53
End: 2023-07-10
Payer: COMMERCIAL

## 2023-07-10 NOTE — PROGRESS NOTES
Population Health Chart Review & Patient Outreach Details:     Reason for Outreach Encounter:     [x]  Non-Compliant Report   []  Payor Report (Humana, PHN, BCBS, MSSP, MCIP, C, etc.)   []  Pre-Visit Chart Review     Updates Requested / Reviewed:     [x]  Care Everywhere    [x]     [x]  External Sources ([facility], [facility], DIS, etc.)   []  Care Team Updated    Patient Outreach Method:    [x]  Telephone Outreach Completed   [] Successful   [x] Left Voicemail   [] Unable to Contact (wrong number, no voicemail)  [x]  [facility] Outreach Sent  []  Letter Outreach Mailed  []  Fax Sent for External Records  []  External Records Upload    Health Maintenance Topics Addressed and Outreach Outcomes / Actions Taken:        [x]      Breast Cancer Screening []  Mammo Scheduled      []  External Records Requested     []  Added Reminder to Complete to Upcoming Primary Care Appt Notes     []  Patient Declined     []  Patient Will Call Back to Schedule     []  Patient Will Schedule with External Provider / Order Routed if Applicable             [x]       Cervical Cancer Screening []  Pap Scheduled      []  External Records Requested     []  Added Reminder to Complete to Upcoming Primary Care Appt Notes     []  Patient Declined     []  Patient Will Call Back to Schedule     []  Patient Will Schedule with External Provider               [x]          Colorectal Cancer Screening []  Colonoscopy Case Request or Referral Placed     []  External Records Requested     []  Added Reminder to Complete to Upcoming Primary Care Appt Notes     []  Patient Declined     []  Patient Will Call Back to Schedule     []  Patient Will Schedule with External Provider     []  Fit Kit Mailed (add the SmartPhrase under additional notes)     []  Reminded Patient to Complete Home Test             []      Diabetic Eye Exam []  Eye Camera Scheduled or Optometry Referral Placed     []  External Records Requested     []  Added Reminder to  Complete to Upcoming Primary Care Appt Notes     []  Patient Declined     []  Patient Will Call Back to Schedule     []  Patient Will Schedule with External Provider             []      Blood Pressure Control []  Primary Care Follow Up Visit Scheduled     []  Remote Blood Pressure Reading Captured     []  Added Reminder to Complete to Upcoming Primary Care Appt Notes     []  Patient Declined     []  Patient Will Call Back / Patient Will Send Portal Message with Reading     []  Patient Will Call Back to Schedule Provider Visit             [x]       HbA1c & Other Labs []  Lab Appt Scheduled for Due Labs     []  Primary Care Follow Up Visit Scheduled      []  Reminded Patient to Complete Home Test     []  Added Reminder to Complete to Upcoming Primary Care Appt Notes     []  Patient Declined     []  Patient Will Call Back to Schedule     []  Patient Will Schedule with External Provider / Order Routed if Applicable           []    Schedule Primary Care Appt []  Primary Care Appt Scheduled     []  Patient Declined     []  Patient Will Call Back to Schedule     []  Pt Established with External Provider & Updated Care Team             []      Medication Adherence []  Primary Care Appointment Scheduled     []  Added Reminder to Upcoming Primary Care Appt Notes     []  Patient Reminded to  Prescription     []  Patient Declined, Provider Notified if Needed     []  Sent Provider Message to Review and/or Add Exclusion to Problem List             []      Osteoporosis Screening []  DXA Appointment Scheduled     []  External Records Requested     []  Added Reminder to Complete to Upcoming Primary Care Appt Notes     []  Patient Declined     []  Patient Will Call Back to Schedule     []  Patient Will Schedule with External Provider / Order Routed if Applicable     Additional Care Coordinator Notes:         Further Action Needed If Patient Returns Outreach:

## 2023-07-17 DIAGNOSIS — F33.0 MILD EPISODE OF RECURRENT MAJOR DEPRESSIVE DISORDER: Chronic | ICD-10-CM

## 2023-07-17 RX ORDER — METHOCARBAMOL 750 MG/1
750 TABLET, FILM COATED ORAL 4 TIMES DAILY PRN
Qty: 44 TABLET | Refills: 3 | Status: SHIPPED | OUTPATIENT
Start: 2023-07-17

## 2023-07-17 RX ORDER — BUPROPION HYDROCHLORIDE 150 MG/1
300 TABLET ORAL DAILY
Qty: 180 TABLET | Refills: 3 | Status: CANCELLED | OUTPATIENT
Start: 2023-07-17

## 2023-07-17 RX ORDER — BUPROPION HYDROCHLORIDE 150 MG/1
300 TABLET ORAL DAILY
Qty: 180 TABLET | Refills: 3 | Status: SHIPPED | OUTPATIENT
Start: 2023-07-17

## 2023-07-17 NOTE — TELEPHONE ENCOUNTER
No care due was identified.  [facility] Embedded Care Due Messages. Reference number: [id number].   7/17/2023 3:07:49 PM CDT

## 2023-08-04 ENCOUNTER — OFFICE VISIT (OUTPATIENT)
Dept: DERMATOLOGY | Facility: CLINIC | Age: 53
End: 2023-08-04
Payer: COMMERCIAL

## 2023-08-04 DIAGNOSIS — L82.1 SEBORRHEIC KERATOSES: ICD-10-CM

## 2023-08-04 DIAGNOSIS — I78.1 TELANGIECTASIA: ICD-10-CM

## 2023-08-04 DIAGNOSIS — Z85.828 HISTORY OF NONMELANOMA SKIN CANCER: Primary | ICD-10-CM

## 2023-08-04 DIAGNOSIS — L57.0 ACTINIC KERATOSIS: ICD-10-CM

## 2023-08-04 DIAGNOSIS — L81.4 LENTIGO: ICD-10-CM

## 2023-08-04 PROCEDURE — 99213 OFFICE O/P EST LOW 20 MIN: CPT | Mod: 25,S$GLB,, | Performed by: DERMATOLOGY

## 2023-08-04 PROCEDURE — 17003 DESTRUCT PREMALG LES 2-14: CPT | Mod: S$GLB,,, | Performed by: DERMATOLOGY

## 2023-08-04 PROCEDURE — 99999 PR PBB SHADOW E&M-EST. PATIENT-LVL II: ICD-10-PCS | Mod: PBBFAC,,, | Performed by: DERMATOLOGY

## 2023-08-04 PROCEDURE — 99213 PR OFFICE/OUTPT VISIT, EST, LEVL III, 20-29 MIN: ICD-10-PCS | Mod: 25,S$GLB,, | Performed by: DERMATOLOGY

## 2023-08-04 PROCEDURE — 17003 DESTRUCTION, PREMALIGNANT LESIONS; SECOND THROUGH 14 LESIONS: ICD-10-PCS | Mod: S$GLB,,, | Performed by: DERMATOLOGY

## 2023-08-04 PROCEDURE — 17000 PR DESTRUCTION(LASER SURGERY,CRYOSURGERY,CHEMOSURGERY),PREMALIGNANT LESIONS,FIRST LESION: ICD-10-PCS | Mod: S$GLB,,, | Performed by: DERMATOLOGY

## 2023-08-04 PROCEDURE — 17000 DESTRUCT PREMALG LESION: CPT | Mod: S$GLB,,, | Performed by: DERMATOLOGY

## 2023-08-04 PROCEDURE — 99999 PR PBB SHADOW E&M-EST. PATIENT-LVL II: CPT | Mod: PBBFAC,,, | Performed by: DERMATOLOGY

## 2023-08-04 NOTE — PATIENT INSTRUCTIONS
DRY SKIN PRECAUTIONS:  Dry skin can occur at any age and for many reasons. In general, skin becomes drier as we age. It is drier in the winter months than in summer months and in low-humidity climates than in high humidity climates. Skin is not dry because it lacks oil, but because it lacks water. Therefore, all treatments are aimed at replacing water in the skin skin and the environment.    In some people, areas of seriously dry skin can lead to a condition called dermatitis in which the skin becomes inflamed. When dermatitis is present, your dermatologist may prescribe a corticosteroid cream or ointment. This cream is applied to the affected areas only. Stop using the corticosteroid cream when the dermatitis clears. The use of a moisturizing lotion, cream, or ointment should be continued to help prevent the recurrence of the dermatitis.    Dry skin can lead to itching, which can lead to scratching, which can then manifest into a rash. This itch-scratch-rash-itch cycle needs to be stopped by protecting the skin and preventing dryness.      Avoid hot baths and showers - hot water removes your natural skin oils more quickly.   Keep showers or baths brief (5-10 min).   Use a mild soap or cleanser (bar or liquid soap).   If your skin is extra-dry, you may only need to use soap daily to the underarms and groin. Use soap to the whole body only 2-3 times weekly.   When you get out of the bath or shower, PAT skin dry.  Use the 3-minute rule after you pat dry apply a moisturizer within 3 minutes of getting out of the bath.   In general ointments are better than creams, which are better than lotions.   You will need to reapply moisturizers 2-4 times a day.   Avoid irritants. Use a sensitive skin laundry detergent (Dreft, All free & clear). Run clothes through a second rinse cycle to remove any residual detergents and chemicals. Avoid wool, cigarette smoke, pet dander, grass, carpet.   Avoid perfumes and scented items.    "Avoid becoming too hot or sweating.  If you really like fragrances, the safest scent for dry/itchy/sensitive skin is coconut     Look for brands that are for "sensitive skin" or "fragrance free."   Some brands we recommend are:     SOAPS: Dove unscented, Vanicream, Cerave, Cetaphil, Aveeno, La Roche Posay   LAUNDRY POWDER/DETERGENTS: Tide Free & Gentle, All Free & Clear, DREFT  DISHES: Chelo, Arias  MOISTURISERS: Cerave, Vanicream, Cetaphil, Curel, Eucerin, Vaseline, Aquaphor    Tips for Sun Protection:    The sun produces both visible and invisible rays. The invisible rays known as UVA (ultraviolet A, aging rays) and UVB (ultraviolet B, burning rays) cause most of the problems.   There is no safe UV light. Therefore, using sun protection can help prevent skin damage, wrinkles, and reduce the risk of skin cancer.      Use a broad-spectrum (protects against UVA and UVB) sunscreen with an SPF of at least 30 on all sun exposed skin, including the lips, even on cloudy days.  If exposed to water, either through swimming or sweating, a water-resistant sunscreen should be used.  Reapply sunscreen frequently -- every 2-4 hours, more often if tom or heavily perspiring.  Wear a broad-brimmed hat and sunglasses.  Seek shade whenever possible. Especially in peak sun hours (10AM - 4PM)  Wear protective, tightly woven clothing with long sleeves.  Plan outdoor activities early or late in the day to avoid peak sunlight hours     What the active ingredients do:  UVB Blockers/UVA Blockers/Physical Blockers*  * Physical blockers are chemical free sunscreens that reflect both UVA and UVB    Active ingredients in physical sunscreens (also called baby or mineral sunscreen):  Titanium dioxide  Zinc oxide    Active ingredients in chemical sunscreens:  Padimate O   Octinoxate   Oxybenzone   Benzophenone  Octyl salicylate  Phenylbenzimidazole sulfonic acid  Octocrylene  Avobenzone (Mejia 1789)    Examples of good high end " broad-spectrum sunblocks:  EltaMD - sensitive skin safe, tinted and non-tinted - mostly physical (available in office, amazon.com, dermEyeVerify, Joosy)  Skinceuticals - rubs in well, cosmetically appealing   ISDIN - Eryfotona can help reverse sun damage w/ active ingredient photolyase    Over the counter:  Blue Lizard - sunscreen that is VERY waterproof, strong, and broad spectrum  Neutrogena Ultra Sheer Dry Touch/ Sheer Zinc  La Roche Posay - mostly chemical options, elegant/moisturizing preparations  Aveeno Continuous Protection  Vanicream (for extremely sensitive skin)    UV-protective clothing:  www.coolibar.JuiceBoxJungle  www.sungrubbies.com  Owsley (Particularly PFG fishing gear, cool long-sleeved shirts)  Anthonyllan (for long sleeve vented shirts, similar to [place])

## 2023-08-04 NOTE — PROGRESS NOTES
Dermatology Outpatient Clinic Progress Note    Patient Name: Concepcion De La Vega  Patient : 1970  Date of Service: 2023    CC/HPI: Concepcion De La Vega is a 53 y.o. year old female with history of basal cell carcinoma and squamous cell carcinoma who presents today for TBSE.  Patient reports lesion on left thigh treated with imiquimod MWF X 2 weeks on 2 weeks off.     ROS:   Dermatological ROS: positive for dry skin    Physical Exam   Head   Head comments: Telangiectasias of the upper chest and neck      Back       Abdomen   Abdomen comments: Scattered abdominal angiomas      Upper extremities       Lower extremities           Diagram Legend     Erythematous scaling macule/papule c/w actinic keratosis       Vascular papule c/w angioma      Pigmented verrucoid papule/plaque c/w seborrheic keratosis      Yellow umbilicated papule c/w sebaceous hyperplasia      Irregularly shaped tan macule c/w lentigo     1-2 mm smooth white papules consistent with Milia      Movable subcutaneous cyst with punctum c/w epidermal inclusion cyst      Subcutaneous movable cyst c/w pilar cyst      Firm pink to brown papule c/w dermatofibroma      Pedunculated fleshy papule(s) c/w skin tag(s)      Evenly pigmented macule c/w junctional nevus     Mildly variegated pigmented, slightly irregular-bordered macule c/w mildly atypical nevus      Flesh colored to evenly pigmented papule c/w intradermal nevus       Pink pearly papule/plaque c/w basal cell carcinoma      Erythematous hyperkeratotic cursted plaque c/w SCC      Surgical scar with no sign of skin cancer recurrence      Open and closed comedones      Inflammatory papules and pustules      Verrucoid papule consistent consistent with wart     Erythematous eczematous patches and plaques     Dystrophic onycholytic nail with subungual debris c/w onychomycosis     Umbilicated papule    Erythematous-base heme-crusted tan verrucoid plaque consistent with inflamed seborrheic keratosis      Erythematous Silvery Scaling Plaque c/w Psoriasis     See annotation    Assessment/Plan:    Diagnoses and all orders for this visit:    History of nonmelanoma skin cancer  - no e/o recurrence    Actinic keratosis  - Cryosurgery Procedure Note  The patient was informed that we would be destroying harmful lesions via cryosurgery, the risks of pain, vesiculation, and potentially persistent discoloration were discussed.   Patient expressed verbal consent. 2 premalignant  lesions were treated with a 10 second freeze thaw cycle.   Thicker lesions were treated with two cycles to enhance chances of complete resolution.   Follow up in 6 weeks if lesions do not resolve.     Seborrheic keratoses  - Benign nature of lesion discussed, monitor for changes    Telangiectasia  - Benign nature of lesion discussed, monitor for changes    Lentigo  - Benign nature of lesion discussed, monitor for changes        No follow-ups on file.        Cyndee Gaines MD

## 2023-12-15 ENCOUNTER — PATIENT MESSAGE (OUTPATIENT)
Dept: FAMILY MEDICINE | Facility: CLINIC | Age: 53
End: 2023-12-15
Payer: COMMERCIAL

## 2023-12-29 ENCOUNTER — PATIENT OUTREACH (OUTPATIENT)
Dept: ADMINISTRATIVE | Facility: HOSPITAL | Age: 53
End: 2023-12-29
Payer: COMMERCIAL

## 2023-12-29 ENCOUNTER — PATIENT MESSAGE (OUTPATIENT)
Dept: ADMINISTRATIVE | Facility: HOSPITAL | Age: 53
End: 2023-12-29
Payer: COMMERCIAL

## 2023-12-29 NOTE — PROGRESS NOTES
Population Health Chart Review & Patient Outreach Details    Outreach Performed: YES Portal    Additional [facility] Health Notes:    BREAST CANCER SCREENING    Non-compliant report chart audits for BREAST CANCER SCREENING     Outreach to patient in reference to SCHEDULING A MAMMOGRAM EXAM.            Updates Requested / Reviewed:        Health Maintenance Topics Overdue:    Health Maintenance Due   Topic    Pneumococcal Vaccines (Age 0-64) (1 - PCV)    Shingles Vaccine (1 of 2)    Mammogram     Colorectal Cancer Screening     Hemoglobin A1c     Cervical Cancer Screening     Diabetes Urine Screening     Influenza Vaccine (1)    COVID-19 Vaccine (4 - 2023-24 season)       Health Maintenance Topic(s) Outreach Outcomes & Actions Taken:    Breast Cancer Screening - Outreach Outcomes & Actions Taken  : outreach

## 2024-04-11 ENCOUNTER — PATIENT MESSAGE (OUTPATIENT)
Dept: ADMINISTRATIVE | Facility: HOSPITAL | Age: 54
End: 2024-04-11
Payer: COMMERCIAL

## 2024-04-15 ENCOUNTER — PATIENT OUTREACH (OUTPATIENT)
Dept: ADMINISTRATIVE | Facility: HOSPITAL | Age: 54
End: 2024-04-15
Payer: COMMERCIAL

## 2024-04-15 DIAGNOSIS — Z12.31 ENCOUNTER FOR SCREENING MAMMOGRAM FOR MALIGNANT NEOPLASM OF BREAST: Primary | ICD-10-CM

## 2024-04-15 DIAGNOSIS — Z01.419 WELL WOMAN EXAM WITH ROUTINE GYNECOLOGICAL EXAM: ICD-10-CM

## 2024-04-15 NOTE — PROGRESS NOTES
Population Health Chart Review & Patient Outreach Details      Additional [facility] Health Notes:               Updates Requested / Reviewed:      Updated Care Coordination Note         Health Maintenance Topics Overdue:      VBHM Score: 7     Cervical Cancer Screening  Colon Cancer Screening  Urine Screening  Hemoglobin A1c  Lipid Panel  Mammogram  Uncontrolled BP                       Health Maintenance Topic(s) Outreach Outcomes & Actions Taken:    Breast Cancer Screening - Outreach Outcomes & Actions Taken  : Mammogram Order Placed    Cervical Cancer Screening - Outreach Outcomes & Actions Taken  : Referral to GYN entered and patient will schedule    Colorectal Cancer Screening - Outreach Outcomes & Actions Taken  : Reminded Patient to Complete Already Received Home Test    Medication Adherence / Statins - Outreach Outcomes & Actions Taken  : Message sent to have patient removed from registry as she is not diabetic

## 2024-05-06 ENCOUNTER — PATIENT MESSAGE (OUTPATIENT)
Dept: ORTHOPEDICS | Facility: CLINIC | Age: 54
End: 2024-05-06
Payer: COMMERCIAL

## 2024-05-10 DIAGNOSIS — M25.561 RIGHT KNEE PAIN, UNSPECIFIED CHRONICITY: Primary | ICD-10-CM

## 2024-05-15 ENCOUNTER — TELEPHONE (OUTPATIENT)
Dept: FAMILY MEDICINE | Facility: CLINIC | Age: 54
End: 2024-05-15
Payer: COMMERCIAL

## 2024-05-15 DIAGNOSIS — E11.9 DIABETES MELLITUS WITHOUT COMPLICATION: ICD-10-CM

## 2024-05-15 NOTE — TELEPHONE ENCOUNTER
----- Message from Lyric Car sent at 5/10/2024  2:54 PM CDT -----  Dr. Glenn Dennison     Unfortunately, Ms. Concepcion De La Vega has failed to meet basic participation requirements for the HTN/DM Digital Medicine Program and will be discharged from the program. We have tried to contact him/her on multiple occasions without success. Unfortunately, she is not a good candidate for digital medicine monitoring.     Thank you for your support of Digital Medicine! Please contact me if you have any questions or concerns.    Sincerely,  Lyric Car

## 2024-06-13 ENCOUNTER — PATIENT MESSAGE (OUTPATIENT)
Dept: ADMINISTRATIVE | Facility: HOSPITAL | Age: 54
End: 2024-06-13
Payer: COMMERCIAL

## 2024-07-09 ENCOUNTER — PATIENT MESSAGE (OUTPATIENT)
Dept: ADMINISTRATIVE | Facility: HOSPITAL | Age: 54
End: 2024-07-09
Payer: COMMERCIAL

## 2024-10-02 DIAGNOSIS — F33.0 MILD EPISODE OF RECURRENT MAJOR DEPRESSIVE DISORDER: Chronic | ICD-10-CM

## 2024-10-02 RX ORDER — BUPROPION HYDROCHLORIDE 150 MG/1
300 TABLET ORAL DAILY
Qty: 180 TABLET | Refills: 3 | OUTPATIENT
Start: 2024-10-02

## 2024-10-02 NOTE — TELEPHONE ENCOUNTER
Advised Ms. Ceferino that she needs an appt & hasn't been seen since 3/02/23 - that I was dening her medication refill. Offered an appt - pt said she had to check her schedule.

## 2024-10-02 NOTE — TELEPHONE ENCOUNTER
Care Due:                  Date            Visit Type   Department     Provider  --------------------------------------------------------------------------------                                NP -                              PRIMARY      [facility] FAMILY  Last Visit: 03-      CARE ([facility])   MEDICINE       Glenn Dennison  Next Visit: None Scheduled  None         None Found                                                            Last  Test          Frequency    Reason                     Performed    Due Date  --------------------------------------------------------------------------------    Office Visit  15 months..  FLUoxetine, buPROPion,     03- 05-                             metoprolol...............    [employer] Embedded Care Due Messages. Reference number: [id number].   10/02/2024 10:07:53 AM MARYA

## 2024-10-03 ENCOUNTER — PATIENT MESSAGE (OUTPATIENT)
Dept: FAMILY MEDICINE | Facility: CLINIC | Age: 54
End: 2024-10-03
Payer: COMMERCIAL

## 2024-11-27 ENCOUNTER — OFFICE VISIT (OUTPATIENT)
Dept: FAMILY MEDICINE | Facility: CLINIC | Age: 54
End: 2024-11-27
Payer: COMMERCIAL

## 2024-11-27 ENCOUNTER — LAB VISIT (OUTPATIENT)
Dept: LAB | Facility: HOSPITAL | Age: 54
End: 2024-11-27
Attending: NURSE PRACTITIONER
Payer: COMMERCIAL

## 2024-11-27 ENCOUNTER — PATIENT MESSAGE (OUTPATIENT)
Dept: FAMILY MEDICINE | Facility: CLINIC | Age: 54
End: 2024-11-27

## 2024-11-27 VITALS
HEIGHT: 68 IN | HEART RATE: 77 BPM | BODY MASS INDEX: 32.56 KG/M2 | SYSTOLIC BLOOD PRESSURE: 142 MMHG | WEIGHT: 214.81 LBS | DIASTOLIC BLOOD PRESSURE: 86 MMHG | OXYGEN SATURATION: 99 %

## 2024-11-27 DIAGNOSIS — F33.0 MILD EPISODE OF RECURRENT MAJOR DEPRESSIVE DISORDER: Chronic | ICD-10-CM

## 2024-11-27 DIAGNOSIS — Z23 IMMUNIZATION DUE: ICD-10-CM

## 2024-11-27 DIAGNOSIS — K58.9 IRRITABLE BOWEL SYNDROME, UNSPECIFIED TYPE: ICD-10-CM

## 2024-11-27 DIAGNOSIS — Z83.2 FAMILY HISTORY OF AUTOIMMUNE DISORDER: ICD-10-CM

## 2024-11-27 DIAGNOSIS — I10 REACTIVE HYPERTENSION: ICD-10-CM

## 2024-11-27 DIAGNOSIS — R73.03 PREDIABETES: ICD-10-CM

## 2024-11-27 DIAGNOSIS — Z12.4 SCREENING FOR CERVICAL CANCER: Primary | ICD-10-CM

## 2024-11-27 DIAGNOSIS — Z12.11 SCREENING FOR COLON CANCER: ICD-10-CM

## 2024-11-27 DIAGNOSIS — Z00.00 WELLNESS EXAMINATION: ICD-10-CM

## 2024-11-27 LAB
BASOPHILS # BLD AUTO: 0.09 K/UL (ref 0–0.2)
BASOPHILS NFR BLD: 1 % (ref 0–1.9)
CHOLEST SERPL-MCNC: 217 MG/DL (ref 120–199)
CHOLEST/HDLC SERPL: 4.3 {RATIO} (ref 2–5)
CRP SERPL-MCNC: 11.1 MG/L (ref 0–8.2)
DIFFERENTIAL METHOD BLD: NORMAL
EOSINOPHIL # BLD AUTO: 0.2 K/UL (ref 0–0.5)
EOSINOPHIL NFR BLD: 1.6 % (ref 0–8)
ERYTHROCYTE [DISTWIDTH] IN BLOOD BY AUTOMATED COUNT: 13.8 % (ref 11.5–14.5)
ERYTHROCYTE [SEDIMENTATION RATE] IN BLOOD BY PHOTOMETRIC METHOD: 13 MM/HR (ref 0–36)
ESTIMATED AVG GLUCOSE: 137 MG/DL (ref 68–131)
HBA1C MFR BLD: 6.4 % (ref 4–5.6)
HCT VFR BLD AUTO: 41.7 % (ref 37–48.5)
HDLC SERPL-MCNC: 50 MG/DL (ref 40–75)
HDLC SERPL: 23 % (ref 20–50)
HGB BLD-MCNC: 13.7 G/DL (ref 12–16)
IMM GRANULOCYTES # BLD AUTO: 0.03 K/UL (ref 0–0.04)
IMM GRANULOCYTES NFR BLD AUTO: 0.3 % (ref 0–0.5)
LDLC SERPL CALC-MCNC: 101.2 MG/DL (ref 63–159)
LYMPHOCYTES # BLD AUTO: 2.8 K/UL (ref 1–4.8)
LYMPHOCYTES NFR BLD: 29.4 % (ref 18–48)
MCH RBC QN AUTO: 29.8 PG (ref 27–31)
MCHC RBC AUTO-ENTMCNC: 32.9 G/DL (ref 32–36)
MCV RBC AUTO: 91 FL (ref 82–98)
MONOCYTES # BLD AUTO: 0.8 K/UL (ref 0.3–1)
MONOCYTES NFR BLD: 7.9 % (ref 4–15)
NEUTROPHILS # BLD AUTO: 5.7 K/UL (ref 1.8–7.7)
NEUTROPHILS NFR BLD: 59.8 % (ref 38–73)
NONHDLC SERPL-MCNC: 167 MG/DL
NRBC BLD-RTO: 0 /100 WBC
PLATELET # BLD AUTO: 331 K/UL (ref 150–450)
PMV BLD AUTO: 10.6 FL (ref 9.2–12.9)
RBC # BLD AUTO: 4.6 M/UL (ref 4–5.4)
TRIGL SERPL-MCNC: 329 MG/DL (ref 30–150)
TSH SERPL DL<=0.005 MIU/L-ACNC: 1.72 UIU/ML (ref 0.4–4)
WBC # BLD AUTO: 9.45 K/UL (ref 3.9–12.7)

## 2024-11-27 PROCEDURE — 1159F MED LIST DOCD IN RCRD: CPT | Mod: CPTII,S$GLB,, | Performed by: NURSE PRACTITIONER

## 2024-11-27 PROCEDURE — 99396 PREV VISIT EST AGE 40-64: CPT | Mod: 25,S$GLB,, | Performed by: NURSE PRACTITIONER

## 2024-11-27 PROCEDURE — 36415 COLL VENOUS BLD VENIPUNCTURE: CPT | Mod: PO | Performed by: NURSE PRACTITIONER

## 2024-11-27 PROCEDURE — 3008F BODY MASS INDEX DOCD: CPT | Mod: CPTII,S$GLB,, | Performed by: NURSE PRACTITIONER

## 2024-11-27 PROCEDURE — 85025 COMPLETE CBC W/AUTO DIFF WBC: CPT | Performed by: NURSE PRACTITIONER

## 2024-11-27 PROCEDURE — 3079F DIAST BP 80-89 MM HG: CPT | Mod: CPTII,S$GLB,, | Performed by: NURSE PRACTITIONER

## 2024-11-27 PROCEDURE — 80061 LIPID PANEL: CPT | Performed by: NURSE PRACTITIONER

## 2024-11-27 PROCEDURE — 90656 IIV3 VACC NO PRSV 0.5 ML IM: CPT | Mod: S$GLB,,, | Performed by: NURSE PRACTITIONER

## 2024-11-27 PROCEDURE — 84443 ASSAY THYROID STIM HORMONE: CPT | Performed by: NURSE PRACTITIONER

## 2024-11-27 PROCEDURE — 3077F SYST BP >= 140 MM HG: CPT | Mod: CPTII,S$GLB,, | Performed by: NURSE PRACTITIONER

## 2024-11-27 PROCEDURE — 86038 ANTINUCLEAR ANTIBODIES: CPT | Performed by: NURSE PRACTITIONER

## 2024-11-27 PROCEDURE — 83036 HEMOGLOBIN GLYCOSYLATED A1C: CPT | Performed by: NURSE PRACTITIONER

## 2024-11-27 PROCEDURE — 86140 C-REACTIVE PROTEIN: CPT | Performed by: NURSE PRACTITIONER

## 2024-11-27 PROCEDURE — 90471 IMMUNIZATION ADMIN: CPT | Mod: S$GLB,,, | Performed by: NURSE PRACTITIONER

## 2024-11-27 PROCEDURE — 99999 PR PBB SHADOW E&M-EST. PATIENT-LVL IV: CPT | Mod: PBBFAC,,, | Performed by: NURSE PRACTITIONER

## 2024-11-27 PROCEDURE — 85652 RBC SED RATE AUTOMATED: CPT | Performed by: NURSE PRACTITIONER

## 2024-11-27 RX ORDER — BUPROPION HYDROCHLORIDE 150 MG/1
300 TABLET ORAL DAILY
Qty: 180 TABLET | Refills: 3 | Status: SHIPPED | OUTPATIENT
Start: 2024-11-27

## 2024-11-27 RX ORDER — DICYCLOMINE HYDROCHLORIDE 20 MG/1
20 TABLET ORAL EVERY 6 HOURS PRN
Qty: 120 TABLET | Refills: 0 | Status: SHIPPED | OUTPATIENT
Start: 2024-11-27 | End: 2024-12-29

## 2024-11-27 RX ORDER — ZOSTER VACCINE RECOMBINANT, ADJUVANTED 50 MCG/0.5
0.5 KIT INTRAMUSCULAR ONCE
Qty: 1 EACH | Refills: 0 | Status: SHIPPED | OUTPATIENT
Start: 2024-11-27 | End: 2024-11-28

## 2024-11-27 RX ORDER — OMEPRAZOLE AND SODIUM BICARBONATE 40; 1100 MG/1; MG/1
1 CAPSULE ORAL
COMMUNITY

## 2024-11-27 NOTE — PROGRESS NOTES
"Subjective     Patient ID: Concepcion De La Vega is a 54 y.o. female.    Chief Complaint: Annual Exam and Medication Refill      Medication Refill  Pertinent negatives include no arthralgias, chest pain, headaches, joint swelling, neck pain, vomiting or weakness.         Patient is new to me. PCP is Dr. Dennison.     53 y/o F with migraines, OAB presents to clinic for annual exam and med refills. Requesting dicyclomine to have on hand when she gets IBS.     Review of Systems   Constitutional:  Negative for activity change and unexpected weight change.   HENT:  Negative for hearing loss, rhinorrhea and trouble swallowing.    Eyes:  Negative for discharge and visual disturbance.   Respiratory:  Negative for chest tightness and wheezing.    Cardiovascular:  Negative for chest pain and palpitations.   Gastrointestinal:  Negative for blood in stool, constipation, diarrhea and vomiting.   Endocrine: Negative for polydipsia and polyuria.   Genitourinary:  Negative for difficulty urinating, dysuria, hematuria and menstrual problem.   Musculoskeletal:  Negative for arthralgias, joint swelling and neck pain.   Neurological:  Negative for weakness and headaches.   Psychiatric/Behavioral:  Negative for confusion and dysphoric mood.           Objective   Vitals:    11/27/24 0901 11/27/24 0931   BP: (!) 142/88 (!) 142/86   Pulse: 77    SpO2: 99%    Weight: 97.4 kg (214 lb 13.4 oz)    Height: 5' 7.5" (1.715 m)       Physical Exam  Vitals and nursing note reviewed.   Constitutional:       General: She is not in acute distress.     Appearance: Normal appearance. She is obese. She is not ill-appearing, toxic-appearing or diaphoretic.   HENT:      Head: Normocephalic and atraumatic.      Right Ear: Tympanic membrane, ear canal and external ear normal. There is no impacted cerumen.      Left Ear: Tympanic membrane, ear canal and external ear normal. There is no impacted cerumen.      Nose: No congestion or rhinorrhea.      Mouth/Throat:      " Pharynx: No oropharyngeal exudate or posterior oropharyngeal erythema.   Eyes:      General: No scleral icterus.        Right eye: No discharge.         Left eye: No discharge.      Conjunctiva/sclera: Conjunctivae normal.      Pupils: Pupils are equal, round, and reactive to light.   Cardiovascular:      Rate and Rhythm: Normal rate and regular rhythm.      Pulses: Normal pulses.      Heart sounds: Normal heart sounds. No murmur heard.     No friction rub. No gallop.   Pulmonary:      Effort: Pulmonary effort is normal. No respiratory distress.      Breath sounds: Normal breath sounds. No stridor. No wheezing, rhonchi or rales.   Abdominal:      General: Abdomen is flat. Bowel sounds are normal.      Palpations: Abdomen is soft.   Musculoskeletal:         General: No deformity or signs of injury.      Cervical back: Normal range of motion and neck supple.      Right lower leg: No edema.      Left lower leg: No edema.   Lymphadenopathy:      Cervical: No cervical adenopathy.   Skin:     General: Skin is warm.      Coloration: Skin is not jaundiced or pale.      Findings: No lesion or rash.   Neurological:      General: No focal deficit present.      Mental Status: She is alert and oriented to person, place, and time. Mental status is at baseline.   Psychiatric:         Mood and Affect: Mood normal.         Behavior: Behavior normal.         Thought Content: Thought content normal.         Judgment: Judgment normal.         1. Wellness examination    2. Reactive hypertension    3. Screening for cervical cancer  - Ambulatory referral/consult to Obstetrics / Gynecology; Future    4. Screening for colon cancer  - Cologuard Screening (Multitarget Stool DNA); Future  - Cologuard Screening (Multitarget Stool DNA)    5. Mild episode of recurrent major depressive disorder  - buPROPion (WELLBUTRIN XL) 150 MG TB24 tablet; Take 2 tablets (300 mg total) by mouth once daily.  Dispense: 180 tablet; Refill: 3    6. Irritable bowel  syndrome, unspecified type  - dicyclomine (BENTYL) 20 mg tablet; Take 1 tablet (20 mg total) by mouth every 6 (six) hours as needed (cramping).  Dispense: 120 tablet; Refill: 0    7. Prediabetes  - HEMOGLOBIN A1C; Future  - TSH; Future  - CBC W/ AUTO DIFFERENTIAL; Future  - Lipid Panel; Future    8. Immunization due  - influenza (Flulaval, Fluzone, Fluarix) 45 mcg/0.5 mL IM vaccine (> or = 6 mo) 0.5 mL  - varicella-zoster [id number], PF, (SHINGRIX, PF,) 50 mcg/0.5 mL injection; Inject 0.5 mLs into the muscle once. for 1 dose  Dispense: 1 each; Refill: 0    9. Family history of autoimmune disorder  - CATARINA; Future  - Sedimentation rate; Future  - C-REACTIVE PROTEIN; Future       RTC/ER precautions given. F/U 6 mo.     Counseled on regular exercise, maintenance of a healthy weight, balanced diet rich in fruits/vegetables and lean protein, and avoidance of unhealthy habits like smoking and excessive alcohol intake.    Cecilia Angulo, MIKEP-C

## 2024-11-29 LAB — ANA SER QL IF: NORMAL

## 2025-01-10 DIAGNOSIS — K58.9 IRRITABLE BOWEL SYNDROME, UNSPECIFIED TYPE: ICD-10-CM

## 2025-01-10 RX ORDER — DICYCLOMINE HYDROCHLORIDE 20 MG/1
20 TABLET ORAL EVERY 6 HOURS PRN
Qty: 120 TABLET | Refills: 0 | Status: SHIPPED | OUTPATIENT
Start: 2025-01-10 | End: 2025-02-09

## 2025-01-13 ENCOUNTER — PATIENT MESSAGE (OUTPATIENT)
Dept: ADMINISTRATIVE | Facility: HOSPITAL | Age: 55
End: 2025-01-13
Payer: COMMERCIAL

## 2025-01-17 ENCOUNTER — OFFICE VISIT (OUTPATIENT)
Dept: FAMILY MEDICINE | Facility: CLINIC | Age: 55
End: 2025-01-17
Payer: COMMERCIAL

## 2025-01-17 VITALS — HEIGHT: 68 IN | WEIGHT: 215 LBS | BODY MASS INDEX: 32.58 KG/M2

## 2025-01-17 DIAGNOSIS — E66.811 CLASS 1 OBESITY DUE TO EXCESS CALORIES WITH SERIOUS COMORBIDITY AND BODY MASS INDEX (BMI) OF 33.0 TO 33.9 IN ADULT: ICD-10-CM

## 2025-01-17 DIAGNOSIS — E66.09 CLASS 1 OBESITY DUE TO EXCESS CALORIES WITH SERIOUS COMORBIDITY AND BODY MASS INDEX (BMI) OF 33.0 TO 33.9 IN ADULT: ICD-10-CM

## 2025-01-17 DIAGNOSIS — E78.2 MIXED HYPERLIPIDEMIA: ICD-10-CM

## 2025-01-17 DIAGNOSIS — R73.03 PREDIABETES: Primary | ICD-10-CM

## 2025-01-17 RX ORDER — TIRZEPATIDE 2.5 MG/.5ML
2.5 INJECTION, SOLUTION SUBCUTANEOUS
Qty: 4 PEN | Refills: 0 | Status: SHIPPED | OUTPATIENT
Start: 2025-01-17

## 2025-01-17 NOTE — PROGRESS NOTES
The patient location is: [place], [place]  The chief complaint leading to consultation is: followup labs  Visit type: Virtual visit with synchronous audio and video  Total time spent with patient: 15 min  Each patient to whom he or she provides medical services by telemedicine is:  (1) informed of the relationship between the physician and patient and the respective role of any other health care provider with respect to management of the patient; and (2) notified that he or she may decline to receive medical services by telemedicine and may withdraw from such care at any time.    Subjective:       Patient ID: Concepcion De La Vega is a 54 y.o. female.    Chief Complaint: Follow-up    Follow-up  Pertinent negatives include no chest pain, headaches, neck pain or vomiting.     53 y/o F presents via video visit for followup on labs, A1C 6.4%.     Past Medical History:   Diagnosis Date    Abnormal Pap smear     ASCUS negative HPV. Repeat pap    ADD (attention deficit disorder)     Arthritis     right knee    Asthma     exercise induced    Basal cell carcinoma 02/2018    Left upper back     BCC (basal cell carcinoma)     Right medial ankle  - ED& C     BCC (basal cell carcinoma) 2016    Left anterior thigh    Cystocele     with stress incontinence    Depression     Dizziness     Fracture of sternum Nov 2010    from [employer] class    GERD (gastroesophageal reflux disease)     HEARING LOSS     Hearing loss in right ear     IBS (irritable bowel syndrome)     Intrauterine device     Mirena    Kidney stone 2009    PONV (postoperative nausea and vomiting)     requests Scopolamine patch    Primary hypertension 1/31/2022    SCC (squamous cell carcinoma), hand, right 2015    excisied [facility]     Seasonal allergies     deviated septum also    SI (sacroiliac) pain     Sinus pain July 3/2012    no fever, starting on antibiotics    Sinusitis     Skin tag of female perineum 2012    Squamous cell carcinoma of skin 09/2018    Left shin      TMJ (dislocation of temporomandibular joint)        Review of patient's allergies indicates:   Allergen Reactions    Venom-wasp Hives and Swelling    Oxytetracycline Swelling     Terramycin    Triple antibiotic [mmlph-mxcsd-mhojbqn-pramoxine] Blisters    Adhesive Rash       Current Outpatient Medications on File Prior to Visit   Medication Sig Dispense Refill    acetaZOLAMIDE (DIAMOX) 250 MG tablet Take 1 tablet (250 mg total) by mouth 3 (three) times daily as needed (migraine). 20 tablet 4    albuterol (PROVENTIL/VENTOLIN HFA) 90 mcg/actuation inhaler Inhale 2 puffs into the lungs every 6 (six) hours as needed for Wheezing. 18 g 3    buPROPion (WELLBUTRIN XL) 150 MG TB24 tablet Take 2 tablets (300 mg total) by mouth once daily. 180 tablet 3    diclofenac (FLECTOR) 1.3 % PT12 Apply 1 patch topically once daily 30 patch 3    dicyclomine (BENTYL) 20 mg tablet Take 1 tablet (20 mg total) by mouth every 6 (six) hours as needed (cramping). 120 tablet 0    l-methylfolate-b2-b6-b12 (CEREFOLIN) 6-5-50-1 mg Tab Take 1 tablet by mouth once daily.      methocarbamoL (ROBAXIN) 750 MG Tab Take 1 tablet (750 mg total) by mouth 4 (four) times daily as needed. 44 tablet 3    metoprolol succinate (TOPROL-XL) 25 MG 24 hr tablet Take 1 tablet (25 mg total) by mouth once daily. 90 tablet 3    omeprazole-sodium bicarbonate (ZEGERID) 40-1.1 mg-gram per capsule Take 1 capsule by mouth.       No current facility-administered medications on file prior to visit.       Review of Systems   Constitutional:  Positive for activity change and unexpected weight change.   HENT:  Negative for hearing loss and trouble swallowing.    Eyes:  Negative for discharge.   Respiratory:  Negative for chest tightness and wheezing.    Cardiovascular:  Negative for chest pain and palpitations.   Gastrointestinal:  Negative for constipation, diarrhea and vomiting.   Genitourinary:  Negative for difficulty urinating and hematuria.   Musculoskeletal:  Negative for  neck pain.   Neurological:  Negative for headaches.   Psychiatric/Behavioral:  Negative for dysphoric mood.         Objective:      No acute distress noted. AAOX3.    Assessment:       1. Prediabetes    2. Class 1 obesity due to excess calories with serious comorbidity and body mass index (BMI) of 33.0 to 33.9 in adult    3. Mixed hyperlipidemia        Plan:       1. Prediabetes    2. Class 1 obesity due to excess calories with serious comorbidity and body mass index (BMI) of 33.0 to 33.9 in adult    3. Mixed hyperlipidemia       Start zepbound at 2.5mg weekly x 4 weeks, plan to increase to 5mg after that.     150 minutes of aerobic exercise weekly  2 sessions of strength training each week  Low cholesterol, low fat diet, the Mediterranean diet is great, you can find plenty of information online.   Limit alcohol consumption

## 2025-01-23 ENCOUNTER — PATIENT MESSAGE (OUTPATIENT)
Dept: FAMILY MEDICINE | Facility: CLINIC | Age: 55
End: 2025-01-23
Payer: COMMERCIAL

## 2025-01-27 ENCOUNTER — OFFICE VISIT (OUTPATIENT)
Dept: FAMILY MEDICINE | Facility: CLINIC | Age: 55
End: 2025-01-27
Payer: COMMERCIAL

## 2025-01-27 DIAGNOSIS — R73.03 PREDIABETES: ICD-10-CM

## 2025-01-27 DIAGNOSIS — E66.09 CLASS 1 OBESITY DUE TO EXCESS CALORIES WITH SERIOUS COMORBIDITY AND BODY MASS INDEX (BMI) OF 33.0 TO 33.9 IN ADULT: Primary | ICD-10-CM

## 2025-01-27 DIAGNOSIS — E66.811 CLASS 1 OBESITY DUE TO EXCESS CALORIES WITH SERIOUS COMORBIDITY AND BODY MASS INDEX (BMI) OF 33.0 TO 33.9 IN ADULT: Primary | ICD-10-CM

## 2025-01-27 NOTE — PROGRESS NOTES
The patient location is: [place], [place]  The chief complaint leading to consultation is: obesity  Visit type: Virtual visit with synchronous audio and video  Total time spent with patient: 15 min  Each patient to whom he or she provides medical services by telemedicine is:  (1) informed of the relationship between the physician and patient and the respective role of any other health care provider with respect to management of the patient; and (2) notified that he or she may decline to receive medical services by telemedicine and may withdraw from such care at any time.    Subjective:       Patient ID: Concepcion De La Vega is a 54 y.o. female.    Chief Complaint: Obesity    HPI  55 y/o F presents via video visit to discuss tirzepatide. She was unable to get the zepbound with her insurance and she is interested in getting it compounded. She does not have any contraindications and she is able to self administer.     Past Medical History:   Diagnosis Date    Abnormal Pap smear     ASCUS negative HPV. Repeat pap    ADD (attention deficit disorder)     Arthritis     right knee    Asthma     exercise induced    Basal cell carcinoma 02/2018    Left upper back     BCC (basal cell carcinoma)     Right medial ankle  - ED& C     BCC (basal cell carcinoma) 2016    Left anterior thigh    Cystocele     with stress incontinence    Depression     Dizziness     Fracture of sternum Nov 2010    from [employer]    GERD (gastroesophageal reflux disease)     HEARING LOSS     Hearing loss in right ear     IBS (irritable bowel syndrome)     Intrauterine device     Mirena    Kidney stone 2009    PONV (postoperative nausea and vomiting)     requests Scopolamine patch    Primary hypertension 1/31/2022    SCC (squamous cell carcinoma), hand, right 2015    excisied [facility]     Seasonal allergies     deviated septum also    SI (sacroiliac) pain     Sinus pain July 3/2012    no fever, starting on antibiotics    Sinusitis     Skin tag of female  perineum 2012    Squamous cell carcinoma of skin 09/2018    Left shin     TMJ (dislocation of temporomandibular joint)        Review of patient's allergies indicates:   Allergen Reactions    Venom-wasp Hives and Swelling    Oxytetracycline Swelling     Terramycin    Triple antibiotic [xsxkh-bdiey-abcarzd-pramoxine] Blisters    Adhesive Rash       Current Outpatient Medications on File Prior to Visit   Medication Sig Dispense Refill    acetaZOLAMIDE (DIAMOX) 250 MG tablet Take 1 tablet (250 mg total) by mouth 3 (three) times daily as needed (migraine). 20 tablet 4    albuterol (PROVENTIL/VENTOLIN HFA) 90 mcg/actuation inhaler Inhale 2 puffs into the lungs every 6 (six) hours as needed for Wheezing. 18 g 3    buPROPion (WELLBUTRIN XL) 150 MG TB24 tablet Take 2 tablets (300 mg total) by mouth once daily. 180 tablet 3    diclofenac (FLECTOR) 1.3 % PT12 Apply 1 patch topically once daily 30 patch 3    dicyclomine (BENTYL) 20 mg tablet Take 1 tablet (20 mg total) by mouth every 6 (six) hours as needed (cramping). 120 tablet 0    l-methylfolate-b2-b6-b12 (CEREFOLIN) 6-5-50-1 mg Tab Take 1 tablet by mouth once daily.      methocarbamoL (ROBAXIN) 750 MG Tab Take 1 tablet (750 mg total) by mouth 4 (four) times daily as needed. 44 tablet 3    metoprolol succinate (TOPROL-XL) 25 MG 24 hr tablet Take 1 tablet (25 mg total) by mouth once daily. 90 tablet 3    omeprazole-sodium bicarbonate (ZEGERID) 40-1.1 mg-gram per capsule Take 1 capsule by mouth.      tirzepatide, weight loss, (ZEPBOUND) 2.5 mg/0.5 mL PnIj Inject 2.5 mg into the skin every 7 days. 4 Pen 0     No current facility-administered medications on file prior to visit.       Review of Systems   Constitutional:  Negative for activity change and unexpected weight change.   HENT:  Negative for hearing loss, rhinorrhea and trouble swallowing.    Eyes:  Negative for discharge and visual disturbance.   Respiratory:  Negative for chest tightness and wheezing.     Cardiovascular:  Negative for chest pain and palpitations.   Gastrointestinal:  Negative for blood in stool, constipation, diarrhea and vomiting.   Endocrine: Negative for polydipsia and polyuria.   Genitourinary:  Negative for difficulty urinating, dysuria, hematuria and menstrual problem.   Musculoskeletal:  Negative for arthralgias, joint swelling and neck pain.   Neurological:  Negative for weakness and headaches.   Psychiatric/Behavioral:  Negative for confusion and dysphoric mood.         Objective:      No acute distress noted. AAOX3.    Assessment:       1. Class 1 obesity due to excess calories with serious comorbidity and body mass index (BMI) of 33.0 to 33.9 in adult    2. Prediabetes        Plan:       1. Class 1 obesity due to excess calories with serious comorbidity and body mass index (BMI) of 33.0 to 33.9 in adult    2. Prediabetes       Rx for tirzepatide faxed to [facility]. Pt to f/u in 1 mo.

## 2025-02-05 ENCOUNTER — HOSPITAL ENCOUNTER (OUTPATIENT)
Dept: RADIOLOGY | Facility: HOSPITAL | Age: 55
Discharge: HOME | End: 2025-02-05
Attending: STUDENT IN AN ORGANIZED HEALTH CARE EDUCATION/TRAINING PROGRAM
Payer: COMMERCIAL

## 2025-02-05 ENCOUNTER — OFFICE VISIT (OUTPATIENT)
Dept: OBSTETRICS AND GYNECOLOGY | Facility: CLINIC | Age: 55
End: 2025-02-05
Payer: COMMERCIAL

## 2025-02-05 VITALS
WEIGHT: 219.38 LBS | DIASTOLIC BLOOD PRESSURE: 80 MMHG | HEIGHT: 67 IN | SYSTOLIC BLOOD PRESSURE: 124 MMHG | BODY MASS INDEX: 34.43 KG/M2

## 2025-02-05 DIAGNOSIS — Z12.4 SCREENING FOR CERVICAL CANCER: ICD-10-CM

## 2025-02-05 DIAGNOSIS — N95.2 VAGINAL ATROPHY: Primary | ICD-10-CM

## 2025-02-05 DIAGNOSIS — Z12.31 ENCOUNTER FOR SCREENING MAMMOGRAM FOR MALIGNANT NEOPLASM OF BREAST: ICD-10-CM

## 2025-02-05 DIAGNOSIS — Z79.890 HORMONE REPLACEMENT THERAPY: ICD-10-CM

## 2025-02-05 PROCEDURE — 87624 HPV HI-RISK TYP POOLED RSLT: CPT | Performed by: STUDENT IN AN ORGANIZED HEALTH CARE EDUCATION/TRAINING PROGRAM

## 2025-02-05 PROCEDURE — 1159F MED LIST DOCD IN RCRD: CPT | Mod: CPTII,S$GLB,, | Performed by: STUDENT IN AN ORGANIZED HEALTH CARE EDUCATION/TRAINING PROGRAM

## 2025-02-05 PROCEDURE — 77067 SCR MAMMO BI INCL CAD: CPT | Mod: 26,,, | Performed by: RADIOLOGY

## 2025-02-05 PROCEDURE — 3008F BODY MASS INDEX DOCD: CPT | Mod: CPTII,S$GLB,, | Performed by: STUDENT IN AN ORGANIZED HEALTH CARE EDUCATION/TRAINING PROGRAM

## 2025-02-05 PROCEDURE — 99999 PR PBB SHADOW E&M-EST. PATIENT-LVL IV: CPT | Mod: PBBFAC,,, | Performed by: STUDENT IN AN ORGANIZED HEALTH CARE EDUCATION/TRAINING PROGRAM

## 2025-02-05 PROCEDURE — 88141 CYTOPATH C/V INTERPRET: CPT | Mod: ,,, | Performed by: PATHOLOGY

## 2025-02-05 PROCEDURE — 99204 OFFICE O/P NEW MOD 45 MIN: CPT | Mod: 25,S$GLB,, | Performed by: STUDENT IN AN ORGANIZED HEALTH CARE EDUCATION/TRAINING PROGRAM

## 2025-02-05 PROCEDURE — 99386 PREV VISIT NEW AGE 40-64: CPT | Mod: 25,S$GLB,, | Performed by: STUDENT IN AN ORGANIZED HEALTH CARE EDUCATION/TRAINING PROGRAM

## 2025-02-05 PROCEDURE — 77067 SCR MAMMO BI INCL CAD: CPT | Mod: TC,PN

## 2025-02-05 PROCEDURE — 88175 CYTOPATH C/V AUTO FLUID REDO: CPT | Performed by: PATHOLOGY

## 2025-02-05 PROCEDURE — 3074F SYST BP LT 130 MM HG: CPT | Mod: CPTII,S$GLB,, | Performed by: STUDENT IN AN ORGANIZED HEALTH CARE EDUCATION/TRAINING PROGRAM

## 2025-02-05 PROCEDURE — 77063 BREAST TOMOSYNTHESIS BI: CPT | Mod: 26,,, | Performed by: RADIOLOGY

## 2025-02-05 PROCEDURE — 3079F DIAST BP 80-89 MM HG: CPT | Mod: CPTII,S$GLB,, | Performed by: STUDENT IN AN ORGANIZED HEALTH CARE EDUCATION/TRAINING PROGRAM

## 2025-02-05 RX ORDER — PROGESTERONE 200 MG/1
200 CAPSULE ORAL NIGHTLY
Qty: 30 CAPSULE | Refills: 2 | Status: SHIPPED | OUTPATIENT
Start: 2025-02-05 | End: 2025-02-17

## 2025-02-05 RX ORDER — ESTRADIOL 0.04 MG/D
1 FILM, EXTENDED RELEASE TRANSDERMAL
Qty: 8 PATCH | Refills: 2 | Status: SHIPPED | OUTPATIENT
Start: 2025-02-06 | End: 2025-02-17

## 2025-02-06 ENCOUNTER — PATIENT MESSAGE (OUTPATIENT)
Dept: OBSTETRICS AND GYNECOLOGY | Facility: CLINIC | Age: 55
End: 2025-02-06
Payer: COMMERCIAL

## 2025-02-06 ENCOUNTER — HOSPITAL ENCOUNTER (OUTPATIENT)
Dept: RADIOLOGY | Facility: HOSPITAL | Age: 55
Discharge: HOME | End: 2025-02-06
Attending: STUDENT IN AN ORGANIZED HEALTH CARE EDUCATION/TRAINING PROGRAM
Payer: COMMERCIAL

## 2025-02-06 DIAGNOSIS — F41.9 ANXIETY: ICD-10-CM

## 2025-02-06 DIAGNOSIS — R89.7 ABNORMAL BREAST BIOPSY: Primary | ICD-10-CM

## 2025-02-06 DIAGNOSIS — R92.8 ABNORMALITY OF RIGHT BREAST ON SCREENING MAMMOGRAM: ICD-10-CM

## 2025-02-06 DIAGNOSIS — Z98.890 HX OF BREAST BIOPSY: ICD-10-CM

## 2025-02-06 PROCEDURE — 77061 BREAST TOMOSYNTHESIS UNI: CPT | Mod: TC,PO,RT

## 2025-02-06 PROCEDURE — 76642 ULTRASOUND BREAST LIMITED: CPT | Mod: 26,RT,, | Performed by: RADIOLOGY

## 2025-02-06 PROCEDURE — 77061 BREAST TOMOSYNTHESIS UNI: CPT | Mod: 26,RT,, | Performed by: RADIOLOGY

## 2025-02-06 PROCEDURE — 76642 ULTRASOUND BREAST LIMITED: CPT | Mod: TC,PO,RT

## 2025-02-06 PROCEDURE — 77065 DX MAMMO INCL CAD UNI: CPT | Mod: 26,RT,, | Performed by: RADIOLOGY

## 2025-02-06 RX ORDER — DIAZEPAM 2 MG/1
2 TABLET ORAL ONCE
Qty: 1 TABLET | Refills: 0 | Status: SHIPPED | OUTPATIENT
Start: 2025-02-06 | End: 2025-02-26 | Stop reason: ALTCHOICE

## 2025-02-06 NOTE — PROGRESS NOTES
History & Physical  Gynecology      SUBJECTIVE:     Chief Complaint: Establish Care and Annual Exam       History of Present Illness:      Here today for well woman exam. Would also like to discuss HRT and vaginal atrophy.     Gyn: no PMB, hx of amenorrhea on IUD. LMP in 2018. Sexually active, some discomfort. No hx of abnormal pap smear. No immediate FH of gyn or colon cancer    No tobacco     Works for [employer], used to work with Dr. Stewart       Review of patient's allergies indicates:   Allergen Reactions    Venom-wasp Hives and Swelling    Oxytetracycline Swelling     Terramycin    Triple antibiotic [fxbli-gzhfu-mlaoxrr-pramoxine] Blisters    Adhesive Rash       Past Medical History:   Diagnosis Date    Abnormal Pap smear     ASCUS negative HPV. Repeat pap    ADD (attention deficit disorder)     Arthritis     right knee    Asthma     exercise induced    Basal cell carcinoma 02/2018    Left upper back     BCC (basal cell carcinoma)     Right medial ankle  - ED& C     BCC (basal cell carcinoma) 2016    Left anterior thigh    Cystocele     with stress incontinence    Depression     Dizziness     Fracture of sternum Nov 2010    from [employer]    GERD (gastroesophageal reflux disease)     HEARING LOSS     Hearing loss in right ear     IBS (irritable bowel syndrome)     Intrauterine device     Mirena    Kidney stone 2009    PONV (postoperative nausea and vomiting)     requests Scopolamine patch    Primary hypertension 1/31/2022    SCC (squamous cell carcinoma), hand, right 2015    [facility]cisied [facility]     Seasonal allergies     deviated septum also    SI (sacroiliac) pain     Sinus pain July 3/2012    no fever, starting on antibiotics    Sinusitis     Skin tag of female perineum 2012    Squamous cell carcinoma of skin 09/2018    Left shin     TMJ (dislocation of temporomandibular joint)      Past Surgical History:   Procedure Laterality Date    ANTERIOR VAGINAL REPAIR      CHOLECYSTECTOMY       COLONOSCOPY      DILATION AND CURETTAGE OF UTERUS      EXTERNAL EAR SURGERY      Rt middle ear oval Repair    [facility]    TONSILLECTOMY      TONSILLECTOMY, ADENOIDECTOMY, BILATERAL MYRINGOTOMY AND TUBES      URETERAL STENT PLACEMENT  2009    cysto, laser lithotripsy left ureter stone     OB History          2    Para   1    Term   1            AB   1    Living             SAB   1    IAB        Ectopic        Multiple        Live Births                   Family History   Problem Relation Name Age of Onset    Cancer Maternal Grandmother          uncertain of which GYN cancer    Anesthesia problems Mother          Mom hard to awaken post-op    Multiple sclerosis Mother      Diabetes Mother      Cataracts Mother      Kidney disease Father      Cataracts Father      Diabetes Brother      Clotting disorder Neg Hx      Breast cancer Neg Hx      Allergic rhinitis Neg Hx      Allergies Neg Hx      Angioedema Neg Hx      Asthma Neg Hx      Atopy Neg Hx      Eczema Neg Hx      Immunodeficiency Neg Hx      Rhinitis Neg Hx      Urticaria Neg Hx      Macular degeneration Neg Hx      Retinal detachment Neg Hx      Glaucoma Neg Hx       Social History     Tobacco Use    Smoking status: Former     Current packs/day: 0.00     Average packs/day: 0.5 packs/day for 5.0 years (2.5 ttl pk-yrs)     Types: Cigarettes     Start date: 1997     Quit date: 2002     Years since quittin.5    Smokeless tobacco: Never   Substance Use Topics    Alcohol use: Yes     Comment: glass of wine of day    Drug use: No       Current Outpatient Medications   Medication Sig    metoprolol succinate (TOPROL-XL) 25 MG 24 hr tablet Take 1 tablet (25 mg total) by mouth once daily.    acetaZOLAMIDE (DIAMOX) 250 MG tablet Take 1 tablet (250 mg total) by mouth 3 (three) times daily as needed (migraine).    albuterol (PROVENTIL/VENTOLIN HFA) 90 mcg/actuation inhaler Inhale 2 puffs into the lungs every 6 (six) hours as  needed for Wheezing.    buPROPion (WELLBUTRIN XL) 150 MG TB24 tablet Take 2 tablets (300 mg total) by mouth once daily.    diclofenac (FLECTOR) 1.3 % PT12 Apply 1 patch topically once daily    dicyclomine (BENTYL) 20 mg tablet Take 1 tablet (20 mg total) by mouth every 6 (six) hours as needed (cramping).    estradioL (VIVELLE-DOT) 0.0375 mg/24 hr Place 1 patch onto the skin twice a week.    l-methylfolate-b2-b6-b12 (CEREFOLIN) 6-5-50-1 mg Tab Take 1 tablet by mouth once daily.    methocarbamoL (ROBAXIN) 750 MG Tab Take 1 tablet (750 mg total) by mouth 4 (four) times daily as needed.    omeprazole-sodium bicarbonate (ZEGERID) 40-1.1 mg-gram per capsule Take 1 capsule by mouth.    progesterone (PROMETRIUM) 200 MG capsule Take 1 capsule (200 mg total) by mouth nightly.    tirzepatide, weight loss, (ZEPBOUND) 2.5 mg/0.5 mL PnIj Inject 2.5 mg into the skin every 7 days. (Patient not taking: Reported on 2/5/2025)     No current facility-administered medications for this visit.         Review of Systems:  Review of Systems   Constitutional:  Negative for chills, fatigue and fever.   HENT:  Negative for congestion.    Eyes:  Negative for visual disturbance.   Respiratory:  Negative for cough and shortness of breath.    Cardiovascular:  Negative for chest pain and palpitations.   Gastrointestinal:  Negative for abdominal distention, abdominal pain, constipation, diarrhea, nausea and vomiting.   Genitourinary:  Negative for difficulty urinating, dysuria, hematuria, vaginal bleeding and vaginal discharge.   Skin:  Negative for rash.   Neurological:  Negative for dizziness, seizures, light-headedness and headaches.   Hematological:  Does not bruise/bleed easily.   Psychiatric/Behavioral:  Negative for dysphoric mood. The patient is not nervous/anxious.         OBJECTIVE:     Physical Exam:  Physical Exam  Vitals reviewed.   Constitutional:       General: She is not in acute distress.     Appearance: Normal appearance. She is  well-developed.   HENT:      Head: Normocephalic and atraumatic.   Cardiovascular:      Rate and Rhythm: Normal rate and regular rhythm.   Pulmonary:      Effort: Pulmonary effort is normal.   Chest:   Breasts:     Right: No inverted nipple, mass, nipple discharge, skin change or tenderness.      Left: No inverted nipple, mass, nipple discharge, skin change or tenderness.   Abdominal:      General: There is no distension.      Palpations: Abdomen is soft.      Tenderness: There is no abdominal tenderness.   Genitourinary:     Vagina: Normal.      Comments: Normal external female genitalia, normal hair distribution. Vaginal mucosa pink, moist, well rugated, scant white physiologic discharge. No blood in vault. Cervix pink, non-friable, without lesion. No CMT. Uterus non tender, mobile, not enlarged. Adnexa without fullness or tenderness.    Skin:     General: Skin is warm.   Neurological:      Mental Status: She is alert and oriented to person, place, and time.   Psychiatric:         Behavior: Behavior normal.         Thought Content: Thought content normal.         Judgment: Judgment normal.           ASSESSMENT:       ICD-10-CM ICD-9-CM    1. Vaginal atrophy  N95.2 627.3       2. Screening for cervical cancer  Z12.4 V76.2 Ambulatory referral/consult to Obstetrics / Gynecology      Liquid-Based Pap Smear, Screening      HPV High Risk Genotypes, PCR      CANCELED: Mammo Digital Screening Bilat      3. Hormone replacement therapy  Z79.890 V07.4 estradioL (VIVELLE-DOT) 0.0375 mg/24 hr      progesterone (PROMETRIUM) 200 MG capsule          Orders Placed This Encounter   Procedures    HPV High Risk Genotypes, PCR     Release to patient->Immediate     Order Specific Question:   Source     Answer:   Cervix     Order Specific Question:   Release to patient     Answer:   Immediate           Plan:      - Pap cotest  - MMG ordered  - colonoscopy declines, s/p cologuard  - tobacco cessation n/a  - contraception n/a  - HPV  vaccine n/a  - Safe Sex n/a  - DEXA @ 65  - Counseled to take daily multivitamin. If patient is of reproductive age and not on contraception, to take prenatal vitamin. Patient has been counseled on the vitamin D and calcium requirements per ACOG recommendations.  Age   Calcium(mg/day)   Vitamin D (IU/day)  9-18    1300                       600  19-50  1000                       600  51-70  1200                       600  >70      1,200                      800  Patient to start vit D    CC: menopausal symptoms  HPI: having some mild hot flashes,but biggest issue is vaginal dryness and skin dryness  A/P:  - Discussed that supplemental hormones can significantly improve her quality of life by improving vasomotor symptoms. Discussed risks including DVT/PE, stroke, heart disease, and breast cancer. Discussed benefits of improved bone health. Discussed recommendation to use lowest possible dose for shortest amount of time. Patient understands these risks and would like to proceed.  - r/b/a discussed of HRT  - opts to try, meds sent in, no contraindications  - intrarosa sample given  - virtual f/u     Janie Neves M.D.  Obstetrics and Gynecology

## 2025-02-07 ENCOUNTER — TELEPHONE (OUTPATIENT)
Dept: RADIOLOGY | Facility: HOSPITAL | Age: 55
End: 2025-02-07
Payer: COMMERCIAL

## 2025-02-10 ENCOUNTER — HOSPITAL ENCOUNTER (OUTPATIENT)
Dept: RADIOLOGY | Facility: HOSPITAL | Age: 55
Discharge: HOME | End: 2025-02-10
Attending: STUDENT IN AN ORGANIZED HEALTH CARE EDUCATION/TRAINING PROGRAM
Payer: COMMERCIAL

## 2025-02-10 DIAGNOSIS — R92.8 ABNORMALITY OF RIGHT BREAST ON SCREENING MAMMOGRAM: ICD-10-CM

## 2025-02-10 PROCEDURE — 88305 TISSUE EXAM BY PATHOLOGIST: CPT | Mod: 26,,, | Performed by: STUDENT IN AN ORGANIZED HEALTH CARE EDUCATION/TRAINING PROGRAM

## 2025-02-10 PROCEDURE — 88342 IMHCHEM/IMCYTCHM 1ST ANTB: CPT | Mod: 26,59,, | Performed by: STUDENT IN AN ORGANIZED HEALTH CARE EDUCATION/TRAINING PROGRAM

## 2025-02-10 PROCEDURE — 63600175 PHARM REV CODE 636 W HCPCS: Mod: PO | Performed by: STUDENT IN AN ORGANIZED HEALTH CARE EDUCATION/TRAINING PROGRAM

## 2025-02-10 PROCEDURE — 88342 IMHCHEM/IMCYTCHM 1ST ANTB: CPT | Mod: PO | Performed by: STUDENT IN AN ORGANIZED HEALTH CARE EDUCATION/TRAINING PROGRAM

## 2025-02-10 PROCEDURE — 88360 TUMOR IMMUNOHISTOCHEM/MANUAL: CPT | Mod: 59,PO | Performed by: STUDENT IN AN ORGANIZED HEALTH CARE EDUCATION/TRAINING PROGRAM

## 2025-02-10 PROCEDURE — 27202663 US BREAST BIOPSY WITH IMAGING 1ST SITE RIGHT: Mod: PO

## 2025-02-10 PROCEDURE — 19083 BX BREAST 1ST LESION US IMAG: CPT | Mod: RT,,, | Performed by: RADIOLOGY

## 2025-02-10 PROCEDURE — 77065 DX MAMMO INCL CAD UNI: CPT | Mod: 26,RT,, | Performed by: RADIOLOGY

## 2025-02-10 PROCEDURE — 88360 TUMOR IMMUNOHISTOCHEM/MANUAL: CPT | Mod: 26,,, | Performed by: STUDENT IN AN ORGANIZED HEALTH CARE EDUCATION/TRAINING PROGRAM

## 2025-02-10 PROCEDURE — 77065 DX MAMMO INCL CAD UNI: CPT | Mod: TC,PO,RT

## 2025-02-10 PROCEDURE — 88305 TISSUE EXAM BY PATHOLOGIST: CPT | Mod: PO | Performed by: STUDENT IN AN ORGANIZED HEALTH CARE EDUCATION/TRAINING PROGRAM

## 2025-02-10 RX ORDER — LIDOCAINE HCL/EPINEPHRINE/PF 2%-1:200K
10 VIAL (ML) INJECTION ONCE
Status: COMPLETED | OUTPATIENT
Start: 2025-02-10 | End: 2025-02-10

## 2025-02-10 RX ORDER — LIDOCAINE HYDROCHLORIDE 10 MG/ML
1 INJECTION, SOLUTION INFILTRATION; PERINEURAL ONCE
Status: COMPLETED | OUTPATIENT
Start: 2025-02-10 | End: 2025-02-10

## 2025-02-10 RX ADMIN — LIDOCAINE HYDROCHLORIDE,EPINEPHRINE BITARTRATE 10 ML: 20; .005 INJECTION, SOLUTION EPIDURAL; INFILTRATION; INTRACAUDAL; PERINEURAL at 09:02

## 2025-02-10 RX ADMIN — LIDOCAINE HYDROCHLORIDE ANHYDROUS 1 ML: 10 INJECTION, SOLUTION INFILTRATION at 09:02

## 2025-02-12 ENCOUNTER — TELEPHONE (OUTPATIENT)
Dept: RADIOLOGY | Facility: HOSPITAL | Age: 55
End: 2025-02-12
Payer: COMMERCIAL

## 2025-02-12 DIAGNOSIS — C50.919 INVASIVE CARCINOMA OF BREAST: Primary | ICD-10-CM

## 2025-02-12 LAB
FINAL PATHOLOGIC DIAGNOSIS: NORMAL
FINAL PATHOLOGIC DIAGNOSIS: NORMAL
GROSS: NORMAL
Lab: NORMAL
Lab: NORMAL

## 2025-02-12 NOTE — TELEPHONE ENCOUNTER
I left a message for patient to call back to discuss right breast biopsy results.  I spoke with patient to discuss right breast invasive mammary carcinoma results. A referral has been placed for a surgical consult. She requested to see Dr. Zuniga.

## 2025-02-17 ENCOUNTER — PATIENT MESSAGE (OUTPATIENT)
Dept: FAMILY MEDICINE | Facility: CLINIC | Age: 55
End: 2025-02-17
Payer: COMMERCIAL

## 2025-02-17 ENCOUNTER — DOCUMENTATION ONLY (OUTPATIENT)
Dept: SURGERY | Facility: CLINIC | Age: 55
End: 2025-02-17
Payer: COMMERCIAL

## 2025-02-17 DIAGNOSIS — K91.89 POSTCHOLECYSTECTOMY DIARRHEA: Primary | ICD-10-CM

## 2025-02-17 DIAGNOSIS — R19.7 POSTCHOLECYSTECTOMY DIARRHEA: Primary | ICD-10-CM

## 2025-02-17 PROBLEM — Z17.0 MALIGNANT NEOPLASM OF UPPER-INNER QUADRANT OF RIGHT BREAST IN FEMALE, ESTROGEN RECEPTOR POSITIVE: Status: ACTIVE | Noted: 2025-02-17

## 2025-02-17 PROBLEM — C50.211 MALIGNANT NEOPLASM OF UPPER-INNER QUADRANT OF RIGHT BREAST IN FEMALE, ESTROGEN RECEPTOR POSITIVE: Status: ACTIVE | Noted: 2025-02-17

## 2025-02-18 ENCOUNTER — RESULTS FOLLOW-UP (OUTPATIENT)
Dept: OBSTETRICS AND GYNECOLOGY | Facility: CLINIC | Age: 55
End: 2025-02-18

## 2025-02-18 DIAGNOSIS — C50.211 MALIGNANT NEOPLASM OF UPPER-INNER QUADRANT OF RIGHT BREAST IN FEMALE, ESTROGEN RECEPTOR POSITIVE: Primary | ICD-10-CM

## 2025-02-18 DIAGNOSIS — Z91.89 AT RISK FOR LYMPHEDEMA: ICD-10-CM

## 2025-02-18 DIAGNOSIS — Z17.0 MALIGNANT NEOPLASM OF UPPER-INNER QUADRANT OF RIGHT BREAST IN FEMALE, ESTROGEN RECEPTOR POSITIVE: Primary | ICD-10-CM

## 2025-02-18 LAB
HPV HR 12 DNA SPEC QL NAA+PROBE: NEGATIVE
HPV16 AG SPEC QL: NEGATIVE
HPV18 DNA SPEC QL NAA+PROBE: NEGATIVE

## 2025-02-19 ENCOUNTER — LAB VISIT (OUTPATIENT)
Dept: LAB | Facility: HOSPITAL | Age: 55
End: 2025-02-19
Attending: NURSE PRACTITIONER
Payer: COMMERCIAL

## 2025-02-19 ENCOUNTER — TELEPHONE (OUTPATIENT)
Dept: HEMATOLOGY/ONCOLOGY | Facility: CLINIC | Age: 55
End: 2025-02-19
Payer: COMMERCIAL

## 2025-02-19 DIAGNOSIS — C50.211 MALIGNANT NEOPLASM OF UPPER-INNER QUADRANT OF RIGHT BREAST IN FEMALE, ESTROGEN RECEPTOR POSITIVE: Primary | ICD-10-CM

## 2025-02-19 DIAGNOSIS — Z01.818 PREOP EXAMINATION: ICD-10-CM

## 2025-02-19 DIAGNOSIS — Z17.0 MALIGNANT NEOPLASM OF UPPER-INNER QUADRANT OF RIGHT BREAST IN FEMALE, ESTROGEN RECEPTOR POSITIVE: Primary | ICD-10-CM

## 2025-02-19 DIAGNOSIS — R73.03 PREDIABETES: ICD-10-CM

## 2025-02-19 DIAGNOSIS — Z01.818 PREOP EXAMINATION: Primary | ICD-10-CM

## 2025-02-19 LAB
ESTIMATED AVG GLUCOSE: 146 MG/DL (ref 68–131)
HBA1C MFR BLD: 6.7 % (ref 4–5.6)

## 2025-02-19 PROCEDURE — 83036 HEMOGLOBIN GLYCOSYLATED A1C: CPT | Performed by: NURSE PRACTITIONER

## 2025-02-19 PROCEDURE — 36415 COLL VENOUS BLD VENIPUNCTURE: CPT | Mod: PO | Performed by: NURSE PRACTITIONER

## 2025-02-19 RX ORDER — CHOLESTYRAMINE 4 G/9G
4 POWDER, FOR SUSPENSION ORAL 2 TIMES DAILY
Qty: 180 PACKET | Refills: 3 | Status: SHIPPED | OUTPATIENT
Start: 2025-02-19 | End: 2026-02-19

## 2025-02-21 NOTE — PROGRESS NOTES
Met with patient during her consult with . Patient and I reviewed the information discussed with Dr. Leon including treatment options, diagnosis, and future plans for workup and monitoring. Detailed information was discussed with the patient on lymphedema management, Integrative Oncology services offered, support groups and explained genetics and hereditary cancers. Written copies were provided as well. Patient and I went through the NCCN patient guidelines book on Breast Cancer that was given, explained some of the information and why it is provided. Also given a copy of the [facility] Board of Medical Examiners brochure on Introductory Guide to Breast Cancer Treatment Options.We discussed follow up appointments, surgical procedures, risks, benefits, pre-op and post-op instructions and expectations, she was also given a detailed copy of her instructions and appointments. I encouraged her to call with any questions or concerns, patient verbalized understanding of all of the above.   Genetic results negative from Dr. Han's office

## 2025-02-24 ENCOUNTER — TUMOR BOARD CONFERENCE (OUTPATIENT)
Dept: SURGERY | Facility: CLINIC | Age: 55
End: 2025-02-24
Payer: COMMERCIAL

## 2025-02-25 ENCOUNTER — TELEPHONE (OUTPATIENT)
Dept: REHABILITATION | Facility: HOSPITAL | Age: 55
End: 2025-02-25
Payer: COMMERCIAL

## 2025-02-25 ENCOUNTER — RESULTS FOLLOW-UP (OUTPATIENT)
Dept: FAMILY MEDICINE | Facility: CLINIC | Age: 55
End: 2025-02-25

## 2025-02-25 ENCOUNTER — TELEPHONE (OUTPATIENT)
Dept: HEMATOLOGY/ONCOLOGY | Facility: CLINIC | Age: 55
End: 2025-02-25
Payer: COMMERCIAL

## 2025-02-25 ENCOUNTER — PATIENT MESSAGE (OUTPATIENT)
Dept: REHABILITATION | Facility: HOSPITAL | Age: 55
End: 2025-02-25
Payer: COMMERCIAL

## 2025-02-25 NOTE — TELEPHONE ENCOUNTER
Spoke to pt to remind of appt tomorrow with Harriet. Pt verbalized understanding and confirmed appt Location was discussed.

## 2025-02-25 NOTE — TELEPHONE ENCOUNTER
Called pt to get her scheduled for [facility] 3/19; pt accepted the apt on 3/12/25 at 3:00 pm. Location and check-in process were discussed. Information on Prehab w/apt reminder sent via [facility].

## 2025-02-25 NOTE — TELEPHONE ENCOUNTER
Returned call to pt, I don't have any cancellations on 2/28/25 for Prehab but I will call her should someone cancel on 2/28/25.    ----- Message from Radha sent at 2/25/2025  4:10 PM CST -----  Type: Needs Medical AdviceWho Called:  pt Symptoms (please be specific):  How long has patient had these symptoms:  Pharmacy name and phone #:  Best Call Back Number:  [phone] Information: pt is requesting a call back to see if there are any cancellations for 2/28

## 2025-02-26 ENCOUNTER — OFFICE VISIT (OUTPATIENT)
Dept: HEMATOLOGY/ONCOLOGY | Facility: CLINIC | Age: 55
End: 2025-02-26
Payer: COMMERCIAL

## 2025-02-26 ENCOUNTER — CLINICAL SUPPORT (OUTPATIENT)
Dept: REHABILITATION | Facility: HOSPITAL | Age: 55
End: 2025-02-26
Payer: COMMERCIAL

## 2025-02-26 ENCOUNTER — PATIENT MESSAGE (OUTPATIENT)
Dept: HEMATOLOGY/ONCOLOGY | Facility: CLINIC | Age: 55
End: 2025-02-26

## 2025-02-26 ENCOUNTER — TELEPHONE (OUTPATIENT)
Dept: HEMATOLOGY/ONCOLOGY | Facility: CLINIC | Age: 55
End: 2025-02-26

## 2025-02-26 ENCOUNTER — TELEPHONE (OUTPATIENT)
Dept: INFUSION THERAPY | Facility: HOSPITAL | Age: 55
End: 2025-02-26
Payer: COMMERCIAL

## 2025-02-26 VITALS
OXYGEN SATURATION: 99 % | DIASTOLIC BLOOD PRESSURE: 82 MMHG | WEIGHT: 218.5 LBS | SYSTOLIC BLOOD PRESSURE: 140 MMHG | HEART RATE: 69 BPM | BODY MASS INDEX: 33.12 KG/M2 | HEIGHT: 68 IN | TEMPERATURE: 97 F

## 2025-02-26 DIAGNOSIS — F51.02 ADJUSTMENT INSOMNIA: ICD-10-CM

## 2025-02-26 DIAGNOSIS — Z17.0 MALIGNANT NEOPLASM OF UPPER-INNER QUADRANT OF RIGHT BREAST IN FEMALE, ESTROGEN RECEPTOR POSITIVE: Primary | ICD-10-CM

## 2025-02-26 DIAGNOSIS — M54.50 LOW BACK PAIN, UNSPECIFIED BACK PAIN LATERALITY, UNSPECIFIED CHRONICITY, UNSPECIFIED WHETHER SCIATICA PRESENT: ICD-10-CM

## 2025-02-26 DIAGNOSIS — F43.22 ADJUSTMENT REACTION WITH ANXIETY: ICD-10-CM

## 2025-02-26 DIAGNOSIS — C50.211 MALIGNANT NEOPLASM OF UPPER-INNER QUADRANT OF RIGHT BREAST IN FEMALE, ESTROGEN RECEPTOR POSITIVE: Primary | ICD-10-CM

## 2025-02-26 DIAGNOSIS — C50.211 MALIGNANT NEOPLASM OF UPPER-INNER QUADRANT OF RIGHT BREAST IN FEMALE, ESTROGEN RECEPTOR POSITIVE: ICD-10-CM

## 2025-02-26 DIAGNOSIS — E66.811 CLASS 1 OBESITY DUE TO EXCESS CALORIES WITH SERIOUS COMORBIDITY AND BODY MASS INDEX (BMI) OF 33.0 TO 33.9 IN ADULT: ICD-10-CM

## 2025-02-26 DIAGNOSIS — E66.09 CLASS 1 OBESITY DUE TO EXCESS CALORIES WITH SERIOUS COMORBIDITY AND BODY MASS INDEX (BMI) OF 33.0 TO 33.9 IN ADULT: ICD-10-CM

## 2025-02-26 DIAGNOSIS — Z17.0 MALIGNANT NEOPLASM OF UPPER-INNER QUADRANT OF RIGHT BREAST IN FEMALE, ESTROGEN RECEPTOR POSITIVE: ICD-10-CM

## 2025-02-26 DIAGNOSIS — Z91.89 AT RISK FOR LYMPHEDEMA: ICD-10-CM

## 2025-02-26 PROCEDURE — 3079F DIAST BP 80-89 MM HG: CPT | Mod: CPTII,S$GLB,, | Performed by: NURSE PRACTITIONER

## 2025-02-26 PROCEDURE — 3077F SYST BP >= 140 MM HG: CPT | Mod: CPTII,S$GLB,, | Performed by: NURSE PRACTITIONER

## 2025-02-26 PROCEDURE — 3008F BODY MASS INDEX DOCD: CPT | Mod: CPTII,S$GLB,, | Performed by: NURSE PRACTITIONER

## 2025-02-26 PROCEDURE — 1160F RVW MEDS BY RX/DR IN RCRD: CPT | Mod: CPTII,S$GLB,, | Performed by: NURSE PRACTITIONER

## 2025-02-26 PROCEDURE — 1159F MED LIST DOCD IN RCRD: CPT | Mod: CPTII,S$GLB,, | Performed by: NURSE PRACTITIONER

## 2025-02-26 PROCEDURE — 3044F HG A1C LEVEL LT 7.0%: CPT | Mod: CPTII,S$GLB,, | Performed by: NURSE PRACTITIONER

## 2025-02-26 PROCEDURE — 97161 PT EVAL LOW COMPLEX 20 MIN: CPT | Mod: PN

## 2025-02-26 PROCEDURE — 99999 PR PBB SHADOW E&M-EST. PATIENT-LVL V: CPT | Mod: PBBFAC,,, | Performed by: NURSE PRACTITIONER

## 2025-02-26 PROCEDURE — 97535 SELF CARE MNGMENT TRAINING: CPT | Mod: PN

## 2025-02-26 PROCEDURE — 97110 THERAPEUTIC EXERCISES: CPT | Mod: PN

## 2025-02-26 PROCEDURE — 99204 OFFICE O/P NEW MOD 45 MIN: CPT | Mod: S$GLB,,, | Performed by: NURSE PRACTITIONER
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
## 2025-02-26 NOTE — TELEPHONE ENCOUNTER
RD called pt to make IO nutrition appt from referral. Pt and RD agreed on date and time that works best for pt. Friday, February 28th @ 11:00am.

## 2025-02-26 NOTE — PATIENT INSTRUCTIONS
Garments recommended:   Sigvaris Secure Arm Sleeve with silicone band 15-20 mmHg size M2  Sigvaris Secure Gauntlet  15-20 mmHg size small  Pt to wear garments 6 weeks after surgery. Only to be worn during the day time and remove at night when sleeping.   Recommended to be worn for up to one year for prophylactics in reducing future risk of lymphedema.     [facility]  [address] [facility] [address], [place] [address]  [phone]      Post Operative Breast Cancer Surgery Exercises    After surgery your shoulder and chest may feel tight and sore. Movement may cause stretching across your chest, axilla (armpit), and the incision site limiting your ability to raise your arm. Exercise will be extremely important in preventing swelling and in helping you regain movement, strength, and use of your arm.     Your post-operative exercise program can be divided into three stages. Your surgeon will inform you when to move to the next stage.     STAGE TIME PURPOSE EXERCISE   I Post-op day 1 to 4  (Drains are in) To prevent and/or reduce swelling - Positioning  - Hand and arm precautions   II Post-op day 5 to 14  (After drains are removed) To provide gentle movement without much stretching  - Shoulder shrugs  - Shoulder retraction   III Post-op day 15-6 wks  (After suture are removed) To stretch and regain full motion - Wall ladder  - Range of motion exercises  - Wand exercises       These exercises are general guideline for use following a mastectomy. Please consult your physician for additional information. Of a tissue expander has been placed, or if you have had reconstruction, you must get approval from your physician before beginning exercises.   Check with your physician prior to beginning a new stage.  Avoid elbows above shoulders until after post-op day 14.    STAGE 1      Abdominal Breathing Exercises      Get comfortable and relax your neck and shoulders. You can sit or lie down. Place one  hand on your upper chest and place the other hand on your belly button. Use your hands to feel the movements as you breathe in and out.  Take a deep breath in through your nose and feel the hand on your stomach move out. Do not let your shoulders move up. The hand on your chest should not move.   Breathe out slow and gentle through your mouth. Pucker your lips as if you were going to whistle or blow out a candle. The hand on your stomach should move in as you breathe out. Breathe out as long as you can until all the air is gone.   To help keep the lymphatic system moving well, practice two breaths every hour using the steps for abdominal breathing exercises.        Positioning    Several times a day, elevate your arm on pillows so your hand is above the level of your heart. This position will allow gravity to drain excess fluids out of your hand and arm. Try to place pillows under involved arm while in sitting position or while laying down.                STAGE 2    Shoulder Shrugs Shoulder Retraction    While sitting with arms supported, shrug both of your shoulder and then relax. Repeat 10 times, 3 times a day.   While sitting or standing, pull both shoulder back, bringing shoulder blades together. Repeat 10 times,   3 times a day.        STAGE 3  Range of Motion Exercises    To retain full motion of your shoulder, it must be moved in all planes, several times a day. Begin arm range of motion exercises with 10 reps of each, 2 times a day. Progress to 3 x 10 reps, as tolerated 2 times a day.    Wand - Shoulder Flexion       Lay on your back in a comfortable position. Raise both arms overhead, then bring back down by your side.        Wand - Shoulder Abduction       Lay on your back in a comfortable position. Raise both arms out to your side, then bring back down by your side.        Wand - Shoulder External Rotation      Lay on your back in a comfortable position. Position your arms as pictured below, with your  elbows bent and your hand in the arm. Slowly lower your hand down, then bring back up.        Wand - Shoulder Flexion      Hold onto a cane or broom with both hands approximately 14 inches apart with arms straight at your side. Raise the cane forward and upwards as far as you can go or until your elbow is near your ear. Then gradually return to the original position.        Wand - Shoulder Abduction      Hold onto the ends of a cane with both hands, then raise the involved arm sideways and upward over your head with the aid of the cane and the good arm. Keep trunk straight! Then gradually return to original position.        Wand - Shoulder External Rotation      Holding onto a cane with both hands approximately 14 inches apart at shoulder level, turn the cane clockwise and then counterclockwise as far as possible.        Wall Climbing - Shoulder Flexion      Stand facing the wall and extend the involved arm directly in front of you so that your fingertips touch the wall (at arm's length away from the wall). Creep up the side of the wall with your fingertips, taking a step toward the wall as you reach higher and higher up the wall. Repeat this procedure going down the wall, but taking a step backward this time. Begin with 5 wall climbs, then progress to 10 reps at a time, 1-2 times a day.        Wall Climbing - Shoulder Abduction     ([email or web address]  home-[email or web address]-[email or web address]/) Repeat the above procedure, but this time position yourself perpendicular (at a right angle) to the wall so that the involved arm will extend sideways up the wall. Keep your trunk straight, not leaning toward the wall or shrugging your shoulder. Place a margot (tape) on the wall each week to see if you are making progress. Begin with 5 wall climbs, then progress to 10 reps at a time, 1-2 times a day.        PRECAUTIONS    As a result of the removal of lymph nodes and vessels, your arm is susceptible to infection  and swelling. A hot, reddened or swollen arm denotes the presence of infection and should be brought to the attention of your physician immediately. Try to avoid cuts, scratches, pinpricks, hangnails, sunburns, insect bites, chemical irritation, and irritating detergent.    The following are helpful hints:    Avoid injections, blood draws, blood pressure, and IVs to be done to your involved arm. If you have undergone axillary dissection, then consider using the opposite only for injections, blood draws, blood pressure, and IVs.  Prevent chapping of your hand and arm by applying lotion liberally several times a day. Recommend Eucerin lotion, No massages to affected upper extremity if you have had lymph nodes dissection or radiation to lymph nodes.   Do not cut nails too close to the quick. Do not cut or pick your cuticles. Use cuticle cream or remover.  Avoid carrying heavy objects (over 5 lbs) with your involved arm.   Protect your arm from burns with small electrical appliances such as irons, curling irons, and frying pans.   Wear sunscreen in the sun.  Apply insect repellent when going to areas where you might be exposed to insect bites.   Use an electric razor for shaving.   Wear loose fitting work/rubber gloves when washing dishes, using , or using steel wool.  Wear garden gloves when gardening or arranging thorn flowers.  Do not wear tight jewelry on your affected arm.  Do not wear narrow tight straps on clothing or under garments.    IMPORTANT    If you do cut, burn, or culp the skin, wash the area and use an antiseptic. If it becomes red, warm, or unusually hard or swollen, contact your physician immediately.     If there is swelling without redness, increased warmth or hardness, the positioning and pumping exercises as described above can help decrease the swelling. Position your hand higher than the elbow, elbow higher than the shoulder, and shoulder higher than your heart. Maintain this position  for 30 minutes. Repeat as necessary.     OCCUPATIONAL AND PHYSICAL THERAPY    Most women have enough motion in their involved arm to perform normal activities within 2 months after surgery. Any post-operative swelling or pain has probably disappeared. However; some women may develop persistent stiffness, weakness, pain, or swelling. If this is your experience, call your physician. He or she may recommend that a physical or occupational therapist work with you to minimize your problems.     CONCLUSION    Besides your exercise program, your daily routine at home can be continued and will be beneficial toward your recovery:  Getting dressed every day  Daily grooming  Cooking and light housework  Being active with family and friends    REMINDER  Pace yourself!  Strive for a balance between work and relaxation  Avoid excessive pulling and lifting which may strain your shoulder

## 2025-02-26 NOTE — TELEPHONE ENCOUNTER
Returned call to pt  to schedule the first acupuncture appt. Insurance approved 12 visits. They voiced acceptance of date/time. They were made aware to arrive early to allow time for check in and complete paperwork. Location was reviewed.    ----- Message from [employer] sent at 2/26/2025  4:05 PM CST -----  Type: Needs Medical AdviceWho Called:  patient Symptoms (please be specific):  How long has patient had these symptoms:  Pharmacy name and phone #:  Best Call Back Number:  [phone] Information: pt is requesting a call back to schedule acupuncture appts

## 2025-02-26 NOTE — PROGRESS NOTES
[facility]      BREAST MULTIDISCIPLINARY TUMOR BOARD    Concepcion Scott    Date Presented to Tumor Board: 02/24/25    Presenting Hospital / Clinic: [facility]    Tumor Laterality: Right    Breast Tumor Site: UIQ    Presenter: Dr. Courtney Leon    Reason for Consultation: Initial Presentation    Specialties Present: Medical Oncology;Hematology;Radiation Oncology;Surgical Oncology;Navigation;Pathology;Research;Integrative Oncology    Patient Status: a current patient    Treatment to Date: None    Clinical Trial Eligibility: None available    ER: Positive    WV: Positive    Her2: Negative    Cancer Staging   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, estrogen receptor positive  Staging form: Breast, AJCC 8th Edition  - Clinical stage from 2/17/2025: Stage IA (cT1b, cN0, cM0, G1, ER+, WV+, HER2-) - Unsigned      Recommended Therapy:  Genetics  Surgery- elects Bilateral Mastectomy, right SLNB with recon  Oncotype  Chemotherapy/Endocrine Therapy  Final recommendations pending surgical  path      Metastatic Site(s): None    Presentation at Cancer Conference: Prospective

## 2025-02-26 NOTE — PROGRESS NOTES
[facility]  Physical Therapy Initial Evaluation PRE-OP  Lymphedema Therapy    Visit Date: 2/26/2025     Name: Concepcion De La Vega  Clinic Number: [id number]  Therapy Diagnosis:   Encounter Diagnoses   Name Primary?    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, estrogen receptor positive     At risk for lymphedema      Physician: Courtney Leon MD  Physician Orders: PT Eval and Treat  Medical Diagnosis from Referral: 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, estrogen receptor positive. At Risk for Lymphedema.  Chart review pertaining to cancer hx:    Cancer Staging   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, estrogen receptor positive  Staging form: Breast, AJCC 8th Edition  - Clinical stage from 2/17/2025: Stage IA (cT1b, cN0, cM0, G1, ER+, NC+, HER2-) - Unsigned     Right 1:00 7 cm from nipple 6 mm mass  Grade 1 IMC ER  NC 1-10, HER2 1+ negative, Ki 5-10        Has all surgical options, lumpectomy, radiation, sentinel node versus mastectomy/reconstruction, sentinel node     Discussed.  Discussed pros and cons.     She wants to think about it.  Wants to meet with Dr. Lama reconstructive surgeon  Nipple appears clear     Endocrine  Oncotype        Requested genetics      Electronically signed by Courtney Leon MD at 2/17/2025 11:54 AM     Evaluation Date: 2/26/2025  Authorization: pending  Plan of Care Expiration: PT f/u 6-8 weeks post-op  Reassessment Due: 6-8 weeks  Fact-G Completed: 1 / 2    Visit: 1 / pending  PTA Visit: -- / 5  Time In: 09:02 AM  Time Out: 10:00 AM  Total Billable Time: 58 minutes    Precautions: Standard, cancer    Subjective     Pt reports: Surgery is scheduled for march 19th, would like to know what she can do now to help prepare. 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   Does report broke her R shoulder about 2 years ago, did not have surgery but kept the R arm immobile and did have some swelling in the arm as a result but resolved. Reports has always since young has had soreness and sensitivty to B axilla, not sure if lymph node swelling.     Dx: 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, estrogen receptor positive. At Risk for Lymphedema.  Surgery date: 3/19/2025  Radiation: not anticipating any need.  Chemotherapy: pending    Pain  Location: neck and shoulder tension, SI and L hip  Current 2/10, Worst 8/10, Best 2/10   Description: sharp pain in SI and hip      Past Medical History:   Past Medical History:   Diagnosis Date    Abnormal Pap smear     ASCUS negative HPV. Repeat pap    ADD (attention deficit disorder)     Arthritis     right knee    Asthma     exercise induced    Basal cell carcinoma 02/2018    Left upper back     BCC (basal cell carcinoma)     Right medial ankle  - ED& C     BCC (basal cell carcinoma) 2016    Left anterior thigh    Cystocele     with stress incontinence    Depression     Dizziness     Fracture of sternum Nov 2010    from [employer]    GERD (gastroesophageal reflux disease)     HEARING LOSS     Hearing loss in right ear     IBS (irritable bowel syndrome)     Intrauterine device     Mirena    Kidney stone 2009    PONV (postoperative nausea and vomiting)     requests Scopolamine patch    Primary hypertension 1/31/2022    SCC (squamous cell carcinoma), hand, right 2015    excisied [facility]     Seasonal allergies     deviated septum also    SI (sacroiliac) pain     Sinus pain July 3/2012    no fever, starting on antibiotics    Sinusitis     Skin tag of female perineum 2012    Squamous cell carcinoma of skin 09/2018    Left shin     TMJ (dislocation of temporomandibular joint)        Past Surgical History:  has a past surgical history that includes Cholecystectomy; TONSILLECTOMY, ADENOIDECTOMY, BILATERAL  yringotomy and tubes; External ear surgery; Dilation and curettage of uterus; Ureteral stent placement (2009); Anterior vaginal repair; Tonsillectomy; Colonoscopy; and LASIK (Bilateral, 2015).    Medications: has a current medication list which includes the following prescription(s): acetazolamide, albuterol, alprazolam, bupropion, cholestyramine, diazepam, diclofenac, l-methylfolate-b2-b6-b12, methocarbamol, metoprolol succinate, omeprazole-sodium bicarbonate, and zepbound.    Allergies:   Review of patient's allergies indicates:   Allergen Reactions    Venom-wasp Hives and Swelling    Oxytetracycline Swelling     Terramycin    Triple antibiotic [uciqb-fplkb-zzgdpns-pramoxine] Blisters    Adhesive Rash          Hand Dominance: Right  Diet: last 3 months getting back to  eating.  Habitus: overweight    Prior Therapy/Previous treatment included: SI and pelvic floor PT this calendar year, but on hold due to upcoming surgery. R shoulder PT 2-3 years ago with good results.  DME owned: none  Social History: lives with their spouse  Place of Residence (Steps/Adaptations): single story home  Occupation: Nurse Works in Nephrology  Prior Exercise Routine: walk 2 miles 4 x week, trains her dogs. At work is fairly sedentary.  Prior Level of Function: independent   Current Level of Function: see above    Patient's Goals:      Objective     Mental Status: Alert/Oriented    Observations  Posture: slightly rounded shoulders  Joint Integrity: WFLs bilateral  Skin Integrity: intact bilateral  Edema: none bilateral    Sensation  Light Touch: intact bilateral  Proprioception: intact bilateral    A/PROM  (L) UE: WFLs  (R) UE: WFLs  Limitations:  none    STRENGTH  (L) UE: WFLs  (R) UE: WFLs  Limitations:  none    Baseline Measurements of BUEs  LANDMARK RIGHT UE (cm)   W + 16 inches 35.5   W + 13 inches 34.3   Elbow 31.0   W + 7 inches 29.5   W + 5 inches 26.5   Wrist 17.3   DPC 19.8   IP Thumb 7.0   Arm Length 44.0   Garments  recommended:   Sigvaris Secure Arm Sleeve with silicone band 15-20 mmHg size M2  Sigvaris Secure Gauntlet  15-20 mmHg size small  Pt to wear garments 6 weeks after surgery. Only to be worn during the day time and remove at night when sleeping.   Recommended to be worn for up to one year for prophylactics in reducing future risk of lymphedema.     Functional Mobility   Bed mobility: independent   Roll to left: independent   Roll to right: independent   Supine to prone: independent   Scooting to edge of bed: independent   Supine to sit: independent   Sit to supine: independent   Transfers to bed: independent   Transfers to toilet: independent   Sit to stand: independent   Stand pivot: independent   Car transfers: independent     Gait Assessment  AD used: none  Assistance: independent  Distance: community distances  Endurance: WFL     Gait Pattern: WFL       Treatment     Treatment Time In: 09:30 AM  Treatment Time Out: 10:00 AM  Total Treatment time separate from Evaluation: 30 minutes      Self-Care/Home Management to improve behavioral/activity modifications related to ADLs, compensatory training, safety procedures, and adaptive equipment for 15 minutes including:  Garments: PT recommend wearing garments for one year per guidelines for prophylactical assist to decrease risk for lymphedema  Skin care  Weight management  Sleep  Nutrition  Infection prevention  Sleeping positions    Therapeutic Exercise to develop strength, ROM, flexibility, and posture for 15 minutes including:  Surgical protocol for position recommendations  Diaphragmatic Breathing  Exercises by day, complete with pictures of exercises and description for safe progression of motion  Provided pt with red theraband and 3 scapular/postural exercises can perform pre- surgery: scapular rows, scapular rows with shoulder ext, B UE ER. Handout with pictures provided. Advised pt resistive exercises should be held after surgery until cleared by her surgeon.        Education: Instructed on general anatomy/physiology, lymphedema information (definitions, signs, symptoms, precautions), role of therapy in multi-disciplinary team, purpose of lymphedema physical therapy and the benefits/risks of treatment, risks of refusing treatment, POC, and goals for therapy were discussed with the pt.    Written Home Exercises Provided: yes.  Exercises were reviewed and JO ANN was able to demonstrate them prior to the end of the session. JO ANN demonstrated good  understanding of the education provided.     See EMR under Patient Instructions for exercises provided 2/26/2025.    Assessment     Jo Ann is a 54 y.o. female referred to outpatient physical therapy with a medical diagnosis of 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, estrogen receptor positive,  At Risk for Lymphedema with surgical procedure planned on 3/19/2025. Pt was seen today pre-operatively to assess strength and ROM of BUEs, to take baseline circumferential measurements of BUEs to aid in the early detection of lymphedema post-operatively, and to provide pt education on exercises/precautions post-operative. Pt does not exhibit any ROM impairments currently. This pt will benefit from skilled PT for reassessment of baseline measurements 6-8 week post-operatively and to address future impairments following surgery such as pain, limited ROM, or decreased mobility. Will need to wait until pt is medically cleared to determine if pt is safe for MLD, pneumatic compression pump, or lymphatouch treatments.      Plan of care discussed with patient: Yes  Pt's spiritual, cultural and educational needs considered and patient is agreeable to the plan of care and goals as stated below:     Anticipated barriers for therapy:  none    Medical Necessity is demonstrated by the following:  History  Co-morbidities and personal factors that may impact the plan of care Co-morbidities:   depression, high BMI, HTN, and  level of undertstanding of current condition    Personal Factors:   none     moderate   Examination  Body Structures and Functions, activity limitations and participation restrictions that may impact the plan of care Body Systems:    gross symmetry    Activity limitations:   Mobility  no deficits    Self care  no deficits    Domestic Life  no deficits    Participation Restrictions:   Post-op restrictions         low   Clinical Presentation stable and uncomplicated low   Decision Making/ Complexity Score: low       GOALS  Short Term Goals: 3 months  Pt to be seen for reassessment in 6-8 weeks after surgery.  Pt will demonstrate 100% knowledge of lymphedema precautions and signs of infection.  Pt to obtain compression garments for prophylactic concerns according to APTA clinical guidelines published in Journal of Physical Therapy.    Long Term Goals: deferred    Plan     Plan of Care: 2/26/2025 to 05/26/2025.    Patient to be seen in 6-8 weeks following surgical date for 1 visits for reassessment. Patient will benefit from Outpatient Physical Therapy services which may include the following interventions: patient education, HEP, therapeutic exercises, neuromuscular re-education, therapeutic activity, manual therapy, self care/home management, modalities, gait training, decongestive massage, multi-layered bandaging, self massage, self bandaging, and assistance in obtaining appropriate compression garment.      If pt is to undergo XRT, POC must exclude MLD, pneumatic compression pump, and lymphatouch to any radiated area.       Shy Henriquez, PT , CLT

## 2025-02-27 ENCOUNTER — TELEPHONE (OUTPATIENT)
Dept: INFUSION THERAPY | Facility: HOSPITAL | Age: 55
End: 2025-02-27
Payer: COMMERCIAL

## 2025-02-27 NOTE — TELEPHONE ENCOUNTER
RD received phone call from pt requesting to reschedule her IO nutrition appt. Pt agreed on new date and time.

## 2025-03-03 ENCOUNTER — CLINICAL SUPPORT (OUTPATIENT)
Dept: REHABILITATION | Facility: HOSPITAL | Age: 55
End: 2025-03-03
Payer: COMMERCIAL

## 2025-03-03 DIAGNOSIS — Z17.0 MALIGNANT NEOPLASM OF UPPER-INNER QUADRANT OF RIGHT BREAST IN FEMALE, ESTROGEN RECEPTOR POSITIVE: ICD-10-CM

## 2025-03-03 DIAGNOSIS — F43.22 ADJUSTMENT REACTION WITH ANXIETY: ICD-10-CM

## 2025-03-03 DIAGNOSIS — C50.211 MALIGNANT NEOPLASM OF UPPER-INNER QUADRANT OF RIGHT BREAST IN FEMALE, ESTROGEN RECEPTOR POSITIVE: ICD-10-CM

## 2025-03-03 DIAGNOSIS — F51.02 ADJUSTMENT INSOMNIA: ICD-10-CM

## 2025-03-03 PROCEDURE — 97813 ACUP 1/> W/ESTIM 1ST 15 MIN: CPT | Mod: PN | Performed by: ACUPUNCTURIST

## 2025-03-03 PROCEDURE — 97814 ACUP 1/> W/ESTIM EA ADDL 15: CPT | Mod: PN | Performed by: ACUPUNCTURIST

## 2025-03-03 NOTE — PROGRESS NOTES
Acupuncture Follow-Up Note     Name: Concepcion De La Vega  Clinic Number: [id number]    Traditional Chinese Medicine (TCM) Diagnosis: Qi Stagnation, Blood Stasis, Qi Deficiency, Blood Deficiency, Liver Yang Rising, Damp, and Yin Deficiency  Medical Diagnosis:   1. Adjustment insomnia    2. Adjustment reaction with anxiety    3. 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, estrogen receptor positive         Evaluation Date: 3/3/2025    Visit #/Visits authorized: 1/12    Precautions: Standard    Subjective     Chief Concern: Insomnia (8/10 since diagnosis/), other low back pain (Left low back and left hip), and Neck Pain (4/10 bilateral with radiculopathy to upper traps and a/c joints)       Medical necessity is demonstrated by the following IMPAIRMENTS: Medical Necessity: Decreased mobility limits day to day activities, social, and emergent situations              Aggravating Factors:  movement     Relieving Factors:  nothing    Symptom Description:     Quality:  Aching, Dull, and Throbbing  Severity:  4-8/10  Frequency:  every day      Objective     Observation: Patient responded well to first treatment and will be a good candidate for continued therapy.       Pulse:        tight       New Findings:  na    Treatment     Treatment Principles:  move qi and blood,relieve bi, calm martinez    Acupuncture points used:  Gb20, Gb21, and Gb34    Bilateral points:Ub 23-26  Unilateral points:  Auricular Treatment:  martinez men    Needles In: 16  Needles Out: 16  Needles W/ STIM placed: 835  Needles W/ STIM removed: 855      Other Traditional Chinese Medicine Modalities -  infrared heat    Assessment     After treatment, patient felt relief and plans to continue as recommended.       Patient prognosis is Good.     Patient will continue to benefit from acupuncture treatment to address the deficits listed in the problem list box on initial evaluation, provide patient family education and to maximize pt's level of  independence in the home and community environment.     Patient's spiritual, cultural and educational needs considered and pt agreeable to plan of care and goals.     Anticipated barriers to treatment: none    Plan     Recommend 2 /week for 6 sessions then re-assess.      Education:  Patient is aware of cumulative benefit of acupuncture

## 2025-03-05 ENCOUNTER — CLINICAL SUPPORT (OUTPATIENT)
Dept: REHABILITATION | Facility: HOSPITAL | Age: 55
End: 2025-03-05
Payer: COMMERCIAL

## 2025-03-05 DIAGNOSIS — F43.22 ADJUSTMENT REACTION WITH ANXIETY: ICD-10-CM

## 2025-03-05 DIAGNOSIS — F51.02 ADJUSTMENT INSOMNIA: Primary | ICD-10-CM

## 2025-03-05 DIAGNOSIS — C50.211 MALIGNANT NEOPLASM OF UPPER-INNER QUADRANT OF RIGHT BREAST IN FEMALE, ESTROGEN RECEPTOR POSITIVE: ICD-10-CM

## 2025-03-05 DIAGNOSIS — Z17.0 MALIGNANT NEOPLASM OF UPPER-INNER QUADRANT OF RIGHT BREAST IN FEMALE, ESTROGEN RECEPTOR POSITIVE: ICD-10-CM

## 2025-03-05 PROCEDURE — 97813 ACUP 1/> W/ESTIM 1ST 15 MIN: CPT | Mod: PN | Performed by: ACUPUNCTURIST

## 2025-03-05 PROCEDURE — 97814 ACUP 1/> W/ESTIM EA ADDL 15: CPT | Mod: PN | Performed by: ACUPUNCTURIST

## 2025-03-05 NOTE — PROGRESS NOTES
Acupuncture Follow-Up Note     Name: Concepcion De La Vega  Clinic Number: [id number]    Traditional Chinese Medicine (TCM) Diagnosis: Qi Stagnation, Blood Stasis, Qi Deficiency, Blood Deficiency, Wind , Damp, and Yin Deficiency  Medical Diagnosis:   1. Adjustment insomnia    2. Adjustment reaction with anxiety    3. 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, estrogen receptor positive         Evaluation Date: 3/5/2025    Visit #/Visits authorized: 2/12    Precautions: Standard    Subjective     Chief Concern: Adjustment insomnia (Insomnia 8/10 since dx), Adjustment reaction with anxiety (Anxiety 6/10 since dx), Lumbar Spine Pain (L-Spine) (Bilateral 3-4/10 SI joint pain without sciatica ), and Neck Pain (Bilateral 3/10 c-spine pain with radiculopathy to upper traps and A/C joints)       Medical necessity is demonstrated by the following IMPAIRMENTS: Medical Necessity: Decreased mobility limits day to day activities, social, and emergent situations              Aggravating Factors:  movement     Relieving Factors:  nothing before acupuncture    Symptom Description:     Quality:  Aching, Dull, Throbbing, and Variable  Severity:  4  Frequency:  every day      Objective     Observation: Patient reports results from previous treatments.  Insomnia being her chief concern, with neck and low back issues accordingly.     Pulse:        thready       New Findings:  na    Treatment     Treatment Principles:  move qi and blood, relieve bi, calm martinez    Acupuncture points used:  Gb20, Gb21, and Gb34    Bilateral points: Ub 23-26 and Gb 29-30  Unilateral points:  Auricular Treatment:  martinez men    Needles In: 20  Needles Out: 20  Needles W/ STIM placed: 235  Needles W/ STIM removed: 255      Other Traditional Chinese Medicine Modalities -  infrared heat    Assessment     After treatment, patient felt relief and plans to continue as recommended.       Patient prognosis is Good.     Patient will continue to benefit  from acupuncture treatment to address the deficits listed in the problem list box on initial evaluation, provide patient family education and to maximize pt's level of independence in the home and community environment.     Patient's spiritual, cultural and educational needs considered and pt agreeable to plan of care and goals.     Anticipated barriers to treatment: none    Plan     Recommend 1-2 /week for 6-12 sessions then re-assess.      Education:  Patient is aware of cumulative benefit of acupuncture

## 2025-03-07 ENCOUNTER — TELEPHONE (OUTPATIENT)
Dept: HEMATOLOGY/ONCOLOGY | Facility: CLINIC | Age: 55
End: 2025-03-07
Payer: COMMERCIAL

## 2025-03-07 NOTE — TELEPHONE ENCOUNTER
LMOVM for pt to remind of appt on 3/10/25 with Dr. Tirado. Asked pt if unable to attend to please let us know, All contact info was left for pt.

## 2025-03-10 ENCOUNTER — CLINICAL SUPPORT (OUTPATIENT)
Dept: HEMATOLOGY/ONCOLOGY | Facility: CLINIC | Age: 55
End: 2025-03-10
Payer: COMMERCIAL

## 2025-03-10 ENCOUNTER — PATIENT MESSAGE (OUTPATIENT)
Dept: ADMINISTRATIVE | Facility: HOSPITAL | Age: 55
End: 2025-03-10
Payer: COMMERCIAL

## 2025-03-10 ENCOUNTER — CLINICAL SUPPORT (OUTPATIENT)
Dept: REHABILITATION | Facility: HOSPITAL | Age: 55
End: 2025-03-10
Payer: COMMERCIAL

## 2025-03-10 VITALS — HEIGHT: 68 IN | WEIGHT: 218.5 LBS | BODY MASS INDEX: 33.12 KG/M2

## 2025-03-10 DIAGNOSIS — F43.22 ADJUSTMENT REACTION WITH ANXIETY: ICD-10-CM

## 2025-03-10 DIAGNOSIS — Z17.0 MALIGNANT NEOPLASM OF UPPER-INNER QUADRANT OF RIGHT BREAST IN FEMALE, ESTROGEN RECEPTOR POSITIVE: ICD-10-CM

## 2025-03-10 DIAGNOSIS — C50.211 MALIGNANT NEOPLASM OF UPPER-INNER QUADRANT OF RIGHT BREAST IN FEMALE, ESTROGEN RECEPTOR POSITIVE: ICD-10-CM

## 2025-03-10 DIAGNOSIS — E66.811 CLASS 1 OBESITY DUE TO EXCESS CALORIES WITH SERIOUS COMORBIDITY AND BODY MASS INDEX (BMI) OF 33.0 TO 33.9 IN ADULT: ICD-10-CM

## 2025-03-10 DIAGNOSIS — Z71.3 NUTRITIONAL COUNSELING: Primary | ICD-10-CM

## 2025-03-10 DIAGNOSIS — E66.09 CLASS 1 OBESITY DUE TO EXCESS CALORIES WITH SERIOUS COMORBIDITY AND BODY MASS INDEX (BMI) OF 33.0 TO 33.9 IN ADULT: ICD-10-CM

## 2025-03-10 DIAGNOSIS — F51.02 ADJUSTMENT INSOMNIA: Primary | ICD-10-CM

## 2025-03-10 PROCEDURE — 97811 ACUP 1/> W/O ESTIM EA ADD 15: CPT | Mod: PN | Performed by: ACUPUNCTURIST

## 2025-03-10 PROCEDURE — 97810 ACUP 1/> WO ESTIM 1ST 15 MIN: CPT | Mod: PN | Performed by: ACUPUNCTURIST

## 2025-03-10 PROCEDURE — 99999 PR PBB SHADOW E&M-EST. PATIENT-LVL II: CPT | Mod: PBBFAC,,,
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
## 2025-03-10 NOTE — PROGRESS NOTES
Acupuncture Follow-Up Note     Name: Concepcion De La Vega  Clinic Number: [id number]    Traditional Chinese Medicine (TCM) Diagnosis: Qi Stagnation, Blood Stasis, Qi Deficiency, Blood Deficiency, Damp, Yin Deficiency, and Phlegm  Medical Diagnosis:   1. Adjustment insomnia    2. Adjustment reaction with anxiety    3. 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, estrogen receptor positive         Evaluation Date: 3/10/2025    Visit #/Visits authorized: 3/12    Precautions: Standard    Subjective     Chief Concern: Neck Pain (Neck pain 5/10 with radiculopathy to bilateral shoulders, R>L.)       Medical necessity is demonstrated by the following IMPAIRMENTS: Medical Necessity: Decreased mobility limits day to day activities, social, and emergent situations              Aggravating Factors:  movement     Relieving Factors:  nothing before acupuncture    Symptom Description:     Quality:  Aching, Dull, and Throbbing  Severity:  5  Frequency:  every day      Objective     Observation: Patient is responding as anticipated to treatment and plans to continue as recommended.        Pulse:        tight       New Findings:  na    Treatment     Treatment Principles:  move qi and blood, relieve bi, calm martinez    Acupuncture points used:  Gb20, Gb21, Gb34, and Li4    Bilateral points:  Unilateral points:  Auricular Treatment:  martinez men    Needles In: 10  Needles Out: 10  Needles W/ STIM placed: 835  Needles W/ STIM removed: 855      Other Traditional Chinese Medicine Modalities -  infrared heat    Assessment     After treatment, patient felt relief and plans to continue as recommended.       Patient prognosis is Good.     Patient will continue to benefit from acupuncture treatment to address the deficits listed in the problem list box on initial evaluation, provide patient family education and to maximize pt's level of independence in the home and community environment.     Patient's spiritual, cultural and  educational needs considered and pt agreeable to plan of care and goals.     Anticipated barriers to treatment: none    Plan     Recommend 1-2 /week for 6-12 sessions then re-assess.      Education:  Patient is aware of cumulative benefit of acupuncture

## 2025-03-10 NOTE — PATIENT INSTRUCTIONS
Download the Eat Fit nelson and use when dining out  Aim for 100g protein/day  Do not skip breakfast - if not hungry drink shake  Choose grab and go snacks as discussed and on handout  Eat more calories in the morning and less in the evening  Drink Jcak BID before and after mastectomy.   Add weight resistant exercises

## 2025-03-28 ENCOUNTER — PATIENT MESSAGE (OUTPATIENT)
Dept: HEMATOLOGY/ONCOLOGY | Facility: CLINIC | Age: 55
End: 2025-03-28
Payer: COMMERCIAL

## 2025-03-28 ENCOUNTER — DOCUMENTATION ONLY (OUTPATIENT)
Dept: SURGERY | Facility: CLINIC | Age: 55
End: 2025-03-28
Payer: COMMERCIAL

## 2025-03-28 NOTE — PROGRESS NOTES
Order for Oncotype entered to [employer], along with pathology, face sheet with demographics and a copy of the insurance card.

## 2025-04-01 ENCOUNTER — TELEPHONE (OUTPATIENT)
Dept: HEMATOLOGY/ONCOLOGY | Facility: CLINIC | Age: 55
End: 2025-04-01
Payer: COMMERCIAL

## 2025-04-01 NOTE — NURSING
Spoke with pt.  Post op consult appt scheduled for 4/21 with Dr. Leiva.  Location and contact information reviewed.  Asked her to call with questions or concerns.  She thanked me and verbalized understanding.

## 2025-04-21 ENCOUNTER — OFFICE VISIT (OUTPATIENT)
Dept: HEMATOLOGY/ONCOLOGY | Facility: CLINIC | Age: 55
End: 2025-04-21
Payer: COMMERCIAL

## 2025-04-21 VITALS
OXYGEN SATURATION: 97 % | BODY MASS INDEX: 34.33 KG/M2 | WEIGHT: 213.63 LBS | HEART RATE: 78 BPM | TEMPERATURE: 98 F | SYSTOLIC BLOOD PRESSURE: 139 MMHG | DIASTOLIC BLOOD PRESSURE: 86 MMHG | HEIGHT: 66 IN

## 2025-04-21 DIAGNOSIS — Z79.811 USE OF ANASTROZOLE: Primary | ICD-10-CM

## 2025-04-21 DIAGNOSIS — Z91.89 AT HIGH RISK FOR OSTEOPOROSIS: ICD-10-CM

## 2025-04-21 DIAGNOSIS — Z17.0 MALIGNANT NEOPLASM OF UPPER-INNER QUADRANT OF RIGHT BREAST IN FEMALE, ESTROGEN RECEPTOR POSITIVE: ICD-10-CM

## 2025-04-21 DIAGNOSIS — C50.211 MALIGNANT NEOPLASM OF UPPER-INNER QUADRANT OF RIGHT BREAST IN FEMALE, ESTROGEN RECEPTOR POSITIVE: ICD-10-CM

## 2025-04-21 PROCEDURE — 99999 PR PBB SHADOW E&M-EST. PATIENT-LVL V: CPT | Mod: PBBFAC,,, | Performed by: INTERNAL MEDICINE

## 2025-04-21 RX ORDER — IBUPROFEN 800 MG/1
800 TABLET ORAL EVERY 8 HOURS PRN
COMMUNITY
Start: 2025-03-18

## 2025-04-21 NOTE — PROGRESS NOTES
Name: Concepcion De La Vega  MRN:  [id number]  :  1970 Age 55 y.o.  Date of Service: 2025    Reason for visit:  Concepcion De La Vega is a 55 y.o. female here regarding the diagnosis below.    #Right Breast Invasive Ductal Carcinoma   Date of Original Diagnosis: 2/10/25  Original Stage: Stage 1A pT1b pN0(sn) % MD 10% HER2 1+/negative grade 1 Ki67 10% Oncotype 24   Current Sites of Disease: none  Current Goals of Therapy: curative  Current Therapy: pending ET     Oncologic History/History of Present Illness:   Detected by imaging    25 Mammogram with US:  Mass: Right breast 5 mm x 4 mm x 4 mm mass at the posterior depth, 1 o'clock     2/10/25 Biopsy + for invasive disease     Family history of cancer:  Maternal Grandmother - Unknown Female Cancer   Father - Prostate Cancer  Paternal Aunt - Bone Cancer   Paternal Aunt - Lung Cancer   Paternal Aunt - GI Cancer   Maternal Uncle - Lung Cancer   Paternal Uncle GI Cancer   Maternal Cousin - Breast Cancer   Maternal Cousin - Prostate Cancer       Menarche at age 12. . Age at first live birth 26. did breast feed 1 year. LMP N/A. OCP-N/A. Menopause at 50's. HRT-Denies.  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 With KAITLYNN nadine ([place])    25- establishes care with me, healing fairly well presents to discuss endocrine therapy, no breast concerns         Past Medical History:   Diagnosis Date    Abnormal Pap smear     ASCUS negative HPV. Repeat pap    ADD (attention deficit disorder)     Arthritis     right knee    Asthma     exercise induced    Basal cell carcinoma 2018    Left upper back     BCC (basal cell carcinoma)     Right medial ankle  - ED& C     BCC (basal cell carcinoma) 2016    Left anterior thigh    Cystocele     with stress incontinence    Depression     Dizziness     Fracture of sternum 2010    from [employer] class     GERD (gastroesophageal reflux disease)     HEARING LOSS     Hearing loss in right ear     IBS (irritable bowel syndrome)     Intrauterine device     Mirena    Kidney stone 2009    PONV (postoperative nausea and vomiting)     requests Scopolamine patch    Primary hypertension 01/31/2022    SCC (squamous cell carcinoma), hand, right 2015    excisied [facility]     Seasonal allergies     deviated septum also    SI (sacroiliac) pain     Sinus pain 07/03/2012    no fever, starting on antibiotics    Sinusitis     Skin tag of female perineum 2012    Squamous cell carcinoma of skin 09/2018    Left shin     TMJ (dislocation of temporomandibular joint)        Past Surgical History:   Procedure Laterality Date    ANTERIOR VAGINAL REPAIR      CHOLECYSTECTOMY      COLONOSCOPY      DILATION AND CURETTAGE OF UTERUS      EXTERNAL EAR SURGERY      Rt middle ear oval Repair    LASIK Bilateral 2015    [place]    RECONSTRUCTION OF BREAST WITH DEEP INFERIOR EPIGASTRIC ARTERY  (KAITLYNN) FREE FLAP Bilateral 3/19/2025    Procedure: RECONSTRUCTION, BREAST, USING KAITLYNN FREE FLAP;  Surgeon: Calin Lama MD;  Location: [facility] OR;  Service: Plastics;  Laterality: Bilateral;    REPAIR, NERVE USING ALLOGRAFT Bilateral 3/19/2025    Procedure: REPAIR, NERVE USING ALLOGRAFT;  Surgeon: Calin Lama MD;  Location: [facility] OR;  Service: Plastics;  Laterality: Bilateral;    SENTINEL LYMPH NODE BIOPSY Right 3/19/2025    Procedure: BIOPSY, LYMPH NODE, SENTINEL;  Surgeon: Courtney Leon MD;  Location: [facility] OR;  Service: General;  Laterality: Right;    SIMPLE MASTECTOMY Bilateral 3/19/2025    Procedure: MASTECTOMY, SIMPLE;  Surgeon: Courtney Leno MD;  Location: [facility] OR;  Service: General;  Laterality: Bilateral;    TONSILLECTOMY, ADENOIDECTOMY, BILATERAL MYRINGOTOMY AND TUBES      URETERAL STENT PLACEMENT  2009    cysto, laser lithotripsy left ureter stone       Allergies as of 04/21/2025 - Reviewed 04/21/2025   Allergen Reaction  "Noted    Venom-wasp Hives and Swelling 06/07/2021    Oxytetracycline Swelling 01/03/2012    Triple antibiotic [awijp-ajdtq-afzxjec-pramoxine] Blisters 07/24/2017    Adhesive Rash 09/13/2018    Metformin Nausea And Vomiting 03/14/2025    Oxycodone-acetaminophen Nausea And Vomiting 03/14/2025       Family History   Problem Relation Name Age of Onset    Heart disease Mother      Anesthesia problems Mother          Mom hard to awaken post-op    Multiple sclerosis Mother      Diabetes Mother      Cataracts Mother      Skin cancer Mother      Pacemaker/defibrilator Mother      Atrial fibrillation Mother      Atrial fibrillation Father      Kidney disease Father          infections    Heart disease Father      Cataracts Father      Prostate cancer Father      Diabetes Brother      Lung cancer Maternal Uncle      Bone cancer Paternal Aunt      Lung cancer Paternal Aunt      Stomach cancer Paternal Aunt      Stomach cancer Paternal Uncle      Cancer Maternal Grandmother          uncertain of which GYN cancer    Breast cancer Maternal Cousin      Prostate cancer Maternal Cousin      Clotting disorder Neg Hx      Allergic rhinitis Neg Hx      Allergies Neg Hx      Angioedema Neg Hx      Asthma Neg Hx      Atopy Neg Hx      Eczema Neg Hx      Immunodeficiency Neg Hx      Rhinitis Neg Hx      Urticaria Neg Hx      Macular degeneration Neg Hx      Retinal detachment Neg Hx      Glaucoma Neg Hx         Social History[1]      PHYSICAL EXAMINATION:  /86 (BP Location: Left arm, Patient Position: Sitting)   Pulse 78   Temp 97.6 °F (36.4 °C)   Ht 5' 6" (1.676 m)   Wt 96.9 kg (213 lb 10 oz)   SpO2 97%   BMI 34.48 kg/m²   Wt Readings from Last 3 Encounters:   04/21/25 96.9 kg (213 lb 10 oz)   03/31/25 97.1 kg (214 lb 1.1 oz)   03/19/25 97.1 kg (214 lb 1.4 oz)     ECOG PERFORMANCE STATUS: 1  Physical Exam   General:  Well-appearing, nontoxic  Eyes:  Equal and round pupils, EOMI, no scleral icterus  Mouth:  No lesions, " moist  Cardiovascular:  Warm, well-perfused, no peripheral edema  Lungs:  Unlabored on room air, no wheezing  Neurologic:  Awake, alert and oriented, participating in the exam  Psych:  Appropriate mood and affect  Skin:  Normal pallor, No rashes  Heme:  No petechiae, no purpura  Breasts: breasts appear normal, no suspicious masses, no skin or nipple changes or axillary nodes, post-mastectomy site well healed and free of suspicious changes, surgical scars noted, KAITLYNN.     LABORATORY:  CBC  Lab Results   Component Value Date    WBC 11.57 03/20/2025    HGB 10.0 (L) 03/20/2025    HCT 30.3 (L) 03/20/2025    MCV 89 03/20/2025     03/20/2025       BMP  Lab Results   Component Value Date     03/20/2025    K 3.5 03/20/2025     03/20/2025    CO2 23 03/20/2025    BUN 8 03/20/2025    CREATININE 0.70 03/20/2025    CALCIUM 8.3 (L) 03/20/2025    ANIONGAP 6 (L) 03/20/2025    ESTGFRAFRICA >60 01/18/2022    EGFRNONAA >60 01/18/2022         ASSESSMENT AND PLAN:  Concepcion De La Vega is a 55 y.o. female with the diagnosis as listed below.  Management as indicated.     #Right Breast Invasive Ductal Carcinoma   Date of Original Diagnosis: 2/10/25  Original Stage: Stage 1A pT1b pN0(sn) % FL 10% HER2 1+/negative grade 1 Ki67 10% Oncotype 24   Current Sites of Disease: none  Current Goals of Therapy: curative  Current Therapy: pending ET       Checking hormones due to age; clinically post menopausal     I counseled the patient regarding the stage of her disease in the role of adjuvant endocrine therapy therapy for HR+ breast cancer..  I counseled the patient regarding the proposed endocrine therapy of anastrozole 1 mg once daily for a duration of 5 years to prevent breast cancer recurrence in her ipsilateral breast and protect the contralateral breast.  I counseled the patient regarding the typical side effects of anastrozole including joint stiffness, hot flashes, vaginal dryness, bone demineralization.    Given she is FL  low and oncotype of 24, ideally would like to use an AI if she tolerate it.  Can try exemestane if she needs to switch.       #Bone Health  -patient will be on endocrine therapy x 5 years, will get baseline DEXA scan to assess bone health    #Lipids- at risk for hyperlipidemia while on endocrine therapy; advise to continue routine PCP visits with lipid checks and initiate cholesterol medication if indicated.       Estelle Leiva M.D.  Hematology/Oncology       Route Chart for Scheduling    Med Onc Chart Routing      Follow up with physician 4 months. in a gown   Follow up with SHERI . 6 weeks with Reymundo with cbc,cmp and hormones levels 2-3 days prior   Infusion scheduling note    Injection scheduling note    Labs    Imaging DXA scan   DXA now   Pharmacy appointment    Other referrals                        [1]   Social History  Tobacco Use    Smoking status: Former     Current packs/day: 0.00     Average packs/day: 0.5 packs/day for 5.0 years (2.5 ttl pk-yrs)     Types: Cigarettes     Start date: 1997     Quit date: 2002     Years since quittin.8     Passive exposure: Past    Smokeless tobacco: Never   Substance Use Topics    Alcohol use: Yes     Alcohol/week: 7.0 standard drinks of alcohol     Types: 7 Glasses of wine per week     Comment: glass of wine of day    Drug use: No

## 2025-04-23 ENCOUNTER — PATIENT MESSAGE (OUTPATIENT)
Dept: REHABILITATION | Facility: HOSPITAL | Age: 55
End: 2025-04-23
Payer: COMMERCIAL

## 2025-04-23 ENCOUNTER — TELEPHONE (OUTPATIENT)
Dept: REHABILITATION | Facility: HOSPITAL | Age: 55
End: 2025-04-23
Payer: COMMERCIAL

## 2025-04-23 DIAGNOSIS — C50.211 MALIGNANT NEOPLASM OF UPPER-INNER QUADRANT OF RIGHT BREAST IN FEMALE, ESTROGEN RECEPTOR POSITIVE: Primary | ICD-10-CM

## 2025-04-23 DIAGNOSIS — Z91.89 AT RISK FOR LYMPHEDEMA: ICD-10-CM

## 2025-04-23 DIAGNOSIS — Z17.0 MALIGNANT NEOPLASM OF UPPER-INNER QUADRANT OF RIGHT BREAST IN FEMALE, ESTROGEN RECEPTOR POSITIVE: Primary | ICD-10-CM

## 2025-04-23 NOTE — TELEPHONE ENCOUNTER
Called pt to get her scheduled for Post[facility]; pt accepted the apt on 5/12/25 at 9:00 am. Information on Posthab w/apt reminder sent via [employer]. Location and check-in process known to pt.    ----- Message from Tom Venegas sent at 3/17/2025  2:06 PM CDT -----  Regarding: Posthab  Will need Posthab with [facility] 6-8 weeks after [facility]/19

## 2025-04-28 ENCOUNTER — PATIENT MESSAGE (OUTPATIENT)
Dept: HEMATOLOGY/ONCOLOGY | Facility: CLINIC | Age: 55
End: 2025-04-28
Payer: COMMERCIAL

## 2025-04-28 ENCOUNTER — HOSPITAL ENCOUNTER (OUTPATIENT)
Dept: RADIOLOGY | Facility: HOSPITAL | Age: 55
Discharge: HOME | End: 2025-04-28
Attending: INTERNAL MEDICINE
Payer: COMMERCIAL

## 2025-04-28 DIAGNOSIS — Z79.811 USE OF ANASTROZOLE: ICD-10-CM

## 2025-04-28 DIAGNOSIS — C50.211 MALIGNANT NEOPLASM OF UPPER-INNER QUADRANT OF RIGHT BREAST IN FEMALE, ESTROGEN RECEPTOR POSITIVE: Primary | ICD-10-CM

## 2025-04-28 DIAGNOSIS — Z17.0 MALIGNANT NEOPLASM OF UPPER-INNER QUADRANT OF RIGHT BREAST IN FEMALE, ESTROGEN RECEPTOR POSITIVE: Primary | ICD-10-CM

## 2025-04-28 PROCEDURE — 77092 TBS I&R FX RSK QHP: CPT | Mod: ,,, | Performed by: RADIOLOGY

## 2025-04-28 PROCEDURE — 77080 DXA BONE DENSITY AXIAL: CPT | Mod: 26,,, | Performed by: RADIOLOGY

## 2025-04-28 PROCEDURE — 77091 TBS TECHL CALCULATION ONLY: CPT | Mod: PO

## 2025-04-28 RX ORDER — ANASTROZOLE 1 MG/1
1 TABLET ORAL DAILY
Qty: 90 TABLET | Refills: 3 | Status: SHIPPED | OUTPATIENT
Start: 2025-04-28 | End: 2026-04-29

## 2025-05-01 ENCOUNTER — PATIENT MESSAGE (OUTPATIENT)
Dept: REHABILITATION | Facility: HOSPITAL | Age: 55
End: 2025-05-01
Payer: COMMERCIAL

## 2025-05-01 ENCOUNTER — TELEPHONE (OUTPATIENT)
Dept: REHABILITATION | Facility: HOSPITAL | Age: 55
End: 2025-05-01
Payer: COMMERCIAL

## 2025-05-01 NOTE — TELEPHONE ENCOUNTER
Returned call to pt to reschedule her Posthab from 5/12/25 to 5/9/25 at 9:00 am. Location and check-in process were discussed. Information on Posthab w apt reminder sent via [employer]  ----- Message from [place] sent at 5/1/2025 12:03 PM CDT -----  Type: Needs Medical AdviceWho Called:  Patient Symptoms (please be specific):  How long has patient had these symptoms:  Pharmacy name and phone #:  Best Call Back Number: [phone] Information: Patient is requesting a call back regarding a sooner appt. with PT.

## 2025-05-05 ENCOUNTER — PATIENT MESSAGE (OUTPATIENT)
Dept: HEMATOLOGY/ONCOLOGY | Facility: CLINIC | Age: 55
End: 2025-05-05
Payer: COMMERCIAL

## 2025-05-06 ENCOUNTER — PATIENT MESSAGE (OUTPATIENT)
Dept: DERMATOLOGY | Facility: CLINIC | Age: 55
End: 2025-05-06
Payer: COMMERCIAL

## 2025-05-09 ENCOUNTER — CLINICAL SUPPORT (OUTPATIENT)
Dept: REHABILITATION | Facility: HOSPITAL | Age: 55
End: 2025-05-09
Payer: COMMERCIAL

## 2025-05-09 DIAGNOSIS — Z91.89 AT RISK FOR LYMPHEDEMA: Primary | ICD-10-CM

## 2025-05-09 DIAGNOSIS — Z91.89 AT RISK FOR LYMPHEDEMA: ICD-10-CM

## 2025-05-09 DIAGNOSIS — Z17.0 MALIGNANT NEOPLASM OF UPPER-INNER QUADRANT OF RIGHT BREAST IN FEMALE, ESTROGEN RECEPTOR POSITIVE: Primary | ICD-10-CM

## 2025-05-09 DIAGNOSIS — C50.211 MALIGNANT NEOPLASM OF UPPER-INNER QUADRANT OF RIGHT BREAST IN FEMALE, ESTROGEN RECEPTOR POSITIVE: ICD-10-CM

## 2025-05-09 DIAGNOSIS — Z17.0 MALIGNANT NEOPLASM OF UPPER-INNER QUADRANT OF RIGHT BREAST IN FEMALE, ESTROGEN RECEPTOR POSITIVE: ICD-10-CM

## 2025-05-09 DIAGNOSIS — C50.211 MALIGNANT NEOPLASM OF UPPER-INNER QUADRANT OF RIGHT BREAST IN FEMALE, ESTROGEN RECEPTOR POSITIVE: Primary | ICD-10-CM

## 2025-05-09 PROCEDURE — 97164 PT RE-EVAL EST PLAN CARE: CPT | Mod: PN

## 2025-05-09 PROCEDURE — 97140 MANUAL THERAPY 1/> REGIONS: CPT | Mod: PN

## 2025-05-09 NOTE — PROGRESS NOTES
[facility]  Physical Therapy Post-OP Reassessment  Lymphedema Therapy    Visit Date: 5/9/2025     Name: Concepcion De La Vega  Clinic Number: [id number]  Therapy Diagnosis:   Encounter Diagnoses   Name Primary?    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 At Risk for Lymphedema.  Chart review pertaining to cancer hx:    Cancer Staging   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, estrogen receptor positive  Staging form: Breast, AJCC 8th Edition  - Clinical stage from 2/17/2025: Stage IA (cT1b, cN0, cM0, G1, ER+, OH+, HER2-) - Unsigned     Right 1:00 7 cm from nipple 6 mm mass  Grade 1 IMC ER  OH 1-10, HER2 1+ negative, Ki 5-10        Has all surgical options, lumpectomy, radiation, sentinel node versus mastectomy/reconstruction, sentinel node     Discussed.  Discussed pros and cons.     She wants to think about it.  Wants to meet with Dr. Lama reconstructive surgeon  Nipple appears clear     Endocrine  Oncotype        Requested genetics      Electronically signed by Courtney Leon MD at 2/17/2025 11:54 AM     Evaluation Date: 2/26/2025  Reassessment: 5/09/2025 requesting 2x week 6 weeks    Authorization: pending  Plan of Care Expiration: 05/09/2025-07/09/2025  Reassessment Due: 06/09/2025  Fact-G Completed: 1 / 2    Visit: 1 / pending  PTA Visit: -- / 5  Time In: 09:00 AM  Time Out: 10:00 AM  Total Billable Time: 60 minutes    Precautions: Standard, cancer    Subjective     Pt reports: Still having tightness raising arms over head more on L side than the right, still having some pain under the right arm but better than it was.  Have not returned to walking as  frequently as I was prior to surgery, currently doing less walking and more dog training. Haven't gotten back her stamina with walking.   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   Does report broke her R shoulder about 2 years ago, did not have surgery but kept the R arm immobile and did have some swelling in the arm as a result but resolved. Reports has always since young has had soreness and sensitivty to B axilla, not sure if lymph node swelling.     Dx: 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, estrogen receptor positive. At Risk for Lymphedema.  Surgery date: 3/19/2025 b mast Socorro flap, SNLB on R  Radiation: none needed.  Chemotherapy: N/A    Pain  Location: pulling/tightness down left side into breast with overhead reaching with L, R axillary pain with end range stretch  Current 2/10, Worst 5/10, Best 2/10   Description: pulling/tightness      Past Medical History:   Past Medical History:   Diagnosis Date    Abnormal Pap smear     ASCUS negative HPV. Repeat pap    ADD (attention deficit disorder)     Arthritis     right knee    Asthma     exercise induced    Basal cell carcinoma 02/2018    Left upper back     BCC (basal cell carcinoma)     Right medial ankle  - ED& C     BCC (basal cell carcinoma) 2016    Left anterior thigh    Cystocele     with stress incontinence    Depression     Dizziness     Fracture of sternum 11/2010    from [employer] class    GERD (gastroesophageal reflux disease)     HEARING LOSS     Hearing loss in right ear     IBS (irritable bowel syndrome)     Intrauterine device     Mirena    Kidney stone 2009    PONV (postoperative nausea and vomiting)     requests Scopolamine patch    Primary hypertension 01/31/2022    SCC (squamous cell carcinoma), hand, right 2015    excisied [facility]     Seasonal allergies     deviated septum also    SI (sacroiliac) pain      Sinus pain 07/03/2012    no fever, starting on antibiotics    Sinusitis     Skin tag of female perineum 2012    Squamous cell carcinoma of skin 09/2018    Left shin     TMJ (dislocation of temporomandibular joint)        Past Surgical History:  has a past surgical history that includes Cholecystectomy; TONSILLECTOMY, ADENOIDECTOMY, BILATERAL yringotomy and tubes; External ear surgery; Dilation and curettage of uterus; Ureteral stent placement (2009); Anterior vaginal repair; Colonoscopy; LASIK (Bilateral, 2015); Simple mastectomy (Bilateral, 3/19/2025); [place] lymph node biopsy (Right, 3/19/2025); Reconstruction of breast with deep inferior epigastric artery  (KAITLYNN) free flap (Bilateral, 3/19/2025); and repair, nerve using allograft (Bilateral, 3/19/2025).    Medications: has a current medication list which includes the following prescription(s): acetazolamide, albuterol, alprazolam, anastrozole, bupropion, cefadroxil, cholestyramine, diclofenac, enoxaparin, hydrocodone-acetaminophen, ibuprofen, l-methylfolate-b2-b6-b12, methocarbamol, metoprolol succinate, omeprazole-sodium bicarbonate, ondansetron, and zepbound.    Allergies:   Review of patient's allergies indicates:   Allergen Reactions    Venom-wasp Hives and Swelling    Oxytetracycline Swelling     Terramycin    Triple antibiotic [cxusp-vyzsi-uqzcmjs-pramoxine] Blisters    Adhesive Rash    Metformin Nausea And Vomiting     And diarrhea    Oxycodone-acetaminophen Nausea And Vomiting          Hand Dominance: Right  Diet: last 3 months getting back to  eating.  Habitus: overweight    Prior Therapy/Previous treatment included: SI and pelvic floor PT this calendar year, but on hold due to upcoming surgery. R shoulder PT 2-3 years ago with good results.  DME owned: none  Social History: lives with their spouse  Place of Residence (Steps/Adaptations): single story home  Occupation: Nurse Works in Nephrology  Prior Exercise Routine: walk 2 miles  4 x week, trains her dogs. At work is fairly sedentary.  Prior Level of Function: independent   Current Level of Function: see above    Patient's Goals:      Objective     Mental Status: Alert/Oriented    Observations  Posture: slightly rounded shoulders  Joint Integrity: WFLs bilateral  Skin Integrity: intact bilateral  Edema: R subaxillary/truncal fullness compared to L      Sensation  Light Touch: numbness  Proprioception: intact bilateral    A/PROM  (L) UE: WFLs, shoulder flexion 150 deg, shoulder abduction 125 deg  (R) UE: WFLs, discomfort at end range 170 deg flexion  Limitations:  Pain with overhead reaching.    STRENGTH  (L) UE: shoulder ER 4/5, IR 4+/5, flexion 5/5, abduction 4+/5, mid trap/low trap 4/5  (R) UE: shoulder ER 4/5, IR 4+/5, flexion 5/5, abduction 4+/5, mid trap/low trap 4/5  Limitations:  none    Baseline Measurements of BUEs  LANDMARK RIGHT UE (cm)  2/26/2025  Prehab RIGHT UE (cm)  5/9/2025  Post-op   W + 16 inches 35.5 35.0   W + 13 inches 34.3 34.0   Elbow 31.0 30.8   W + 7 inches 29.5 28.8   W + 5 inches 26.5 25.8   Wrist 17.3 17.2   DPC 19.8 19.8   IP Thumb 7.0 7.0   Arm Length 44.0    Garments recommended:   Sigvaris Secure Arm Sleeve with silicone band 15-20 mmHg size M2  Sigvaris Secure Gauntlet  15-20 mmHg size small  Pt to wear garments 6 weeks after surgery. Only to be worn during the day time and remove at night when sleeping.   Recommended to be worn for up to one year for prophylactics in reducing future risk of lymphedema.     Functional Mobility   Bed mobility: independent   Roll to left: independent   Roll to right: independent   Supine to prone: independent   Scooting to edge of bed: independent   Supine to sit: independent   Sit to supine: independent   Transfers to bed: independent   Transfers to toilet: independent   Sit to stand: independent   Stand pivot: independent   Car transfers: independent     Gait Assessment  AD used: none  Assistance: independent  Distance:  community distances  Endurance: [facility]     Gait Pattern: WFL       Treatment     Treatment Time In: 09:30 AM  Treatment Time Out: 10:00 AM  Total Treatment time separate from Evaluation: 30 minutes    Manual Therapy to develop flexibility, extensibility, desensitization, pliability, and contour for 30 minutes including:   Manual lymphatic drainage to bilateral short neck series, cervical terminus , axilla, deep abdominals, sternal nodes, paravertebral nodes, AAI anastomosis , QIAN anastomosis, and rework accessing all watershed areas on trunk.  Encouraged pt to wear compression bra for lymphatic support for R sub axillary fullness.     Therapeutic Exercise to develop strength, ROM, flexibility, and posture for 5 minutes including:  Instructed pt to continue post-op ROM exercises, stretches (wall walks flexion, abduction) in tolerable range.  Diaphragmatic Breathing    Education: Instructed on general anatomy/physiology, lymphedema information (definitions, signs, symptoms, precautions), role of therapy in multi-disciplinary team, purpose of lymphedema physical therapy and the benefits/risks of treatment, risks of refusing treatment, POC, and goals for therapy were discussed with the pt.    Written Home Exercises Provided: yes.  Exercises were reviewed and JO ANN was able to demonstrate them prior to the end of the session. JO ANN demonstrated good  understanding of the education provided.     See EMR under Patient Instructions for exercises provided 2/26/2025.    Assessment     Jo Ann is a 55 y.o. female referred to outpatient physical therapy with a medical diagnosis of 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, estrogen receptor positive,  At Risk for Lymphedema with surgical procedure performed on  on 3/19/2025. Pt was seen today post-operatively to re-assess strength and ROM of BUEs, to reassess baseline circumferential measurements of BUEs to aid in the early detection of lymphedema  post-operatively, and to provide pt education on exercises/precautions post-operative. Pt does exhibit any ROM impairments to L UE with limitations with shoulder flexion and shoulder abduction movements as well as rotator cuff and scapular stabilization weakness. Patient also presents with R subaxillary/truncal swelling compared to L side and would benefit from decongestive manual lymphatic drainage to assist with lymphatic support. Recommended to pt to continue wear compression sports bra at this time. This pt will benefit from skilled PT for address impairments following surgery such as pain, limited ROM, or decreased mobility.     Plan of care discussed with patient: Yes  Pt's spiritual, cultural and educational needs considered and patient is agreeable to the plan of care and goals as stated below:     Anticipated barriers for therapy: none    Medical Necessity is demonstrated by the following:  History  Co-morbidities and personal factors that may impact the plan of care Co-morbidities:   depression, high BMI, HTN, and level of undertstanding of current condition    Personal Factors:   none     moderate   Examination  Body Structures and Functions, activity limitations and participation restrictions that may impact the plan of care Body Systems:    gross symmetry    Activity limitations:   Mobility  no deficits    Self care  no deficits    Domestic Life  no deficits    Participation Restrictions:   Post-op restrictions         low   Clinical Presentation stable and uncomplicated low   Decision Making/ Complexity Score: low       GOALS  Short Term Goals: 3 months  Pt to be seen for reassessment in 6-8 weeks after surgery.  Pt will demonstrate 100% knowledge of lymphedema precautions and signs of infection.  Pt to obtain compression garments for prophylactic concerns according to APTA clinical guidelines published in Journal of Physical Therapy.    Long Term Goals: deferred    Updated POC:  5/9/2025-7/09/2025  Short Term goals: 4 weeks  1. Patient will demonstrate 100% understanding of lymphedema risk reduction practices to include self monitoring for lymphedema. (progressing, not met)  2. Patient will demonstrate independence with Home Exercise program established. (progressing, not met)  3. Pt will increase AROM/PROM in shoulder abduction ROM to 150 degrees bilaterally to improve functional reach, carry, push, pull pain free. (progressing, not met)  4. Pt will increase AROM/PROM in shoulder flexion to 160 degrees bilaterally to improve functional reach, carry, push, pull pain free.(progressing, not met)  5. Pt will increase strength to >/= 4+ in gross UE musculature to improve tolerance to all functional activities pain free. (progressing, not met)    Long Term Goals: 6 weeks   1.  Pt will increase AROM/PROM in shoulder flexion to 170 degrees bilaterally to improve functional reach, carry, push, pull pain free. (progressing, not met)  2. Pt will increase strength to 4+/5 in gross UE musculature to improve tolerance to all functional activities pain free. (progressing, not met)  3. Pt will demonstrate full/maximized tissue mobility to increase ROM and promote healthy tissue to be pain free at discharge. (progressing, not met)  4. Pt will report decrease in overall worst pain to 2/10 at discharge. (progressing, not met)  5. Pt will increase AROM/PROM in shoulder abduction ROM to 170 degrees bilaterally to improve functional reach, carry, push, pull pain free. (progressing, not met)  6. Patient will report compliance with walking program 5x week for 30 min each day to improve overall cardiovascular function and decrease cancer related fatigue at discharge. (progressing, not met)    Plan     Plan of Care: 5/9/2025 to 07/09/2025.    Patient to be seen in 2x week for 6 weeks. Patient will benefit from Outpatient Physical Therapy services which may include the following interventions: patient education,  HEP, therapeutic exercises, neuromuscular re-education, therapeutic activity, manual therapy, self care/home management, modalities, gait training, decongestive massage, multi-layered bandaging, self massage, self bandaging, and assistance in obtaining appropriate compression garment.      If pt is to undergo XRT, POC must exclude MLD, pneumatic compression pump, and lymphatouch to any radiated area.       Shy Henriquez, PT , CLT

## 2025-05-13 ENCOUNTER — CLINICAL SUPPORT (OUTPATIENT)
Dept: REHABILITATION | Facility: HOSPITAL | Age: 55
End: 2025-05-13
Payer: COMMERCIAL

## 2025-05-13 DIAGNOSIS — C50.211 MALIGNANT NEOPLASM OF UPPER-INNER QUADRANT OF RIGHT BREAST IN FEMALE, ESTROGEN RECEPTOR POSITIVE: ICD-10-CM

## 2025-05-13 DIAGNOSIS — Z91.89 AT RISK FOR LYMPHEDEMA: Primary | ICD-10-CM

## 2025-05-13 DIAGNOSIS — Z17.0 MALIGNANT NEOPLASM OF UPPER-INNER QUADRANT OF RIGHT BREAST IN FEMALE, ESTROGEN RECEPTOR POSITIVE: ICD-10-CM

## 2025-05-13 PROCEDURE — 97140 MANUAL THERAPY 1/> REGIONS: CPT | Mod: PN,CQ

## 2025-05-13 PROCEDURE — 97110 THERAPEUTIC EXERCISES: CPT | Mod: PN,CQ

## 2025-05-14 NOTE — PROGRESS NOTES
"[facility] Outpatient  Physical Therapy Progress Note  Lymphedema Therapy    Visit Date: 5/13/2025    Name: Jo Ann De La Vega  MRN: [id number]  Therapy Diagnosis:   Encounter Diagnoses   Name Primary?    At risk for lymphedema Yes    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, estrogen receptor positive        Evaluation Date: 2/26/2025  Reassessment: 5/09/2025 requesting 2x week 6 weeks     Authorization: pending  Plan of Care Expiration: 05/09/2025-07/09/2025  Reassessment Due: 06/09/2025  Fact-G Completed: 1 / 2     Visit: 2 / pending  PTA Visit: 1 / 5  Time In: 2:00 PM  Time Out: 3:00 PM  Total Billable Time: 60 minutes     Precautions: Standard, cancer      Subjective     Patient states: feel puffy under arms and have "stabbing pains" under arms at night, or maybe I just notice it more then. Everything is numb. Have been using scar tape on incisions and wearing a bra to bed with good coverage under the arms but not much compression. Broke her shoulder a long time ago and was immobilized for a long time and have never had full ROM in right shoulder since then. Saw Dr Lama recently and he cleared me to begin exercising. Want to start Pilates and working on trying to find classes near her. 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     Pain: 2/10   Location: bilateral shoulders and sub axillary area with end range stretching    Objective     JO ANN participated in / received the following treatment:    Therapeutic Exercise to develop strength, endurance, ROM, flexibility, posture, and core stabilization for 15 minutes including:  Ball up the wall 10 sec hold x 5 (feels good)  Towel slide with trunk rotation 10 sec hold x 10 performed on right and left side  Supine scapula retraction/depression 10 sec hold x 10  Supine T's with YTB x 10  LTR with arms in "V" position with 10 sec hold x 3  Side lying right/left shoulder abduction x 10 " ea  Right shoulder ER with manual resistance x 10  Thoracic rotation x 6 reps ea side      Manual Therapy to develop flexibility, extensibility, desensitization, pliability, and contour for 45 minutes including:  Pt received MLD to right upper extremity/trunk with pre-treatment short neck series to include:  cervical terminus, lateral and posterior neck, left axilla, abdomen, followed by full arm/trunk sequence (anterior and posterior) in supine and sidelying, with rework accessing all watershed areas on trunk. Manual stretching to bilateral shoulders with some discomfort at end range.         Education Provided  - Progress toward goals   - Role of therapy   - Activity modification  - Reviewed HEP      Home Exercises Provided: Patient instructed to continue previously provided HEP. Will issue more comprehensive program next session  Exercises were reviewed and JO ANN was able to demonstrate them prior to the end of the session.  JO ANN demonstrated good  understanding of the education provided.   See EMR under Patient Instructions for exercises provided 2/26/2025.    Assessment   Pt tolerated treatment well, having some discomfort/fullness in bilateral axillae. She is wearing a bra with good axillary coverage, but not much compression. She would benefit from a post mastectomy compression bra, will look at options next session. Tightness in bilateral shoulders, will continue to work on this. Will add more rotator cuff strengthening next session.     Jo Ann is a 55 y.o. female referred to outpatient physical therapy with a medical diagnosis of 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, estrogen receptor positive,  At Risk for Lymphedema with surgical procedure performed on  on 3/19/2025. Pt was seen today post-operatively to re-assess strength and ROM of BUEs, to reassess baseline circumferential measurements of BUEs to aid in the early detection of lymphedema post-operatively, and to  provide pt education on exercises/precautions post-operative. Pt does exhibit any ROM impairments to L UE with limitations with shoulder flexion and shoulder abduction movements as well as rotator cuff and scapular stabilization weakness. Patient also presents with R subaxillary/truncal swelling compared to L side and would benefit from decongestive manual lymphatic drainage to assist with lymphatic support. Recommended to pt to continue wear compression sports bra at this time. This pt will benefit from skilled PT for address impairments following surgery such as pain, limited ROM, or decreased mobility.      Plan of care discussed with patient: Yes  Pt's spiritual, cultural and educational needs considered and patient is agreeable to the plan of care and goals as stated below:       Anticipated barriers for therapy: none    GOALS  Short Term Goals: 3 months  Pt to be seen for reassessment in 6-8 weeks after surgery.  Pt will demonstrate 100% knowledge of lymphedema precautions and signs of infection.  Pt to obtain compression garments for prophylactic concerns according to APTA clinical guidelines published in Journal of Physical Therapy.     Long Term Goals: deferred     Updated POC: 5/9/2025-7/09/2025  Short Term goals: 4 weeks  1. Patient will demonstrate 100% understanding of lymphedema risk reduction practices to include self monitoring for lymphedema. (progressing, not met)  2. Patient will demonstrate independence with Home Exercise program established. (progressing, not met)  3. Pt will increase AROM/PROM in shoulder abduction ROM to 150 degrees bilaterally to improve functional reach, carry, push, pull pain free. (progressing, not met)  4. Pt will increase AROM/PROM in shoulder flexion to 160 degrees bilaterally to improve functional reach, carry, push, pull pain free.(progressing, not met)  5. Pt will increase strength to >/= 4+ in gross UE musculature to improve tolerance to all functional activities  pain free. (progressing, not met)     Long Term Goals: 6 weeks   1.  Pt will increase AROM/PROM in shoulder flexion to 170 degrees bilaterally to improve functional reach, carry, push, pull pain free. (progressing, not met)  2. Pt will increase strength to 4+/5 in gross UE musculature to improve tolerance to all functional activities pain free. (progressing, not met)  3. Pt will demonstrate full/maximized tissue mobility to increase ROM and promote healthy tissue to be pain free at discharge. (progressing, not met)  4. Pt will report decrease in overall worst pain to 2/10 at discharge. (progressing, not met)  5. Pt will increase AROM/PROM in shoulder abduction ROM to 170 degrees bilaterally to improve functional reach, carry, push, pull pain free. (progressing, not met)  6. Patient will report compliance with walking program 5x week for 30 min each day to improve overall cardiovascular function and decrease cancer related fatigue at discharge. (progressing, not met)     Plan     Continue with established plan of care working toward PT goals.    Jenni Villalobos, PTA

## 2025-05-16 ENCOUNTER — CLINICAL SUPPORT (OUTPATIENT)
Dept: REHABILITATION | Facility: HOSPITAL | Age: 55
End: 2025-05-16
Payer: COMMERCIAL

## 2025-05-16 DIAGNOSIS — Z91.89 AT RISK FOR LYMPHEDEMA: Primary | ICD-10-CM

## 2025-05-16 DIAGNOSIS — C50.211 MALIGNANT NEOPLASM OF UPPER-INNER QUADRANT OF RIGHT BREAST IN FEMALE, ESTROGEN RECEPTOR POSITIVE: ICD-10-CM

## 2025-05-16 DIAGNOSIS — Z17.0 MALIGNANT NEOPLASM OF UPPER-INNER QUADRANT OF RIGHT BREAST IN FEMALE, ESTROGEN RECEPTOR POSITIVE: ICD-10-CM

## 2025-05-16 PROCEDURE — 97110 THERAPEUTIC EXERCISES: CPT | Mod: PN,CQ

## 2025-05-16 PROCEDURE — 97140 MANUAL THERAPY 1/> REGIONS: CPT | Mod: PN,CQ

## 2025-05-16 NOTE — PROGRESS NOTES
[facility] Outpatient  Physical Therapy Progress Note  Lymphedema Therapy    Visit Date: 5/16/2025    Name: Jo Ann De La Vega  MRN: [id number]  Therapy Diagnosis:   Encounter Diagnoses   Name Primary?    At risk for lymphedema Yes    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, estrogen receptor positive        Evaluation Date: 2/26/2025  Reassessment: 5/09/2025 requesting 2x week 6 weeks     Authorization: pending  Plan of Care Expiration: 05/09/2025-07/09/2025  Reassessment Due: 06/09/2025  Fact-G Completed: 1 / 2     Visit: 3 / pending  PTA Visit: 2 / 5  Time In: 9:10 AM (pt late)  Time Out: 10:05 AM  Total Billable Time: 55 minutes     Precautions: Standard, cancer      Subjective     Patient states: feel puffy under arms, tape felt nice but not sure that it helped.  Everything is numb. Have been using scar tape on incisions and wearing a bra to bed with good coverage under the arms but not much compression. Broke her shoulder a long time ago and was immobilized for a long time and have never had full ROM in right shoulder since then. Saw Dr Lama recently and he cleared me to begin exercising. Want to start Pilates and working on trying to find classes near her. 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 Did try the lower trunk rotation exercise at home and left sharp pulling under right arm, but that is the side that they put the doppler in to check blood flow. It bothered me when it was in and still bothers me. Will return to work on Monday and I am not looking forward to it. Ordered some therabands with handles off [employer].     Pain: 2/10   Location: bilateral shoulders and sub axillary area with end range stretching    Objective     JO ANN participated in / received the following treatment:    Therapeutic Exercise to develop strength, endurance, ROM, flexibility, posture, and core stabilization for 10 minutes including:  Ball up the  "wall 10 sec hold x 5 (feels good)  Towel slide with trunk rotation 10 sec hold x 10 performed on right and left side  Supine scapula retraction/depression 10 sec hold x 10  Supine T's with YTB x 10  LTR with arms in "V" position with 10 sec hold x 3  Side lying right/left shoulder abduction x 10 ea  Right shoulder ER with manual resistance x 10  Thoracic rotation x 6 reps ea side  Supine bilateral ER with RTB 2 x 10      Manual Therapy to develop flexibility, extensibility, desensitization, pliability, and contour for 45 minutes including:  Pt received MLD to right upper extremity/trunk with pre-treatment short neck series to include:  cervical terminus, lateral and posterior neck, left axilla, abdomen, followed by full arm/trunk sequence (anterior and posterior) in supine and sidelying, with rework accessing all watershed areas on trunk. Manual stretching to bilateral shoulders with some discomfort at end range. Removed Rock tape from right sub axillary area and did not replace due to some mild skin irritation.         Education Provided  - Progress toward goals   - Role of therapy   - Activity modification  - Reviewed HEP      Home Exercises Provided: Patient instructed to continue previously provided HEP. Will issue more comprehensive program next session  Exercises were reviewed and JO ANN was able to demonstrate them prior to the end of the session.  JO ANN demonstrated good  understanding of the education provided.   See EMR under Patient Instructions for exercises provided 2/26/2025.    Assessment   Pt tolerated treatment well, having some discomfort/fullness in bilateral axillae. She is wearing a bra with good axillary coverage, but not much compression. She would benefit from a post mastectomy compression bra, would benefit from a consult with [employer]. Pt will need to obtain a card from [facility] to call and schedule a consult.  Tightness in bilateral shoulders, will continue to work on this. " Will add more rotator cuff strengthening next session.     Concepcion is a 55 y.o. female referred to outpatient physical therapy with a medical diagnosis of 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, estrogen receptor positive,  At Risk for Lymphedema with surgical procedure performed on  on 3/19/2025. Pt was seen today post-operatively to re-assess strength and ROM of BUEs, to reassess baseline circumferential measurements of BUEs to aid in the early detection of lymphedema post-operatively, and to provide pt education on exercises/precautions post-operative. Pt does exhibit any ROM impairments to L UE with limitations with shoulder flexion and shoulder abduction movements as well as rotator cuff and scapular stabilization weakness. Patient also presents with R subaxillary/truncal swelling compared to L side and would benefit from decongestive manual lymphatic drainage to assist with lymphatic support. Recommended to pt to continue wear compression sports bra at this time. This pt will benefit from skilled PT for address impairments following surgery such as pain, limited ROM, or decreased mobility.      Plan of care discussed with patient: Yes  Pt's spiritual, cultural and educational needs considered and patient is agreeable to the plan of care and goals as stated below:       Anticipated barriers for therapy: none    GOALS  Short Term Goals: 3 months  Pt to be seen for reassessment in 6-8 weeks after surgery.  Pt will demonstrate 100% knowledge of lymphedema precautions and signs of infection.  Pt to obtain compression garments for prophylactic concerns according to APTA clinical guidelines published in Journal of Physical Therapy.     Long Term Goals: deferred     Updated POC: 5/9/2025-7/09/2025  Short Term goals: 4 weeks  1. Patient will demonstrate 100% understanding of lymphedema risk reduction practices to include self monitoring for lymphedema. (progressing, not met)  2. Patient  will demonstrate independence with Home Exercise program established. (progressing, not met)  3. Pt will increase AROM/PROM in shoulder abduction ROM to 150 degrees bilaterally to improve functional reach, carry, push, pull pain free. (progressing, not met)  4. Pt will increase AROM/PROM in shoulder flexion to 160 degrees bilaterally to improve functional reach, carry, push, pull pain free.(progressing, not met)  5. Pt will increase strength to >/= 4+ in gross UE musculature to improve tolerance to all functional activities pain free. (progressing, not met)     Long Term Goals: 6 weeks   1.  Pt will increase AROM/PROM in shoulder flexion to 170 degrees bilaterally to improve functional reach, carry, push, pull pain free. (progressing, not met)  2. Pt will increase strength to 4+/5 in gross UE musculature to improve tolerance to all functional activities pain free. (progressing, not met)  3. Pt will demonstrate full/maximized tissue mobility to increase ROM and promote healthy tissue to be pain free at discharge. (progressing, not met)  4. Pt will report decrease in overall worst pain to 2/10 at discharge. (progressing, not met)  5. Pt will increase AROM/PROM in shoulder abduction ROM to 170 degrees bilaterally to improve functional reach, carry, push, pull pain free. (progressing, not met)  6. Patient will report compliance with walking program 5x week for 30 min each day to improve overall cardiovascular function and decrease cancer related fatigue at discharge. (progressing, not met)     Plan     Continue with established plan of care working toward PT goals.    Jenni Villalobos, PTA

## 2025-05-19 ENCOUNTER — CLINICAL SUPPORT (OUTPATIENT)
Dept: REHABILITATION | Facility: HOSPITAL | Age: 55
End: 2025-05-19
Payer: COMMERCIAL

## 2025-05-19 DIAGNOSIS — Z91.89 AT RISK FOR LYMPHEDEMA: Primary | ICD-10-CM

## 2025-05-19 PROCEDURE — 97110 THERAPEUTIC EXERCISES: CPT | Mod: PN

## 2025-05-19 PROCEDURE — 97140 MANUAL THERAPY 1/> REGIONS: CPT | Mod: PN

## 2025-05-19 NOTE — PROGRESS NOTES
[facility] Out[facility]  Physical Therapy Progress Note  Lymphedema Therapy    Visit Date: 5/19/2025    Name: Jo Ann De La Vega  MRN: [id number]  Therapy Diagnosis:   Encounter Diagnosis   Name Primary?    At risk for lymphedema Yes       Evaluation Date: 2/26/2025  Reassessment: 5/09/2025 requesting 2x week 6 weeks     Authorization:auth for 10 visits through 12/31/2025  Plan of Care Expiration: 05/09/2025-07/09/2025  Reassessment Due: 06/09/2025  Fact-G Completed: 1 / 2     Visit: 3 / 12 ( auth of 10 visits)  PTA Visit: 0 / 5  Time In: 1 PM  Time Out: 2 PM  Total Billable Time:60 minutes     Precautions: Standard, cancer      Subjective     Patient states: tape definitely helped, feels her R shoulder hurts with elevation and reports fullness. Everything is numb. Have been using scar tape on incisions and wearing a bra to bed with good coverage under the arms but not much compression. Broke her shoulder a long time ago and was immobilized for a long time and have never had full ROM in right shoulder since then. Saw Dr Lama recently and he cleared me to begin exercising. Want to start Pilates and working on trying to find classes near her.     Pain: 2/10   Location: bilateral shoulders and sub axillary area with end range stretching    Objective     JO ANN participated in / received the following treatment:    Therapeutic Exercise to develop strength, endurance, ROM, flexibility, posture, and core stabilization for 20 minutes including:    Corner wall stretch 30 sec 3 reps  Ball up the wall 10 sec hold x 5 (feels good)  Inferior glide of HH  in front of mirror with verbal cues 5 min with lengthen and lift  ER with red tband 15 reps each arm  Foam roller supine with scap depression 10 sec hold 10 reps  Foam roller diaphragmatic breathing  deferred  Towel slide with trunk rotation 10 sec hold x 10 performed on right and left side  Supine scapula retraction/depression 10 sec hold x 10  Supine T's  "with YTB x 10  LTR with arms in "V" position with 10 sec hold x 3  Side lying right/left shoulder abduction x 10 ea  Right shoulder ER with manual resistance x 10  Thoracic rotation x 6 reps ea side  Supine bilateral ER with RTB 2 x 10      Manual Therapy to develop flexibility, extensibility, desensitization, pliability, and contour for 40 minutes including:  Pt received MLD to right upper extremity/trunk with pre-treatment short neck series to include:  cervical terminus, lateral and posterior neck, left axilla, abdomen, followed by full arm/trunk sequence (anterior and posterior) in supine and sidelying, with rework accessing all watershed areas on trunk. Manual stretching to bilateral shoulders with some discomfort at end range. Rock tape from right and left sub axillary area to facilitate lymph[place] in weave pattern, also placed tape in postural support to draw shoulders back for cueing, which pt reports felt really good after treatment.Instructed provided today on focus of cervical terminus, subaxilla and abdomen for self MLD.         Education Provided  - Progress toward goals   - Role of therapy   - Activity modification  - Reviewed HEP  Provided recommendation of posture corrector brace  available online at amaz[employer]    [place] Exercises Provided: Patient instructed to continue previously provided HEP. Will issue more comprehensive program next session  Exercises were reviewed and JO ANN was able to demonstrate them prior to the end of the session.  JO ANN demonstrated good  understanding of the education provided.   See EMR under Patient Instructions for exercises provided 2/26/2025.    Assessment   Pt tolerated treatment well, does have fullness in B sub axillary region. Pt responded excellently with improved glenohumeral kinematics in R shoulder after inferior glide exercises and less pain with elevation of arm. Also postural cueing of tape assisted in support and relief of pain. Will benefit from " learning scapular stabilization exercises to improve postural related dysfunction of rounded shoulders and over activation of B upper trap. She is wearing a bra with good axillary coverage, but not much compression. She would benefit from a post mastectomy compression bra, would benefit from a consult with [employer]. Pt will need to obtain a card from [facility] to call and schedule a consult.  Tightness in bilateral shoulders, will continue to work on this. Will add more rotator cuff strengthening next session.     Concepcion is a 55 y.o. female referred to outpatient physical therapy with a medical diagnosis of 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, estrogen receptor positive,  At Risk for Lymphedema with surgical procedure performed on  on 3/19/2025. Pt was seen today post-operatively to re-assess strength and ROM of BUEs, to reassess baseline circumferential measurements of BUEs to aid in the early detection of lymphedema post-operatively, and to provide pt education on exercises/precautions post-operative. Pt does exhibit any ROM impairments to L UE with limitations with shoulder flexion and shoulder abduction movements as well as rotator cuff and scapular stabilization weakness. Patient also presents with R subaxillary/truncal swelling compared to L side and would benefit from decongestive manual lymphatic drainage to assist with lymphatic support. Recommended to pt to continue wear compression sports bra at this time. This pt will benefit from skilled PT for address impairments following surgery such as pain, limited ROM, or decreased mobility.      Plan of care discussed with patient: Yes  Pt's spiritual, cultural and educational needs considered and patient is agreeable to the plan of care and goals as stated below:       Anticipated barriers for therapy: none    GOALS  Short Term Goals: 3 months  Pt to be seen for reassessment in 6-8 weeks after surgery.  Pt will  demonstrate 100% knowledge of lymphedema precautions and signs of infection.  Pt to obtain compression garments for prophylactic concerns according to APTA clinical guidelines published in Journal of Physical Therapy.     Long Term Goals: deferred     Updated POC: 5/9/2025-7/09/2025  Short Term goals: 4 weeks  1. Patient will demonstrate 100% understanding of lymphedema risk reduction practices to include self monitoring for lymphedema. (progressing, not met)  2. Patient will demonstrate independence with Home Exercise program established. (progressing, not met)  3. Pt will increase AROM/PROM in shoulder abduction ROM to 150 degrees bilaterally to improve functional reach, carry, push, pull pain free. (progressing, not met)  4. Pt will increase AROM/PROM in shoulder flexion to 160 degrees bilaterally to improve functional reach, carry, push, pull pain free.(progressing, not met)  5. Pt will increase strength to >/= 4+ in gross UE musculature to improve tolerance to all functional activities pain free. (progressing, not met)     Long Term Goals: 6 weeks   1.  Pt will increase AROM/PROM in shoulder flexion to 170 degrees bilaterally to improve functional reach, carry, push, pull pain free. (progressing, not met)  2. Pt will increase strength to 4+/5 in gross UE musculature to improve tolerance to all functional activities pain free. (progressing, not met)  3. Pt will demonstrate full/maximized tissue mobility to increase ROM and promote healthy tissue to be pain free at discharge. (progressing, not met)  4. Pt will report decrease in overall worst pain to 2/10 at discharge. (progressing, not met)  5. Pt will increase AROM/PROM in shoulder abduction ROM to 170 degrees bilaterally to improve functional reach, carry, push, pull pain free. (progressing, not met)  6. Patient will report compliance with walking program 5x week for 30 min each day to improve overall cardiovascular function and decrease cancer related fatigue  at discharge. (progressing, not met)     Plan     Continue with established plan of care working toward PT goals.    Dorie Mcclure, PT, CLT

## 2025-05-20 ENCOUNTER — PATIENT MESSAGE (OUTPATIENT)
Dept: FAMILY MEDICINE | Facility: CLINIC | Age: 55
End: 2025-05-20
Payer: COMMERCIAL

## 2025-05-20 RX ORDER — TIRZEPATIDE 2.5 MG/.5ML
2.5 INJECTION, SOLUTION SUBCUTANEOUS
Qty: 4 PEN | Refills: 3 | Status: SHIPPED | OUTPATIENT
Start: 2025-05-20

## 2025-05-20 NOTE — TELEPHONE ENCOUNTER
Care Due:                  Date            Visit Type   Department     Provider  --------------------------------------------------------------------------------    Last Visit: None Found      None         None Found  Next Visit: None Scheduled  None         None Found                                                            Last  Test          Frequency    Reason                     Performed    Due Date  --------------------------------------------------------------------------------    Office Visit  15 months..  cholestyramine...........  Not Found    Overdue    [employer] Embedded Care Due Messages. Reference number: [id number].   5/20/2025 10:59:35 AM CDT

## 2025-05-21 ENCOUNTER — CLINICAL SUPPORT (OUTPATIENT)
Dept: REHABILITATION | Facility: HOSPITAL | Age: 55
End: 2025-05-21
Payer: COMMERCIAL

## 2025-05-21 DIAGNOSIS — Z91.89 AT RISK FOR LYMPHEDEMA: Primary | ICD-10-CM

## 2025-05-21 DIAGNOSIS — C50.211 MALIGNANT NEOPLASM OF UPPER-INNER QUADRANT OF RIGHT BREAST IN FEMALE, ESTROGEN RECEPTOR POSITIVE: ICD-10-CM

## 2025-05-21 DIAGNOSIS — Z17.0 MALIGNANT NEOPLASM OF UPPER-INNER QUADRANT OF RIGHT BREAST IN FEMALE, ESTROGEN RECEPTOR POSITIVE: ICD-10-CM

## 2025-05-21 PROCEDURE — 97110 THERAPEUTIC EXERCISES: CPT | Mod: PN

## 2025-05-21 PROCEDURE — 97140 MANUAL THERAPY 1/> REGIONS: CPT | Mod: PN

## 2025-05-21 NOTE — PROGRESS NOTES
[facility] Outpatient  Physical Therapy Progress Note  Lymphedema Therapy    Visit Date: 5/21/2025    Name: Jo Ann De La Vega  MRN: [id number]  Therapy Diagnosis:   Encounter Diagnoses   Name Primary?    At risk for lymphedema Yes    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, estrogen receptor positive        Evaluation Date: 2/26/2025  Reassessment: 5/09/2025 requesting 2x week 6 weeks     Authorization:auth for 10 visits through 12/31/2025  Plan of Care Expiration: 05/09/2025-07/09/2025  Reassessment Due: 06/09/2025  Fact-G Completed: 1 / 2     Visit: 4 / 12 ( auth of 10 visits)  PTA Visit: 0 / 5  Time In: 1 PM  Time Out: 2 PM  Total Billable Time:60 minutes     Precautions: Standard, cancer      Subjective     Patient states: Tired today, started back at work. Feel the tape definitely helped, feels her R shoulder hurts with elevation and reports fullness. Everything is numb. Have been using scar tape on incisions and wearing a bra to bed with good coverage under the arms but not much compression. Broke her shoulder a long time ago and was immobilized for a long time and have never had full ROM in right shoulder since then. Saw Dr Lama recently and he cleared me to begin exercising. Want to start Pilates and working on trying to find classes near her.     Pain: 2/10   Location: bilateral shoulders and sub axillary area with end range stretching    Objective     JO ANN participated in / received the following treatment:    Therapeutic Exercise to develop strength, endurance, ROM, flexibility, posture, and core stabilization for 15 minutes including:    Corner wall stretch 30 sec 3 reps  Ball up the wall 10 sec hold x 5 (feels good)  Inferior glide of HH  in front of mirror with verbal cues 5 min with lengthen and lift  ER with red tband 15 reps each arm  Towel slide with trunk rotation 10 sec hold x 10 performed on right and left side  Foam roller supine with scap  "depression 10 sec hold 10 reps- deferred due to time  Foam roller diaphragmatic breathing    deferred  Supine scapula retraction/depression 10 sec hold x 10  Supine T's with YTB x 10  LTR with arms in "V" position with 10 sec hold x 3  Side lying right/left shoulder abduction x 10 ea  Right shoulder ER with manual resistance x 10  Thoracic rotation x 6 reps ea side  Supine bilateral ER with RTB 2 x 10      Manual Therapy to develop flexibility, extensibility, desensitization, pliability, and contour for 40 minutes including:  Pt received MLD to right upper extremity/trunk with pre-treatment short neck series to include:  cervical terminus, lateral and posterior neck, left axilla, abdomen, followed by full arm/trunk sequence (anterior and posterior) in supine and sidelying, with rework accessing all watershed areas on trunk. Manual stretching to bilateral shoulders with some discomfort at end range. Instructed provided today on focus of cervical terminus, subaxilla and abdomen for self MLD.     Deferred today (as tape removed before session, will resume next session): Rock tape from right and left sub axillary area to facilitate lymphmotorcity in weave pattern, also placed tape in postural support to draw shoulders back for cueing, which pt reports felt really good after treatment.    Education Provided  - Progress toward goals   - Role of therapy   - Activity modification  - Reviewed HEP  Provided recommendation of posture corrector brace  available online at [facility] Exercises Provided: Patient instructed to continue previously provided HEP. Will issue more comprehensive program next session  Exercises were reviewed and JO ANN was able to demonstrate them prior to the end of the session.  JO ANN demonstrated good  understanding of the education provided.   See EMR under Patient Instructions for exercises provided 2/26/2025.    Assessment   Pt tolerated treatment well, does have fullness in B sub axillary " region. Pt responded excellently with improved glenohumeral kinematics in R shoulder after inferior glide exercises and less pain with elevation of arm. Also postural cueing of tape assisted in support and relief of pain last session, however deferred taping today as needing to give skin a break, will resume next session. Will benefit from learning scapular stabilization exercises to improve postural related dysfunction of rounded shoulders and over activation of B upper trap. She is wearing a bra with good axillary coverage, but not much compression. She would benefit from a post mastectomy compression bra, would benefit from a consult with [employer].   PT to request order for post-mastectomy compression bra and send to [employer] to schedule appointment, they will need to check benefits for coverage.  Pt will need to obtain a card from [employer] to call and schedule a consult.  Tightness in bilateral shoulders, will continue to work on this. Will add more rotator cuff strengthening next session.     Concepcion is a 55 y.o. female referred to outpatient physical therapy with a medical diagnosis of 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, estrogen receptor positive,  At Risk for Lymphedema with surgical procedure performed on  on 3/19/2025. Pt was seen today post-operatively to re-assess strength and ROM of BUEs, to reassess baseline circumferential measurements of BUEs to aid in the early detection of lymphedema post-operatively, and to provide pt education on exercises/precautions post-operative. Pt does exhibit any ROM impairments to L UE with limitations with shoulder flexion and shoulder abduction movements as well as rotator cuff and scapular stabilization weakness. Patient also presents with R subaxillary/truncal swelling compared to L side and would benefit from decongestive manual lymphatic drainage to assist with lymphatic support. Recommended to pt to  continue wear compression sports bra at this time. This pt will benefit from skilled PT for address impairments following surgery such as pain, limited ROM, or decreased mobility.      Plan of care discussed with patient: Yes  Pt's spiritual, cultural and educational needs considered and patient is agreeable to the plan of care and goals as stated below:       Anticipated barriers for therapy: none    Garments:  5/21/2025 PT to request order for post-mastectomy compression bra and send to [employer] to schedule appointment, they will need to check benefits for coverage.      GOALS  Short Term Goals: 3 months  Pt to be seen for reassessment in 6-8 weeks after surgery.  Pt will demonstrate 100% knowledge of lymphedema precautions and signs of infection.  Pt to obtain compression garments for prophylactic concerns according to APTA clinical guidelines published in Journal of Physical Therapy.     Long Term Goals: deferred     Updated POC: 5/9/2025-7/09/2025  Short Term goals: 4 weeks  1. Patient will demonstrate 100% understanding of lymphedema risk reduction practices to include self monitoring for lymphedema. (progressing, not met)  2. Patient will demonstrate independence with Home Exercise program established. (progressing, not met)  3. Pt will increase AROM/PROM in shoulder abduction ROM to 150 degrees bilaterally to improve functional reach, carry, push, pull pain free. (progressing, not met)  4. Pt will increase AROM/PROM in shoulder flexion to 160 degrees bilaterally to improve functional reach, carry, push, pull pain free.(progressing, not met)  5. Pt will increase strength to >/= 4+ in gross UE musculature to improve tolerance to all functional activities pain free. (progressing, not met)     Long Term Goals: 6 weeks   1.  Pt will increase AROM/PROM in shoulder flexion to 170 degrees bilaterally to improve functional reach, carry, push, pull pain free. (progressing, not met)  2. Pt will increase  strength to 4+/5 in gross UE musculature to improve tolerance to all functional activities pain free. (progressing, not met)  3. Pt will demonstrate full/maximized tissue mobility to increase ROM and promote healthy tissue to be pain free at discharge. (progressing, not met)  4. Pt will report decrease in overall worst pain to 2/10 at discharge. (progressing, not met)  5. Pt will increase AROM/PROM in shoulder abduction ROM to 170 degrees bilaterally to improve functional reach, carry, push, pull pain free. (progressing, not met)  6. Patient will report compliance with walking program 5x week for 30 min each day to improve overall cardiovascular function and decrease cancer related fatigue at discharge. (progressing, not met)     Plan   Send order for bra consult to [employer] once signed.     Continue with established plan of care working toward PT goals.    Shy Henriquez, PT, CLT

## 2025-05-27 ENCOUNTER — CLINICAL SUPPORT (OUTPATIENT)
Dept: REHABILITATION | Facility: HOSPITAL | Age: 55
End: 2025-05-27
Payer: COMMERCIAL

## 2025-05-27 DIAGNOSIS — Z91.89 AT RISK FOR LYMPHEDEMA: Primary | ICD-10-CM

## 2025-05-27 PROCEDURE — 97140 MANUAL THERAPY 1/> REGIONS: CPT | Mod: PN

## 2025-05-27 PROCEDURE — 97110 THERAPEUTIC EXERCISES: CPT | Mod: PN

## 2025-05-27 NOTE — PROGRESS NOTES
[facility] Out[facility]  Physical Therapy Progress Note  Lymphedema Therapy    Visit Date: 5/27/2025    Name: Jo Ann De La Vega  MRN: [id number]  Therapy Diagnosis:   Encounter Diagnosis   Name Primary?    At risk for lymphedema Yes       Evaluation Date: 2/26/2025  Reassessment: 5/09/2025 requesting 2x week 6 weeks     Authorization:auth for 10 visits through 12/31/2025  Plan of Care Expiration: 05/09/2025-07/09/2025  Reassessment Due: 06/09/2025  Fact-G Completed: 1 / 2     Visit: 5 / 12 ( auth of 10 visits)  PTA Visit: 0 / 5  Time In: 3:10 PM  Time Out: 4: 25 PM  Total Billable Time:75 minutes     Precautions: Standard, cancer      Subjective     Patient states: Felt great after last session.  Stomach feels full, like constipation or shelving. Feel the tape definitely helped, feels her R shoulder hurts with elevation and reports fullness.  Have been using scar tape on incisions and wearing a bra to bed with good coverage under the arms but not much compression. Broke her shoulder a long time ago and was immobilized for a long time and have never had full ROM in right shoulder since then. Reports she had pain in chest at rib with trunk rotation in supine but could not replicate in clinic. Stopped doing that one. Saw Dr Lama recently and he cleared me to begin exercising. Want to start Pilates and working on trying to find classes near her.     Pain: 1/10   Location: bilateral shoulders and sub axillary area with end range stretching    Objective     JO ANN participated in / received the following treatment:    Therapeutic Exercise to develop strength, endurance, ROM, flexibility, posture, and core stabilization for 15 minutes including:  Prone scapular stabilization   I, T, A  10 sec hold on  5 reps each and W  1 set of 10 reps each arm  deferred  Corner wall stretch 30 sec 3 reps  Ball up the wall 10 sec hold x 5 (feels good)  Inferior glide of HH  in front of mirror with verbal cues 5 min  "with lengthen and lift  ER with red tband 15 reps each arm  Towel slide with trunk rotation 10 sec hold x 10 performed on right and left side  Foam roller supine with scap depression 10 sec hold 10 reps- deferred due to time  Foam roller diaphragmatic breathing    deferred  Supine scapula retraction/depression 10 sec hold x 10  Supine T's with YTB x 10  LTR with arms in "V" position with 10 sec hold x 3  Side lying right/left shoulder abduction x 10 ea  Right shoulder ER with manual resistance x 10  Thoracic rotation x 6 reps ea side  Supine bilateral ER with RTB 2 x 10      Manual Therapy to develop flexibility, extensibility, desensitization, pliability, and contour for 60 minutes including:  Pt received MLD to right upper extremity/trunk with pre-treatment short neck series to include:  cervical terminus, lateral and posterior neck, left axilla, abdomen, followed by full arm/trunk sequence (anterior and posterior) in supine and sidelying, with rework accessing all watershed areas on trunk. Also worked on lateral pathways B from axilla to abdominal region as well as deep abdominal nodes activated with breathing techniques and springing. Manual stretching to bilateral shoulders with some discomfort at end range. Instructed provided today on focus of cervical terminus, subaxilla and abdomen for self MLD. Mepiform scar tape at abdominal scar.   Rock tape from right and left sub axillary area to facilitate lymphmotorcity in weave pattern, also placed weave pattern over abdominal region.  Education Provided  - Progress toward goals   - Role of therapy   - Activity modification  - Reviewed HEP  Provided recommendation of posture corrector brace  available online at [facility] Exercises Provided: Patient instructed to continue previously provided HEP. Will issue more comprehensive program next session  Exercises were reviewed and JO ANN was able to demonstrate them prior to the end of the session.  JO ANN " demonstrated good  understanding of the education provided.   See EMR under Patient Instructions for exercises provided 2/26/2025.    Assessment   Pt tolerated treatment well, does have fullness in B sub axillary region. Will benefit from performing scapular stabilization exercises to improve postural related dysfunction of rounded shoulders and over activation of B upper trap. She is wearing a bra with good axillary coverage, but not much compression. She would benefit from a post mastectomy compression bra, would benefit from a consult with [employer], this order was sent over today via [facility]. Also would benefit from swell spots at sub axillary region B, will size next tx  PT to request order for post-mastectomy compression bra and send to [employer] to schedule appointment, they will need to check benefits for coverage.  Pt will need to obtain a card from [employer] to call and schedule a consult.  Tightness in bilateral shoulders, will continue to work on this. Will add more rotator cuff strengthening next session.     Concepcion is a 55 y.o. female referred to outpatient physical therapy with a medical diagnosis of 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, estrogen receptor positive,  At Risk for Lymphedema with surgical procedure performed on  on 3/19/2025. Pt was seen today post-operatively to re-assess strength and ROM of BUEs, to reassess baseline circumferential measurements of BUEs to aid in the early detection of lymphedema post-operatively, and to provide pt education on exercises/precautions post-operative. Pt does exhibit any ROM impairments to L UE with limitations with shoulder flexion and shoulder abduction movements as well as rotator cuff and scapular stabilization weakness. Patient also presents with R subaxillary/truncal swelling compared to L side and would benefit from decongestive manual lymphatic drainage to assist with lymphatic support.  Recommended to pt to continue wear compression sports bra at this time. This pt will benefit from skilled PT for address impairments following surgery such as pain, limited ROM, or decreased mobility.      Plan of care discussed with patient: Yes  Pt's spiritual, cultural and educational needs considered and patient is agreeable to the plan of care and goals as stated below:       Anticipated barriers for therapy: none    Garments:  5/21/2025 PT to request order for post-mastectomy compression bra and send to [employer] to schedule appointment, they will need to check benefits for coverage.      GOALS  Short Term Goals: 3 months  Pt to be seen for reassessment in 6-8 weeks after surgery.  Pt will demonstrate 100% knowledge of lymphedema precautions and signs of infection.  Pt to obtain compression garments for prophylactic concerns according to APTA clinical guidelines published in Journal of Physical Therapy.     Long Term Goals: deferred     Updated POC: 5/9/2025-7/09/2025  Short Term goals: 4 weeks  1. Patient will demonstrate 100% understanding of lymphedema risk reduction practices to include self monitoring for lymphedema. (progressing, not met)  2. Patient will demonstrate independence with Home Exercise program established. (progressing, not met)  3. Pt will increase AROM/PROM in shoulder abduction ROM to 150 degrees bilaterally to improve functional reach, carry, push, pull pain free. (progressing, not met)  4. Pt will increase AROM/PROM in shoulder flexion to 160 degrees bilaterally to improve functional reach, carry, push, pull pain free.(progressing, not met)  5. Pt will increase strength to >/= 4+ in gross UE musculature to improve tolerance to all functional activities pain free. (progressing, not met)     Long Term Goals: 6 weeks   1.  Pt will increase AROM/PROM in shoulder flexion to 170 degrees bilaterally to improve functional reach, carry, push, pull pain free. (progressing, not  met)  2. Pt will increase strength to 4+/5 in gross UE musculature to improve tolerance to all functional activities pain free. (progressing, not met)  3. Pt will demonstrate full/maximized tissue mobility to increase ROM and promote healthy tissue to be pain free at discharge. (progressing, not met)  4. Pt will report decrease in overall worst pain to 2/10 at discharge. (progressing, not met)  5. Pt will increase AROM/PROM in shoulder abduction ROM to 170 degrees bilaterally to improve functional reach, carry, push, pull pain free. (progressing, not met)  6. Patient will report compliance with walking program 5x week for 30 min each day to improve overall cardiovascular function and decrease cancer related fatigue at discharge. (progressing, not met)     Plan   Would benefit from swell spots at sub axillary region B, will size next tx  Sent order for bra consult to [employer] today     Continue with established plan of care working toward PT goals.    Dorie Mcclure, PT, CLT

## 2025-05-29 ENCOUNTER — PATIENT MESSAGE (OUTPATIENT)
Dept: ADMINISTRATIVE | Facility: HOSPITAL | Age: 55
End: 2025-05-29
Payer: COMMERCIAL

## 2025-05-29 ENCOUNTER — CLINICAL SUPPORT (OUTPATIENT)
Dept: REHABILITATION | Facility: HOSPITAL | Age: 55
End: 2025-05-29
Payer: COMMERCIAL

## 2025-05-29 DIAGNOSIS — Z17.0 MALIGNANT NEOPLASM OF UPPER-INNER QUADRANT OF RIGHT BREAST IN FEMALE, ESTROGEN RECEPTOR POSITIVE: ICD-10-CM

## 2025-05-29 DIAGNOSIS — C50.211 MALIGNANT NEOPLASM OF UPPER-INNER QUADRANT OF RIGHT BREAST IN FEMALE, ESTROGEN RECEPTOR POSITIVE: ICD-10-CM

## 2025-05-29 DIAGNOSIS — Z91.89 AT RISK FOR LYMPHEDEMA: Primary | ICD-10-CM

## 2025-05-29 PROCEDURE — 97110 THERAPEUTIC EXERCISES: CPT | Mod: PN,CQ

## 2025-05-29 PROCEDURE — 97140 MANUAL THERAPY 1/> REGIONS: CPT | Mod: PN,CQ

## 2025-05-29 NOTE — PROGRESS NOTES
[facility] Outpatient  Physical Therapy Progress Note  Lymphedema Therapy    Visit Date: 5/29/2025    Name: Jo Ann De La Vega  MRN: [id number]  Therapy Diagnosis:   Encounter Diagnoses   Name Primary?    At risk for lymphedema Yes    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, estrogen receptor positive        Evaluation Date: 2/26/2025  Reassessment: 5/09/2025 requesting 2x week 6 weeks     Authorization:auth for 10 visits through 12/31/2025  Plan of Care Expiration: 05/09/2025-07/09/2025  Reassessment Due: 06/09/2025  Fact-G Completed: 1 / 2     Visit: 6 / 12 ( auth of 10 visits)  PTA Visit: 1 / 5  Time In: 11:00 AM  Time Out: 12:00 PM  Total Billable Time: 60 minutes     Precautions: Standard, cancer      Subjective     Patient states: Felt great after last session, but a little sore.  Feel the tape definitely helped, but don't want to be taped for the weekend.  Have been using scar tape on incisions and wearing a bra to bed with good coverage under the arms but not much compression. Broke her shoulder a long time ago and was immobilized for a long time and have never had full ROM in right shoulder since then. Reports she had pain in chest at rib on the right with trunk rotation to left in supine; Stopped doing that one. Saw Dr Lama recently and he cleared me to begin exercising. Have been taking exercise classes online to increase strength and endurance with dog agility training.   Pain: 1/10   Location: bilateral shoulders and sub axillary area with end range stretching    Objective     JO ANN participated in / received the following treatment:    Therapeutic Exercise to develop strength, endurance, ROM, flexibility, posture, and core stabilization for 30 minutes including:  Prone scapular stabilization   I, T, A  10 sec hold on  5 reps each and W  1 set of 10 reps each arm  deferred  Corner wall stretch 30 sec 3 reps  Ball up the wall 10 sec hold x 5 (feels  "good)  Inferior glide of HH  in front of mirror with verbal cues 5 min with lengthen and lift  ER with red tband 15 reps each arm  Towel slide with trunk rotation 10 sec hold x 10 performed on right and left side  Foam roller supine with scap depression 10 sec hold 10 reps- deferred due to time  Foam roller diaphragmatic breathing    deferred  Supine scapula retraction/depression 10 sec hold x 10  Supine T's with YTB x 10  LTR with arms in "V" position with 10 sec hold x 3  Side lying right/left shoulder abduction x 10 ea  Right shoulder ER with manual resistance x 10  Thoracic rotation x 6 reps ea side  Supine bilateral ER with RTB 2 x 10      Manual Therapy to develop flexibility, extensibility, desensitization, pliability, and contour for 30 minutes including:  Pt received MLD to right upper extremity/trunk with pre-treatment short neck series to include:  cervical terminus, lateral and posterior neck, left axilla, abdomen, followed by full arm/trunk sequence (anterior and posterior) in supine and sidelying, with rework accessing all watershed areas on trunk. Also worked on lateral pathways B from axilla to abdominal region as well as deep abdominal nodes activated with breathing techniques and springing. Manual stretching to bilateral shoulders with some discomfort at end range. Instructed provided today on focus of cervical terminus, subaxilla and abdomen for self MLD.   Deferred per pt request:  Mepiform scar tape at abdominal scar.   Rock tape from right and left sub axillary area to facilitate lymphmotorcity in weave pattern, also placed weave pattern over abdominal region.  Education Provided  - Progress toward goals   - Role of therapy   - Activity modification  - Reviewed HEP  Provided recommendation of posture corrector brace  available online at [facility] Exercises Provided: Patient instructed to continue previously provided HEP. Will issue more comprehensive program next session  Exercises were " reviewed and JO ANN was able to demonstrate them prior to the end of the session.  JO ANN demonstrated good  understanding of the education provided.   See EMR under Patient Instructions for exercises provided 2/26/2025.    Assessment   Pt tolerated treatment well, does have fullness in B sub axillary region. Will benefit from performing scapular stabilization exercises to improve postural related dysfunction of rounded shoulders and over activation of B upper trap. She is wearing a bra with good axillary coverage, but not much compression. She would benefit from a post mastectomy compression bra, would benefit from a consult with [employer], this order was sent over today via route. Also would benefit from swell spots at sub axillary region B, will size next tx  PT to request order for post-mastectomy compression bra and send to [employer] to schedule appointment, they will need to check benefits for coverage.  Pt will need to obtain a card from [facility] to call and schedule a consult.  Tightness in bilateral shoulders, will continue to work on this. Will add more rotator cuff strengthening next session.     Jo Ann is a 55 y.o. female referred to outpatient physical therapy with a medical diagnosis of 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, estrogen receptor positive,  At Risk for Lymphedema with surgical procedure performed on  on 3/19/2025. Pt was seen today post-operatively to re-assess strength and ROM of BUEs, to reassess baseline circumferential measurements of BUEs to aid in the early detection of lymphedema post-operatively, and to provide pt education on exercises/precautions post-operative. Pt does exhibit any ROM impairments to L UE with limitations with shoulder flexion and shoulder abduction movements as well as rotator cuff and scapular stabilization weakness. Patient also presents with R subaxillary/truncal swelling compared to L side and would  benefit from decongestive manual lymphatic drainage to assist with lymphatic support. Recommended to pt to continue wear compression sports bra at this time. This pt will benefit from skilled PT for address impairments following surgery such as pain, limited ROM, or decreased mobility.      Plan of care discussed with patient: Yes  Pt's spiritual, cultural and educational needs considered and patient is agreeable to the plan of care and goals as stated below:       Anticipated barriers for therapy: none    Garments:  5/21/2025 PT to request order for post-mastectomy compression bra and send to [employer] to schedule appointment, they will need to check benefits for coverage.  5/29/2025 orders routed to [employer] and requested order for bilateral Solaris full bra swell spots in size medium.    GOALS  Short Term Goals: 3 months  Pt to be seen for reassessment in 6-8 weeks after surgery.  Pt will demonstrate 100% knowledge of lymphedema precautions and signs of infection.  Pt to obtain compression garments for prophylactic concerns according to APTA clinical guidelines published in Journal of Physical Therapy.     Long Term Goals: deferred     Updated POC: 5/9/2025-7/09/2025  Short Term goals: 4 weeks  1. Patient will demonstrate 100% understanding of lymphedema risk reduction practices to include self monitoring for lymphedema. (progressing, not met)  2. Patient will demonstrate independence with Home Exercise program established. (progressing, not met)  3. Pt will increase AROM/PROM in shoulder abduction ROM to 150 degrees bilaterally to improve functional reach, carry, push, pull pain free. (progressing, not met)  4. Pt will increase AROM/PROM in shoulder flexion to 160 degrees bilaterally to improve functional reach, carry, push, pull pain free.(progressing, not met)  5. Pt will increase strength to >/= 4+ in gross UE musculature to improve tolerance to all functional activities pain free.  (progressing, not met)     Long Term Goals: 6 weeks   1.  Pt will increase AROM/PROM in shoulder flexion to 170 degrees bilaterally to improve functional reach, carry, push, pull pain free. (progressing, not met)  2. Pt will increase strength to 4+/5 in gross UE musculature to improve tolerance to all functional activities pain free. (progressing, not met)  3. Pt will demonstrate full/maximized tissue mobility to increase ROM and promote healthy tissue to be pain free at discharge. (progressing, not met)  4. Pt will report decrease in overall worst pain to 2/10 at discharge. (progressing, not met)  5. Pt will increase AROM/PROM in shoulder abduction ROM to 170 degrees bilaterally to improve functional reach, carry, push, pull pain free. (progressing, not met)  6. Patient will report compliance with walking program 5x week for 30 min each day to improve overall cardiovascular function and decrease cancer related fatigue at discharge. (progressing, not met)     Plan   Would benefit from swell spots at sub axillary region B, sized for Solaris full bra swell spots and requested orders.   Sent order to [employer] for post mastectomy bra fitting    Continue with established plan of care working toward PT goals.    Jenni Villalobos, PTA, CLT

## 2025-06-02 ENCOUNTER — CLINICAL SUPPORT (OUTPATIENT)
Dept: REHABILITATION | Facility: HOSPITAL | Age: 55
End: 2025-06-02
Payer: COMMERCIAL

## 2025-06-02 ENCOUNTER — PATIENT MESSAGE (OUTPATIENT)
Dept: HEMATOLOGY/ONCOLOGY | Facility: CLINIC | Age: 55
End: 2025-06-02
Payer: COMMERCIAL

## 2025-06-02 DIAGNOSIS — Z91.89 AT RISK FOR LYMPHEDEMA: Primary | ICD-10-CM

## 2025-06-02 PROCEDURE — 97140 MANUAL THERAPY 1/> REGIONS: CPT | Mod: PN

## 2025-06-04 ENCOUNTER — CLINICAL SUPPORT (OUTPATIENT)
Dept: REHABILITATION | Facility: HOSPITAL | Age: 55
End: 2025-06-04
Payer: COMMERCIAL

## 2025-06-04 DIAGNOSIS — Z91.89 AT RISK FOR LYMPHEDEMA: Primary | ICD-10-CM

## 2025-06-04 PROCEDURE — 97110 THERAPEUTIC EXERCISES: CPT | Mod: PN

## 2025-06-09 ENCOUNTER — CLINICAL SUPPORT (OUTPATIENT)
Dept: REHABILITATION | Facility: HOSPITAL | Age: 55
End: 2025-06-09
Payer: COMMERCIAL

## 2025-06-09 DIAGNOSIS — Z91.89 AT RISK FOR LYMPHEDEMA: Primary | ICD-10-CM

## 2025-06-09 PROCEDURE — 97140 MANUAL THERAPY 1/> REGIONS: CPT | Mod: PN

## 2025-06-09 NOTE — PROGRESS NOTES
[facility] Outpatient  Physical Therapy Progress Note  Lymphedema Therapy    Visit Date: 6/9/2025    Name: Concepcion De La Vega  MRN: [id number]  Therapy Diagnosis:   Encounter Diagnosis   Name Primary?    At risk for lymphedema Yes       Evaluation Date: 2/26/2025  Reassessment: 5/09/2025 requesting 2x week 6 weeks   Re-assessment 6/9/2024 1-2 visits per week x 4 weeks  Authorization:auth for 10 visits through 12/31/2025  Plan of Care Expiration: 05/09/2025-07/09/2025  Reassessment Due: 06/09/2025  Fact-G Completed: 1 / 2     Visit: 9/ 12 ( auth of 10 visits)  PTA Visit: 0 / 5  Time In: 11:07 AM  Time Out: 12:15 PM  Total Billable Time: 60 minutes     Precautions: Standard, cancer      Subjective     Patient states:had swelling in left breast on Friday, which has not happened prior got worse on Sat and got in touch with doctor about it.Also spent yesterday in shorts and noticed abdominal swelling increased, she usually has been wearing compression leggings for exercise. Did wear her binder to bed and it helped her last night to reduce swelling in abdominal region.  Prior report: Broke her shoulder a long time ago and was immobilized for a long time and have never had full ROM in right shoulder since then. Reports she had pain in chest at rib on the right with trunk rotation to left in supine; Stopped doing that one. Saw Dr Lama recently and he cleared me to begin exercising. Have been taking exercise classes online to increase strength and endurance with dog agility training.   Pain: 3/10   Location:abdomen    Objective   MMT hip glut max  L 3+/5, L 4+/5  glut med 5/5 B    ROM Re-assess 6/9/2025:  L UE ROM shoulder flexion 170 deg, shoulder abduction 170 deg  Baseline Measurements of BUEs  LANDMARK RIGHT UE (cm)  2/26/2025  Prehab RIGHT UE (cm)  5/9/2025  Post-op Re-assess   6/9/2025   W + 16 inches 35.5 35.0 34.6    W + 13 inches 34.3 34.0 33.2   Elbow 31.0 30.8 29.7   W + 7 inches 29.5 28.8  "27.8   W + 5 inches 26.5 25.8 24.2   Wrist 17.3 17.2 17.3   DPC 19.8 19.8 19.3   IP Thumb 7.0 7.0 6.4   Axillary chest girth    106.0    NP line chest girth   118.8 cm   Navel girth   106.5 cm   Arm Length 44.0      Garments recommended:   Sigvaris Secure Arm Sleeve with silicone band 15-20 mmHg size M2  Sigvaris Secure Gauntlet  15-20 mmHg size small  Pt to wear garments 6 weeks after surgery. Only to be worn during the day time and remove at night when sleeping.   Recommended to be worn for up to one year for prophylactics in reducing future risk of lymphedema.   JO ANN participated in / received the following treatment:  Recommended and sized for bioflect capris XL  deferred  Therapeutic Exercise to develop strength, endurance, ROM, flexibility, posture, and core stabilization for 0 minutes including:    Single limb bridge 10 sec hold 10 reps L with verbal cues  Table top with left hip extension and adduction cross over then into "frog leg" 10 reps with control.  Standing clock squats/ lungMemento  Skier squat 45 sec holds   Side step position with R leg against wall with trunk flexion easing into left hip with knee flexed and lifting up into stance. 10 reps each side    deferred  Stretches: UE/ trunk/ pelvis  deferred  Prone scapular stabilization   I, T, A  10 sec hold on  5 reps each and W  1 set of 10 reps each arm  deferred  Corner wall stretch 30 sec 3 reps  Ball up the wall 10 sec hold x 5 (feels good)  Inferior glide of HH  in front of mirror with verbal cues 5 min with lengthen and lift  ER with red tband 15 reps each arm  Towel slide with trunk rotation 10 sec hold x 10 performed on right and left side  Foam roller supine with scap depression 10 sec hold 10 reps- deferred due to time  Foam roller diaphragmatic breathing    deferred  Supine scapula retraction/depression 10 sec hold x 10  Supine T's with YTB x 10  LTR with arms in "V" position with 10 sec hold x 3  Side lying right/left shoulder abduction x " 10 ea  Right shoulder ER with manual resistance x 10  Thoracic rotation x 6 reps ea side  Supine bilateral ER with RTB 2 x 10      Manual Therapy to develop flexibility, extensibility, desensitization, pliability, and contour for 60 minutes including:  Pt received MLD to right upper extremity/trunk with pre-treatment short neck series to include:  cervical terminus, lateral and posterior neck, left axilla, abdomen, followed by full arm/trunk sequence (anterior and posterior) in supine and sidelying, with rework accessing all watershed areas on trunk. Also worked on lateral pathways B from axilla to abdominal region as well as deep abdominal nodes activated with breathing techniques and springing. Manual stretching to bilateral shoulders with some discomfort at end range. Instructed provided today on focus of cervical terminus, subaxilla and abdomen for self MLD. Pt with reduction in tightness in left breast after MFR.  Pt with fullness noted B subaxillary region.    Rock tape from right and left sub axillary area to facilitate lymphmotorcity in R in start pattern and L weave pattern, also placed weave pattern over abdominal region on both sides.  Education Provided  - Progress toward goals   - Role of therapy   - Activity modification  - Reviewed HEP  Provided recommendation of posture corrector brace  available online at [facility] Exercises Provided: Patient instructed to continue previously provided HEP. Will issue more comprehensive program next session  Exercises were reviewed and JO ANN was able to demonstrate them prior to the end of the session.  JO ANN demonstrated good  understanding of the education provided.   See EMR under Patient Instructions for exercises provided 2/26/2025.  GARMENTS:  6/2/2025 Routed orders to [employer] today for swell spots at sub axillary region B, sized at last tx for Solaris full bra swell spots   Sent order to [employer] for post mastectomy bra fitting  they have called her and starting process of getting her seen at Cancer center.   Assessment   Pt has met 4 out of 5 STG set, 3 of 6 LTG met, all others are in progress. Pt maintains her L UE girth however has swelling in lower abdomen that varies as well as now in left breast. L glut max significantly weaker than right.   She would benefit from a post mastectomy compression bra, would benefit from a consult with [facility], and they did call her and are setting up an appt for when they are at the Cancer center.  PT requested order for post-mastectomy compression bra and send to [facility] to schedule appointment, they will need to check benefits for coverage.  Pt will need to obtain a card from [facility] to call and schedule a consult.  Tightness in bilateral shoulders, will continue to work on this. Will add more rotator cuff strengthening next session.     Concepcion is a 55 y.o. female referred to outpatient physical therapy with a medical diagnosis of 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, estrogen receptor positive,  At Risk for Lymphedema with surgical procedure performed on  on 3/19/2025. Pt was seen today post-operatively to re-assess strength and ROM of BUEs, to reassess baseline circumferential measurements of BUEs to aid in the early detection of lymphedema post-operatively, and to provide pt education on exercises/precautions post-operative. Pt does exhibit any ROM impairments to L UE with limitations with shoulder flexion and shoulder abduction movements as well as rotator cuff and scapular stabilization weakness. Patient also presents with R subaxillary/truncal swelling compared to L side and would benefit from decongestive manual lymphatic drainage to assist with lymphatic support. Recommended to pt to continue wear compression sports bra at this time. This pt will benefit from skilled PT for address impairments following surgery such as pain, limited  ROM, or decreased mobility.      Plan of care discussed with patient: Yes  Pt's spiritual, cultural and educational needs considered and patient is agreeable to the plan of care and goals as stated below:       Anticipated barriers for therapy: none    Garments:  5/21/2025 PT to request order for post-mastectomy compression bra and send to [employer] to schedule appointment, they will need to check benefits for coverage.  5/29/2025 orders routed to [employer] and requested order for bilateral Solaris full bra swell spots in size medium.  6/9/2025 ar routed signed orders on solaris swell spots to [employer].  GOALS  Short Term Goals: 3 months  Pt to be seen for reassessment in 6-8 weeks after surgery.  Pt will demonstrate 100% knowledge of lymphedema precautions and signs of infection.  Pt to obtain compression garments for prophylactic concerns according to APTA clinical guidelines published in Journal of Physical Therapy.     Long Term Goals: deferred     Updated POC: 5/9/2025-7/09/2025  Short Term goals: 4 weeks  1. Patient will demonstrate 100% understanding of lymphedema risk reduction practices to include self monitoring for lymphedema. GOAL MET 6/9/2025  2. Patient will demonstrate independence with Home Exercise program established. GOAL MET 6/9/2025  3. Pt will increase AROM/PROM in shoulder abduction ROM to 150 degrees bilaterally to improve functional reach, carry, push, pull pain free. GOAL MET 6/9/2025  4. Pt will increase AROM/PROM in shoulder flexion to 160 degrees bilaterally to improve functional reach, carry, push, pull pain free.GOAL MET 6/9/2025  5. Pt will increase strength to >/= 4+ in gross UE musculature to improve tolerance to all functional activities pain free. (progressing, not met) 6/9/2025     Long Term Goals: 6 weeks   1.  Pt will increase AROM/PROM in shoulder flexion to 170 degrees bilaterally to improve functional reach, carry, push, pull pain free.  GOAL MET 6/9/2025  2. Pt will increase strength to 4+/5 in gross UE musculature to improve tolerance to all functional activities pain free. (progressing, not met)  3. Pt will demonstrate full/maximized tissue mobility to increase ROM and promote healthy tissue to be pain free at discharge. (progressing, not met)  4. Pt will report decrease in overall worst pain to 2/10 at discharge. (progressing, not met)  5. Pt will increase AROM/PROM in shoulder abduction ROM to 170 degrees bilaterally to improve functional reach, carry, push, pull pain free. GOAL MET 6/9/2025  6. Patient will report compliance with walking program 5x week for 30 min each day to improve overall cardiovascular function and decrease cancer related fatigue at discharge. GOAL MET 6/9/2025     Plan   Re-assessed today, recommend continuation of PT 1-2 visits per week x 4 weeks all goals in progress  Sent orders via routing to [employer] for solaris swell spots 6/9/2025  Continue with established plan of care working toward PT goals.    Dorie Mcclure, PT, CLT

## 2025-06-10 ENCOUNTER — TELEPHONE (OUTPATIENT)
Dept: HEMATOLOGY/ONCOLOGY | Facility: CLINIC | Age: 55
End: 2025-06-10
Payer: COMMERCIAL

## 2025-06-10 NOTE — TELEPHONE ENCOUNTER
Copied from CRM #[id number]. Topic: General Inquiry - Patient Advice  >> Grayson 10, 2025  4:51 PM Radha wrote:  Type: Needs Medical Advice  Who Called:  pt   Symptoms (please be specific):    How long has patient had these symptoms:    Pharmacy name and phone #:    Best Call Back Number: [phone]  Additional Information: pt is requesting a call back to reschedule appt on 6/12    Returned call to pt, cancelled appt for 6/12/25 and r/s for 6/13/25. Pt verbalized understanding, confirmed new day/time and thanked me for returning her call.

## 2025-06-13 ENCOUNTER — CLINICAL SUPPORT (OUTPATIENT)
Dept: REHABILITATION | Facility: HOSPITAL | Age: 55
End: 2025-06-13
Payer: COMMERCIAL

## 2025-06-13 DIAGNOSIS — Z91.89 AT RISK FOR LYMPHEDEMA: Primary | ICD-10-CM

## 2025-06-13 PROCEDURE — 97035 APP MDLTY 1+ULTRASOUND EA 15: CPT | Mod: PN,CQ

## 2025-06-13 PROCEDURE — 97140 MANUAL THERAPY 1/> REGIONS: CPT | Mod: PN,CQ

## 2025-06-13 NOTE — PROGRESS NOTES
"[facility]  Physical Therapy Progress Note  Lymphedema Therapy    Visit Date: 6/13/2025    Name: Concepcion De La Vega  MRN: [id number]  Therapy Diagnosis:   Encounter Diagnosis   Name Primary?    At risk for lymphedema Yes       Evaluation Date: 2/26/2025  Reassessment: 5/09/2025 requesting 2x week 6 weeks   Re-assessment 6/9/2024 1-2 visits per week x 4 weeks  Authorization:auth for 10 visits through 12/31/2025  Plan of Care Expiration: 05/09/2025-07/09/2025  Reassessment Due: 06/09/2025  Fact-G Completed: 1 / 2     Visit: 10/ 12 ( auth of 10 visits)  PTA Visit: 1 / 5  Time In: 4:04 PM  Time Out: 5:02 PM  Total Billable Time: 58 minutes     Precautions: Standard, cancer      Subjective     Patient states:  that she saw Dr Lama and he said that she definitely has lymphedema in her left breast and has a "healing ridge" of tissue in her lower abdomen. It tends to poke out when I sit up, "like a hernia, but he said that it wasn't a hernia." Today was first day that she wore the Bioflect and really like the way it felt, really comfortable,  want to order the leggings because the jes's stop just below the knee. Have been doing her agility exercises and some of the glute exercises that Dorie showed her.   Prior report: Broke her shoulder a long time ago and was immobilized for a long time and have never had full ROM in right shoulder since then. Reports she had pain in chest at rib on the right with trunk rotation to left in supine; Stopped doing that one. Saw Dr Lama recently and he cleared me to begin exercising. Have been taking exercise classes online to increase strength and endurance with dog agility training.   Pain: 3/10   Location:abdomen    Objective     Garments recommended:   Sigvaris Secure Arm Sleeve with silicone band 15-20 mmHg size M2  Sigvaris Secure Gauntlet  15-20 mmHg size small  Pt to wear garments 6 weeks after surgery. Only to be worn during the day time and " "remove at night when sleeping.   Recommended to be worn for up to one year for prophylactics in reducing future risk of lymphedema.   JO ANN participated in / received the following treatment:  Recommended and sized for bioflect capris XL  deferred  Therapeutic Exercise to develop strength, endurance, ROM, flexibility, posture, and core stabilization for 0 minutes including:    Single limb bridge 10 sec hold 10 reps L with verbal cues  Table top with left hip extension and adduction cross over then into "frog leg" 10 reps with control.  Standing clock squats/ lunges  Skier squat 45 sec holds   Side step position with R leg against wall with trunk flexion easing into left hip with knee flexed and lifting up into stance. 10 reps each side    deferred  Stretches: UE/ trunk/ pelvis  deferred  Prone scapular stabilization   I, T, A  10 sec hold on  5 reps each and W  1 set of 10 reps each arm  deferred  Corner wall stretch 30 sec 3 reps  Ball up the wall 10 sec hold x 5 (feels good)  Inferior glide of HH  in front of mirror with verbal cues 5 min with lengthen and lift  ER with red tband 15 reps each arm  Towel slide with trunk rotation 10 sec hold x 10 performed on right and left side  Foam roller supine with scap depression 10 sec hold 10 reps- deferred due to time  Foam roller diaphragmatic breathing    deferred  Supine scapula retraction/depression 10 sec hold x 10  Supine T's with YTB x 10  LTR with arms in "V" position with 10 sec hold x 3  Side lying right/left shoulder abduction x 10 ea  Right shoulder ER with manual resistance x 10  Thoracic rotation x 6 reps ea side  Supine bilateral ER with RTB 2 x 10      Manual Therapy to develop flexibility, extensibility, desensitization, pliability, and contour for 40 minutes including:  Pt received MLD to right upper extremity/trunk with pre-treatment short neck series to include:  cervical terminus, lateral and posterior neck, left axilla, abdomen, followed by full " arm/trunk sequence (anterior and posterior) in supine and sidelying, with rework accessing all watershed areas on trunk. Also worked on lateral pathways B from axilla to abdominal region as well as deep abdominal nodes activated with breathing techniques and springing. Manual stretching to bilateral shoulders with some discomfort at end range. Instructed provided today on focus of cervical terminus, subaxilla and abdomen for self MLD. Pt with reduction in tightness in left breast after MFR.  Pt with fullness noted B subaxillary region.   Ultrasound was performed to lower abdominal area x 10' at 1.0 w.cm2 50% pulsed 3 mH, followed by Ultrasound x 8' to left medial breast at 1.0 w/cm2 50% pulsed 3 mH to address medial breast swelling/hardness. Decreased hardness in left medial breast after MLD, stretching, and ultrasound.   Rock tape to left sub axillary area in edema technique and Rock tape applied in star pattern to left medial breast swelling. Pt instructed to remove if painful, can remain taped up to 72 hrs, pat dry after bathing. Pt verbalized understanding of all instruction.   Education Provided  - Progress toward goals   - Role of therapy   - Activity modification  - Reviewed HEP  Provided recommendation of posture corrector brace  available online at [facility] Exercises Provided: Patient instructed to continue previously provided HEP. Will issue more comprehensive program next session  Exercises were reviewed and JO ANN was able to demonstrate them prior to the end of the session.  JO ANN demonstrated good  understanding of the education provided.   See EMR under Patient Instructions for exercises provided 2/26/2025.  GARMENTS:  6/2/2025 Routed orders to [employer] today for swell spots at sub axillary region B, sized at last tx for Solaris full bra swell spots   Sent order to [employer] for post mastectomy bra fitting they have called her and starting process of getting her seen at  [facility] center.   Assessment   Pt with left breast lymphedema and healing ridge of tissue in lower abdominal area. Pt has had pelvic floor therapy in the past and was encourage to engage her pelvic floor to prevent abdominal bulging. Added ultrasound to left breast, with decreased swelling/hardness post treatment. Ultrasound also to lower abdominal area, will monitor response.  L glut max significantly weaker than right.  She would benefit from a post mastectomy compression bra, would benefit from a consult with [employer], and they did call her and are setting up an appt for when they are at the Cancer center.  PT requested order for post-mastectomy compression bra and send to [employer] to schedule appointment, they will need to check benefits for coverage.  Pt will need to obtain a card from [employer] to call and schedule a consult.  Tightness in bilateral shoulders, will continue to work on this. Will add more rotator cuff strengthening next session.     Concepcion is a 55 y.o. female referred to outpatient physical therapy with a medical diagnosis of 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, estrogen receptor positive,  At Risk for Lymphedema with surgical procedure performed on  on 3/19/2025. Pt was seen today post-operatively to re-assess strength and ROM of BUEs, to reassess baseline circumferential measurements of BUEs to aid in the early detection of lymphedema post-operatively, and to provide pt education on exercises/precautions post-operative. Pt does exhibit any ROM impairments to L UE with limitations with shoulder flexion and shoulder abduction movements as well as rotator cuff and scapular stabilization weakness. Patient also presents with R subaxillary/truncal swelling compared to L side and would benefit from decongestive manual lymphatic drainage to assist with lymphatic support. Recommended to pt to continue wear compression sports bra at this time.  This pt will benefit from skilled PT for address impairments following surgery such as pain, limited ROM, or decreased mobility.      Plan of care discussed with patient: Yes  Pt's spiritual, cultural and educational needs considered and patient is agreeable to the plan of care and goals as stated below:       Anticipated barriers for therapy: none    Garments:  5/21/2025 PT to request order for post-mastectomy compression bra and send to [employer] to schedule appointment, they will need to check benefits for coverage.  5/29/2025 orders routed to [employer] and requested order for bilateral Solaris full bra swell spots in size medium.  6/9/2025 ar routed signed orders on solaris swell spots to [employer].  GOALS  Short Term Goals: 3 months  Pt to be seen for reassessment in 6-8 weeks after surgery.  Pt will demonstrate 100% knowledge of lymphedema precautions and signs of infection.  Pt to obtain compression garments for prophylactic concerns according to APTA clinical guidelines published in Journal of Physical Therapy.     Long Term Goals: deferred     Updated POC: 5/9/2025-7/09/2025  Short Term goals: 4 weeks  1. Patient will demonstrate 100% understanding of lymphedema risk reduction practices to include self monitoring for lymphedema. GOAL MET 6/9/2025  2. Patient will demonstrate independence with Home Exercise program established. GOAL MET 6/9/2025  3. Pt will increase AROM/PROM in shoulder abduction ROM to 150 degrees bilaterally to improve functional reach, carry, push, pull pain free. GOAL MET 6/9/2025  4. Pt will increase AROM/PROM in shoulder flexion to 160 degrees bilaterally to improve functional reach, carry, push, pull pain free.GOAL MET 6/9/2025  5. Pt will increase strength to >/= 4+ in gross UE musculature to improve tolerance to all functional activities pain free. (progressing, not met) 6/9/2025     Long Term Goals: 6 weeks   1.  Pt will increase AROM/PROM in  shoulder flexion to 170 degrees bilaterally to improve functional reach, carry, push, pull pain free. GOAL MET 6/9/2025  2. Pt will increase strength to 4+/5 in gross UE musculature to improve tolerance to all functional activities pain free. (progressing, not met)  3. Pt will demonstrate full/maximized tissue mobility to increase ROM and promote healthy tissue to be pain free at discharge. (progressing, not met)  4. Pt will report decrease in overall worst pain to 2/10 at discharge. (progressing, not met)  5. Pt will increase AROM/PROM in shoulder abduction ROM to 170 degrees bilaterally to improve functional reach, carry, push, pull pain free. GOAL MET 6/9/2025  6. Patient will report compliance with walking program 5x week for 30 min each day to improve overall cardiovascular function and decrease cancer related fatigue at discharge. GOAL MET 6/9/2025     Plan   recommend continuation of PT 1-2 visits per week x 4 weeks all goals in progress  Sent [facility] via routing [facility] [employer] [facility] 6/9/[facility]  Continue with established plan of care working toward PT goals.    Jenni Villalobos, PTA, CLT

## 2025-06-16 ENCOUNTER — CLINICAL SUPPORT (OUTPATIENT)
Dept: REHABILITATION | Facility: HOSPITAL | Age: 55
End: 2025-06-16
Payer: COMMERCIAL

## 2025-06-16 ENCOUNTER — PATIENT MESSAGE (OUTPATIENT)
Dept: ADMINISTRATIVE | Facility: HOSPITAL | Age: 55
End: 2025-06-16
Payer: COMMERCIAL

## 2025-06-16 ENCOUNTER — LAB VISIT (OUTPATIENT)
Dept: LAB | Facility: HOSPITAL | Age: 55
End: 2025-06-16
Attending: INTERNAL MEDICINE
Payer: COMMERCIAL

## 2025-06-16 DIAGNOSIS — Z17.0 MALIGNANT NEOPLASM OF UPPER-INNER QUADRANT OF RIGHT BREAST IN FEMALE, ESTROGEN RECEPTOR POSITIVE: ICD-10-CM

## 2025-06-16 DIAGNOSIS — Z91.89 AT RISK FOR LYMPHEDEMA: Primary | ICD-10-CM

## 2025-06-16 DIAGNOSIS — C50.211 MALIGNANT NEOPLASM OF UPPER-INNER QUADRANT OF RIGHT BREAST IN FEMALE, ESTROGEN RECEPTOR POSITIVE: ICD-10-CM

## 2025-06-16 LAB
ABSOLUTE EOSINOPHIL (OHS): 0.23 K/UL
ABSOLUTE MONOCYTE (OHS): 0.78 K/UL (ref 0.3–1)
ABSOLUTE NEUTROPHIL COUNT (OHS): 4.74 K/UL (ref 1.8–7.7)
ALBUMIN SERPL BCP-MCNC: 4.1 G/DL (ref 3.5–5.2)
ALP SERPL-CCNC: 89 UNIT/L (ref 40–150)
ALT SERPL W/O P-5'-P-CCNC: 53 UNIT/L (ref 10–44)
ANION GAP (OHS): 11 MMOL/L (ref 8–16)
AST SERPL-CCNC: 37 UNIT/L (ref 11–45)
BASOPHILS # BLD AUTO: 0.06 K/UL
BASOPHILS NFR BLD AUTO: 0.7 %
BILIRUB SERPL-MCNC: 0.2 MG/DL (ref 0.1–1)
BUN SERPL-MCNC: 12 MG/DL (ref 6–20)
CALCIUM SERPL-MCNC: 9.5 MG/DL (ref 8.7–10.5)
CHLORIDE SERPL-SCNC: 103 MMOL/L (ref 95–110)
CO2 SERPL-SCNC: 26 MMOL/L (ref 23–29)
CREAT SERPL-MCNC: 0.9 MG/DL (ref 0.5–1.4)
ERYTHROCYTE [DISTWIDTH] IN BLOOD BY AUTOMATED COUNT: 13.8 % (ref 11.5–14.5)
ESTRADIOL SERPL HS-MCNC: <10 PG/ML
FSH SERPL-ACNC: 111.19 MIU/ML
GFR SERPLBLD CREATININE-BSD FMLA CKD-EPI: >60 ML/MIN/1.73/M2
GLUCOSE SERPL-MCNC: 107 MG/DL (ref 70–110)
HCT VFR BLD AUTO: 39.1 % (ref 37–48.5)
HGB BLD-MCNC: 13 GM/DL (ref 12–16)
IMM GRANULOCYTES # BLD AUTO: 0.02 K/UL (ref 0–0.04)
IMM GRANULOCYTES NFR BLD AUTO: 0.2 % (ref 0–0.5)
LH SERPL-ACNC: 33 MIU/ML
LYMPHOCYTES # BLD AUTO: 2.97 K/UL (ref 1–4.8)
MCH RBC QN AUTO: 29.1 PG (ref 27–31)
MCHC RBC AUTO-ENTMCNC: 33.2 G/DL (ref 32–36)
MCV RBC AUTO: 88 FL (ref 82–98)
NUCLEATED RBC (/100WBC) (OHS): 0 /100 WBC
PLATELET # BLD AUTO: 290 K/UL (ref 150–450)
PMV BLD AUTO: 10.2 FL (ref 9.2–12.9)
POTASSIUM SERPL-SCNC: 4.1 MMOL/L (ref 3.5–5.1)
PROT SERPL-MCNC: 7.8 GM/DL (ref 6–8.4)
RBC # BLD AUTO: 4.46 M/UL (ref 4–5.4)
RELATIVE EOSINOPHIL (OHS): 2.6 %
RELATIVE LYMPHOCYTE (OHS): 33.8 % (ref 18–48)
RELATIVE MONOCYTE (OHS): 8.9 % (ref 4–15)
RELATIVE NEUTROPHIL (OHS): 53.8 % (ref 38–73)
SODIUM SERPL-SCNC: 140 MMOL/L (ref 136–145)
WBC # BLD AUTO: 8.8 K/UL (ref 3.9–12.7)

## 2025-06-16 PROCEDURE — 84295 ASSAY OF SERUM SODIUM: CPT | Mod: PN

## 2025-06-16 PROCEDURE — 97035 APP MDLTY 1+ULTRASOUND EA 15: CPT | Mod: PN

## 2025-06-16 PROCEDURE — 83001 ASSAY OF GONADOTROPIN (FSH): CPT

## 2025-06-16 PROCEDURE — 36415 COLL VENOUS BLD VENIPUNCTURE: CPT | Mod: PN

## 2025-06-16 PROCEDURE — 85025 COMPLETE CBC W/AUTO DIFF WBC: CPT | Mod: PN

## 2025-06-16 PROCEDURE — 83002 ASSAY OF GONADOTROPIN (LH): CPT

## 2025-06-16 PROCEDURE — 82670 ASSAY OF TOTAL ESTRADIOL: CPT

## 2025-06-16 PROCEDURE — 97140 MANUAL THERAPY 1/> REGIONS: CPT | Mod: PN

## 2025-06-16 NOTE — PROGRESS NOTES
"[facility] Out[facility]  Physical Therapy Progress Note  Lymphedema Therapy    Visit Date: 6/16/2025    Name: Concepcion De La Vega  MRN: [id number]  Therapy Diagnosis:   Encounter Diagnoses   Name Primary?    At risk for lymphedema Yes    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, estrogen receptor positive        Evaluation Date: 2/26/2025  Reassessment: 5/09/2025 requesting 2x week 6 weeks   Re-assessment 6/9/2024 1-2 visits per week x 4 weeks    Authorization:auth for 10 visits through 12/31/2025, 20 visits from 5/9/2025-12/31/2025  Plan of Care Expiration: 05/09/2025-07/09/2025  Reassessment Due: 07/09/2025  Fact-G Completed: 1 / 2     Visit: 12/ 12 ( auth of 10 visits)  PTA Visit: 0 / 5  Time In: 11:12 AM  Time Out: 12:08 PM  Total Billable Time: 56 minutes     Precautions: Standard, cancer      Subjective     Patient states: Still have the tape on my left side from Friday, do feel like the tape really helps and is doing something for the fullness. Can also tell a difference in my abdominal scar and feeling softer, not as nodular. The medial side of the left breast doesn't feel as hard.  Reports that she saw Dr Lama and he said that she definitely has lymphedema in her left breast and has a "healing ridge" of tissue in her lower abdomen. It tends to poke out when I sit up, "like a hernia, but he said that it wasn't a hernia." Reports she really likes the bioflect jes leggings but didn't get to wear them today.   Have been doing her agility exercises and some of the glute exercises that Dorie showed her.   Have been taking exercise classes online to increase strength and endurance with dog agility training.   Didn't get to wear the bioflect compression capris today.     Pain: 3/10   Location:abdomen    Objective     Garments recommended:   Sigvaris Secure Arm Sleeve with silicone band 15-20 mmHg size M2  Sigvaris Secure Gauntlet  15-20 mmHg size small  Pt to wear " "garments 6 weeks after surgery. Only to be worn during the day time and remove at night when sleeping.   Recommended to be worn for up to one year for prophylactics in reducing future risk of lymphedema.   JO ANN participated in / received the following treatment:  Recommended and sized for bioflect capris XL  deferred  Therapeutic Exercise to develop strength, endurance, ROM, flexibility, posture, and core stabilization for 0 minutes including:    Single limb bridge 10 sec hold 10 reps L with verbal cues  Table top with left hip extension and adduction cross over then into "frog leg" 10 reps with control.  Standing clock squats/ lunges  Skier squat 45 sec holds   Side step position with R leg against wall with trunk flexion easing into left hip with knee flexed and lifting up into stance. 10 reps each side    deferred  Stretches: UE/ trunk/ pelvis  deferred  Prone scapular stabilization   I, T, A  10 sec hold on  5 reps each and W  1 set of 10 reps each arm  deferred  Corner wall stretch 30 sec 3 reps  Ball up the wall 10 sec hold x 5 (feels good)  Inferior glide of HH  in front of mirror with verbal cues 5 min with lengthen and lift  ER with red tband 15 reps each arm  Towel slide with trunk rotation 10 sec hold x 10 performed on right and left side  Foam roller supine with scap depression 10 sec hold 10 reps- deferred due to time  Foam roller diaphragmatic breathing    deferred  Supine scapula retraction/depression 10 sec hold x 10  Supine T's with YTB x 10  LTR with arms in "V" position with 10 sec hold x 3  Side lying right/left shoulder abduction x 10 ea  Right shoulder ER with manual resistance x 10  Thoracic rotation x 6 reps ea side  Supine bilateral ER with RTB 2 x 10      Manual Therapy to develop flexibility, extensibility, desensitization, pliability, and contour for 40 minutes including:  Pt received MLD to right upper extremity/trunk, left breast with pre-treatment short neck series to include:  " cervical terminus, lateral and posterior neck, left axilla, abdomen, followed by full arm/trunk sequence (anterior and posterior) in supine and sidelying, with rework accessing all watershed areas on trunk. Also worked on lateral pathways B from axilla to abdominal region as well as deep abdominal nodes activated with breathing techniques and springing.     Manual stretching to bilateral shoulders with some discomfort at end range. Instructed provided today on focus of cervical terminus, subaxilla and abdomen for self MLD. Pt with reduction in tightness in left breast after MFR.  Pt with fullness noted B subaxillary region.     Direct Contact Modalities after being cleared for contraindications for 16 minutes including:  Ultrasound was performed to lower abdominal area x 8' at 1.0 w.cm2 50% pulsed 3 mH, followed by Ultrasound x 8' to left medial breast at 1.0 w/cm2 50% pulsed 3 mH to address medial breast swelling/hardness. Decreased hardness in left medial breast after MLD, stretching, and ultrasound.     Deferred today (has had tape on for 3 days) will resume next visit. Rock tape to left sub axillary area in edema technique and Rock tape applied in star pattern to left medial breast swelling. Pt instructed to remove if painful, can remain taped up to 72 hrs, pat dry after bathing. Pt verbalized understanding of all instruction.     Education Provided  - Progress toward goals   - Role of therapy   - Activity modification  - Reviewed HEP  Provided recommendation of posture corrector brace  available online at [facility] Exercises Provided: Patient instructed to continue previously provided HEP. Will issue more comprehensive program next session  Exercises were reviewed and JO ANN was able to demonstrate them prior to the end of the session.  JO ANN demonstrated good  understanding of the education provided.   See EMR under Patient Instructions for exercises provided 2/26/2025.    Assessment   Pt with left  breast lymphedema and healing ridge of tissue in lower abdominal area. Pt has had pelvic floor therapy in the past and was encourage to engage her pelvic floor to prevent abdominal bulging. Continued ultrasound to abdominal scaring, left breast, with decreased swelling/hardness post treatment. L glut max significantly weaker than right.  She would benefit from a post mastectomy compression bra, would benefit from a consult with [employer], and they did call her and are setting up an appt for when they are at the Cancer center. Patient attempted to call [employer], was by  they would return her call with follow up however had not heard back from them. Pt will be calling [employer] again to follow up regarding swell spots.     PT requested order for post-mastectomy compression bra and send to [employer] to schedule appointment, they will need to check benefits for coverage.  Pt will need to obtain a card from [employer] to call and schedule a consult.  Tightness in bilateral shoulders, will continue to work on this. Will add more rotator cuff strengthening next session.     Concepcion is a 55 y.o. female referred to outpatient physical therapy with a medical diagnosis of 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, estrogen receptor positive,  At Risk for Lymphedema with surgical procedure performed on  on 3/19/2025. Pt was seen today post-operatively to re-assess strength and ROM of BUEs, to reassess baseline circumferential measurements of BUEs to aid in the early detection of lymphedema post-operatively, and to provide pt education on exercises/precautions post-operative. Pt does exhibit any ROM impairments to L UE with limitations with shoulder flexion and shoulder abduction movements as well as rotator cuff and scapular stabilization weakness. Patient also presents with R subaxillary/truncal swelling compared to L side and would  benefit from decongestive manual lymphatic drainage to assist with lymphatic support. Recommended to pt to continue wear compression sports bra at this time. This pt will benefit from skilled PT for address impairments following surgery such as pain, limited ROM, or decreased mobility.      Plan of care discussed with patient: Yes  Pt's spiritual, cultural and educational needs considered and patient is agreeable to the plan of care and goals as stated below:       Anticipated barriers for therapy: none    Garments:  5/21/2025 PT to request order for post-mastectomy compression bra and send to [email or web address] to schedule appointment, they will need to check benefits for coverage.  5/29/2025 orders routed to [email or web address] and requested order for bilateral Solaris full bra swell spots in size medium.  6/2/2025 Routed orders to [employer] today for swell spots at sub axillary region B, sized at last tx for Solaris full bra swell spots   Sent order to [email or web address] for post mastectomy bra fitting they have called her and starting process of getting her seen at Cancer center.   6/9/2025 ar routed signed orders on solaris swell spots to [employer] today.      GOALS  Short Term Goals: 3 months  Pt to be seen for reassessment in 6-8 weeks after surgery.  Pt will demonstrate 100% knowledge of lymphedema precautions and signs of infection.  Pt to obtain compression garments for prophylactic concerns according to APTA clinical guidelines published in Journal of Physical Therapy.     Long Term Goals: deferred     Updated POC: 5/9/2025-7/09/2025  Short Term goals: 4 weeks  1. Patient will demonstrate 100% understanding of lymphedema risk reduction practices to include self monitoring for lymphedema. GOAL MET 6/9/2025  2. Patient will demonstrate independence with Home Exercise program established. GOAL MET 6/9/2025  3. Pt will increase AROM/PROM in shoulder abduction ROM to 150 degrees bilaterally  to improve functional reach, carry, push, pull pain free. GOAL MET 6/9/2025  4. Pt will increase AROM/PROM in shoulder flexion to 160 degrees bilaterally to improve functional reach, carry, push, pull pain free.GOAL MET 6/9/2025  5. Pt will increase strength to >/= 4+ in gross UE musculature to improve tolerance to all functional activities pain free. (progressing, not met) 6/9/2025     Long Term Goals: 6 weeks   1.  Pt will increase AROM/PROM in shoulder flexion to 170 degrees bilaterally to improve functional reach, carry, push, pull pain free. GOAL MET 6/9/2025  2. Pt will increase strength to 4+/5 in gross UE musculature to improve tolerance to all functional activities pain free. (progressing, not met)  3. Pt will demonstrate full/maximized tissue mobility to increase ROM and promote healthy tissue to be pain free at discharge. (progressing, not met)  4. Pt will report decrease in overall worst pain to 2/10 at discharge. (progressing, not met)  5. Pt will increase AROM/PROM in shoulder abduction ROM to 170 degrees bilaterally to improve functional reach, carry, push, pull pain free. GOAL MET 6/9/2025  6. Patient will report compliance with walking program 5x week for 30 min each day to improve overall cardiovascular function and decrease cancer related fatigue at discharge. GOAL MET 6/9/2025     Plan   recommend continuation of PT 1-2 visits per week x 4 weeks all goals in progress 6/9/2025-7/9/2025  Sent orders via routing to [email or web address] for solaris swell spots 6/9/2025, pt to call to follow up on order.  Continue with established plan of care working toward PT goals.    Shy Henriquez, PT, CLT

## 2025-06-17 ENCOUNTER — TELEPHONE (OUTPATIENT)
Dept: DERMATOLOGY | Facility: CLINIC | Age: 55
End: 2025-06-17
Payer: COMMERCIAL

## 2025-06-17 ENCOUNTER — RESULTS FOLLOW-UP (OUTPATIENT)
Dept: HEMATOLOGY/ONCOLOGY | Facility: CLINIC | Age: 55
End: 2025-06-17

## 2025-06-17 ENCOUNTER — PATIENT MESSAGE (OUTPATIENT)
Dept: DERMATOLOGY | Facility: CLINIC | Age: 55
End: 2025-06-17
Payer: COMMERCIAL

## 2025-06-18 ENCOUNTER — CLINICAL SUPPORT (OUTPATIENT)
Dept: REHABILITATION | Facility: HOSPITAL | Age: 55
End: 2025-06-18
Payer: COMMERCIAL

## 2025-06-18 ENCOUNTER — OFFICE VISIT (OUTPATIENT)
Dept: HEMATOLOGY/ONCOLOGY | Facility: CLINIC | Age: 55
End: 2025-06-18
Payer: COMMERCIAL

## 2025-06-18 VITALS
SYSTOLIC BLOOD PRESSURE: 155 MMHG | TEMPERATURE: 97 F | HEIGHT: 66 IN | OXYGEN SATURATION: 99 % | WEIGHT: 210.31 LBS | BODY MASS INDEX: 33.8 KG/M2 | HEART RATE: 68 BPM | DIASTOLIC BLOOD PRESSURE: 88 MMHG | RESPIRATION RATE: 18 BRPM

## 2025-06-18 DIAGNOSIS — M81.0 OSTEOPOROSIS WITHOUT CURRENT PATHOLOGICAL FRACTURE, UNSPECIFIED OSTEOPOROSIS TYPE: ICD-10-CM

## 2025-06-18 DIAGNOSIS — Z17.0 MALIGNANT NEOPLASM OF UPPER-INNER QUADRANT OF RIGHT BREAST IN FEMALE, ESTROGEN RECEPTOR POSITIVE: Primary | ICD-10-CM

## 2025-06-18 DIAGNOSIS — Z91.89 AT RISK FOR LYMPHEDEMA: Primary | ICD-10-CM

## 2025-06-18 DIAGNOSIS — Z79.811 USE OF ANASTROZOLE: ICD-10-CM

## 2025-06-18 DIAGNOSIS — C50.211 MALIGNANT NEOPLASM OF UPPER-INNER QUADRANT OF RIGHT BREAST IN FEMALE, ESTROGEN RECEPTOR POSITIVE: Primary | ICD-10-CM

## 2025-06-18 PROCEDURE — 99999 PR PBB SHADOW E&M-EST. PATIENT-LVL IV: CPT | Mod: PBBFAC,,, | Performed by: NURSE PRACTITIONER

## 2025-06-18 PROCEDURE — 97035 APP MDLTY 1+ULTRASOUND EA 15: CPT | Mod: PN

## 2025-06-18 PROCEDURE — 3008F BODY MASS INDEX DOCD: CPT | Mod: CPTII,S$GLB,, | Performed by: NURSE PRACTITIONER

## 2025-06-18 PROCEDURE — 1159F MED LIST DOCD IN RCRD: CPT | Mod: CPTII,S$GLB,, | Performed by: NURSE PRACTITIONER

## 2025-06-18 PROCEDURE — 97140 MANUAL THERAPY 1/> REGIONS: CPT | Mod: PN

## 2025-06-18 PROCEDURE — 3079F DIAST BP 80-89 MM HG: CPT | Mod: CPTII,S$GLB,, | Performed by: NURSE PRACTITIONER

## 2025-06-18 PROCEDURE — 99214 OFFICE O/P EST MOD 30 MIN: CPT | Mod: S$GLB,,, | Performed by: NURSE PRACTITIONER

## 2025-06-18 PROCEDURE — 3077F SYST BP >= 140 MM HG: CPT | Mod: CPTII,S$GLB,, | Performed by: NURSE PRACTITIONER

## 2025-06-18 PROCEDURE — 3044F HG A1C LEVEL LT 7.0%: CPT | Mod: CPTII,S$GLB,, | Performed by: NURSE PRACTITIONER

## 2025-06-18 NOTE — PROGRESS NOTES
"[facility] Outpatient  Physical Therapy Progress Note  Lymphedema Therapy    Visit Date: 6/18/2025    Name: Concepcion De La Vega  MRN: [id number]  Therapy Diagnosis:         Evaluation Date: 2/26/2025  Reassessment: 5/09/2025 requesting 2x week 6 weeks   Re-assessment 6/9/2024 1-2 visits per week x 4 weeks    Authorization:auth for 10 visits through 12/31/2025, 20 visits from 5/9/2025-12/31/2025  Plan of Care Expiration: 05/09/2025-07/09/2025  Reassessment Due: 07/09/2025  Fact-G Completed: 1 / 2     Visit: 12/ 12 ( auth of 20 visits) added 8 visits at reassess 13/20  PTA Visit: 0 / 5  Time In: 10 AM  Time Out: 11:05 AM  Total Billable Time: 65 minutes     Precautions: Standard, cancer      Subjective     Patient states: ultrasound seem to help.  Noted this am her left breast was larger. Better after exercise, and pt says bought an Katerina bra online at [employer]    Prior:  Can also tell a difference in my abdominal scar and feeling softer, not as nodular. The medial side of the left breast doesn't feel as hard.  Reports that she saw Dr Lama and he said that she definitely has lymphedema in her left breast and has a "healing ridge" of tissue in her lower abdomen. It tends to poke out when I sit up, "like a hernia, but he said that it wasn't a hernia." Reports she really likes the bioflect jes leggings but didn't get to wear them today.   Have been doing her agility exercises and some of the glute exercises that Dorie showed her.   Have been taking exercise classes online to increase strength and endurance with dog agility training.   Didn't get to wear the bioflect compression capris today.     Pain: 3/10   Location:abdomen    Objective     Garments recommended:   Sigvaris Secure Arm Sleeve with silicone band 15-20 mmHg size M2  Sigvaris Secure Gauntlet  15-20 mmHg size small  Pt to wear garments 6 weeks after surgery. Only to be worn during the day time and remove at night when sleeping. " "  Recommended to be worn for up to one year for prophylactics in reducing future risk of lymphedema.   JO ANN participated in / received the following treatment:  Recommended and sized for bioflect capris XL  deferred  Therapeutic Exercise to develop strength, endurance, ROM, flexibility, posture, and core stabilization for 0 minutes including:    Single limb bridge 10 sec hold 10 reps L with verbal cues  Table top with left hip extension and adduction cross over then into "frog leg" 10 reps with control.  Standing clock squats/ lunges  Skier squat 45 sec holds   Side step position with R leg against wall with trunk flexion easing into left hip with knee flexed and lifting up into stance. 10 reps each side    deferred  Stretches: UE/ trunk/ pelvis  deferred  Prone scapular stabilization   I, T, A  10 sec hold on  5 reps each and W  1 set of 10 reps each arm  deferred  Corner wall stretch 30 sec 3 reps  Ball up the wall 10 sec hold x 5 (feels good)  Inferior glide of HH  in front of mirror with verbal cues 5 min with lengthen and lift  ER with red tband 15 reps each arm  Towel slide with trunk rotation 10 sec hold x 10 performed on right and left side  Foam roller supine with scap depression 10 sec hold 10 reps- deferred due to time  Foam roller diaphragmatic breathing    deferred  Supine scapula retraction/depression 10 sec hold x 10  Supine T's with YTB x 10  LTR with arms in "V" position with 10 sec hold x 3  Side lying right/left shoulder abduction x 10 ea  Right shoulder ER with manual resistance x 10  Thoracic rotation x 6 reps ea side  Supine bilateral ER with RTB 2 x 10      Manual Therapy to develop flexibility, extensibility, desensitization, pliability, and contour for 50 minutes including:  Pt received MLD to right upper extremity/trunk, left breast with pre-treatment short neck series to include:  cervical terminus, lateral and posterior neck, left axilla, abdomen, followed by full arm/trunk sequence " (anterior and posterior) in supine and sidelying, with rework accessing all watershed areas on trunk. Also worked on lateral pathways B from axilla to abdominal region as well as deep abdominal nodes activated with breathing techniques and springing.   Rock tape to left and right  sub axillary area in edema technique and Rock tape applied in star pattern to left medial breast swelling also to abdominal region bilaterally. Pt instructed to remove if painful, can remain taped up to 72 hrs, pat dry after bathing. Pt verbalized understanding of all instruction.   Manual stretching to bilateral shoulders with some discomfort at end range. Previously Instructed on focus of cervical terminus, subaxilla and abdomen for self MLD. Pt with reduction in tightness in left breast after MFR.  Pt with fullness noted B subaxillary region.     Direct Contact Modalities after being cleared for contraindications for 15 minutes including:  Ultrasound was performed to lower abdominal area x 8' at 1.0 w.cm2 50% pulsed 3 mH, followed by Ultrasound x 7' to left medial breast at 1.0 w/cm2 50% pulsed 3 mH to address medial breast swelling/hardness. Decreased hardness in left medial breast after MLD, stretching, and ultrasound.   Education Provided  - Progress toward goals   - Role of therapy   - Activity modification  - Reviewed HEP  Provided recommendation of posture corrector brace  available online at [facility] Exercises Provided: Patient instructed to continue previously provided HEP. Will issue more comprehensive program next session  Exercises were reviewed and JO ANN was able to demonstrate them prior to the end of the session.  JO ANN demonstrated good  understanding of the education provided.   See EMR under Patient Instructions for exercises provided 2/26/2025.    Assessment   Pt with left breast lymphedema and healing ridge of tissue in lower abdominal area. Pt has had pelvic floor therapy in the past and was encourage to  engage her pelvic floor to prevent abdominal bulging. Continued ultrasound to abdominal scaring, left breast, with decreased swelling/hardness post treatment. L glut max significantly weaker than right.  She would benefit from a post mastectomy compression bra, would benefit from a consult with [employer], and they did call her and are setting up an appt for when they are at the Cancer center. Patient attempted to call [employer], was by  they would return her call with follow up however had not heard back from them. Pt will be calling [employer] again to follow up regarding swell spots.     PT requested order for post-mastectomy compression bra and send to [employer] to schedule appointment, they will need to check benefits for coverage.  Pt will need to obtain a card from [employer] to call and schedule a consult.  Tightness in bilateral shoulders, will continue to work on this. Will add more rotator cuff strengthening next session.     Concepcion is a 55 y.o. female referred to outpatient physical therapy with a medical diagnosis of 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, estrogen receptor positive,  At Risk for Lymphedema with surgical procedure performed on  on 3/19/2025. Pt was seen today post-operatively to re-assess strength and ROM of BUEs, to reassess baseline circumferential measurements of BUEs to aid in the early detection of lymphedema post-operatively, and to provide pt education on exercises/precautions post-operative. Pt does exhibit any ROM impairments to L UE with limitations with shoulder flexion and shoulder abduction movements as well as rotator cuff and scapular stabilization weakness. Patient also presents with R subaxillary/truncal swelling compared to L side and would benefit from decongestive manual lymphatic drainage to assist with lymphatic support. Recommended to pt to continue wear compression sports  bra at this time. This pt will benefit from skilled PT for address impairments following surgery such as pain, limited ROM, or decreased mobility.      Plan of care discussed with patient: Yes  Pt's spiritual, cultural and educational needs considered and patient is agreeable to the plan of care and goals as stated below:       Anticipated barriers for therapy: none    Garments:  5/21/2025 PT to request order for post-mastectomy compression bra and send to [employer] to schedule appointment, they will need to check benefits for coverage.  5/29/2025 orders routed to [employer] and requested order for bilateral Solaris full bra swell spots in size medium.  6/2/2025 Routed orders to [employer] today for swell spots at sub axillary region B, sized at last tx for Solaris full bra swell spots   Sent order to [employer] for post mastectomy bra fitting they have called her and starting process of getting her seen at Cancer center.   6/9/2025 ar routed signed orders on solaris swell spots to [facility] today.      GOALS  Short Term Goals: 3 months  Pt to be seen for reassessment in 6-8 weeks after surgery.  Pt will demonstrate 100% knowledge of lymphedema precautions and signs of infection.  Pt to obtain compression garments for prophylactic concerns according to APTA clinical guidelines published in Journal of Physical Therapy.     Long Term Goals: deferred     Updated POC: 5/9/2025-7/09/2025  Short Term goals: 4 weeks  1. Patient will demonstrate 100% understanding of lymphedema risk reduction practices to include self monitoring for lymphedema. GOAL MET 6/9/2025  2. Patient will demonstrate independence with Home Exercise program established. GOAL MET 6/9/2025  3. Pt will increase AROM/PROM in shoulder abduction ROM to 150 degrees bilaterally to improve functional reach, carry, push, pull pain free. GOAL MET 6/9/2025  4. Pt will increase AROM/PROM in shoulder flexion to 160  degrees bilaterally to improve functional reach, carry, push, pull pain free.GOAL MET 6/9/2025  5. Pt will increase strength to >/= 4+ in gross UE musculature to improve tolerance to all functional activities pain free. (progressing, not met) 6/9/2025     Long Term Goals: 6 weeks   1.  Pt will increase AROM/PROM in shoulder flexion to 170 degrees bilaterally to improve functional reach, carry, push, pull pain free. GOAL MET 6/9/2025  2. Pt will increase strength to 4+/5 in gross UE musculature to improve tolerance to all functional activities pain free. (progressing, not met)  3. Pt will demonstrate full/maximized tissue mobility to increase ROM and promote healthy tissue to be pain free at discharge. (progressing, not met)  4. Pt will report decrease in overall worst pain to 2/10 at discharge. (progressing, not met)  5. Pt will increase AROM/PROM in shoulder abduction ROM to 170 degrees bilaterally to improve functional reach, carry, push, pull pain free. GOAL MET 6/9/2025  6. Patient will report compliance with walking program 5x week for 30 min each day to improve overall cardiovascular function and decrease cancer related fatigue at discharge. GOAL MET 6/9/2025     Plan   recommend continuation of PT 1-2 visits per week x 4 weeks all goals in progress 6/9/2025-7/9/2025  Sent orders via routing to [email or web address] for [facility] 6/9/2025, pt to call to follow up on order.  Continue with established plan of care working toward PT goals.    Dorei Mcclure, PT, CLT

## 2025-06-18 NOTE — PROGRESS NOTES
Name: Concepcion De La Vega  MRN:  [id number]  :  1970 Age 55 y.o.  Date of Service: 2025    Reason for visit:  Concepcion De La Vega is a 55 y.o. female here regarding the diagnosis below:  approximate 6 week start from Anastrozole    #Right Breast Invasive Ductal Carcinoma   Date of Original Diagnosis: 2/10/25  Original Stage: Stage 1A pT1b pN0(sn) % PA 10% HER2 1+/negative grade 1 Ki67 10% Oncotype 24   Current Sites of Disease: none  Current Goals of Therapy: curative  Current Therapy: Anastrozole started 25    Oncologic History/History of Present Illness:   Detected by imaging    25 Mammogram with US:  Mass: Right breast 5 mm x 4 mm x 4 mm mass at the posterior depth, 1 o'clock     2/10/25 Biopsy + for invasive disease     Family history of cancer:  Maternal Grandmother - Unknown Female Cancer   Father - Prostate Cancer  Paternal Aunt - Bone Cancer   Paternal Aunt - Lung Cancer   Paternal Aunt - GI Cancer   Maternal Uncle - Lung Cancer   Paternal Uncle GI Cancer   Maternal Cousin - Breast Cancer   Maternal Cousin - Prostate Cancer       Menarche at age 12. . Age at first live birth 26. did breast feed 1 year. LMP N/A. OCP-N/A. Menopause at 50's. HRT-Denies.  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 With KAITLYNN recon ([place])    25- establishes care with me, healing fairly well presents to discuss endocrine therapy, no breast concerns     Today:  25  Ms. De La Vega presents to the clinic today for her approximate 6 week start of Anastrozole.  She endorses increased agitation.  She is dealing with it.    She denies any myalgias, arthralgias, hot flashes, abdominal discomfort, N/V, new lumps or bumps, etc.  Labs reviewed as well as BMD/Trabecular Scores.      Past Medical History:   Diagnosis Date    Abnormal Pap smear     ASCUS negative HPV. Repeat pap    ADD (attention  deficit disorder)     Arthritis     right knee    Asthma     exercise induced    Basal cell carcinoma 02/2018    Left upper back     BCC (basal cell carcinoma)     Right medial ankle  - ED& C     BCC (basal cell carcinoma) 2016    Left anterior thigh    Cystocele     with stress incontinence    Depression     Dizziness     Fracture of sternum 11/2010    from [employer] class    GERD (gastroesophageal reflux disease)     HEARING LOSS     Hearing loss in right ear     IBS (irritable bowel syndrome)     Intrauterine device     Mirena    Kidney stone 2009    PONV (postoperative nausea and vomiting)     requests Scopolamine patch    Primary hypertension 01/31/2022    SCC (squamous cell carcinoma), hand, right 2015    excisied [facility]     Seasonal allergies     deviated septum also    SI (sacroiliac) pain     Sinus pain 07/03/2012    no fever, starting on antibiotics    Sinusitis     Skin tag of female perineum 2012    Squamous cell carcinoma of skin 09/2018    Left shin     TMJ (dislocation of temporomandibular joint)        Past Surgical History:   Procedure Laterality Date    ANTERIOR VAGINAL REPAIR      CHOLECYSTECTOMY      COLONOSCOPY      DILATION AND CURETTAGE OF UTERUS      EXTERNAL EAR SURGERY      Rt middle ear oval Repair    LASIK Bilateral 2015    [place]    RECONSTRUCTION OF BREAST WITH DEEP INFERIOR EPIGASTRIC ARTERY  (KAITLYNN) FREE FLAP Bilateral 3/19/2025    Procedure: RECONSTRUCTION, BREAST, USING KAITLYNN FREE FLAP;  Surgeon: Calin Lama MD;  Location: [facility] OR;  Service: Plastics;  Laterality: Bilateral;    REPAIR, NERVE USING ALLOGRAFT Bilateral 3/19/2025    Procedure: REPAIR, NERVE USING ALLOGRAFT;  Surgeon: Calin Lama MD;  Location: [facility] OR;  Service: Plastics;  Laterality: Bilateral;    SENTINEL LYMPH NODE BIOPSY Right 3/19/2025    Procedure: BIOPSY, LYMPH NODE, SENTINEL;  Surgeon: Courtney Leon MD;  Location: [facility] OR;  Service: General;  Laterality: Right;    SIMPLE MASTECTOMY  Bilateral 3/19/2025    Procedure: MASTECTOMY, SIMPLE;  Surgeon: Courtney Leon MD;  Location: [facility] OR;  Service: General;  Laterality: Bilateral;    TONSILLECTOMY, ADENOIDECTOMY, BILATERAL MYRINGOTOMY AND TUBES      URETERAL STENT PLACEMENT  2009    cysto, laser lithotripsy left ureter stone       Allergies as of 06/18/2025 - Reviewed 05/20/2025   Allergen Reaction Noted    Venom-wasp Hives and Swelling 06/07/2021    Oxytetracycline Swelling 01/03/2012    Triple antibiotic [iavpp-zulie-erclzhi-pramoxine] Blisters 07/24/2017    Adhesive Rash 09/13/2018    Metformin Nausea And Vomiting 03/14/2025    Oxycodone-acetaminophen Nausea And Vomiting 03/14/2025       Family History   Problem Relation Name Age of Onset    Heart disease Mother      Anesthesia problems Mother          Mom hard to awaken post-op    Multiple sclerosis Mother      Diabetes Mother      Cataracts Mother      Skin cancer Mother      Pacemaker/defibrilator Mother      Atrial fibrillation Mother      Atrial fibrillation Father      Kidney disease Father          infections    Heart disease Father      Cataracts Father      Prostate cancer Father      Diabetes Brother      Lung cancer Maternal Uncle      Bone cancer Paternal Aunt      Lung cancer Paternal Aunt      Stomach cancer Paternal Aunt      Stomach cancer Paternal Uncle      Cancer Maternal Grandmother          uncertain of which GYN cancer    Breast cancer Maternal Cousin      Prostate cancer Maternal Cousin      Clotting disorder Neg Hx      Allergic rhinitis Neg Hx      Allergies Neg Hx      Angioedema Neg Hx      Asthma Neg Hx      Atopy Neg Hx      Eczema Neg Hx      Immunodeficiency Neg Hx      Rhinitis Neg Hx      Urticaria Neg Hx      Macular degeneration Neg Hx      Retinal detachment Neg Hx      Glaucoma Neg Hx         Social History[1]      Answers submitted by the patient for this visit:  Review of Systems Questionnaire (Submitted on 6/18/2025)  appetite change : No  unexpected  "weight change: No  mouth sores: No  visual disturbance: No  cough: No  shortness of breath: No  chest pain: No  abdominal pain: No  diarrhea: No  frequency: No  back pain: No  rash: No  headaches: No  adenopathy: No  nervous/ anxious: Yes    PHYSICAL EXAMINATION:  Weight:  Loss of 3 pounds in 7 weeks (Not on Zepbound)  BP (!) 155/88 (BP Location: Right arm, Patient Position: Sitting)   Pulse 68   Temp 97.1 °F (36.2 °C) (Temporal)   Resp 18   Ht 5' 6" (1.676 m)   Wt 95.4 kg (210 lb 5.1 oz)   SpO2 99%   BMI 33.95 kg/m²   Wt Readings from Last 3 Encounters:   04/21/25 96.9 kg (213 lb 10 oz)   03/31/25 97.1 kg (214 lb 1.1 oz)   03/19/25 97.1 kg (214 lb 1.4 oz)     ECOG PERFORMANCE STATUS: 1  Physical Exam   General:  Well-appearing, nontoxic  Eyes:  Equal and round pupils, EOMI, no scleral icterus  Mouth:  No lesions, moist  Cardiovascular:  RRR, warm, well-perfused, no peripheral edema  Lungs:  CTA, unlabored on room air, no wheezing  Neurologic:  Awake, alert and oriented, participating in the exam  Psych:  Appropriate mood and affect  Skin:  Normal pallor, No rashes  Heme:  No petechiae, no purpura  Breasts: Post-mastectomy; KAITLYNN, chest soft/NT, incisions well healed; free of masses/lesions; axillae wnl bilateral.    LABORATORY:  CBC  Lab Results   Component Value Date    WBC 8.80 06/16/2025    HGB 13.0 06/16/2025    HCT 39.1 06/16/2025    MCV 88 06/16/2025     06/16/2025       BMP  Lab Results   Component Value Date     06/16/2025    K 4.1 06/16/2025     06/16/2025    CO2 26 06/16/2025    BUN 12 06/16/2025    CREATININE 0.9 06/16/2025    CALCIUM 9.5 06/16/2025    ANIONGAP 11 06/16/2025    ESTGFRAFRICA >60 01/18/2022    EGFRNONAA >60 01/18/2022 04/28/25:  BMD:  lumbar T-score = -1.5; femoral neck T-score = -1.1; Trabecular score lumbar = -2.5; femoral -1.6; indicating Osteoporosis    Hormone levels reviewed:  indication post-menopausal      ASSESSMENT AND PLAN:  Concepcion De La Vega is a 55 y.o. " female with the diagnosis as listed below.  Management as indicated.     #Right Breast Invasive Ductal Carcinoma   Date of Original Diagnosis: 2/10/25  Original Stage: Stage 1A pT1b pN0(sn) % AR 10% HER2 1+/negative grade 1 Ki67 10% Oncotype 24   Current Sites of Disease: none  Current Goals of Therapy: curative  Current Therapy: Anastrozole started 04/21/25      Checking hormones due to age; clinically post menopausal     I counseled the patient regarding the stage of her disease in the role of adjuvant endocrine therapy therapy for HR+ breast cancer..  I counseled the patient regarding the proposed endocrine therapy of anastrozole 1 mg once daily for a duration of 5 years to prevent breast cancer recurrence in her ipsilateral breast and protect the contralateral breast.  I counseled the patient regarding the typical side effects of anastrozole including joint stiffness, hot flashes, vaginal dryness, bone demineralization.    Given she is AR low and oncotype of 24, ideally would like to use an AI if she tolerate it.  Can try exemestane if she needs to switch.   06/18/25:  Tolerating Anastrozole with some increased agitation; feels as though she has a handle on it; would like to continue; f/u as scheduled with Dr. Leiva on 08/06/25.    #Bone Health  -patient will be on endocrine therapy x 5 years, will get baseline DEXA scan to assess bone health  06/18/25:  BMD/Trabecular score reviewed as Osteoporosis; Prolia discussed & plan submitted for auth; dental clearance form given to patient     #Lipids- at risk for hyperlipidemia while on endocrine therapy; advise to continue routine PCP visits with lipid checks and initiate cholesterol medication if indicated.       DANI Nayak, MARCO A  [facility]   minutes were spent in coordination of patient's care, record review and counseling.      DANI Nayak, MARCO A  [facility]  [place]  30 minutes were spent in coordination of patient's care, record review and counseling.    Route Chart for Scheduling    Med Onc Chart Routing      Follow up with physician . F/u as scheduled with Dr. Leiva on 25 (4 month) - gown - no labs   Follow up with SHERI    Infusion scheduling note    Injection scheduling note    Labs    Imaging    Pharmacy appointment    Other referrals                              [1]   Social History  Tobacco Use    Smoking status: Former     Current packs/day: 0.00     Average packs/day: 0.5 packs/day for 5.0 years (2.5 ttl pk-yrs)     Types: Cigarettes     Start date: 1997     Quit date: 2002     Years since quittin.9     Passive exposure: Past    Smokeless tobacco: Never   Substance Use Topics    Alcohol use: Yes     Alcohol/week: 7.0 standard drinks of alcohol     Types: 7 Glasses of wine per week     Comment: glass of wine of day    Drug use: No

## 2025-06-19 ENCOUNTER — PATIENT MESSAGE (OUTPATIENT)
Dept: HEMATOLOGY/ONCOLOGY | Facility: CLINIC | Age: 55
End: 2025-06-19
Payer: COMMERCIAL

## 2025-06-24 ENCOUNTER — TELEPHONE (OUTPATIENT)
Dept: DERMATOLOGY | Facility: CLINIC | Age: 55
End: 2025-06-24
Payer: COMMERCIAL

## 2025-06-24 NOTE — TELEPHONE ENCOUNTER
Patient stopped in clinic a couple of weeks ago to ask to move appointment on 6/25/25 to another day as she has an upcoming event. Called this morning to let her know that we do not have anything available and next available general dermatology day will be in August. LVM with above information and asked that she please call or send message through portal to let us know if she still would still like to reschedule.

## 2025-06-25 ENCOUNTER — OFFICE VISIT (OUTPATIENT)
Dept: DERMATOLOGY | Facility: CLINIC | Age: 55
End: 2025-06-25
Payer: COMMERCIAL

## 2025-06-25 DIAGNOSIS — D18.01 CHERRY ANGIOMA: ICD-10-CM

## 2025-06-25 DIAGNOSIS — L82.1 SEBORRHEIC KERATOSES: Primary | ICD-10-CM

## 2025-06-25 DIAGNOSIS — D48.5 NEOPLASM OF UNCERTAIN BEHAVIOR OF SKIN: ICD-10-CM

## 2025-06-25 DIAGNOSIS — L81.4 LENTIGO: ICD-10-CM

## 2025-06-25 DIAGNOSIS — Z85.828 HISTORY OF NONMELANOMA SKIN CANCER: ICD-10-CM

## 2025-06-25 PROCEDURE — 99999 PR PBB SHADOW E&M-EST. PATIENT-LVL II: CPT | Mod: PBBFAC,,, | Performed by: DERMATOLOGY

## 2025-06-25 NOTE — PROGRESS NOTES
Dermatology Outpatient Clinic Progress Note    Patient Name: Concepcion De La Vega  Patient : 1970  MRN: [id number]  Date of Service: 2025    Subjective:      CC/HPI: Concepcion De La Vega is a 55 y.o. year old female with history of basal cell carcinoma and squamous cell carcinoma who presents today for new lesions of concern.Patient reports lesion to left thigh that she noticed a few months ago. Lesion is not painful, does not bleed or itch and no medications used to treat. Another lesion of concern to Right shin, which is pink in color, but not painful.  She reports that her Mom noticed new lesion to her nose that she was not aware of but now sees a brown spot on left side of nose.     ROS:   Dermatological ROS: positive for skin lesion changes    Objective:   Physical Exam   Head       EENT       Back       Upper extremities       Lower extremities         Eccrine/apocrine         Diagram Legend     Erythematous scaling macule/papule c/w actinic keratosis       Vascular papule c/w angioma      Pigmented verrucoid papule/plaque c/w seborrheic keratosis      Yellow umbilicated papule c/w sebaceous hyperplasia      Irregularly shaped tan macule c/w lentigo     1-2 mm smooth white papules consistent with Milia      Movable subcutaneous cyst with punctum c/w epidermal inclusion cyst      Subcutaneous movable cyst c/w pilar cyst      Firm pink to brown papule c/w dermatofibroma      Pedunculated fleshy papule(s) c/w skin tag(s)      Evenly pigmented macule c/w junctional nevus     Mildly variegated pigmented, slightly irregular-bordered macule c/w mildly atypical nevus      Flesh colored to evenly pigmented papule c/w intradermal nevus       Pink pearly papule/plaque c/w basal cell carcinoma      Erythematous hyperkeratotic cursted plaque c/w SCC      Surgical scar with no sign of skin cancer recurrence      Open and closed comedones      Inflammatory papules and pustules      Verrucoid papule consistent consistent with wart      Erythematous eczematous patches and plaques     Dystrophic onycholytic nail with subungual debris c/w onychomycosis     Umbilicated papule    Erythematous-base heme-crusted tan verrucoid plaque consistent with inflamed seborrheic keratosis     Erythematous Silvery Scaling Plaque c/w Psoriasis     See annotation      Assessment / Plan:        Seborrheic keratoses  - Benign nature of lesion discussed, monitor for changes    Cherry angioma  - Benign nature of lesion discussed, monitor for changes    History of nonmelanoma skin cancer  - History of basal cell carcinoma  - No evidence of recurrence at previously treated sites on today's exam.  - No evidence of juan antonio metastases.  - Recommended continued sun protection, including broad-spectrum OTC sunscreen with SPF 30 or greater.    Lentigo  - Benign nature of lesion discussed, monitor for changes  - Signs of lentigo maligna discussed, patient to rtc for suspicious changes    Neoplasm of uncertain behavior of skin  - Patient defers biopsy today  - follow up as scheduled for biopsy    Recent Visits  Date Type Provider Dept   06/25/25 Office Visit Cyndee Gaines MD [facility] Dermatology Surgery   Showing recent visits within past 360 days and meeting all other requirements  Future Appointments  Date Type Provider Dept   07/10/25 Appointment Cyndee Gaines MD [facility] Dermatology Surgery   Showing future appointments within next 720 days and meeting all other requirements        Cyndee Gaines MD FAAD

## 2025-06-30 ENCOUNTER — PATIENT MESSAGE (OUTPATIENT)
Dept: HEMATOLOGY/ONCOLOGY | Facility: CLINIC | Age: 55
End: 2025-06-30
Payer: COMMERCIAL

## 2025-07-10 ENCOUNTER — PROCEDURE VISIT (OUTPATIENT)
Dept: DERMATOLOGY | Facility: CLINIC | Age: 55
End: 2025-07-10
Payer: COMMERCIAL

## 2025-07-10 DIAGNOSIS — D48.5 NEOPLASM OF UNCERTAIN BEHAVIOR OF SKIN: Primary | ICD-10-CM

## 2025-07-10 PROCEDURE — 11103 TANGNTL BX SKIN EA SEP/ADDL: CPT | Mod: S$GLB,,, | Performed by: DERMATOLOGY

## 2025-07-10 PROCEDURE — 11102 TANGNTL BX SKIN SINGLE LES: CPT | Mod: S$GLB,,, | Performed by: DERMATOLOGY

## 2025-07-10 PROCEDURE — 88305 TISSUE EXAM BY PATHOLOGIST: CPT | Mod: TC | Performed by: DERMATOLOGY

## 2025-07-10 NOTE — PROGRESS NOTES
Dermatology Outpatient Clinic Progress Note    Patient Name: Concepcion De La Vega  Patient : 1970  MRN: [id number]  Date of Service: 7/10/2025    Subjective:      CC/HPI: Concepcion De La Vega is a 55 y.o. year old female with history of basal cell carcinoma and squamous cell carcinoma who presents today for biopsies to back.  Patient reports she does not have any new concerns today.     ROS:   Dermatological ROS: positive for skin lesion changes    Objective:   Physical Exam   Back   Back comments: R/o sbcc X 2 eryth pearly papule          Diagram Legend     Erythematous scaling macule/papule c/w actinic keratosis       Vascular papule c/w angioma      Pigmented verrucoid papule/plaque c/w seborrheic keratosis      Yellow umbilicated papule c/w sebaceous hyperplasia      Irregularly shaped tan macule c/w lentigo     1-2 mm smooth white papules consistent with Milia      Movable subcutaneous cyst with punctum c/w epidermal inclusion cyst      Subcutaneous movable cyst c/w pilar cyst      Firm pink to brown papule c/w dermatofibroma      Pedunculated fleshy papule(s) c/w skin tag(s)      Evenly pigmented macule c/w junctional nevus     Mildly variegated pigmented, slightly irregular-bordered macule c/w mildly atypical nevus      Flesh colored to evenly pigmented papule c/w intradermal nevus       Pink pearly papule/plaque c/w basal cell carcinoma      Erythematous hyperkeratotic cursted plaque c/w SCC      Surgical scar with no sign of skin cancer recurrence      Open and closed comedones      Inflammatory papules and pustules      Verrucoid papule consistent consistent with wart     Erythematous eczematous patches and plaques     Dystrophic onycholytic nail with subungual debris c/w onychomycosis     Umbilicated papule    Erythematous-base heme-crusted tan verrucoid plaque consistent with inflamed seborrheic keratosis     Erythematous Silvery Scaling Plaque c/w Psoriasis     See annotation      Assessment / Plan:         Neoplasm of uncertain behavior of skin    Shave Biopsy Procedure Note  Risks, benefits, limitations of shave biopsy discussed. Areas cleansed with alcohol, photographs of suspicious lesions taken in [place]. 2 lesion(s) numbed with 1% lidocaine with 1:200,000 epinephrine. Lesions removed with razor surgery and sent for pathology in formalin. Hemostasis achieved with alumuninum chloride. Polysporin and a band-aid applied.  Will call patient with results once available. Patient tolerated procedure well.             Cyndee Gaines MD FAAD

## 2025-07-11 ENCOUNTER — PATIENT MESSAGE (OUTPATIENT)
Dept: DERMATOLOGY | Facility: CLINIC | Age: 55
End: 2025-07-11
Payer: COMMERCIAL

## 2025-07-11 ENCOUNTER — PATIENT MESSAGE (OUTPATIENT)
Dept: INFUSION THERAPY | Facility: HOSPITAL | Age: 55
End: 2025-07-11
Payer: COMMERCIAL

## 2025-07-15 LAB
ESTROGEN SERPL-MCNC: NORMAL PG/ML
INSULIN SERPL-ACNC: NORMAL U[IU]/ML
LAB AP CLINICAL INFORMATION: NORMAL
LAB AP GROSS DESCRIPTION: NORMAL
LAB AP REPORT FOOTNOTES: NORMAL
T3RU NFR SERPL: NORMAL %

## 2025-07-16 ENCOUNTER — RESULTS FOLLOW-UP (OUTPATIENT)
Dept: DERMATOLOGY | Facility: CLINIC | Age: 55
End: 2025-07-16
Payer: COMMERCIAL

## 2025-07-16 ENCOUNTER — PATIENT MESSAGE (OUTPATIENT)
Dept: DERMATOLOGY | Facility: CLINIC | Age: 55
End: 2025-07-16
Payer: COMMERCIAL

## 2025-07-16 ENCOUNTER — TELEPHONE (OUTPATIENT)
Dept: DERMATOLOGY | Facility: CLINIC | Age: 55
End: 2025-07-16
Payer: COMMERCIAL

## 2025-07-23 ENCOUNTER — TELEPHONE (OUTPATIENT)
Dept: REHABILITATION | Facility: HOSPITAL | Age: 55
End: 2025-07-23
Payer: COMMERCIAL

## 2025-07-23 ENCOUNTER — TELEPHONE (OUTPATIENT)
Dept: HEMATOLOGY/ONCOLOGY | Facility: CLINIC | Age: 55
End: 2025-07-23
Payer: COMMERCIAL

## 2025-07-23 NOTE — TELEPHONE ENCOUNTER
Returned call to pt to reschedule her PT/Lymph apt on 7/30/24 at 4:00 pm to 7/24/25 at 9:00 am. Pt requested a sooner apt. Location and check-in process known to pt.

## 2025-07-23 NOTE — TELEPHONE ENCOUNTER
Copied from CRM #[id number]. Topic: General Inquiry - Patient Advice  >> Jul 22, 2025 11:41 AM Lien wrote:  Type:  Needs Medical Advice    Who Called: PT    Would the patient rather a call back or a response via [employer]? [email or web address]  Best Call Back Number: [phone]   Additional Information: pt requesting a call back to see if there are any appt this week

## 2025-07-23 NOTE — NURSING
Spoke with pt.  She is having a skin biopsy on Monday.  She would like her PT appt before Monday, if possible. Message sent to Rubi.  PT appt rescheduled as requested.

## 2025-07-24 ENCOUNTER — CLINICAL SUPPORT (OUTPATIENT)
Dept: REHABILITATION | Facility: HOSPITAL | Age: 55
End: 2025-07-24
Payer: COMMERCIAL

## 2025-07-24 DIAGNOSIS — I89.0 LYMPHEDEMA: Primary | ICD-10-CM

## 2025-07-24 DIAGNOSIS — Z91.89 AT RISK FOR LYMPHEDEMA: ICD-10-CM

## 2025-07-24 PROCEDURE — 97140 MANUAL THERAPY 1/> REGIONS: CPT | Mod: PN,CQ

## 2025-07-24 PROCEDURE — 97035 APP MDLTY 1+ULTRASOUND EA 15: CPT | Mod: PN

## 2025-07-24 NOTE — PROGRESS NOTES
"[facility] Outpatient  Physical Therapy Progress Note  Lymphedema Therapy    Visit Date: 7/24/2025    Name: Concepcion De La Vega  MRN: [id number]  Therapy Diagnosis: Lymphedema     Evaluation Date: 2/26/2025  Reassessment: 5/09/2025 requesting 2x week 6 weeks   Re-assessment 6/9/2024 1-2 visits per week x 4 weeks    Authorization:auth for 10 visits through 12/31/2025, 20 visits from 5/9/2025-12/31/2025  Plan of Care Expiration: 05/09/2025-07/09/2025  Reassessment Due: 07/09/2025  Fact-G Completed: 1 / 2     Visit: 12/ 12 ( auth of 20 visits) added 8 visits at reassess 14/20  PTA Visit: 1 / 5  Time In: 9:00 AM  Time Out: 10:05 AM  Total Billable Time: 65 minutes     Precautions: Standard, cancer      Subjective     Patient states: was doing really good and then suddenly the left breast started swelling more, sometimes hurts. Have been wearing compression bra but never received the swell spots. Have a Bioflect vest but I misplaced it. Will likely have another surgery with Dr Lama later this year but want to get the swelling under control.     Prior:  Can also tell a difference in my abdominal scar and feeling softer, not as nodular. The medial side of the left breast doesn't feel as hard.  Reports that she saw Dr Lama and he said that she definitely has lymphedema in her left breast and has a "healing ridge" of tissue in her lower abdomen. It tends to poke out when I sit up, "like a hernia, but he said that it wasn't a hernia." Reports she really likes the bioflect jes leggings but didn't get to wear them today.   Have been doing her agility exercises and some of the glute exercises that Dorie showed her.   Have been taking exercise classes online to increase strength and endurance with dog agility training.   Didn't get to wear the bioflect compression capris today.     Pain: 3/10   Location:abdomen/left breast    Objective     Garments recommended:   Sigvaris Secure Arm Sleeve with " "silicone band 15-20 mmHg size M2  Sigvaris Secure Gauntlet  15-20 mmHg size small  Pt to wear garments 6 weeks after surgery. Only to be worn during the day time and remove at night when sleeping.   Recommended to be worn for up to one year for prophylactics in reducing future risk of lymphedema.   JO ANN participated in / received the following treatment:  Recommended and sized for bioflect capris XL  deferred  Therapeutic Exercise to develop strength, endurance, ROM, flexibility, posture, and core stabilization for 0 minutes including:    Single limb bridge 10 sec hold 10 reps L with verbal cues  Table top with left hip extension and adduction cross over then into "frog leg" 10 reps with control.  Standing clock squats/ lunges  Skier squat 45 sec holds   Side step position with R leg against wall with trunk flexion easing into left hip with knee flexed and lifting up into stance. 10 reps each side    deferred  Stretches: UE/ trunk/ pelvis  deferred  Prone scapular stabilization   I, T, A  10 sec hold on  5 reps each and W  1 set of 10 reps each arm  deferred  Corner wall stretch 30 sec 3 reps  Ball up the wall 10 sec hold x 5 (feels good)  Inferior glide of HH  in front of mirror with verbal cues 5 min with lengthen and lift  ER with red tband 15 reps each arm  Towel slide with trunk rotation 10 sec hold x 10 performed on right and left side  Foam roller supine with scap depression 10 sec hold 10 reps- deferred due to time  Foam roller diaphragmatic breathing    deferred  Supine scapula retraction/depression 10 sec hold x 10  Supine T's with YTB x 10  LTR with arms in "V" position with 10 sec hold x 3  Side lying right/left shoulder abduction x 10 ea  Right shoulder ER with manual resistance x 10  Thoracic rotation x 6 reps ea side  Supine bilateral ER with RTB 2 x 10      Manual Therapy to develop flexibility, extensibility, desensitization, pliability, and contour for 50 minutes including:  Pt received MLD to " right upper extremity/trunk, left breast with pre-treatment short neck series to include:  cervical terminus, lateral and posterior neck, left axilla, abdomen, followed by full arm/trunk sequence (anterior and posterior) in supine and sidelying, with rework accessing all watershed areas on trunk. Also worked on lateral pathways B from axilla to abdominal region as well as deep abdominal nodes activated with breathing techniques and springing. Concurrent use of pneumatic compression pump at 30 mmHg x 15'. Issued Readzpak mastectomy pad to wear inside bra size large (this garment was a sample that was donated to clinic). Excellent fit noted and instructed to wear nightly to bed with bra on top.   Rock tape to left and right  sub axillary area in edema technique and Rock tape applied in star pattern to left medial breast swelling also to abdominal region bilaterally. Pt instructed to remove if painful, can remain taped up to 72 hrs, pat dry after bathing. Pt verbalized understanding of all instruction.   Manual stretching to bilateral shoulders with some discomfort at end range. Previously Instructed on focus of cervical terminus, subaxilla and abdomen for self MLD. Pt with reduction in tightness in left breast after MFR.  Pt with fullness noted B subaxillary region.   Direct Contact Modalities after being cleared for contraindications for 15 minutes including:  Ultrasound was performed to lower abdominal area x 8' at 1.0 w.cm2 50% pulsed 3 mH, followed by Ultrasound x 7' to left medial breast at 1.0 w/cm2 50% pulsed 3 mH to address medial breast swelling/hardness. Decreased hardness in left medial breast after MLD, stretching, ultrasound, and pump.      Previous session:  Sized for jobst upper body/breast / chest wall pad: unilateral post-mastectomy pad-power lupe in size large buff color to address L breast and axillary lymphedema    Education Provided  - Progress toward goals   - Role of therapy   - Activity  modification  - Reviewed HEP  Provided recommendation of posture corrector brace  available online at [facility] Exercises Provided: Patient instructed to continue previously provided HEP. Will issue more comprehensive program next session  Exercises were reviewed and JO ANN was able to demonstrate them prior to the end of the session.  JO ANN demonstrated good  understanding of the education provided.   See EMR under Patient Instructions for exercises provided 2/26/2025.    Assessment   Pt with left breast lymphedema and healing ridge of tissue in lower abdominal area. Pt has had pelvic floor therapy in the past and was encourage to engage her pelvic floor to prevent abdominal bulging. Continued ultrasound to abdominal scaring, left breast, with decreased swelling/hardness post treatment. L glut max significantly weaker than right, pt compliant with HEP. Pt has compression bras and wearing daily, also has a Bioflect tank but has misplaced it. Pt would benefit from a pneumatic compression pump with vest component to work on swelling in left breast and sub-axillary area.     PT requested order for post-mastectomy compression bra and send to [employer] to schedule appointment, they will need to check benefits for coverage.  Pt will need to obtain a card from Rubi to call and schedule a consult.  Tightness in bilateral shoulders, will continue to work on this. Will add more rotator cuff strengthening next session.     Jo Ann is a 55 y.o. female referred to outpatient physical therapy with a medical diagnosis of 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, estrogen receptor positive,  At Risk for Lymphedema with surgical procedure performed on  on 3/19/2025. Pt was seen today post-operatively to re-assess strength and ROM of BUEs, to reassess baseline circumferential measurements of BUEs to aid in the early detection of lymphedema post-operatively, and to provide pt  education on exercises/precautions post-operative. Pt does exhibit any ROM impairments to L UE with limitations with shoulder flexion and shoulder abduction movements as well as rotator cuff and scapular stabilization weakness. Patient also presents with R subaxillary/truncal swelling compared to L side and would benefit from decongestive manual lymphatic drainage to assist with lymphatic support. Recommended to pt to continue wear compression sports bra at this time. This pt will benefit from skilled PT for address impairments following surgery such as pain, limited ROM, or decreased mobility.     Patient has not seen significant improvement after four plus weeks of conservative therapy to include compression, elevation, exercise, MLD, skin care and diet as well as compliance to medication regimen. Pt's chest is hyperpigmented and fibrosis present and needs a compression device for home management.    Plan of care discussed with patient: Yes  Pt's spiritual, cultural and educational needs considered and patient is agreeable to the plan of care and goals as stated below:       Anticipated barriers for therapy: none    Garments:  5/21/2025 PT to request order for post-mastectomy compression bra and send to [email or web address] to schedule appointment, they will need to check benefits for coverage.  5/29/2025 orders routed to [email or web address] and requested order for bilateral Solaris full bra swell spots in size medium.  6/2/2025 Routed orders to [employer] today for swell spots at sub axillary region B, sized at last tx for Solaris full bra swell spots   Sent order to [email or web address] for post mastectomy bra fitting they have called her and starting process of getting her seen at Cancer center.   6/9/2025 ar routed signed orders on solaris swell spots to [employer] today.  Pump:   7/24/2025 sent request to [facility] to check insurance benefits for a pneumatic compression pump with vest  component. SD    GOALS  Short Term Goals: 3 months  Pt to be seen for reassessment in 6-8 weeks after surgery.  Pt will demonstrate 100% knowledge of lymphedema precautions and signs of infection.  Pt to obtain compression garments for prophylactic concerns according to APTA clinical guidelines published in Journal of Physical Therapy.     Long Term Goals: deferred     Updated POC: 5/9/2025-7/09/2025  Short Term goals: 4 weeks  1. Patient will demonstrate 100% understanding of lymphedema risk reduction practices to include self monitoring for lymphedema. GOAL MET 6/9/2025  2. Patient will demonstrate independence with Home Exercise program established. GOAL MET 6/9/2025  3. Pt will increase AROM/PROM in shoulder abduction ROM to 150 degrees bilaterally to improve functional reach, carry, push, pull pain free. GOAL MET 6/9/2025  4. Pt will increase AROM/PROM in shoulder flexion to 160 degrees bilaterally to improve functional reach, carry, push, pull pain free.GOAL MET 6/9/2025  5. Pt will increase strength to >/= 4+ in gross UE musculature to improve tolerance to all functional activities pain free. (progressing, not met) 6/9/2025     Long Term Goals: 6 weeks   1.  Pt will increase AROM/PROM in shoulder flexion to 170 degrees bilaterally to improve functional reach, carry, push, pull pain free. GOAL MET 6/9/2025  2. Pt will increase strength to 4+/5 in gross UE musculature to improve tolerance to all functional activities pain free. (progressing, not met)  3. Pt will demonstrate full/maximized tissue mobility to increase ROM and promote healthy tissue to be pain free at discharge. (progressing, not met)  4. Pt will report decrease in overall worst pain to 2/10 at discharge. (progressing, not met)  5. Pt will increase AROM/PROM in shoulder abduction ROM to 170 degrees bilaterally to improve functional reach, carry, push, pull pain free. GOAL MET 6/9/2025  6. Patient will report compliance with walking program 5x week  for 30 min each day to improve overall cardiovascular function and decrease cancer related fatigue at discharge. GOAL MET 6/9/2025     Plan   recommend continuation of PT 1-2 visits per week x 4 weeks all goals in progress 6/9/2025-7/9/2025  [facility] does not carry sigvaris: will send orders for jobst unilateral post-mastectomy pad-power lupe in size large buff color to address L breast and axillary lymphedema  Continue with established plan of care working toward PT goals.    Jenni Villalobos, PTA, CLT

## 2025-07-28 ENCOUNTER — OFFICE VISIT (OUTPATIENT)
Dept: DERMATOLOGY | Facility: CLINIC | Age: 55
End: 2025-07-28
Payer: COMMERCIAL

## 2025-07-28 DIAGNOSIS — C44.519 BCC (BASAL CELL CARCINOMA), TRUNK: Primary | ICD-10-CM

## 2025-07-28 PROCEDURE — 17263 DSTRJ MAL LES T/A/L 2.1-3.0: CPT | Mod: S$GLB,,, | Performed by: DERMATOLOGY

## 2025-07-28 PROCEDURE — 99999 PR PBB SHADOW E&M-EST. PATIENT-LVL I: CPT | Mod: PBBFAC,,, | Performed by: DERMATOLOGY

## 2025-07-28 PROCEDURE — 17261 DSTRJ MAL LES T/A/L .6-1.0CM: CPT | Mod: S$GLB,,, | Performed by: DERMATOLOGY

## 2025-07-28 PROCEDURE — 99499 UNLISTED E&M SERVICE: CPT | Mod: S$GLB,,, | Performed by: DERMATOLOGY

## 2025-07-29 DIAGNOSIS — I10 REACTIVE HYPERTENSION: ICD-10-CM

## 2025-07-29 RX ORDER — METOPROLOL SUCCINATE 25 MG/1
25 TABLET, EXTENDED RELEASE ORAL DAILY
Qty: 90 TABLET | Refills: 3 | Status: CANCELLED | OUTPATIENT
Start: 2025-07-29 | End: 2026-07-29

## 2025-07-29 RX ORDER — METOPROLOL SUCCINATE 25 MG/1
25 TABLET, EXTENDED RELEASE ORAL DAILY
Qty: 90 TABLET | Refills: 3 | Status: SHIPPED | OUTPATIENT
Start: 2025-07-29 | End: 2026-07-29

## 2025-07-29 RX ORDER — METHOCARBAMOL 750 MG/1
750 TABLET, FILM COATED ORAL 4 TIMES DAILY PRN
Qty: 44 TABLET | Refills: 3 | Status: CANCELLED | OUTPATIENT
Start: 2025-07-29

## 2025-07-29 RX ORDER — METHOCARBAMOL 750 MG/1
750 TABLET, FILM COATED ORAL 4 TIMES DAILY PRN
Qty: 44 TABLET | Refills: 3 | Status: SHIPPED | OUTPATIENT
Start: 2025-07-29

## 2025-07-29 NOTE — TELEPHONE ENCOUNTER
Care Due:                  Date            Visit Type   Department     Provider  --------------------------------------------------------------------------------    Last Visit: None Found      None         None Found  Next Visit: None Scheduled  None         None Found                                                            Last  Test          Frequency    Reason                     Performed    Due Date  --------------------------------------------------------------------------------    Office Visit  15 months..  cholestyramine,            Not Found    Overdue                             tirzepatide,.............    HBA1C.......  6 months...  tirzepatide,.............  02- 08-    [employer] Embedded Care Due Messages. Reference number: [id number].   7/29/2025 2:18:59 PM CDT

## 2025-07-29 NOTE — TELEPHONE ENCOUNTER
No care due was identified.  [facility] Embedded Care Due Messages. Reference number: [id number].   7/29/2025 2:42:10 PM CDT

## 2025-07-30 ENCOUNTER — CLINICAL SUPPORT (OUTPATIENT)
Dept: REHABILITATION | Facility: HOSPITAL | Age: 55
End: 2025-07-30
Payer: COMMERCIAL

## 2025-07-30 DIAGNOSIS — I89.0 LYMPHEDEMA: Primary | ICD-10-CM

## 2025-07-30 PROCEDURE — 97140 MANUAL THERAPY 1/> REGIONS: CPT | Mod: PN

## 2025-07-30 NOTE — PROGRESS NOTES
"[facility] Outpatient  Physical Therapy Progress Note  Lymphedema Therapy / Re-Assessment     Visit Date: 7/30/2025    Name: Concepcion De La Vega  MRN: [id number]  Therapy Diagnosis: Lymphedema     Evaluation Date: 2/26/2025  Reassessment: 5/09/2025 requesting 2x week 6 weeks   Re-assessment 6/9/2024 1-2 visits per week x 4 weeks  Re-assessment 7/30/2025 1-2 x week x 6 weeks    Authorization:auth for 10 visits through 12/31/2025, 20 visits from 5/9/2025-12/31/2025  Plan of Care Expiration: 05/09/2025-07/09/2025  Reassessment Due: 8/30/2025  Fact-G Completed: 1 / 2     Visit: 12/ 12 ( auth of 20 visits) added 8 visits at reassess 16/20: new POC 7/30/2025 - 1-2 week x 4-6 weeks to add to POC.12/24 pending auth  PTA Visit: 0 / 5  Time In: 4 PM  Time Out: 5:30 PM  Total Billable Time: 90 minutes     Precautions: Standard, cancer      Subjective     Patient states: have not received swell spots. Left breast still swelling.  Does have a bra that provides enough compression. Krikna bra  has 3 of them.   was doing really good and then suddenly the left breast started swelling more, sometimes hurts. Have been wearing compression bra but never received the swell spots. Have a Bioflect vest but I misplaced it. Will likely have another surgery with Dr Lama later this year but want to get the swelling under control.     Prior:  Can also tell a difference in my abdominal scar and feeling softer, not as nodular. The medial side of the left breast doesn't feel as hard.  Reports that she saw Dr Lama and he said that she definitely has lymphedema in her left breast and has a "healing ridge" of tissue in her lower abdomen. It tends to poke out when I sit up, "like a hernia, but he said that it wasn't a hernia." Reports she really likes the bioflect jes leggings but didn't get to wear them today.   Have been doing her agility exercises and some of the glute exercises that Dorie showed her.   Have been taking " "exercise classes online to increase strength and endurance with dog agility training.   Didn't get to wear the bioflect compression capris today.     Pain: 3/10   Location:abdomen/left breast tender.    Objective     Garments recommended:   Sigvaris Secure Arm Sleeve with silicone band 15-20 mmHg size M2  Sigvaris Secure Gauntlet  15-20 mmHg size small  Pt to wear garments 6 weeks after surgery. Only to be worn during the day time and remove at night when sleeping.   Recommended to be worn for up to one year for prophylactics in reducing future risk of lymphedema.     Re-assessment      ROM Re-assess 6/9/2025:  L UE ROM shoulder flexion 170 deg, shoulder abduction 170 deg  Re-Assess 7/30/2025 ROM LUE and R UE with full shoulder flexion and abduction 180 deg pain free.  Baseline Measurements of BUEs  LANDMARK RIGHT UE (cm)  2/26/2025  Prehab RIGHT UE (cm)  5/9/2025  Post-op Re-assess   6/9/2025 Re-Assess   7/30/2025  R UE    W + 16 inches 35.5 35.0 34.6  34.0   W + 13 inches 34.3 34.0 33.2 33.5   Elbow 31.0 30.8 29.7 29.8   W + 7 inches 29.5 28.8 27.8 28.0   W + 5 inches 26.5 25.8 24.2 24.7   Wrist 17.3 17.2 17.3 17.0   DPC 19.8 19.8 19.3 19.4   IP Thumb 7.0 7.0 6.4 6.5   Axillary chest girth      106.0  103.0   NP line chest girth     118.8 cm 111.5   Navel girth     106.5 cm 103.0   Arm Length 44.0      Has lost 5 pounds. 7/30/2025  Pt with less fullness noted B subaxillary region.   JO ANN participated in / received the following treatment:  Recommended and sized for bioflect capris XL  deferred  Therapeutic Exercise to develop strength, endurance, ROM, flexibility, posture, and core stabilization for 0 minutes including:    Single limb bridge 10 sec hold 10 reps L with verbal cues  Table top with left hip extension and adduction cross over then into "frog leg" 10 reps with control.  Standing clock squats/ lunges  Skier squat 45 sec holds   Side step position with R leg against wall with trunk flexion easing into " "left hip with knee flexed and lifting up into stance. 10 reps each side    deferred  Stretches: UE/ trunk/ pelvis  deferred  Prone scapular stabilization   I, T, A  10 sec hold on  5 reps each and W  1 set of 10 reps each arm  deferred  Corner wall stretch 30 sec 3 reps  Ball up the wall 10 sec hold x 5 (feels good)  Inferior glide of HH  in front of mirror with verbal cues 5 min with lengthen and lift  ER with red tband 15 reps each arm  Towel slide with trunk rotation 10 sec hold x 10 performed on right and left side  Foam roller supine with scap depression 10 sec hold 10 reps- deferred due to time  Foam roller diaphragmatic breathing    deferred  Supine scapula retraction/depression 10 sec hold x 10  Supine T's with YTB x 10  LTR with arms in "V" position with 10 sec hold x 3  Side lying right/left shoulder abduction x 10 ea  Right shoulder ER with manual resistance x 10  Thoracic rotation x 6 reps ea side  Supine bilateral ER with RTB 2 x 10      Manual Therapy to develop flexibility, extensibility, desensitization, pliability, and contour for 75 minutes including:  Pt received MLD to right upper extremity/trunk, left breast with pre-treatment short neck series to include:  cervical terminus, lateral and posterior neck, left axilla, abdomen, followed by full arm/trunk sequence (anterior and posterior) in supine and sidelying, with rework accessing all watershed areas on trunk. Also worked on lateral pathways B from axilla to abdominal region as well as deep abdominal nodes activated with breathing techniques and springing. Concurrent use of pneumatic compression pump at 30 mmHg x 15'. Issued Trumakerpak mastectomy pad to wear inside bra size large (this garment was a sample that was donated to clinic) is wearing at night with bra. . Excellent fit noted and instructed to wear nightly to bed with bra on top. Provided mepiform to left breast and sub axillary region to try inside bra.   Deferred Rock tape to left and " right  sub axillary area in edema technique and Rock tape applied in star pattern to left medial breast swelling also to abdominal region bilaterally. Pt instructed to remove if painful, can remain taped up to 72 hrs, pat dry after bathing. Pt verbalized understanding of all instruction.       Direct Contact Modalities after being cleared for contraindications for 0 minutes including:  Ultrasound was performed to lower abdominal area x 8' at 1.0 w.cm2 50% pulsed 3 mH, followed by Ultrasound x 7' to left medial breast at 1.0 w/cm2 50% pulsed 3 mH to address medial breast swelling/hardness. Decreased hardness in left medial breast after MLD, stretching, ultrasound, and pump.      Previous session:  Sized for jobst upper body/breast / chest wall pad: unilateral post-mastectomy pad-power lupe in size large buff color to address L breast and axillary lymphedema    Education Provided  - Progress toward goals   - Role of therapy   - Activity modification  - Reviewed HEP  Provided recommendation of posture corrector brace  available online at [facility] Exercises Provided: Patient instructed to continue previously provided HEP. Will issue more comprehensive program next session  Exercises were reviewed and JO ANN was able to demonstrate them prior to the end of the session.  JO ANN demonstrated good  understanding of the education provided.   See EMR under Patient Instructions for exercises provided 2/26/2025.    Assessment   Pt with some decrease in measures for trunk girth, however still continues with lymphedema in left breast and L axillary region and fullness in R axilla as well, . Pt with left breast lymphedema and fullness lower abdominal area. Pt has had pelvic floor therapy in the past and was encourage to engage her pelvic floor to prevent abdominal bulging. Will continue ultrasound to abdominal scaring, left breast.  Pt has compression bras and wearing daily, also has a Bioflect tank but has misplaced it.  Pt would benefit from a pneumatic compression pump with vest component to work on swelling in left breast and B sub-axillary area. Patient has not seen significant improvement after four plus weeks of conservative therapy to include compression, elevation, exercise, MLD, skin care and diet as well as compliance to medication regimen. Pt chest L breast and B axilla has hyperplasia  and needs a compression device for home management.     Will add more rotator cuff strengthening next session.     Concepcion is a 55 y.o. female referred to outpatient physical therapy with a medical diagnosis of 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, estrogen receptor positive,  At Risk for Lymphedema with surgical procedure performed on  on 3/19/2025. Pt was seen today post-operatively to re-assess strength and ROM of BUEs, to reassess baseline circumferential measurements of BUEs to aid in the early detection of lymphedema post-operatively, and to provide pt education on exercises/precautions post-operative. Pt does exhibit any ROM impairments to L UE with limitations with shoulder flexion and shoulder abduction movements as well as rotator cuff and scapular stabilization weakness. Patient also presents with R subaxillary/truncal swelling compared to L side and would benefit from decongestive manual lymphatic drainage to assist with lymphatic support. Recommended to pt to continue wear compression sports bra at this time. This pt will benefit from skilled PT for address impairments following surgery such as pain, limited ROM, or decreased mobility.     Patient has not seen significant improvement after four plus weeks of conservative therapy to include compression, elevation, exercise, MLD, skin care and diet as well as compliance to medication regimen. Pt's chest is hyperpigmented and fibrosis present and needs a compression device for home management.    Plan of care discussed with patient: Yes  Pt's  spiritual, cultural and educational needs considered and patient is agreeable to the plan of care and goals as stated below:       Anticipated barriers for therapy: none    Garments:  5/21/2025 PT to request order for post-mastectomy compression bra and send to [employer] to schedule appointment, they will need to check benefits for coverage.  5/29/2025 orders routed to [employer] and requested order for bilateral Solaris full bra swell spots in size medium.  6/2/2025 Routed orders to [employer] today for swell spots at sub axillary region B, sized at last tx for Solaris full bra swell spots   Sent order to [employer] for post mastectomy bra fitting they have called her and starting process of getting her seen at Cancer center.   6/9/2025 ar routed signed orders on solaris swell spots to [facility] today.  Pump:   7/24/2025 sent request to [facility] to check insurance benefits for a pneumatic compression pump with vest component. SD    GOALS  Short Term Goals: 3 months  Pt to be seen for reassessment in 6-8 weeks after surgery.  Pt will demonstrate 100% knowledge of lymphedema precautions and signs of infection.  Pt to obtain compression garments for prophylactic concerns according to APTA clinical guidelines published in Journal of Physical Therapy.     Long Term Goals: deferred     Updated POC: 5/9/2025-7/09/2025  Short Term goals: 4 weeks  1. Patient will demonstrate 100% understanding of lymphedema risk reduction practices to include self monitoring for lymphedema. GOAL MET 6/9/2025  2. Patient will demonstrate independence with Home Exercise program established. GOAL MET 6/9/2025  3. Pt will increase AROM/PROM in shoulder abduction ROM to 150 degrees bilaterally to improve functional reach, carry, push, pull pain free. GOAL MET 6/9/2025  4. Pt will increase AROM/PROM in shoulder flexion to 160 degrees bilaterally to improve functional reach, carry, push, pull  pain free.GOAL MET 6/9/2025  5. Pt will increase strength to >/= 4+ in gross UE musculature to improve tolerance to all functional activities pain free. (GOAL MET 7/30/2025     Long Term Goals: 6 weeks   1.  Pt will increase AROM/PROM in shoulder flexion to 170 degrees bilaterally to improve functional reach, carry, push, pull pain free. GOAL MET 6/9/2025  2. Pt will increase strength to 4+/5 in gross UE musculature to improve tolerance to all functional activities pain free. GOAL MET 7/30/2025  3. Pt will demonstrate full/maximized tissue mobility to increase ROM and promote healthy tissue to be pain free at discharge. In progress 7/30/2025  4. Pt will report decrease in overall worst pain to 2/10 at discharge. (progressing, not met) in progress 7/30/2025  5. Pt will increase AROM/PROM in shoulder abduction ROM to 170 degrees bilaterally to improve functional reach, carry, push, pull pain free. GOAL MET 6/9/2025  6. Patient will report compliance with walking program 5x week for 30 min each day to improve overall cardiovascular function and decrease cancer related fatigue at discharge. GOAL MET 6/9/2025     Plan   Recommend continuation of PT 1-2 visits per week x 4 weeks all goals in progress 67/30/2025 - 8/30/2025  [facility] does not carry sigvaris: will send orders for jobst unilateral post-mastectomy pad-power dry in size large buff color to address L breast and axillary lymphedema  Continue with established plan of care working toward PT goals.    Dorie Mcclure, PT, CLT

## 2025-08-06 ENCOUNTER — OFFICE VISIT (OUTPATIENT)
Dept: HEMATOLOGY/ONCOLOGY | Facility: CLINIC | Age: 55
End: 2025-08-06
Payer: COMMERCIAL

## 2025-08-06 VITALS
TEMPERATURE: 98 F | WEIGHT: 211.19 LBS | DIASTOLIC BLOOD PRESSURE: 95 MMHG | HEIGHT: 68 IN | SYSTOLIC BLOOD PRESSURE: 157 MMHG | HEART RATE: 75 BPM | BODY MASS INDEX: 32.01 KG/M2 | OXYGEN SATURATION: 98 % | RESPIRATION RATE: 17 BRPM

## 2025-08-06 DIAGNOSIS — R45.86 MOOD CHANGE: ICD-10-CM

## 2025-08-06 DIAGNOSIS — C50.211 MALIGNANT NEOPLASM OF UPPER-INNER QUADRANT OF RIGHT BREAST IN FEMALE, ESTROGEN RECEPTOR POSITIVE: Primary | ICD-10-CM

## 2025-08-06 DIAGNOSIS — M81.0 OSTEOPOROSIS WITHOUT CURRENT PATHOLOGICAL FRACTURE, UNSPECIFIED OSTEOPOROSIS TYPE: ICD-10-CM

## 2025-08-06 DIAGNOSIS — Z17.0 MALIGNANT NEOPLASM OF UPPER-INNER QUADRANT OF RIGHT BREAST IN FEMALE, ESTROGEN RECEPTOR POSITIVE: Primary | ICD-10-CM

## 2025-08-06 DIAGNOSIS — N95.1 MENOPAUSAL STATE: ICD-10-CM

## 2025-08-06 DIAGNOSIS — Z79.811 USE OF ANASTROZOLE: ICD-10-CM

## 2025-08-06 PROCEDURE — 99999 PR PBB SHADOW E&M-EST. PATIENT-LVL IV: CPT | Mod: PBBFAC,,, | Performed by: INTERNAL MEDICINE

## 2025-08-06 PROCEDURE — 3077F SYST BP >= 140 MM HG: CPT | Mod: CPTII,S$GLB,, | Performed by: INTERNAL MEDICINE

## 2025-08-06 PROCEDURE — 3008F BODY MASS INDEX DOCD: CPT | Mod: CPTII,S$GLB,, | Performed by: INTERNAL MEDICINE

## 2025-08-06 PROCEDURE — 3044F HG A1C LEVEL LT 7.0%: CPT | Mod: CPTII,S$GLB,, | Performed by: INTERNAL MEDICINE

## 2025-08-06 PROCEDURE — 1159F MED LIST DOCD IN RCRD: CPT | Mod: CPTII,S$GLB,, | Performed by: INTERNAL MEDICINE

## 2025-08-06 PROCEDURE — 3080F DIAST BP >= 90 MM HG: CPT | Mod: CPTII,S$GLB,, | Performed by: INTERNAL MEDICINE

## 2025-08-06 PROCEDURE — 99215 OFFICE O/P EST HI 40 MIN: CPT | Mod: S$GLB,,, | Performed by: INTERNAL MEDICINE

## 2025-08-06 RX ORDER — VENLAFAXINE HYDROCHLORIDE 37.5 MG/1
37.5 CAPSULE, EXTENDED RELEASE ORAL DAILY
Qty: 90 CAPSULE | Refills: 1 | Status: SHIPPED | OUTPATIENT
Start: 2025-08-06 | End: 2026-08-06

## 2025-08-06 NOTE — PROGRESS NOTES
Name: Concepcion De La Vega  MRN:  [id number]  :  1970 Age 55 y.o.  Date of Service: 2025    Reason for visit:  Concepcion De La Vega is a 55 y.o. female here regarding the diagnosis below:  approximate 6 week start from Anastrozole    #Right Breast Invasive Ductal Carcinoma   Date of Original Diagnosis: 2/10/25  Original Stage: Stage 1A pT1b pN0(sn) % NC 10% HER2 1+/negative grade 1 Ki67 10% Oncotype 24   Current Sites of Disease: none  Current Goals of Therapy: curative  Current Therapy: Anastrozole started 25    Oncologic History/History of Present Illness:   Detected by imaging    25 Mammogram with US:  Mass: Right breast 5 mm x 4 mm x 4 mm mass at the posterior depth, 1 o'clock     2/10/25 Biopsy + for invasive disease     Family history of cancer:  Maternal Grandmother - Unknown Female Cancer   Father - Prostate Cancer  Paternal Aunt - Bone Cancer   Paternal Aunt - Lung Cancer   Paternal Aunt - GI Cancer   Maternal Uncle - Lung Cancer   Paternal Uncle GI Cancer   Maternal Cousin - Breast Cancer   Maternal Cousin - Prostate Cancer       Menarche at age 12. . Age at first live birth 26. did breast feed 1 year. LMP N/A. OCP-N/A. Menopause at 50's. HRT-Denies.  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 With KAITLYNN nadine Carver)    25- establishes care with me, healing fairly well presents to discuss endocrine therapy, no breast concerns     Today:  25  Ms. De La Vega presents to the clinic today for Anastrozole check.  She endorses increased agitation and not feeling like herself.    Reports she doesn't like the lymphedema in the medial portion of her left breast, planning to talk to Dr. Lama about it, has already completed PT and has a lymphedema pump on the way.       PHYSICAL EXAMINATION:   BP (!) 157/95 (BP Location: Left arm, Patient Position: Sitting)   Pulse 75    "Temp 98.1 °F (36.7 °C) (Temporal)   Resp 17   Ht 5' 7.5" (1.715 m)   Wt 95.8 kg (211 lb 3.2 oz)   SpO2 98%   BMI 32.59 kg/m²   Wt Readings from Last 3 Encounters:   08/06/25 95.8 kg (211 lb 3.2 oz)   06/18/25 95.4 kg (210 lb 5.1 oz)   04/21/25 96.9 kg (213 lb 10 oz)     ECOG PERFORMANCE STATUS: 1  Physical Exam   General:  Well-appearing, nontoxic  Eyes:  Equal and round pupils, EOMI, no scleral icterus  Mouth:  No lesions, moist  Cardiovascular:  RRR, warm, well-perfused, no peripheral edema  Lungs:  CTA, unlabored on room air, no wheezing  Neurologic:  Awake, alert and oriented, participating in the exam  Psych:  Appropriate mood and affect  Skin:  Normal pallor, No rashes  Heme:  No petechiae, no purpura  Breasts: Post-mastectomy; KAITLYNN, chest soft/NT, incisions well healed; free of masses/lesions; axillae wnl bilateral.    LABORATORY:  CBC  Lab Results   Component Value Date    WBC 8.80 06/16/2025    HGB 13.0 06/16/2025    HCT 39.1 06/16/2025    MCV 88 06/16/2025     06/16/2025       BMP  Lab Results   Component Value Date     06/16/2025    K 4.1 06/16/2025     06/16/2025    CO2 26 06/16/2025    BUN 12 06/16/2025    CREATININE 0.9 06/16/2025    CALCIUM 9.5 06/16/2025    ANIONGAP 11 06/16/2025    ESTGFRAFRICA >60 01/18/2022    EGFRNONAA >60 01/18/2022         ASSESSMENT AND PLAN:  Concepcion De La Vega is a 55 y.o. female with the diagnosis as listed below.  Management as indicated.     #Right Breast Invasive Ductal Carcinoma   Date of Original Diagnosis: 2/10/25  Original Stage: Stage 1A pT1b pN0(sn) % SD 10% HER2 1+/negative grade 1 Ki67 10% Oncotype 24   Current Sites of Disease: none  Current Goals of Therapy: curative  Current Therapy: Anastrozole started 04/21/25      Checking hormones due to age; clinically post menopausal     I counseled the patient regarding the stage of her disease in the role of adjuvant endocrine therapy therapy for HR+ breast cancer..  I counseled the patient " regarding the proposed endocrine therapy of anastrozole 1 mg once daily for a duration of 5 years to prevent breast cancer recurrence in her ipsilateral breast and protect the contralateral breast.  I counseled the patient regarding the typical side effects of anastrozole including joint stiffness, hot flashes, vaginal dryness, bone demineralization.    Given she is MI low and oncotype of 24, ideally would like to use an AI if she tolerate it.  Can try exemestane if she needs to switch.     06/18/25:  Tolerating Anastrozole with some increased agitation; willing to try low dose SSRI, formerly had low anorgasmia with SSRIs but states she currently isn't feeling like herself and wants to try anything at this point; Effexor 37.5 send to pharmacy.     #Bone Health--Osteoporosis   -patient will be on endocrine therapy x 5 years, will get baseline DEXA scan to assess bone health  06/18/25:  BMD/Trabecular score reviewed as Osteoporosis; Prolia discussed & plan submitted for auth; dental clearance form given to patient, she plans to get this back to us next visit for treatment.     #Lipids- at risk for hyperlipidemia while on endocrine therapy; advise to continue routine PCP visits with lipid checks and initiate cholesterol medication if indicated.       Route Chart for Scheduling    Med Onc Chart Routing      Follow up with physician 6 months. in a gown for exam no labs   Follow up with SHERI 3 months. with cbc,cmp and first denosumab injection, in a gown for exam   Infusion scheduling note   denosumab in 3 months; she plans to bring her dental clearance form with her to the visit   Injection scheduling note    Labs    Imaging    Pharmacy appointment    Other referrals                  Therapy Plan Information  DENOSUMAB (PROLIA) Q6M for Osteoporosis without current pathological fracture, noted on 6/18/2025  Medications  denosumab (PROLIA) injection 60 mg  60 mg, Subcutaneous, Every 26 weeks      No therapy plan of the  specified type found.    No therapy plan of the specified type found.

## 2025-08-07 DIAGNOSIS — Z17.0 MALIGNANT NEOPLASM OF UPPER-INNER QUADRANT OF RIGHT BREAST IN FEMALE, ESTROGEN RECEPTOR POSITIVE: Primary | ICD-10-CM

## 2025-08-07 DIAGNOSIS — C50.211 MALIGNANT NEOPLASM OF UPPER-INNER QUADRANT OF RIGHT BREAST IN FEMALE, ESTROGEN RECEPTOR POSITIVE: Primary | ICD-10-CM

## 2025-08-12 ENCOUNTER — TELEPHONE (OUTPATIENT)
Dept: REHABILITATION | Facility: HOSPITAL | Age: 55
End: 2025-08-12
Payer: COMMERCIAL

## 2025-08-13 ENCOUNTER — CLINICAL SUPPORT (OUTPATIENT)
Dept: REHABILITATION | Facility: HOSPITAL | Age: 55
End: 2025-08-13
Payer: COMMERCIAL

## 2025-08-13 DIAGNOSIS — C50.211 MALIGNANT NEOPLASM OF UPPER-INNER QUADRANT OF RIGHT BREAST IN FEMALE, ESTROGEN RECEPTOR POSITIVE: ICD-10-CM

## 2025-08-13 DIAGNOSIS — Z17.0 MALIGNANT NEOPLASM OF UPPER-INNER QUADRANT OF RIGHT BREAST IN FEMALE, ESTROGEN RECEPTOR POSITIVE: ICD-10-CM

## 2025-08-13 DIAGNOSIS — I89.0 LYMPHEDEMA: Primary | ICD-10-CM

## 2025-08-13 PROCEDURE — 97140 MANUAL THERAPY 1/> REGIONS: CPT | Mod: PN

## 2025-08-13 PROCEDURE — 97035 APP MDLTY 1+ULTRASOUND EA 15: CPT | Mod: PN

## 2025-08-15 ENCOUNTER — PATIENT MESSAGE (OUTPATIENT)
Dept: HEMATOLOGY/ONCOLOGY | Facility: CLINIC | Age: 55
End: 2025-08-15
Payer: COMMERCIAL

## 2025-08-19 ENCOUNTER — PATIENT MESSAGE (OUTPATIENT)
Dept: ADMINISTRATIVE | Facility: HOSPITAL | Age: 55
End: 2025-08-19
Payer: COMMERCIAL

## 2025-09-03 ENCOUNTER — TELEPHONE (OUTPATIENT)
Dept: REHABILITATION | Facility: HOSPITAL | Age: 55
End: 2025-09-03
Payer: COMMERCIAL